# Patient Record
Sex: FEMALE | Race: WHITE | NOT HISPANIC OR LATINO | Employment: PART TIME | URBAN - METROPOLITAN AREA
[De-identification: names, ages, dates, MRNs, and addresses within clinical notes are randomized per-mention and may not be internally consistent; named-entity substitution may affect disease eponyms.]

---

## 2017-01-13 ENCOUNTER — OFFICE VISIT (OUTPATIENT)
Dept: FAMILY MEDICINE CLINIC | Facility: CLINIC | Age: 62
End: 2017-01-13

## 2017-01-13 VITALS
WEIGHT: 243.2 LBS | DIASTOLIC BLOOD PRESSURE: 86 MMHG | BODY MASS INDEX: 38.17 KG/M2 | HEIGHT: 67 IN | TEMPERATURE: 98.1 F | SYSTOLIC BLOOD PRESSURE: 124 MMHG

## 2017-01-13 DIAGNOSIS — T63.301D SPIDER BITE, ACCIDENTAL OR UNINTENTIONAL, SUBSEQUENT ENCOUNTER: Primary | ICD-10-CM

## 2017-01-13 PROCEDURE — 99213 OFFICE O/P EST LOW 20 MIN: CPT | Performed by: FAMILY MEDICINE

## 2017-01-13 RX ORDER — DAPSONE 100 MG/1
TABLET ORAL
Qty: 6 TABLET | Refills: 0 | Status: SHIPPED | OUTPATIENT
Start: 2017-01-13 | End: 2017-09-29

## 2017-01-13 NOTE — PROGRESS NOTES
Chief Complaint   Patient presents with   • Cellulitis     follow up       Subjective.../HPI  Patient present today with cellullitis from spider bite on lower abdomen.    I have reviewed the patient's medical history in detail and updated the computerized patient record.    No family history on file.    Social History     Social History   • Marital status:      Spouse name: N/A   • Number of children: N/A   • Years of education: N/A     Occupational History   • Not on file.     Social History Main Topics   • Smoking status: Former Smoker   • Smokeless tobacco: Never Used   • Alcohol use No   • Drug use: No   • Sexual activity: Defer     Other Topics Concern   • Not on file     Social History Narrative       Review of Systems:   Review of Systems   Constitutional: Negative for chills, fatigue, fever and unexpected weight change.   HENT: Negative for ear pain, hearing loss, sinus pressure, sore throat and tinnitus.    Eyes: Negative for pain, discharge and redness.   Respiratory: Negative for cough, shortness of breath and wheezing.    Cardiovascular: Negative for chest pain, palpitations and leg swelling.   Gastrointestinal: Negative for abdominal pain, constipation, diarrhea and nausea.   Endocrine: Negative for cold intolerance and heat intolerance.   Genitourinary: Negative for difficulty urinating, flank pain and urgency.   Musculoskeletal: Negative for back pain, joint swelling and myalgias.   Skin: Negative for rash and wound.   Allergic/Immunologic: Negative for environmental allergies and food allergies.   Neurological: Negative for dizziness, seizures, numbness and headaches.   Hematological: Negative for adenopathy. Does not bruise/bleed easily.   Psychiatric/Behavioral: Negative for decreased concentration, dysphoric mood and sleep disturbance. The patient is not nervous/anxious.    All other systems reviewed and are negative.      Objective:  Vital Signs     Vitals:    01/13/17 1626   BP: 124/86  "  BP Location: Left arm   Patient Position: Sitting   Temp: 98.1 °F (36.7 °C)   TempSrc: Oral   Weight: 243 lb 3.2 oz (110 kg)   Height: 67\" (170.2 cm)     Physical Exam   Constitutional: She is oriented to person, place, and time. She appears well-developed and well-nourished.   Eyes: Pupils are equal, round, and reactive to light.   Neck: Normal range of motion.   Cardiovascular: Normal rate.    Pulmonary/Chest: Effort normal.   Abdominal: Soft.   Musculoskeletal: Normal range of motion.   Neurological: She is alert and oriented to person, place, and time.   Skin: Skin is warm and dry. Rash (large lesion is 3x2 cm) noted.   Lower abd        Results Review:      REVIEWED AND DISCUSSED CLINICAL RESULTS WITH PATIENT                          Current Outpatient Prescriptions:   •  azelastine (OPTIVAR) 0.05 % ophthalmic solution, Apply  to eye 2 (Two) Times a Day. 1 (one) solution, Disp: , Rfl:   •  JASON 5-40 MG per tablet, Take 1 tablet by mouth daily., Disp: 30 tablet, Rfl: 11  •  ENABLEX 15 MG 24 hr tablet, Take 1 tablet by mouth daily., Disp: 30 tablet, Rfl: 11  •  Halobetasol Propionate (ULTRAVATE EX), Apply 45 g topically 2 (Two) Times a Day. Med Name: Ultravate, 0.05%, Disp: , Rfl:   •  mupirocin (BACTROBAN) 2 % ointment, APPLY TO AFFECTED AREA 3 TIMES A DAY, Disp: , Rfl: 2  •  SEROQUEL XR 50 MG tablet sustained-release 24 hour tablet, , Disp: , Rfl:   •  dapsone 100 MG tablet, 1/2 tablet x 2 days and 1 tablet x 5 days, Disp: 6 tablet, Rfl: 0    Procedures    Assessment/Plan     Diagnoses and all orders for this visit:    Spider bite, accidental or unintentional, subsequent encounter    Other orders  -     dapsone 100 MG tablet; 1/2 tablet x 2 days and 1 tablet x 5 days         Asif Robertson Jr., MD  01/13/17  6:05 PM          "

## 2017-01-16 ENCOUNTER — TELEPHONE (OUTPATIENT)
Dept: FAMILY MEDICINE CLINIC | Facility: CLINIC | Age: 62
End: 2017-01-16

## 2017-01-16 RX ORDER — LEVOFLOXACIN 500 MG/1
500 TABLET, FILM COATED ORAL DAILY
Qty: 19 TABLET | Refills: 0 | Status: SHIPPED | OUTPATIENT
Start: 2017-01-16 | End: 2019-05-01

## 2017-01-16 NOTE — TELEPHONE ENCOUNTER
----- Message from Venus Muhammad sent at 1/16/2017  9:34 AM EST -----   [phone]  [facility] [phone]  Allergic to sulfa and codeine drugs  She saw dr goodman on the 13th and was given dapsone and they said it contains sulfa drug and she is allergic to it. And wants to know if she can get something else called in for her.

## 2017-07-17 RX ORDER — AMLODIPINE BESYLATE AND OLMESARTAN MEDOXOMIL 5; 40 MG/1; MG/1
1 TABLET, FILM COATED ORAL DAILY
Qty: 30 TABLET | Refills: 3 | Status: SHIPPED | OUTPATIENT
Start: 2017-07-17 | End: 2017-07-17 | Stop reason: SDUPTHER

## 2017-07-17 RX ORDER — AMLODIPINE BESYLATE AND OLMESARTAN MEDOXOMIL 5; 40 MG/1; MG/1
1 TABLET, FILM COATED ORAL DAILY
Qty: 30 TABLET | Refills: 3 | Status: SHIPPED | OUTPATIENT
Start: 2017-07-17 | End: 2017-07-18 | Stop reason: SDUPTHER

## 2017-07-18 RX ORDER — AMLODIPINE BESYLATE AND OLMESARTAN MEDOXOMIL 5; 40 MG/1; MG/1
1 TABLET, FILM COATED ORAL DAILY
Qty: 30 TABLET | Refills: 3 | Status: SHIPPED | OUTPATIENT
Start: 2017-07-18 | End: 2017-09-29 | Stop reason: SDUPTHER

## 2017-09-29 ENCOUNTER — OFFICE VISIT (OUTPATIENT)
Dept: FAMILY MEDICINE CLINIC | Facility: CLINIC | Age: 62
End: 2017-09-29

## 2017-09-29 VITALS
DIASTOLIC BLOOD PRESSURE: 60 MMHG | TEMPERATURE: 97.5 F | BODY MASS INDEX: 38.48 KG/M2 | HEART RATE: 65 BPM | HEIGHT: 67 IN | WEIGHT: 245.2 LBS | SYSTOLIC BLOOD PRESSURE: 110 MMHG | OXYGEN SATURATION: 95 %

## 2017-09-29 DIAGNOSIS — N32.81 OVERACTIVE BLADDER: ICD-10-CM

## 2017-09-29 DIAGNOSIS — Z23 IMMUNIZATION DUE: Primary | ICD-10-CM

## 2017-09-29 DIAGNOSIS — I10 ESSENTIAL HYPERTENSION: ICD-10-CM

## 2017-09-29 PROCEDURE — 90686 IIV4 VACC NO PRSV 0.5 ML IM: CPT | Performed by: FAMILY MEDICINE

## 2017-09-29 PROCEDURE — 90471 IMMUNIZATION ADMIN: CPT | Performed by: FAMILY MEDICINE

## 2017-09-29 PROCEDURE — 99213 OFFICE O/P EST LOW 20 MIN: CPT | Performed by: FAMILY MEDICINE

## 2017-09-29 RX ORDER — AMLODIPINE BESYLATE AND OLMESARTAN MEDOXOMIL 5; 40 MG/1; MG/1
1 TABLET, FILM COATED ORAL DAILY
Qty: 30 TABLET | Refills: 11 | Status: SHIPPED | OUTPATIENT
Start: 2017-09-29 | End: 2017-09-29 | Stop reason: SDUPTHER

## 2017-09-29 RX ORDER — AMLODIPINE BESYLATE AND OLMESARTAN MEDOXOMIL 5; 40 MG/1; MG/1
1 TABLET, FILM COATED ORAL DAILY
Qty: 30 TABLET | Refills: 11 | Status: SHIPPED | OUTPATIENT
Start: 2017-09-29 | End: 2018-05-16 | Stop reason: SDUPTHER

## 2017-09-29 RX ORDER — AZELASTINE HYDROCHLORIDE 0.5 MG/ML
1 SOLUTION/ DROPS OPHTHALMIC 2 TIMES DAILY
Qty: 1 EACH | Refills: 11 | Status: SHIPPED | OUTPATIENT
Start: 2017-09-29 | End: 2020-05-01 | Stop reason: HOSPADM

## 2017-09-29 RX ORDER — FLUCONAZOLE 150 MG/1
150 TABLET ORAL ONCE
Qty: 12 TABLET | Refills: 5 | Status: SHIPPED | OUTPATIENT
Start: 2017-09-29 | End: 2017-09-29

## 2017-09-29 NOTE — PROGRESS NOTES
Subjective.../ HPI  I have reviewed the patient's medical history in detail and updated the computerized patient record.    Subjective: Francheska Sherman is a 62 y.o. female presents here today for-    Hypertension (follow up pt is doing well.no complains)  Francheska has a history of chronic hypertension and has been well controlled on current medications.  Patient reports has had hypertension for 4 years. She is tolerating medications without side effect. She reports no vision changes, headaches or lightheadedness. She is requesting refills of medications    History reviewed. No pertinent family history.    Social History     Social History   • Marital status:      Spouse name: N/A   • Number of children: N/A   • Years of education: N/A     Occupational History   • Not on file.     Social History Main Topics   • Smoking status: Former Smoker   • Smokeless tobacco: Never Used   • Alcohol use No   • Drug use: No   • Sexual activity: Defer     Other Topics Concern   • Not on file     Social History Narrative       Review of Systems   Constitutional: Negative.    HENT: Negative.    Eyes: Negative.    Respiratory: Negative.    Cardiovascular: Negative.    Gastrointestinal: Negative.    Endocrine: Negative.    Genitourinary: Negative.    Musculoskeletal: Negative.    Skin: Negative.    Allergic/Immunologic: Negative.    Neurological: Negative.    Hematological: Negative.    Psychiatric/Behavioral: Negative.        Physical Exam   Constitutional: She is oriented to person, place, and time. She appears well-developed and well-nourished.   Cardiovascular: Normal rate, regular rhythm, normal heart sounds and intact distal pulses.    Pulmonary/Chest: Effort normal and breath sounds normal.   Neurological: She is alert and oriented to person, place, and time.   Vitals reviewed.    Procedures    Francheska was seen today for hypertension.    Diagnoses and all orders for this visit:    Immunization due  -     Flu Vaccine Quad PF  3YR+    Overactive bladder  -     ENABLEX 15 MG 24 hr tablet; Take 1 tablet by mouth Daily.    Essential hypertension  -     Discontinue: JASON 5-40 MG per tablet; Take 1 tablet by mouth Daily.  -     JASON 5-40 MG per tablet; Take 1 tablet by mouth Daily.        Requested Prescriptions     Signed Prescriptions Disp Refills   • ENABLEX 15 MG 24 hr tablet 30 tablet 11     Sig: Take 1 tablet by mouth Daily.   • JASON 5-40 MG per tablet 30 tablet 11     Sig: Take 1 tablet by mouth Daily.       Results Review:    REVIEWED AND DISCUSSED CLINICAL RESULTS WITH PATIENT    Return in about 6 months (around 3/29/2018) for Recheck.

## 2017-10-12 ENCOUNTER — TELEPHONE (OUTPATIENT)
Dept: FAMILY MEDICINE CLINIC | Facility: CLINIC | Age: 62
End: 2017-10-12

## 2017-10-12 NOTE — TELEPHONE ENCOUNTER
Patients  called upset over cost of JASON 5-40mg over $200.00 can't afford please advise  Last office visit 9/29/17 she will be out of medication by the weekend, we have no samples of her dose.

## 2017-10-13 RX ORDER — IRBESARTAN 150 MG/1
150 TABLET ORAL NIGHTLY
Qty: 30 TABLET | Refills: 6 | Status: SHIPPED | OUTPATIENT
Start: 2017-10-13 | End: 2018-05-18

## 2017-10-13 RX ORDER — AMLODIPINE BESYLATE 5 MG/1
5 TABLET ORAL DAILY
Qty: 30 TABLET | Refills: 6 | Status: SHIPPED | OUTPATIENT
Start: 2017-10-13 | End: 2018-05-18

## 2018-05-16 DIAGNOSIS — N32.81 OVERACTIVE BLADDER: ICD-10-CM

## 2018-05-16 DIAGNOSIS — I10 ESSENTIAL HYPERTENSION: ICD-10-CM

## 2018-05-16 RX ORDER — AMLODIPINE BESYLATE AND OLMESARTAN MEDOXOMIL 5; 40 MG/1; MG/1
1 TABLET, FILM COATED ORAL DAILY
Qty: 30 TABLET | Refills: 11 | Status: SHIPPED | OUTPATIENT
Start: 2018-05-16 | End: 2019-05-01 | Stop reason: ALTCHOICE

## 2018-05-18 RX ORDER — SOLIFENACIN SUCCINATE 10 MG/1
10 TABLET, FILM COATED ORAL DAILY
Qty: 30 TABLET | Refills: 1 | Status: SHIPPED | OUTPATIENT
Start: 2018-05-18 | End: 2019-05-01

## 2018-11-13 DIAGNOSIS — N32.81 OVERACTIVE BLADDER: ICD-10-CM

## 2019-01-31 ENCOUNTER — HOSPITAL ENCOUNTER (EMERGENCY)
Facility: HOSPITAL | Age: 64
Discharge: PSYCHIATRIC OR SUBSTANCE USE FACILITY | End: 2019-02-01
Attending: EMERGENCY MEDICINE | Admitting: EMERGENCY MEDICINE

## 2019-01-31 LAB
ALBUMIN SERPL-MCNC: 4.5 G/DL (ref 3.5–5.2)
ALBUMIN/GLOB SERPL: 1.2 G/DL
ALP SERPL-CCNC: 39 U/L (ref 39–117)
ALT SERPL W P-5'-P-CCNC: 46 U/L (ref 1–33)
AMORPH URATE CRY URNS QL MICRO: ABNORMAL /HPF
AMPHET+METHAMPHET UR QL: NEGATIVE
ANION GAP SERPL CALCULATED.3IONS-SCNC: 14.7 MMOL/L
AST SERPL-CCNC: 35 U/L (ref 1–32)
BACTERIA UR QL AUTO: ABNORMAL /HPF
BARBITURATES UR QL SCN: NEGATIVE
BASOPHILS # BLD AUTO: 0.02 10*3/MM3 (ref 0–0.2)
BASOPHILS NFR BLD AUTO: 0.2 % (ref 0–1.5)
BENZODIAZ UR QL SCN: NEGATIVE
BILIRUB SERPL-MCNC: 0.3 MG/DL (ref 0.1–1.2)
BILIRUB UR QL STRIP: NEGATIVE
BUN BLD-MCNC: 31 MG/DL (ref 8–23)
BUN/CREAT SERPL: 27.9 (ref 7–25)
CALCIUM SPEC-SCNC: 9.9 MG/DL (ref 8.6–10.5)
CANNABINOIDS SERPL QL: NEGATIVE
CHLORIDE SERPL-SCNC: 103 MMOL/L (ref 98–107)
CLARITY UR: ABNORMAL
CO2 SERPL-SCNC: 23.3 MMOL/L (ref 22–29)
COCAINE UR QL: NEGATIVE
COLOR UR: YELLOW
CREAT BLD-MCNC: 1.11 MG/DL (ref 0.57–1)
DEPRECATED RDW RBC AUTO: 53.6 FL (ref 37–54)
EOSINOPHIL # BLD AUTO: 0.2 10*3/MM3 (ref 0–0.7)
EOSINOPHIL NFR BLD AUTO: 2 % (ref 0.3–6.2)
ERYTHROCYTE [DISTWIDTH] IN BLOOD BY AUTOMATED COUNT: 15.3 % (ref 11.7–13)
ETHANOL BLD-MCNC: <10 MG/DL (ref 0–10)
ETHANOL UR QL: <0.01 %
GFR SERPL CREATININE-BSD FRML MDRD: 50 ML/MIN/1.73
GLOBULIN UR ELPH-MCNC: 3.8 GM/DL
GLUCOSE BLD-MCNC: 106 MG/DL (ref 65–99)
GLUCOSE UR STRIP-MCNC: NEGATIVE MG/DL
HCT VFR BLD AUTO: 46.3 % (ref 35.6–45.5)
HGB BLD-MCNC: 14.3 G/DL (ref 11.9–15.5)
HGB UR QL STRIP.AUTO: ABNORMAL
HYALINE CASTS UR QL AUTO: ABNORMAL /LPF
IMM GRANULOCYTES # BLD AUTO: 0.03 10*3/MM3 (ref 0–0.03)
IMM GRANULOCYTES NFR BLD AUTO: 0.3 % (ref 0–0.5)
KETONES UR QL STRIP: ABNORMAL
LEUKOCYTE ESTERASE UR QL STRIP.AUTO: ABNORMAL
LYMPHOCYTES # BLD AUTO: 2.29 10*3/MM3 (ref 0.9–4.8)
LYMPHOCYTES NFR BLD AUTO: 22.8 % (ref 19.6–45.3)
MCH RBC QN AUTO: 29.7 PG (ref 26.9–32)
MCHC RBC AUTO-ENTMCNC: 30.9 G/DL (ref 32.4–36.3)
MCV RBC AUTO: 96.1 FL (ref 80.5–98.2)
METHADONE UR QL SCN: NEGATIVE
MONOCYTES # BLD AUTO: 0.85 10*3/MM3 (ref 0.2–1.2)
MONOCYTES NFR BLD AUTO: 8.5 % (ref 5–12)
NEUTROPHILS # BLD AUTO: 6.64 10*3/MM3 (ref 1.9–8.1)
NEUTROPHILS NFR BLD AUTO: 66.2 % (ref 42.7–76)
NITRITE UR QL STRIP: NEGATIVE
OPIATES UR QL: NEGATIVE
OXYCODONE UR QL SCN: NEGATIVE
PH UR STRIP.AUTO: <=5 [PH] (ref 5–8)
PLATELET # BLD AUTO: 279 10*3/MM3 (ref 140–500)
PMV BLD AUTO: 11.5 FL (ref 6–12)
POTASSIUM BLD-SCNC: 4.2 MMOL/L (ref 3.5–5.2)
PROT SERPL-MCNC: 8.3 G/DL (ref 6–8.5)
PROT UR QL STRIP: NEGATIVE
RBC # BLD AUTO: 4.82 10*6/MM3 (ref 3.9–5.2)
RBC # UR: ABNORMAL /HPF
REF LAB TEST METHOD: ABNORMAL
SODIUM BLD-SCNC: 141 MMOL/L (ref 136–145)
SP GR UR STRIP: 1.03 (ref 1–1.03)
SQUAMOUS #/AREA URNS HPF: ABNORMAL /HPF
UROBILINOGEN UR QL STRIP: ABNORMAL
WBC NRBC COR # BLD: 10.03 10*3/MM3 (ref 4.5–10.7)
WBC UR QL AUTO: ABNORMAL /HPF

## 2019-01-31 PROCEDURE — 80307 DRUG TEST PRSMV CHEM ANLYZR: CPT | Performed by: NURSE PRACTITIONER

## 2019-01-31 PROCEDURE — 80053 COMPREHEN METABOLIC PANEL: CPT | Performed by: NURSE PRACTITIONER

## 2019-01-31 PROCEDURE — 81001 URINALYSIS AUTO W/SCOPE: CPT | Performed by: NURSE PRACTITIONER

## 2019-01-31 PROCEDURE — 85025 COMPLETE CBC W/AUTO DIFF WBC: CPT | Performed by: NURSE PRACTITIONER

## 2019-01-31 PROCEDURE — 90791 PSYCH DIAGNOSTIC EVALUATION: CPT

## 2019-01-31 PROCEDURE — 36415 COLL VENOUS BLD VENIPUNCTURE: CPT

## 2019-01-31 PROCEDURE — 99285 EMERGENCY DEPT VISIT HI MDM: CPT

## 2019-01-31 RX ORDER — NITROFURANTOIN 25; 75 MG/1; MG/1
100 CAPSULE ORAL 2 TIMES DAILY
Qty: 14 CAPSULE | Refills: 0 | Status: SHIPPED | OUTPATIENT
Start: 2019-01-31 | End: 2019-05-01

## 2019-02-01 VITALS
HEART RATE: 82 BPM | RESPIRATION RATE: 18 BRPM | DIASTOLIC BLOOD PRESSURE: 64 MMHG | TEMPERATURE: 97 F | HEIGHT: 67 IN | SYSTOLIC BLOOD PRESSURE: 101 MMHG | BODY MASS INDEX: 38.4 KG/M2 | OXYGEN SATURATION: 93 %

## 2019-02-01 NOTE — ED NOTES
This RN spoke with HELENA Sutton at [facility] regarding prescription for Macrobid not included in pt's paperwork. Script faxed to [phone] per HELENA Sutton's instructions. Lesley MALIK also verbally informed of medication, dose, frequency and amount to be dispensed. Verbalized understanding.     Hilary Hunter RN  02/01/19 5679

## 2019-02-01 NOTE — CONSULTS
"62 yo white female evaluated in ED (Room#42) BIB her  Tate. Patient affect blunted, flat. Slow to respond verbally, positive for thought blocking. Onset 4-5 days ago per . States she has only slept approximately 8-9 hours in a 72 hour period. Last psychiatric inpatient admission was 4 years ago. Currently outpatient with Dr. Ernst. Patient states she is prescribed seroquel; recently increased from 1 50mg tab to 2 50mg tabs nightly.  states patient has never been diagnosed with bipolar disorder but believes she should be. States patient has been paranoid talking about monkeys on the roof messing with the electronics. Patient has been checking the doors frequently and under the bed. Patient went missing for 14 hours yesterday and was found walking down the street in the cold. Patient states she was driving around and ran out of gas. Asked patient what her destination was and she starts talking about the \"ice on the roads so I went back and forth\". Police advised  to pursue a MIW but  brought patient to the ED instead. Patient states she is very stressed about \"everyone fighting and talking about Trump\". Denies SI. Denies HI. Does appear overtly paranoid with delusional thinking.  states last time patient had an episode like this was 4 years ago.  concerned about patient's safety. States she can't be left alone like this. 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 Royal is currently on psychiatric diversion. Spoke with intake (rosaline) at [facility] and she states we can fax evaluation to them for review. Patient and  agreeable with plan of care.  "

## 2019-02-01 NOTE — ED NOTES
Pt and spouse updated on status. DENNY gives ETA of 0230. Food and beverage offered to pt, but declined. Recliner provided for pt due to not wanting to sit on stretcher. Beverage offered to spouse also, but declined.     Hilary Hunter, RN  02/01/19 0037

## 2019-02-01 NOTE — ED NOTES
Pt to room 28 awaiting disposition and possible admission to [facility]. Spouse at bedside.     Hilary Hunter RN  01/31/19 1575

## 2019-02-01 NOTE — ED PROVIDER NOTES
Pt care taken over from ROSEMARY Tam, pending a decision regarding transfer facility and placement.     TRANSFER    Patient will be transferred to [facility] for admission. Dr. Ernst has accepted the patient as a transfer and the patient will be transferred with a copy of their ED encounter notes and any EKGs, lab and radiology results. Patient stable enough for transport at the time of transfer.    Pt/family voiced understanding of the plan for transfer for further testing/treatment as needed.    Documentation assistance provided by carmella Lei for Cr Bergeron PA-C.  Information recorded by the carmella was done at my direction and has been verified and validated by me.     Radha Lei  01/31/19 8981       Cr Bergeron PA  01/31/19 6684

## 2019-02-01 NOTE — ED NOTES
Intake called at 's request, wants to know if he can give his wife her night meds (Griffin and Seroquel) per Meng, ok to give pt her night meds. Pt and spouse updated on status.      Hilary Hunter, RN  01/31/19 4055

## 2019-04-15 ENCOUNTER — TRANSCRIBE ORDERS (OUTPATIENT)
Dept: ADMINISTRATIVE | Facility: HOSPITAL | Age: 64
End: 2019-04-15

## 2019-04-15 DIAGNOSIS — K76.0 FATTY LIVER: Primary | ICD-10-CM

## 2019-04-15 DIAGNOSIS — B19.20 HEPATITIS C VIRUS INFECTION WITHOUT HEPATIC COMA, UNSPECIFIED CHRONICITY: ICD-10-CM

## 2019-04-18 ENCOUNTER — APPOINTMENT (OUTPATIENT)
Dept: CT IMAGING | Facility: HOSPITAL | Age: 64
End: 2019-04-18

## 2019-04-18 ENCOUNTER — TRANSCRIBE ORDERS (OUTPATIENT)
Dept: ADMINISTRATIVE | Facility: HOSPITAL | Age: 64
End: 2019-04-18

## 2019-04-18 DIAGNOSIS — R10.11 RUQ PAIN: Primary | ICD-10-CM

## 2019-05-01 ENCOUNTER — OFFICE VISIT (OUTPATIENT)
Dept: CARDIOLOGY | Age: 64
End: 2019-05-01

## 2019-05-01 VITALS
HEART RATE: 80 BPM | HEIGHT: 67 IN | BODY MASS INDEX: 38.14 KG/M2 | SYSTOLIC BLOOD PRESSURE: 135 MMHG | WEIGHT: 243 LBS | DIASTOLIC BLOOD PRESSURE: 88 MMHG

## 2019-05-01 DIAGNOSIS — R94.31 ABNORMAL EKG: Primary | ICD-10-CM

## 2019-05-01 DIAGNOSIS — I10 ESSENTIAL HYPERTENSION: ICD-10-CM

## 2019-05-01 DIAGNOSIS — R06.02 SOB (SHORTNESS OF BREATH): ICD-10-CM

## 2019-05-01 PROCEDURE — 99203 OFFICE O/P NEW LOW 30 MIN: CPT | Performed by: INTERNAL MEDICINE

## 2019-05-01 NOTE — PROGRESS NOTES
Subjective:        [facility]  Cardiology Consult Note    Patient Identification:  Name: Francheska Sherman  Age: 64 y.o.  Sex: female  :  1955  MRN: [id number]             CC  ABNORMAL EKG    History of Present Illness:   64-year-old female here for the cardiac evaluation as well as establishment of the care as the patient has been found to have abnormal EKG, by the primary care physician recently, also complaining the shortness of breath on exertion    Shortness of breath there is a mild on moderate exertion relieved with rest over the several months    Comorbid cardiac risk factors:     Past Medical History:  Past Medical History:   Diagnosis Date   • ADD (attention deficit disorder)    • Chest pain    • Encounter for annual health examination     Annual Health Assessment: 14, 10/25/13   • History of mammogram 2011   • Hyperactivity of bladder    • Hypertension    • Pain of right side of body     c/o pain in right side and back     Past Surgical History:  No past surgical history on file.   Allergies:  Allergies   Allergen Reactions   • Codeine    • Sulfa Antibiotics      Home Meds:    (Not in a hospital admission)  Current Meds:   [unfilled]  Social History:   Social History     Tobacco Use   • Smoking status: Former Smoker   • Smokeless tobacco: Never Used   Substance Use Topics   • Alcohol use: No      Family History:  No family history on file.     Review of Systems    Constitutional: No weakness,fatigue, fever, rigors, chills   Eyes: No vision changes, eye pain   ENT/oropharynx: No difficulty swallowing, sore throat, epistaxis, changes in hearing   Cardiovascular: No chest pain, chest tightness, palpitations, paroxysmal nocturnal dyspnea, orthopnea, diaphoresis, dizziness / syncopal episode   Respiratory:  Mild shortness of breath, dyspnea on exertion, no cough, wheezing hemoptysis   Gastrointestinal: No abdominal pain, nausea, vomiting, diarrhea, bloody stools   Genitourinary: No  hematuria, dysuria   Neurological: No headache, tremors, numbness,  one-sided weakness    Musculoskeletal: No cramps, myalgias,  joint pain, joint swelling   Integument: No rash, edema           Constitutional:  Heart Rate:  [80] 80  BP: (135)/(88) 135/88    Physical Exam   General:  Appears in no acute distress  Eyes: PERTL,  HEENT:  No JVD. Thyroid not visibly enlarged. No mucosal pallor or cyanosis  Respiratory: Respirations regular and unlabored at rest. BBS with good air entry in all fields. No crackles, rubs or wheezes auscultated  Cardiovascular: S1S2 Regular rate and rhythm. No murmur, rub or gallop auscultated. No carotid bruits. DP/PT pulses    . No pretibial pitting edema  Gastrointestinal: Abdomen soft, flat, non tender. Bowel sounds present. No hepatosplenomegaly. No ascites  Musculoskeletal: DALLAS x4. No abnormal movements  Extremities: No digital clubbing or cyanosis  Skin: Color pink. Skin warm and dry to touch. No rashes  No xanthoma  Neuro: AAO x3 CN II-XII grossly intact          Procedures        Cardiographics  ECG:     Telemetry:    Echocardiogram:     Imaging  Chest X-ray:     Lab Review               @[email or web address]@              Assessment:/ Recommendations / Plan:   Patient Active Problem List   Diagnosis   • Strain of left ankle   • Overactive bladder   • Essential hypertension                    ICD-10-CM ICD-9-CM   1. Abnormal EKG R94.31 794.31   2. SOB (shortness of breath) R06.02 786.05   3. Essential hypertension I10 401.9     1. Abnormal EKG  Considering the patient's symptoms as well as clinical situation and  EKG findings, along with cardiac risk factors, ischemic workup is necessary to rule out ischemic cardiomyopathy, stress induced arrhythmias, and functional capacity for diagnosis as well as prognostic consideration    - Stress Test With Myocardial Perfusion (1 Day)  - Adult Transthoracic Echo Complete W/ Cont if Necessary Per Protocol    2. SOB (shortness of breath)  Considering  patient's medical condition as well as the risk factors, patient will require echocardiogram for further evaluation for the LV function, four-chamber evaluation, including the pressures, valvular function and  pericardial disease and pericardial effusion    - Stress Test With Myocardial Perfusion (1 Day)  - Adult Transthoracic Echo Complete W/ Cont if Necessary Per Protocol    3. Essential hypertension  The blood pressure stable  - Stress Test With Myocardial Perfusion (1 Day)  - Adult Transthoracic Echo Complete W/ Cont if Necessary Per Protocol       WALKING LEXISCA, ECHO    SEE IN 2-4 WKS    Labs/tests ordered for am      Karely Zee MD  5/1/2019, 3:56 PM      EMR Dragon/Transcription:   Dictated utilizing Dragon dictation

## 2019-05-06 ENCOUNTER — OFFICE (OUTPATIENT)
Dept: URBAN - METROPOLITAN AREA CLINIC 66 | Facility: CLINIC | Age: 64
End: 2019-05-06

## 2019-05-06 VITALS
WEIGHT: 245 LBS | HEIGHT: 67 IN | HEART RATE: 72 BPM | DIASTOLIC BLOOD PRESSURE: 90 MMHG | SYSTOLIC BLOOD PRESSURE: 130 MMHG

## 2019-05-06 DIAGNOSIS — R10.11 RIGHT UPPER QUADRANT PAIN: ICD-10-CM

## 2019-05-06 DIAGNOSIS — K30 FUNCTIONAL DYSPEPSIA: ICD-10-CM

## 2019-05-06 DIAGNOSIS — R19.4 CHANGE IN BOWEL HABIT: ICD-10-CM

## 2019-05-06 DIAGNOSIS — E66.9 OBESITY, UNSPECIFIED: ICD-10-CM

## 2019-05-06 DIAGNOSIS — Z12.11 ENCOUNTER FOR SCREENING FOR MALIGNANT NEOPLASM OF COLON: ICD-10-CM

## 2019-05-06 DIAGNOSIS — K21.9 GASTRO-ESOPHAGEAL REFLUX DISEASE WITHOUT ESOPHAGITIS: ICD-10-CM

## 2019-05-06 DIAGNOSIS — K76.0 FATTY (CHANGE OF) LIVER, NOT ELSEWHERE CLASSIFIED: ICD-10-CM

## 2019-05-06 PROCEDURE — 99243 OFF/OP CNSLTJ NEW/EST LOW 30: CPT | Performed by: NURSE PRACTITIONER

## 2019-05-22 ENCOUNTER — HOSPITAL ENCOUNTER (OUTPATIENT)
Dept: CARDIOLOGY | Facility: HOSPITAL | Age: 64
Discharge: HOME | End: 2019-05-22
Admitting: INTERNAL MEDICINE

## 2019-05-22 ENCOUNTER — HOSPITAL ENCOUNTER (OUTPATIENT)
Dept: CARDIOLOGY | Facility: HOSPITAL | Age: 64
Discharge: HOME | End: 2019-05-22

## 2019-05-22 VITALS
WEIGHT: 243 LBS | DIASTOLIC BLOOD PRESSURE: 87 MMHG | SYSTOLIC BLOOD PRESSURE: 131 MMHG | HEART RATE: 74 BPM | HEIGHT: 67 IN | BODY MASS INDEX: 38.14 KG/M2

## 2019-05-22 VITALS
SYSTOLIC BLOOD PRESSURE: 140 MMHG | RESPIRATION RATE: 18 BRPM | WEIGHT: 243 LBS | HEIGHT: 67 IN | HEART RATE: 68 BPM | DIASTOLIC BLOOD PRESSURE: 70 MMHG | BODY MASS INDEX: 38.14 KG/M2 | OXYGEN SATURATION: 95 %

## 2019-05-22 LAB
BH CV ECHO MEAS - ACS: 2 CM
BH CV ECHO MEAS - AI DEC SLOPE: 231 CM/SEC^2
BH CV ECHO MEAS - AI MAX PG: 56 MMHG
BH CV ECHO MEAS - AI MAX VEL: 374 CM/SEC
BH CV ECHO MEAS - AI P1/2T: 474.2 MSEC
BH CV ECHO MEAS - AO MAX PG (FULL): 0.9 MMHG
BH CV ECHO MEAS - AO MAX PG: 8.4 MMHG
BH CV ECHO MEAS - AO MEAN PG (FULL): 0 MMHG
BH CV ECHO MEAS - AO MEAN PG: 4 MMHG
BH CV ECHO MEAS - AO ROOT AREA (BSA CORRECTED): 1.3
BH CV ECHO MEAS - AO ROOT AREA: 6.6 CM^2
BH CV ECHO MEAS - AO ROOT DIAM: 2.9 CM
BH CV ECHO MEAS - AO V2 MAX: 145 CM/SEC
BH CV ECHO MEAS - AO V2 MEAN: 96.8 CM/SEC
BH CV ECHO MEAS - AO V2 VTI: 28.6 CM
BH CV ECHO MEAS - ASC AORTA: 3.1 CM
BH CV ECHO MEAS - AVA(I,A): 2.9 CM^2
BH CV ECHO MEAS - AVA(I,D): 2.9 CM^2
BH CV ECHO MEAS - AVA(V,A): 3 CM^2
BH CV ECHO MEAS - AVA(V,D): 3 CM^2
BH CV ECHO MEAS - BSA(HAYCOCK): 2.3 M^2
BH CV ECHO MEAS - BSA: 2.2 M^2
BH CV ECHO MEAS - BZI_BMI: 38.1 KILOGRAMS/M^2
BH CV ECHO MEAS - BZI_METRIC_HEIGHT: 170.2 CM
BH CV ECHO MEAS - BZI_METRIC_WEIGHT: 110.2 KG
BH CV ECHO MEAS - EDV(CUBED): 91.1 ML
BH CV ECHO MEAS - EDV(MOD-SP2): 62 ML
BH CV ECHO MEAS - EDV(MOD-SP4): 78 ML
BH CV ECHO MEAS - EDV(TEICH): 92.4 ML
BH CV ECHO MEAS - EF(CUBED): 67.3 %
BH CV ECHO MEAS - EF(MOD-BP): 62 %
BH CV ECHO MEAS - EF(MOD-SP2): 59.7 %
BH CV ECHO MEAS - EF(MOD-SP4): 61.5 %
BH CV ECHO MEAS - EF(TEICH): 59 %
BH CV ECHO MEAS - ESV(CUBED): 29.8 ML
BH CV ECHO MEAS - ESV(MOD-SP2): 25 ML
BH CV ECHO MEAS - ESV(MOD-SP4): 30 ML
BH CV ECHO MEAS - ESV(TEICH): 37.9 ML
BH CV ECHO MEAS - FS: 31.1 %
BH CV ECHO MEAS - IVS/LVPW: 0.85
BH CV ECHO MEAS - IVSD: 1.1 CM
BH CV ECHO MEAS - LAT PEAK E' VEL: 9.4 CM/SEC
BH CV ECHO MEAS - LV DIASTOLIC VOL/BSA (35-75): 35.5 ML/M^2
BH CV ECHO MEAS - LV MASS(C)D: 198.1 GRAMS
BH CV ECHO MEAS - LV MASS(C)DI: 90.2 GRAMS/M^2
BH CV ECHO MEAS - LV MAX PG: 7.5 MMHG
BH CV ECHO MEAS - LV MEAN PG: 4 MMHG
BH CV ECHO MEAS - LV SYSTOLIC VOL/BSA (12-30): 13.7 ML/M^2
BH CV ECHO MEAS - LV V1 MAX: 137 CM/SEC
BH CV ECHO MEAS - LV V1 MEAN: 90.7 CM/SEC
BH CV ECHO MEAS - LV V1 VTI: 26.1 CM
BH CV ECHO MEAS - LVIDD: 4.5 CM
BH CV ECHO MEAS - LVIDS: 3.1 CM
BH CV ECHO MEAS - LVLD AP2: 6.3 CM
BH CV ECHO MEAS - LVLD AP4: 6.7 CM
BH CV ECHO MEAS - LVLS AP2: 5.8 CM
BH CV ECHO MEAS - LVLS AP4: 5.5 CM
BH CV ECHO MEAS - LVOT AREA (M): 3.1 CM^2
BH CV ECHO MEAS - LVOT AREA: 3.1 CM^2
BH CV ECHO MEAS - LVOT DIAM: 2 CM
BH CV ECHO MEAS - LVPWD: 1.3 CM
BH CV ECHO MEAS - MED PEAK E' VEL: 8.3 CM/SEC
BH CV ECHO MEAS - MV A DUR: 0.17 SEC
BH CV ECHO MEAS - MV A MAX VEL: 109 CM/SEC
BH CV ECHO MEAS - MV DEC SLOPE: 484 CM/SEC^2
BH CV ECHO MEAS - MV DEC TIME: 0.15 SEC
BH CV ECHO MEAS - MV E MAX VEL: 101 CM/SEC
BH CV ECHO MEAS - MV E/A: 0.93
BH CV ECHO MEAS - MV MAX PG: 5.3 MMHG
BH CV ECHO MEAS - MV MEAN PG: 2 MMHG
BH CV ECHO MEAS - MV P1/2T MAX VEL: 104 CM/SEC
BH CV ECHO MEAS - MV P1/2T: 62.9 MSEC
BH CV ECHO MEAS - MV V2 MAX: 115 CM/SEC
BH CV ECHO MEAS - MV V2 MEAN: 71.7 CM/SEC
BH CV ECHO MEAS - MV V2 VTI: 30.9 CM
BH CV ECHO MEAS - MVA P1/2T LCG: 2.1 CM^2
BH CV ECHO MEAS - MVA(P1/2T): 3.5 CM^2
BH CV ECHO MEAS - MVA(VTI): 2.7 CM^2
BH CV ECHO MEAS - PA ACC TIME: 0.12 SEC
BH CV ECHO MEAS - PA MAX PG (FULL): 2.3 MMHG
BH CV ECHO MEAS - PA MAX PG: 4.8 MMHG
BH CV ECHO MEAS - PA PR(ACCEL): 26.8 MMHG
BH CV ECHO MEAS - PA V2 MAX: 110 CM/SEC
BH CV ECHO MEAS - PI END-D VEL: 145 CM/SEC
BH CV ECHO MEAS - PULM A REVS DUR: 0.14 SEC
BH CV ECHO MEAS - PULM A REVS VEL: 33 CM/SEC
BH CV ECHO MEAS - PULM DIAS VEL: 45.6 CM/SEC
BH CV ECHO MEAS - PULM S/D: 1.5
BH CV ECHO MEAS - PULM SYS VEL: 66.5 CM/SEC
BH CV ECHO MEAS - PVA(V,A): 2.8 CM^2
BH CV ECHO MEAS - PVA(V,D): 2.8 CM^2
BH CV ECHO MEAS - QP/QS: 0.7
BH CV ECHO MEAS - RAP SYSTOLE: 3 MMHG
BH CV ECHO MEAS - RV MAX PG: 2.5 MMHG
BH CV ECHO MEAS - RV MEAN PG: 1 MMHG
BH CV ECHO MEAS - RV V1 MAX: 79.8 CM/SEC
BH CV ECHO MEAS - RV V1 MEAN: 53.3 CM/SEC
BH CV ECHO MEAS - RV V1 VTI: 15.1 CM
BH CV ECHO MEAS - RVOT AREA: 3.8 CM^2
BH CV ECHO MEAS - RVOT DIAM: 2.2 CM
BH CV ECHO MEAS - RVSP: 33.9 MMHG
BH CV ECHO MEAS - SI(AO): 86 ML/M^2
BH CV ECHO MEAS - SI(CUBED): 27.9 ML/M^2
BH CV ECHO MEAS - SI(LVOT): 37.3 ML/M^2
BH CV ECHO MEAS - SI(MOD-SP2): 16.8 ML/M^2
BH CV ECHO MEAS - SI(MOD-SP4): 21.9 ML/M^2
BH CV ECHO MEAS - SI(TEICH): 24.8 ML/M^2
BH CV ECHO MEAS - SV(AO): 188.9 ML
BH CV ECHO MEAS - SV(CUBED): 61.3 ML
BH CV ECHO MEAS - SV(LVOT): 82 ML
BH CV ECHO MEAS - SV(MOD-SP2): 37 ML
BH CV ECHO MEAS - SV(MOD-SP4): 48 ML
BH CV ECHO MEAS - SV(RVOT): 57.4 ML
BH CV ECHO MEAS - SV(TEICH): 54.5 ML
BH CV ECHO MEAS - TAPSE (>1.6): 1.9 CM2
BH CV ECHO MEAS - TR MAX VEL: 278 CM/SEC
BH CV ECHO MEASUREMENTS AVERAGE E/E' RATIO: 11.41
BH CV XLRA - RV BASE: 3 CM
BH CV XLRA - RV LENGTH: 6.9 CM
BH CV XLRA - RV MID: 2.7 CM
BH CV XLRA - TDI S': 11.6 CM/SEC
LEFT ATRIUM VOLUME INDEX: 18 ML/M2
MAXIMAL PREDICTED HEART RATE: 158 BPM
STRESS TARGET HR: 134 BPM

## 2019-05-22 PROCEDURE — 0 TECHNETIUM SESTAMIBI: Performed by: INTERNAL MEDICINE

## 2019-05-22 PROCEDURE — 93306 TTE W/DOPPLER COMPLETE: CPT

## 2019-05-22 PROCEDURE — 93306 TTE W/DOPPLER COMPLETE: CPT | Performed by: INTERNAL MEDICINE

## 2019-05-22 PROCEDURE — A9500 TC99M SESTAMIBI: HCPCS | Performed by: INTERNAL MEDICINE

## 2019-05-22 PROCEDURE — 78452 HT MUSCLE IMAGE SPECT MULT: CPT

## 2019-05-22 PROCEDURE — 93018 CV STRESS TEST I&R ONLY: CPT | Performed by: INTERNAL MEDICINE

## 2019-05-22 PROCEDURE — 78452 HT MUSCLE IMAGE SPECT MULT: CPT | Performed by: INTERNAL MEDICINE

## 2019-05-22 PROCEDURE — 93016 CV STRESS TEST SUPVJ ONLY: CPT | Performed by: NURSE PRACTITIONER

## 2019-05-22 PROCEDURE — 93017 CV STRESS TEST TRACING ONLY: CPT

## 2019-05-22 RX ORDER — AMLODIPINE BESYLATE 5 MG/1
5 TABLET ORAL DAILY PRN
Refills: 0 | COMMUNITY
Start: 2019-04-15

## 2019-05-22 RX ORDER — LOSARTAN POTASSIUM 50 MG/1
50 TABLET ORAL DAILY
COMMUNITY
End: 2020-05-01 | Stop reason: HOSPADM

## 2019-05-22 RX ORDER — QUETIAPINE 150 MG/1
TABLET, EXTENDED RELEASE ORAL
Refills: 2 | COMMUNITY
Start: 2019-05-16 | End: 2020-05-01 | Stop reason: HOSPADM

## 2019-05-22 RX ORDER — FLUCONAZOLE 150 MG/1
TABLET ORAL
Refills: 4 | Status: ON HOLD | COMMUNITY
Start: 2019-05-13 | End: 2020-04-30

## 2019-05-22 RX ADMIN — TECHNETIUM TC 99M SESTAMIBI 1 DOSE: 1 INJECTION INTRAVENOUS at 10:49

## 2019-05-22 RX ADMIN — TECHNETIUM TC 99M SESTAMIBI 1 DOSE: 1 INJECTION INTRAVENOUS at 07:47

## 2019-05-23 LAB
BH CV STRESS BP STAGE 1: NORMAL
BH CV STRESS BP STAGE 2: NORMAL
BH CV STRESS DURATION MIN STAGE 1: 3
BH CV STRESS DURATION MIN STAGE 2: 2
BH CV STRESS DURATION SEC STAGE 1: 30
BH CV STRESS DURATION SEC STAGE 2: 2
BH CV STRESS GRADE STAGE 1: 10
BH CV STRESS GRADE STAGE 2: 12
BH CV STRESS HR STAGE 1: 117
BH CV STRESS HR STAGE 2: 137
BH CV STRESS METS STAGE 1: 4.1
BH CV STRESS METS STAGE 2: 7
BH CV STRESS PROTOCOL 1: NORMAL
BH CV STRESS RECOVERY BP: NORMAL MMHG
BH CV STRESS RECOVERY HR: 89 BPM
BH CV STRESS RECOVERY O2: 95 %
BH CV STRESS SPEED STAGE 1: 1.7
BH CV STRESS SPEED STAGE 2: 2.5
BH CV STRESS STAGE 1: 1
BH CV STRESS STAGE 2: 2
LV EF NUC BP: 60 %
MAXIMAL PREDICTED HEART RATE: 158 BPM
PERCENT MAX PREDICTED HR: 86.71 %
STRESS BASELINE BP: NORMAL MMHG
STRESS BASELINE HR: 85 BPM
STRESS O2 SAT REST: 95 %
STRESS PERCENT HR: 102 %
STRESS POST ESTIMATED WORKLOAD: 7 METS
STRESS POST EXERCISE DUR MIN: 6 MIN
STRESS POST EXERCISE DUR SEC: 2 SEC
STRESS POST PEAK BP: NORMAL MMHG
STRESS POST PEAK HR: 137 BPM
STRESS TARGET HR: 134 BPM

## 2019-06-11 ENCOUNTER — ON CAMPUS - OUTPATIENT (OUTPATIENT)
Dept: URBAN - METROPOLITAN AREA HOSPITAL 114 | Facility: HOSPITAL | Age: 64
End: 2019-06-11
Payer: COMMERCIAL

## 2019-06-11 ENCOUNTER — ANESTHESIA (OUTPATIENT)
Dept: GASTROENTEROLOGY | Facility: HOSPITAL | Age: 64
End: 2019-06-11

## 2019-06-11 ENCOUNTER — HOSPITAL ENCOUNTER (OUTPATIENT)
Facility: HOSPITAL | Age: 64
Setting detail: HOSPITAL OUTPATIENT SURGERY
Discharge: HOME | End: 2019-06-11
Attending: INTERNAL MEDICINE | Admitting: INTERNAL MEDICINE

## 2019-06-11 ENCOUNTER — ANESTHESIA EVENT (OUTPATIENT)
Dept: GASTROENTEROLOGY | Facility: HOSPITAL | Age: 64
End: 2019-06-11

## 2019-06-11 VITALS
TEMPERATURE: 97.8 F | BODY MASS INDEX: 38.77 KG/M2 | HEART RATE: 72 BPM | SYSTOLIC BLOOD PRESSURE: 138 MMHG | DIASTOLIC BLOOD PRESSURE: 77 MMHG | RESPIRATION RATE: 16 BRPM | WEIGHT: 247 LBS | HEIGHT: 67 IN | OXYGEN SATURATION: 93 %

## 2019-06-11 DIAGNOSIS — K31.89 OTHER DISEASES OF STOMACH AND DUODENUM: ICD-10-CM

## 2019-06-11 DIAGNOSIS — K62.1 RECTAL POLYP: ICD-10-CM

## 2019-06-11 DIAGNOSIS — K21.9 ACID REFLUX: ICD-10-CM

## 2019-06-11 DIAGNOSIS — R12 HEARTBURN: ICD-10-CM

## 2019-06-11 DIAGNOSIS — K63.5 POLYP OF COLON: ICD-10-CM

## 2019-06-11 DIAGNOSIS — K21.9 GASTRO-ESOPHAGEAL REFLUX DISEASE WITHOUT ESOPHAGITIS: ICD-10-CM

## 2019-06-11 DIAGNOSIS — Z12.11 SCREENING FOR COLON CANCER: ICD-10-CM

## 2019-06-11 DIAGNOSIS — Z12.11 ENCOUNTER FOR SCREENING FOR MALIGNANT NEOPLASM OF COLON: ICD-10-CM

## 2019-06-11 DIAGNOSIS — D12.2 BENIGN NEOPLASM OF ASCENDING COLON: ICD-10-CM

## 2019-06-11 PROCEDURE — 88305 TISSUE EXAM BY PATHOLOGIST: CPT | Performed by: INTERNAL MEDICINE

## 2019-06-11 PROCEDURE — 43239 EGD BIOPSY SINGLE/MULTIPLE: CPT | Performed by: INTERNAL MEDICINE

## 2019-06-11 PROCEDURE — 25010000002 PROPOFOL 1000 MG/ML EMULSION: Performed by: ANESTHESIOLOGY

## 2019-06-11 PROCEDURE — 45380 COLONOSCOPY AND BIOPSY: CPT | Mod: 33 | Performed by: INTERNAL MEDICINE

## 2019-06-11 PROCEDURE — 25010000002 PROPOFOL 10 MG/ML EMULSION: Performed by: ANESTHESIOLOGY

## 2019-06-11 RX ORDER — DIPHENHYDRAMINE HYDROCHLORIDE 50 MG/ML
6.25 INJECTION INTRAMUSCULAR; INTRAVENOUS
Status: DISCONTINUED | OUTPATIENT
Start: 2019-06-11 | End: 2019-06-11 | Stop reason: HOSPADM

## 2019-06-11 RX ORDER — EPHEDRINE SULFATE 50 MG/ML
5 INJECTION, SOLUTION INTRAVENOUS ONCE AS NEEDED
Status: DISCONTINUED | OUTPATIENT
Start: 2019-06-11 | End: 2019-06-11 | Stop reason: HOSPADM

## 2019-06-11 RX ORDER — FENTANYL CITRATE 50 UG/ML
25 INJECTION, SOLUTION INTRAMUSCULAR; INTRAVENOUS
Status: DISCONTINUED | OUTPATIENT
Start: 2019-06-11 | End: 2019-06-11 | Stop reason: HOSPADM

## 2019-06-11 RX ORDER — SODIUM CHLORIDE, SODIUM LACTATE, POTASSIUM CHLORIDE, CALCIUM CHLORIDE 600; 310; 30; 20 MG/100ML; MG/100ML; MG/100ML; MG/100ML
30 INJECTION, SOLUTION INTRAVENOUS CONTINUOUS PRN
Status: DISCONTINUED | OUTPATIENT
Start: 2019-06-11 | End: 2019-06-11 | Stop reason: HOSPADM

## 2019-06-11 RX ORDER — ONDANSETRON 2 MG/ML
4 INJECTION INTRAMUSCULAR; INTRAVENOUS ONCE AS NEEDED
Status: DISCONTINUED | OUTPATIENT
Start: 2019-06-11 | End: 2019-06-11 | Stop reason: HOSPADM

## 2019-06-11 RX ORDER — HYDRALAZINE HYDROCHLORIDE 20 MG/ML
5 INJECTION INTRAMUSCULAR; INTRAVENOUS
Status: DISCONTINUED | OUTPATIENT
Start: 2019-06-11 | End: 2019-06-11 | Stop reason: HOSPADM

## 2019-06-11 RX ORDER — SODIUM CHLORIDE 0.9 % (FLUSH) 0.9 %
3-10 SYRINGE (ML) INJECTION AS NEEDED
Status: DISCONTINUED | OUTPATIENT
Start: 2019-06-11 | End: 2019-06-11 | Stop reason: HOSPADM

## 2019-06-11 RX ORDER — LIDOCAINE HYDROCHLORIDE 20 MG/ML
INJECTION, SOLUTION INFILTRATION; PERINEURAL AS NEEDED
Status: DISCONTINUED | OUTPATIENT
Start: 2019-06-11 | End: 2019-06-11 | Stop reason: SURG

## 2019-06-11 RX ORDER — PROPOFOL 10 MG/ML
VIAL (ML) INTRAVENOUS AS NEEDED
Status: DISCONTINUED | OUTPATIENT
Start: 2019-06-11 | End: 2019-06-11 | Stop reason: SURG

## 2019-06-11 RX ORDER — NALOXONE HCL 0.4 MG/ML
0.4 VIAL (ML) INJECTION AS NEEDED
Status: DISCONTINUED | OUTPATIENT
Start: 2019-06-11 | End: 2019-06-11 | Stop reason: HOSPADM

## 2019-06-11 RX ADMIN — SODIUM CHLORIDE, POTASSIUM CHLORIDE, SODIUM LACTATE AND CALCIUM CHLORIDE 30 ML/HR: 600; 310; 30; 20 INJECTION, SOLUTION INTRAVENOUS at 09:24

## 2019-06-11 RX ADMIN — PROPOFOL 200 MCG/KG/MIN: 10 INJECTION, EMULSION INTRAVENOUS at 09:35

## 2019-06-11 RX ADMIN — PROPOFOL 100 MG: 10 INJECTION, EMULSION INTRAVENOUS at 09:35

## 2019-06-11 RX ADMIN — LIDOCAINE HYDROCHLORIDE 60 MG: 20 INJECTION, SOLUTION INFILTRATION; PERINEURAL at 09:35

## 2019-06-11 NOTE — H&P
Patient Care Team:  Toñito Loja MD as PCP - General (Internal Medicine)    Chief complaint Gastroesophageal reflux diseaseAge related colon cancer screening    Subjective     History of Present Illness  The patient presents with no gastrointestinal  Symptoms or issues at this time   Review of Systems   Constitutional: Positive for fatigue.        Past Medical History:   Diagnosis Date   • ADD (attention deficit disorder)    • ARDS survivor    • Chest pain    • Encounter for annual health examination     Annual Health Assessment: 14, 10/25/13   • GERD (gastroesophageal reflux disease)    • History of mammogram 2011   • Hyperactivity of bladder    • Hypertension    • Pain of right side of body     c/o pain in right side and back   • Sepsis (CMS/HCC)      Past Surgical History:   Procedure Laterality Date   • APPENDECTOMY     •  SECTION     • CHEST TUBE INSERTION     • CHOLECYSTECTOMY     • HYSTERECTOMY       History reviewed. No pertinent family history.  Social History     Tobacco Use   • Smoking status: Former Smoker   • Smokeless tobacco: Never Used   Substance Use Topics   • Alcohol use: No   • Drug use: No     No medications prior to admission.     Allergies:  Codeine and Sulfa antibiotics    Objective      Vital Signs  Temp:  [97.8 °F (36.6 °C)] 97.8 °F (36.6 °C)  Heart Rate:  [87] 87  Resp:  [14] 14  BP: (136)/() 136/82    Physical Exam   Constitutional: She is oriented to person, place, and time. She appears well-developed and well-nourished.   HENT:   Mouth/Throat: Oropharynx is clear and moist.   Eyes: Conjunctivae are normal.   Neck: Neck supple.   Cardiovascular: Normal rate and regular rhythm.   Pulmonary/Chest: Effort normal and breath sounds normal.   Abdominal: Soft. Bowel sounds are normal.   Neurological: She is alert and oriented to person, place, and time.   Skin: Skin is warm and dry.   Psychiatric: She has a normal mood and affect.       Results  Review:   None      Assessment/Plan       * No active hospital problems. *      Assessment:  (Gastroesophageal reflux disease  Age related colon cancer screening).     Plan:   (Upper and lower tract endoscopy, risks, alternatives and benefits dicussed  with patient and patient is agreeable to proceed.).       I discussed the patients findings and my recommendations with patient and nursing staff    Fernandez Hooks MD  06/11/19  10:12 AM

## 2019-06-11 NOTE — ANESTHESIA PREPROCEDURE EVALUATION
Anesthesia Evaluation     no history of anesthetic complications:               Airway   Mallampati: II  Neck ROM: full  no difficulty expected  Dental - normal exam     Pulmonary - normal exam   (+) a smoker Former, shortness of breath,   (-) COPD, asthma, sleep apnea    ROS comment: H/o ARDS  PE comment: nonlabored  Cardiovascular - normal exam    Rhythm: regular  Rate: normal    (+) hypertension 2 medications or greater, dysrhythmias (T-wave abnormlity on EKG-->ischemic workup negative),   (-) valvular problems/murmurs, past MI, CAD, angina      Neuro/Psych  (+) psychiatric history ADD,     (-) seizures, TIA, CVA  GI/Hepatic/Renal/Endo    (+)   hepatitis (treated) C, liver disease (h/o Hep C-->resolved w/ tx),   (-) GERD, no renal disease, diabetes, hypothyroidism, hyperthyroidism    Musculoskeletal (-) negative ROS    Abdominal    Substance History      OB/GYN          Other                      Anesthesia Plan    ASA 3     MAC     Anesthetic plan, all risks, benefits, and alternatives have been provided, discussed and informed consent has been obtained with: patient.

## 2019-06-11 NOTE — ANESTHESIA POSTPROCEDURE EVALUATION
"Patient: Francheska Sherman    Procedure Summary     Date:  06/11/19 Room / Location:  [facility] ENDOSCOPY 9 /  [facility]    Anesthesia Start:  0931 Anesthesia Stop:  1014    Procedures:       ESOPHAGOGASTRODUODENOSCOPY with biopsies (N/A Esophagus)      COLONOSCOPY into cecum and TI with cold biopsy polypectomies (N/A ) Diagnosis:      Surgeon:  Fernandez Hooks MD Provider:  Rafita Araiza MD    Anesthesia Type:  MAC ASA Status:  3          Anesthesia Type: MAC  Last vitals  BP   127/82 (06/11/19 1013)   Temp   36.6 °C (97.8 °F) (06/11/19 0910)   Pulse   72 (06/11/19 1013)   Resp   16 (06/11/19 1013)     SpO2   93 % (06/11/19 1013)     Post Anesthesia Care and Evaluation    Patient location during evaluation: bedside  Patient participation: complete - patient participated  Level of consciousness: awake  Pain management: adequate  Airway patency: patent  Anesthetic complications: No anesthetic complications    Cardiovascular status: acceptable  Respiratory status: acceptable  Hydration status: acceptable    Comments: */82 (BP Location: Left arm, Patient Position: Lying)   Pulse 72   Temp 36.6 °C (97.8 °F) (Oral)   Resp 16   Ht 170.2 cm (67\")   Wt 112 kg (247 lb)   SpO2 93%   BMI 38.69 kg/m²         "

## 2019-06-11 NOTE — DISCHARGE INSTRUCTIONS
Colon Polyps  Polyps are tissue growths inside the body. Polyps can grow in many places, including the large intestine (colon). A polyp may be a round bump or a mushroom-shaped growth. You could have one polyp or several.  Most colon polyps are noncancerous (benign). However, some colon polyps can become cancerous over time.  What are the causes?  The exact cause of colon polyps is not known.  What increases the risk?  This condition is more likely to develop in people who:  · Have a family history of colon cancer or colon polyps.  · Are older than 50 or older than 45 if they are .  · Have inflammatory bowel disease, such as ulcerative colitis or Crohn disease.  · Are overweight.  · Smoke cigarettes.  · Do not get enough exercise.  · Drink too much alcohol.  · Eat a diet that is:  ? High in fat and red meat.  ? Low in fiber.  · Had childhood cancer that was treated with abdominal radiation.    What are the signs or symptoms?  Most polyps do not cause symptoms. If you have symptoms, they may include:  · Blood coming from your rectum when having a bowel movement.  · Blood in your stool. The stool may look dark red or black.  · A change in bowel habits, such as constipation or diarrhea.    How is this diagnosed?  This condition is diagnosed with a colonoscopy. This is a procedure that uses a lighted, flexible scope to look at the inside of your colon.  How is this treated?  Treatment for this condition involves removing any polyps that are found. Those polyps will then be tested for cancer. If cancer is found, your health care provider will talk to you about options for colon cancer treatment.  Follow these instructions at home:  Diet  · Eat plenty of fiber, such as fruits, vegetables, and whole grains.  · Eat foods that are high in calcium and vitamin D, such as milk, cheese, yogurt, eggs, liver, fish, and broccoli.  · Limit foods high in fat, red meats, and processed meats, such as hot dogs, sausage,  page, and lunch meats.  · Maintain a healthy weight, or lose weight if recommended by your health care provider.  General instructions  · Do not smoke cigarettes.  · Do not drink alcohol excessively.  · Keep all follow-up visits as told by your health care provider. This is important. This includes keeping regularly scheduled colonoscopies. Talk to your health care provider about when you need a colonoscopy.  · Exercise every day or as told by your health care provider.  Contact a health care provider if:  · You have new or worsening bleeding during a bowel movement.  · You have new or increased blood in your stool.  · You have a change in bowel habits.  · You unexpectedly lose weight.  This information is not intended to replace advice given to you by your health care provider. Make sure you discuss any questions you have with your health care provider.  Document Released: 09/13/2005 Document Revised: 05/25/2017 Document Reviewed: 11/07/2016  [employer] Interactive Patient Education © 2019 [employer]  Colonoscopy, Adult, Care After  This sheet gives you information about how to care for yourself after your procedure. Your health care provider may also give you more specific instructions. If you have problems or questions, contact your health care provider.  What can I expect after the procedure?  After the procedure, it is common to have:  · A small amount of blood in your stool for 24 hours after the procedure.  · Some gas.  · Mild abdominal cramping or bloating.    Follow these instructions at home:  General instructions    · For the first 24 hours after the procedure:  ? Do not drive or use machinery.  ? Do not sign important documents.  ? Do not drink alcohol.  ? Do your regular daily activities at a slower pace than normal.  ? Eat soft, easy-to-digest foods.  ? Rest often.  · Take over-the-counter or prescription medicines only as told by your health care provider.  · It is up to you to get the results of  your procedure. Ask your health care provider, or the department performing the procedure, when your results will be ready.  Relieving cramping and bloating  · Try walking around when you have cramps or feel bloated.  · Apply heat to your abdomen as told by your health care provider. Use a heat source that your health care provider recommends, such as a moist heat pack or a heating pad.  ? Place a towel between your skin and the heat source.  ? Leave the heat on for 20-30 minutes.  ? Remove the heat if your skin turns bright red. This is especially important if you are unable to feel pain, heat, or cold. You may have a greater risk of getting burned.  Eating and drinking  · Drink enough fluid to keep your urine clear or pale yellow.  · Resume your normal diet as instructed by your health care provider. Avoid heavy or fried foods that are hard to digest.  · Avoid drinking alcohol for as long as instructed by your health care provider.  Contact a health care provider if:  · You have blood in your stool 2-3 days after the procedure.  Get help right away if:  · You have more than a small spotting of blood in your stool.  · You pass large blood clots in your stool.  · Your abdomen is swollen.  · You have nausea or vomiting.  · You have a fever.  · You have increasing abdominal pain that is not relieved with medicine.  This information is not intended to replace advice given to you by your health care provider. Make sure you discuss any questions you have with your health care provider.  Document Released: 08/01/2005 Document Revised: 09/11/2017 Document Reviewed: 02/28/2017  [employer] Interactive Patient Education © 2018 [employer]  Gastritis, Adult  Gastritis is inflammation of the stomach. There are two kinds of gastritis:  · Acute gastritis. This kind develops suddenly.  · Chronic gastritis. This kind lasts for a long time.    Gastritis happens when the lining of the stomach becomes weak or gets damaged. Without  treatment, gastritis can lead to stomach bleeding and ulcers.  What are the causes?  This condition may be caused by:  · An infection.  · Drinking too much alcohol.  · Certain medicines.  · Having too much acid in the stomach.  · A disease of the intestines or stomach.  · Stress.    What are the signs or symptoms?  Symptoms of this condition include:  · Pain or a burning in the upper abdomen.  · Nausea.  · Vomiting.  · An uncomfortable feeling of fullness after eating.    In some cases, there are no symptoms.  How is this diagnosed?  This condition may be diagnosed with:  · A description of your symptoms.  · A physical exam.  · Tests. These can include:  ? Blood tests.  ? Stool tests.  ? A test in which a thin, flexible instrument with a light and camera on the end is passed down the esophagus and into the stomach (upper endoscopy).  ? A test in which a sample of tissue is taken for testing (biopsy).    How is this treated?  This condition may be treated with medicines. If the condition is caused by a bacterial infection, you may be given antibiotic medicines. If it is caused by too much acid in the stomach, you may get medicines called H2 blockers, proton pump inhibitors, or antacids. Treatment may also involve stopping the use of certain medicines, such as aspirin, ibuprofen, or other nonsteroidal anti-inflammatory drugs (NSAIDs).  Follow these instructions at home:  · Take over-the-counter and prescription medicines only as told by your health care provider.  · If you were prescribed an antibiotic, take it as told by your health care provider. Do not stop taking the antibiotic even if you start to feel better.  · Drink enough fluid to keep your urine pale yellow.  · Eat small, frequent meals instead of large meals.  · Avoid foods and drinks that make your symptoms worse.  Contact a health care provider if:  · Your symptoms get worse.  · Your symptoms return after treatment.  Get help right away if:  · You vomit  blood or material that looks like coffee grounds.  · You have black or dark red stools.  · You are unable to keep fluids down.  · Your abdominal pain gets worse.  · You have a fever.  · You do not feel better after 1 week.  Summary  · Gastritis is inflammation of the stomach that happens when the lining of the stomach becomes weak or gets damaged.  · This condition is diagnosed with a medical history, a physical exam, or tests.  · This condition may be treated with medicines to treat infection or medicines to reduce the amount of acid in your stomach.  · If your condition is caused by alcohol, or by irritation from medicines that you are taking, you may be told to stop using alcohol or to stop taking those medicines.  This information is not intended to replace advice given to you by your health care provider. Make sure you discuss any questions you have with your health care provider.  Document Released: 12/12/2002 Document Revised: 07/31/2018 Document Reviewed: 09/10/2016  [employer] Interactive Patient Education © 2019 [employer]    Esophagogastroduodenoscopy, Care After  Refer to this sheet in the next few weeks. These instructions provide you with information about caring for yourself after your procedure. Your health care provider may also give you more specific instructions. Your treatment has been planned according to current medical practices, but problems sometimes occur. Call your health care provider if you have any problems or questions after your procedure.  What can I expect after the procedure?  After the procedure, it is common to have:  · A sore throat.  · Nausea.  · Bloating.  · Dizziness.  · Fatigue.    Follow these instructions at home:    · Do not drive for 24 hours if you received a medicine to help you relax (sedative).  · If your health care provider took a tissue sample for testing during the procedure, make sure to get your test results. This is your responsibility. Ask your health care  provider or the department performing the test when your results will be ready.  · Keep all follow-up visits as told by your health care provider. This is important.  Contact a health care provider if:  · You cannot stop coughing.  · You are not urinating.  · You are urinating less than usual.  Get help right away if:  · You have trouble swallowing.  · You cannot eat or drink.  · You have throat or chest pain that gets worse.  · You are dizzy or light-headed.  · You faint.  · You have nausea or vomiting.  · You have chills.  · You have a fever.  · You have severe abdominal pain.  · You have black, tarry, or bloody stools.  This information is not intended to replace advice given to you by your health care provider. Make sure you discuss any questions you have with your health care provider.  Document Released: 12/04/2013 Document Revised: 05/25/2017 Document Reviewed: 11/10/2016  [employer] Interactive Patient Education © 2019 [employer]

## 2019-06-12 LAB
CYTO UR: NORMAL
LAB AP CASE REPORT: NORMAL
PATH REPORT.FINAL DX SPEC: NORMAL
PATH REPORT.GROSS SPEC: NORMAL

## 2019-06-13 ENCOUNTER — TELEPHONE (OUTPATIENT)
Dept: CARDIOLOGY | Facility: CLINIC | Age: 64
End: 2019-06-13

## 2020-04-25 ENCOUNTER — APPOINTMENT (OUTPATIENT)
Dept: GENERAL RADIOLOGY | Facility: HOSPITAL | Age: 65
End: 2020-04-25

## 2020-04-25 ENCOUNTER — APPOINTMENT (OUTPATIENT)
Dept: CT IMAGING | Facility: HOSPITAL | Age: 65
End: 2020-04-25

## 2020-04-25 ENCOUNTER — HOSPITAL ENCOUNTER (INPATIENT)
Facility: HOSPITAL | Age: 65
LOS: 6 days | End: 2020-05-01
Attending: EMERGENCY MEDICINE | Admitting: INTERNAL MEDICINE

## 2020-04-25 DIAGNOSIS — R41.82 ALTERED MENTAL STATUS, UNSPECIFIED ALTERED MENTAL STATUS TYPE: Primary | ICD-10-CM

## 2020-04-25 DIAGNOSIS — E87.0 HYPERNATREMIA: ICD-10-CM

## 2020-04-25 DIAGNOSIS — N28.9 RENAL INSUFFICIENCY: ICD-10-CM

## 2020-04-25 PROBLEM — K58.2 IRRITABLE BOWEL SYNDROME WITH BOTH CONSTIPATION AND DIARRHEA: Status: ACTIVE | Noted: 2020-04-25

## 2020-04-25 PROBLEM — K21.9 GERD (GASTROESOPHAGEAL REFLUX DISEASE): Status: ACTIVE | Noted: 2020-04-25

## 2020-04-25 PROBLEM — Z86.19 HISTORY OF HEPATITIS C: Status: ACTIVE | Noted: 2020-04-25

## 2020-04-25 PROBLEM — E86.9 VOLUME DEPLETION: Status: ACTIVE | Noted: 2020-04-25

## 2020-04-25 PROBLEM — R79.89 ELEVATED LIVER FUNCTION TESTS: Status: ACTIVE | Noted: 2020-04-25

## 2020-04-25 PROBLEM — N17.9 ACUTE KIDNEY INJURY: Status: ACTIVE | Noted: 2020-04-25

## 2020-04-25 PROBLEM — F98.8 ADD (ATTENTION DEFICIT DISORDER): Status: ACTIVE | Noted: 2020-04-25

## 2020-04-25 PROBLEM — E78.5 HYPERLIPIDEMIA: Status: ACTIVE | Noted: 2020-04-25

## 2020-04-25 PROBLEM — E66.9 OBESITY (BMI 30-39.9): Status: ACTIVE | Noted: 2020-04-25

## 2020-04-25 PROBLEM — E55.9 VITAMIN D DEFICIENCY: Status: ACTIVE | Noted: 2020-04-25

## 2020-04-25 PROBLEM — K76.0 HEPATIC STEATOSIS: Status: ACTIVE | Noted: 2020-04-25

## 2020-04-25 PROBLEM — R31.9 HEMATURIA: Status: ACTIVE | Noted: 2020-04-25

## 2020-04-25 PROBLEM — R53.1 GENERALIZED WEAKNESS: Status: ACTIVE | Noted: 2020-04-25

## 2020-04-25 LAB
ALBUMIN SERPL-MCNC: 4.4 G/DL (ref 3.5–5.2)
ALBUMIN/GLOB SERPL: 1 G/DL
ALP SERPL-CCNC: 59 U/L (ref 39–117)
ALT SERPL W P-5'-P-CCNC: 236 U/L (ref 1–33)
AMPHET+METHAMPHET UR QL: NEGATIVE
ANION GAP SERPL CALCULATED.3IONS-SCNC: 25.3 MMOL/L (ref 5–15)
AST SERPL-CCNC: 415 U/L (ref 1–32)
BACTERIA UR QL AUTO: ABNORMAL /HPF
BARBITURATES UR QL SCN: NEGATIVE
BASOPHILS # BLD AUTO: 0.05 10*3/MM3 (ref 0–0.2)
BASOPHILS NFR BLD AUTO: 0.3 % (ref 0–1.5)
BENZODIAZ UR QL SCN: NEGATIVE
BILIRUB SERPL-MCNC: 0.7 MG/DL (ref 0.2–1.2)
BILIRUB UR QL STRIP: NEGATIVE
BUN BLD-MCNC: 78 MG/DL (ref 8–23)
BUN/CREAT SERPL: 48.8 (ref 7–25)
CALCIUM SPEC-SCNC: 10.1 MG/DL (ref 8.6–10.5)
CANNABINOIDS SERPL QL: NEGATIVE
CHLORIDE SERPL-SCNC: 101 MMOL/L (ref 98–107)
CLARITY UR: ABNORMAL
CO2 SERPL-SCNC: 24.7 MMOL/L (ref 22–29)
COCAINE UR QL: NEGATIVE
COLOR UR: YELLOW
CREAT BLD-MCNC: 1.6 MG/DL (ref 0.57–1)
DEPRECATED RDW RBC AUTO: 45.2 FL (ref 37–54)
EOSINOPHIL # BLD AUTO: 0.02 10*3/MM3 (ref 0–0.4)
EOSINOPHIL NFR BLD AUTO: 0.1 % (ref 0.3–6.2)
ERYTHROCYTE [DISTWIDTH] IN BLOOD BY AUTOMATED COUNT: 13.5 % (ref 12.3–15.4)
ETHANOL BLD-MCNC: <10 MG/DL (ref 0–10)
ETHANOL UR QL: <0.01 %
GFR SERPL CREATININE-BSD FRML MDRD: 33 ML/MIN/1.73
GLOBULIN UR ELPH-MCNC: 4.5 GM/DL
GLUCOSE BLD-MCNC: 123 MG/DL (ref 65–99)
GLUCOSE UR STRIP-MCNC: NEGATIVE MG/DL
HCT VFR BLD AUTO: 50.8 % (ref 34–46.6)
HGB BLD-MCNC: 16.9 G/DL (ref 12–15.9)
HGB UR QL STRIP.AUTO: ABNORMAL
HYALINE CASTS UR QL AUTO: ABNORMAL /LPF
IMM GRANULOCYTES # BLD AUTO: 0.09 10*3/MM3 (ref 0–0.05)
IMM GRANULOCYTES NFR BLD AUTO: 0.6 % (ref 0–0.5)
KETONES UR QL STRIP: ABNORMAL
LEUKOCYTE ESTERASE UR QL STRIP.AUTO: ABNORMAL
LYMPHOCYTES # BLD AUTO: 2.28 10*3/MM3 (ref 0.7–3.1)
LYMPHOCYTES NFR BLD AUTO: 15.6 % (ref 19.6–45.3)
MAGNESIUM SERPL-MCNC: 2.9 MG/DL (ref 1.6–2.4)
MCH RBC QN AUTO: 30.1 PG (ref 26.6–33)
MCHC RBC AUTO-ENTMCNC: 33.3 G/DL (ref 31.5–35.7)
MCV RBC AUTO: 90.4 FL (ref 79–97)
METHADONE UR QL SCN: NEGATIVE
MONOCYTES # BLD AUTO: 1.14 10*3/MM3 (ref 0.1–0.9)
MONOCYTES NFR BLD AUTO: 7.8 % (ref 5–12)
NEUTROPHILS # BLD AUTO: 11.04 10*3/MM3 (ref 1.7–7)
NEUTROPHILS NFR BLD AUTO: 75.6 % (ref 42.7–76)
NITRITE UR QL STRIP: NEGATIVE
NRBC BLD AUTO-RTO: 0 /100 WBC (ref 0–0.2)
OPIATES UR QL: NEGATIVE
OXYCODONE UR QL SCN: NEGATIVE
PH UR STRIP.AUTO: <=5 [PH] (ref 5–8)
PLATELET # BLD AUTO: 310 10*3/MM3 (ref 140–450)
PMV BLD AUTO: 11.9 FL (ref 6–12)
POTASSIUM BLD-SCNC: 3.7 MMOL/L (ref 3.5–5.2)
PROT SERPL-MCNC: 8.9 G/DL (ref 6–8.5)
PROT UR QL STRIP: ABNORMAL
RBC # BLD AUTO: 5.62 10*6/MM3 (ref 3.77–5.28)
RBC # UR: ABNORMAL /HPF
REF LAB TEST METHOD: ABNORMAL
SODIUM BLD-SCNC: 151 MMOL/L (ref 136–145)
SP GR UR STRIP: 1.02 (ref 1–1.03)
SQUAMOUS #/AREA URNS HPF: ABNORMAL /HPF
T4 FREE SERPL-MCNC: 1.53 NG/DL (ref 0.93–1.7)
TSH SERPL DL<=0.05 MIU/L-ACNC: 1.18 UIU/ML (ref 0.27–4.2)
UROBILINOGEN UR QL STRIP: ABNORMAL
WBC NRBC COR # BLD: 14.62 10*3/MM3 (ref 3.4–10.8)
WBC UR QL AUTO: ABNORMAL /HPF

## 2020-04-25 PROCEDURE — P9612 CATHETERIZE FOR URINE SPEC: HCPCS

## 2020-04-25 PROCEDURE — 87522 HEPATITIS C REVRS TRNSCRPJ: CPT | Performed by: INTERNAL MEDICINE

## 2020-04-25 PROCEDURE — 85025 COMPLETE CBC W/AUTO DIFF WBC: CPT | Performed by: EMERGENCY MEDICINE

## 2020-04-25 PROCEDURE — 87086 URINE CULTURE/COLONY COUNT: CPT | Performed by: EMERGENCY MEDICINE

## 2020-04-25 PROCEDURE — 80307 DRUG TEST PRSMV CHEM ANLYZR: CPT | Performed by: EMERGENCY MEDICINE

## 2020-04-25 PROCEDURE — 84443 ASSAY THYROID STIM HORMONE: CPT | Performed by: EMERGENCY MEDICINE

## 2020-04-25 PROCEDURE — 81001 URINALYSIS AUTO W/SCOPE: CPT | Performed by: EMERGENCY MEDICINE

## 2020-04-25 PROCEDURE — 99284 EMERGENCY DEPT VISIT MOD MDM: CPT

## 2020-04-25 PROCEDURE — 71045 X-RAY EXAM CHEST 1 VIEW: CPT

## 2020-04-25 PROCEDURE — 84439 ASSAY OF FREE THYROXINE: CPT | Performed by: EMERGENCY MEDICINE

## 2020-04-25 PROCEDURE — 85025 COMPLETE CBC W/AUTO DIFF WBC: CPT | Performed by: INTERNAL MEDICINE

## 2020-04-25 PROCEDURE — 36415 COLL VENOUS BLD VENIPUNCTURE: CPT

## 2020-04-25 PROCEDURE — 70450 CT HEAD/BRAIN W/O DYE: CPT

## 2020-04-25 PROCEDURE — 83735 ASSAY OF MAGNESIUM: CPT | Performed by: EMERGENCY MEDICINE

## 2020-04-25 PROCEDURE — 80053 COMPREHEN METABOLIC PANEL: CPT | Performed by: EMERGENCY MEDICINE

## 2020-04-25 RX ORDER — DOCUSATE SODIUM 100 MG/1
100 CAPSULE, LIQUID FILLED ORAL 2 TIMES DAILY PRN
Status: DISCONTINUED | OUTPATIENT
Start: 2020-04-25 | End: 2020-05-01 | Stop reason: HOSPADM

## 2020-04-25 RX ORDER — NITROGLYCERIN 0.4 MG/1
0.4 TABLET SUBLINGUAL
Status: DISCONTINUED | OUTPATIENT
Start: 2020-04-25 | End: 2020-05-01 | Stop reason: HOSPADM

## 2020-04-25 RX ORDER — LORAZEPAM 0.5 MG/1
0.5 TABLET ORAL EVERY 8 HOURS PRN
Status: DISCONTINUED | OUTPATIENT
Start: 2020-04-25 | End: 2020-05-01 | Stop reason: HOSPADM

## 2020-04-25 RX ORDER — ACETAMINOPHEN 650 MG/1
650 SUPPOSITORY RECTAL EVERY 4 HOURS PRN
Status: DISCONTINUED | OUTPATIENT
Start: 2020-04-25 | End: 2020-05-01 | Stop reason: HOSPADM

## 2020-04-25 RX ORDER — FAMOTIDINE 20 MG/1
40 TABLET, FILM COATED ORAL DAILY
Status: DISCONTINUED | OUTPATIENT
Start: 2020-04-26 | End: 2020-05-01 | Stop reason: HOSPADM

## 2020-04-25 RX ORDER — CLOBETASOL PROPIONATE 0.5 MG/G
CREAM TOPICAL EVERY 12 HOURS SCHEDULED
Status: DISCONTINUED | OUTPATIENT
Start: 2020-04-25 | End: 2020-05-01 | Stop reason: HOSPADM

## 2020-04-25 RX ORDER — LOSARTAN POTASSIUM 50 MG/1
50 TABLET ORAL DAILY
Status: DISCONTINUED | OUTPATIENT
Start: 2020-04-26 | End: 2020-04-30

## 2020-04-25 RX ORDER — SODIUM CHLORIDE 0.9 % (FLUSH) 0.9 %
10 SYRINGE (ML) INJECTION EVERY 12 HOURS SCHEDULED
Status: DISCONTINUED | OUTPATIENT
Start: 2020-04-25 | End: 2020-05-01 | Stop reason: HOSPADM

## 2020-04-25 RX ORDER — KETOTIFEN FUMARATE 0.35 MG/ML
1 SOLUTION/ DROPS OPHTHALMIC 2 TIMES DAILY PRN
Status: DISCONTINUED | OUTPATIENT
Start: 2020-04-25 | End: 2020-05-01 | Stop reason: HOSPADM

## 2020-04-25 RX ORDER — AMLODIPINE BESYLATE 5 MG/1
5 TABLET ORAL DAILY PRN
Status: DISCONTINUED | OUTPATIENT
Start: 2020-04-25 | End: 2020-05-01 | Stop reason: HOSPADM

## 2020-04-25 RX ORDER — QUETIAPINE FUMARATE 50 MG/1
100 TABLET, EXTENDED RELEASE ORAL NIGHTLY
Status: DISCONTINUED | OUTPATIENT
Start: 2020-04-25 | End: 2020-05-01 | Stop reason: HOSPADM

## 2020-04-25 RX ORDER — ONDANSETRON 2 MG/ML
4 INJECTION INTRAMUSCULAR; INTRAVENOUS EVERY 6 HOURS PRN
Status: DISCONTINUED | OUTPATIENT
Start: 2020-04-25 | End: 2020-05-01 | Stop reason: HOSPADM

## 2020-04-25 RX ORDER — CEFTRIAXONE SODIUM 1 G/50ML
1 INJECTION, SOLUTION INTRAVENOUS EVERY 24 HOURS
Status: DISCONTINUED | OUTPATIENT
Start: 2020-04-25 | End: 2020-04-28

## 2020-04-25 RX ORDER — OXYBUTYNIN CHLORIDE 5 MG/1
5 TABLET, EXTENDED RELEASE ORAL DAILY
Status: DISCONTINUED | OUTPATIENT
Start: 2020-04-26 | End: 2020-05-01 | Stop reason: HOSPADM

## 2020-04-25 RX ORDER — ACETAMINOPHEN 325 MG/1
650 TABLET ORAL EVERY 4 HOURS PRN
Status: DISCONTINUED | OUTPATIENT
Start: 2020-04-25 | End: 2020-05-01 | Stop reason: HOSPADM

## 2020-04-25 RX ORDER — CHOLECALCIFEROL (VITAMIN D3) 125 MCG
5 CAPSULE ORAL NIGHTLY PRN
Status: DISCONTINUED | OUTPATIENT
Start: 2020-04-25 | End: 2020-05-01 | Stop reason: HOSPADM

## 2020-04-25 RX ORDER — DEXTROSE AND SODIUM CHLORIDE 5; .45 G/100ML; G/100ML
125 INJECTION, SOLUTION INTRAVENOUS CONTINUOUS
Status: DISCONTINUED | OUTPATIENT
Start: 2020-04-25 | End: 2020-04-26

## 2020-04-25 RX ORDER — SODIUM CHLORIDE 0.9 % (FLUSH) 0.9 %
10 SYRINGE (ML) INJECTION AS NEEDED
Status: DISCONTINUED | OUTPATIENT
Start: 2020-04-25 | End: 2020-05-01 | Stop reason: HOSPADM

## 2020-04-25 RX ORDER — ACETAMINOPHEN 160 MG/5ML
650 SOLUTION ORAL EVERY 4 HOURS PRN
Status: DISCONTINUED | OUTPATIENT
Start: 2020-04-25 | End: 2020-05-01 | Stop reason: HOSPADM

## 2020-04-25 RX ADMIN — SODIUM CHLORIDE 1000 ML: 9 INJECTION, SOLUTION INTRAVENOUS at 18:17

## 2020-04-25 RX ADMIN — DEXTROSE AND SODIUM CHLORIDE 125 ML/HR: 5; 450 INJECTION, SOLUTION INTRAVENOUS at 23:32

## 2020-04-26 ENCOUNTER — APPOINTMENT (OUTPATIENT)
Dept: CT IMAGING | Facility: HOSPITAL | Age: 65
End: 2020-04-26

## 2020-04-26 ENCOUNTER — APPOINTMENT (OUTPATIENT)
Dept: ULTRASOUND IMAGING | Facility: HOSPITAL | Age: 65
End: 2020-04-26

## 2020-04-26 PROBLEM — F31.62 BIPOLAR DISORDER, CURRENT EPISODE MIXED, MODERATE: Status: ACTIVE | Noted: 2020-04-26

## 2020-04-26 LAB
25(OH)D3 SERPL-MCNC: 23.8 NG/ML (ref 30–100)
ALBUMIN SERPL-MCNC: 3.8 G/DL (ref 3.5–5.2)
ALP SERPL-CCNC: 51 U/L (ref 39–117)
ALT SERPL W P-5'-P-CCNC: 182 U/L (ref 1–33)
AMMONIA BLD-SCNC: 33 UMOL/L (ref 11–51)
AMYLASE SERPL-CCNC: 80 U/L (ref 28–100)
ANION GAP SERPL CALCULATED.3IONS-SCNC: 18.6 MMOL/L (ref 5–15)
APAP SERPL-MCNC: <5 MCG/ML (ref 10–30)
ARTERIAL PATENCY WRIST A: POSITIVE
AST SERPL-CCNC: 268 U/L (ref 1–32)
ATMOSPHERIC PRESS: 747.4 MMHG
BACTERIA SPEC AEROBE CULT: NO GROWTH
BACTERIA UR QL AUTO: ABNORMAL /HPF
BASE EXCESS BLDA CALC-SCNC: 4.3 MMOL/L (ref 0–2)
BASOPHILS # BLD AUTO: 0.04 10*3/MM3 (ref 0–0.2)
BASOPHILS # BLD AUTO: 0.06 10*3/MM3 (ref 0–0.2)
BASOPHILS NFR BLD AUTO: 0.3 % (ref 0–1.5)
BASOPHILS NFR BLD AUTO: 0.5 % (ref 0–1.5)
BDY SITE: ABNORMAL
BILIRUB CONJ SERPL-MCNC: 0.2 MG/DL (ref 0.2–0.3)
BILIRUB INDIRECT SERPL-MCNC: 0.3 MG/DL
BILIRUB SERPL-MCNC: 0.5 MG/DL (ref 0.2–1.2)
BILIRUB UR QL STRIP: NEGATIVE
BUN BLD-MCNC: 57 MG/DL (ref 8–23)
BUN/CREAT SERPL: 54.3 (ref 7–25)
CALCIUM SPEC-SCNC: 9.3 MG/DL (ref 8.6–10.5)
CHLORIDE SERPL-SCNC: 107 MMOL/L (ref 98–107)
CHLORIDE UR-SCNC: 40 MMOL/L
CHOLEST SERPL-MCNC: 189 MG/DL (ref 0–200)
CK SERPL-CCNC: 2473 U/L (ref 20–180)
CLARITY UR: CLEAR
CO2 SERPL-SCNC: 25.4 MMOL/L (ref 22–29)
COLOR UR: YELLOW
CREAT BLD-MCNC: 1.05 MG/DL (ref 0.57–1)
CREAT UR-MCNC: 100.9 MG/DL
DEPRECATED RDW RBC AUTO: 44.4 FL (ref 37–54)
DEPRECATED RDW RBC AUTO: 45.2 FL (ref 37–54)
EOSINOPHIL # BLD AUTO: 0.05 10*3/MM3 (ref 0–0.4)
EOSINOPHIL # BLD AUTO: 0.14 10*3/MM3 (ref 0–0.4)
EOSINOPHIL NFR BLD AUTO: 0.4 % (ref 0.3–6.2)
EOSINOPHIL NFR BLD AUTO: 1.2 % (ref 0.3–6.2)
ERYTHROCYTE [DISTWIDTH] IN BLOOD BY AUTOMATED COUNT: 13.4 % (ref 12.3–15.4)
ERYTHROCYTE [DISTWIDTH] IN BLOOD BY AUTOMATED COUNT: 13.5 % (ref 12.3–15.4)
GFR SERPL CREATININE-BSD FRML MDRD: 53 ML/MIN/1.73
GLUCOSE BLD-MCNC: 131 MG/DL (ref 65–99)
GLUCOSE UR STRIP-MCNC: NEGATIVE MG/DL
HAV IGM SERPL QL IA: ABNORMAL
HBV CORE IGM SERPL QL IA: ABNORMAL
HBV SURFACE AG SERPL QL IA: ABNORMAL
HCO3 BLDA-SCNC: 30.4 MMOL/L (ref 22–28)
HCT VFR BLD AUTO: 47.5 % (ref 34–46.6)
HCT VFR BLD AUTO: 47.5 % (ref 34–46.6)
HCV AB SER DONR QL: REACTIVE
HDLC SERPL-MCNC: 34 MG/DL (ref 40–60)
HGB BLD-MCNC: 15.4 G/DL (ref 12–15.9)
HGB BLD-MCNC: 15.8 G/DL (ref 12–15.9)
HGB UR QL STRIP.AUTO: ABNORMAL
HOLD SPECIMEN: NORMAL
HOROWITZ INDEX BLD+IHG-RTO: 30 %
HYALINE CASTS UR QL AUTO: ABNORMAL /LPF
IMM GRANULOCYTES # BLD AUTO: 0.04 10*3/MM3 (ref 0–0.05)
IMM GRANULOCYTES # BLD AUTO: 0.09 10*3/MM3 (ref 0–0.05)
IMM GRANULOCYTES NFR BLD AUTO: 0.3 % (ref 0–0.5)
IMM GRANULOCYTES NFR BLD AUTO: 0.7 % (ref 0–0.5)
INR PPP: 1.13 (ref 0.9–1.1)
KETONES UR QL STRIP: ABNORMAL
LDLC SERPL CALC-MCNC: 127 MG/DL (ref 0–100)
LDLC/HDLC SERPL: 3.73 {RATIO}
LEUKOCYTE ESTERASE UR QL STRIP.AUTO: NEGATIVE
LIPASE SERPL-CCNC: 68 U/L (ref 13–60)
LYMPHOCYTES # BLD AUTO: 2.08 10*3/MM3 (ref 0.7–3.1)
LYMPHOCYTES # BLD AUTO: 2.31 10*3/MM3 (ref 0.7–3.1)
LYMPHOCYTES NFR BLD AUTO: 17.4 % (ref 19.6–45.3)
LYMPHOCYTES NFR BLD AUTO: 17.5 % (ref 19.6–45.3)
MAGNESIUM SERPL-MCNC: 2.5 MG/DL (ref 1.6–2.4)
MAGNESIUM SERPL-MCNC: 2.7 MG/DL (ref 1.6–2.4)
MCH RBC QN AUTO: 29.3 PG (ref 26.6–33)
MCH RBC QN AUTO: 30.1 PG (ref 26.6–33)
MCHC RBC AUTO-ENTMCNC: 32.4 G/DL (ref 31.5–35.7)
MCHC RBC AUTO-ENTMCNC: 33.3 G/DL (ref 31.5–35.7)
MCV RBC AUTO: 90.5 FL (ref 79–97)
MCV RBC AUTO: 90.5 FL (ref 79–97)
MODALITY: ABNORMAL
MONOCYTES # BLD AUTO: 1.21 10*3/MM3 (ref 0.1–0.9)
MONOCYTES # BLD AUTO: 1.26 10*3/MM3 (ref 0.1–0.9)
MONOCYTES NFR BLD AUTO: 10.5 % (ref 5–12)
MONOCYTES NFR BLD AUTO: 9.2 % (ref 5–12)
NEUTROPHILS # BLD AUTO: 8.39 10*3/MM3 (ref 1.7–7)
NEUTROPHILS # BLD AUTO: 9.46 10*3/MM3 (ref 1.7–7)
NEUTROPHILS NFR BLD AUTO: 70.3 % (ref 42.7–76)
NEUTROPHILS NFR BLD AUTO: 71.7 % (ref 42.7–76)
NITRITE UR QL STRIP: NEGATIVE
NRBC BLD AUTO-RTO: 0 /100 WBC (ref 0–0.2)
NRBC BLD AUTO-RTO: 0.1 /100 WBC (ref 0–0.2)
O2 A-A PPRESDIFF RESPIRATORY: 0.5 MMHG
PCO2 BLDA: 48.8 MM HG (ref 35–45)
PH BLDA: 7.4 PH UNITS (ref 7.35–7.45)
PH UR STRIP.AUTO: <=5 [PH] (ref 5–8)
PHOSPHATE SERPL-MCNC: 3.9 MG/DL (ref 2.5–4.5)
PLATELET # BLD AUTO: 260 10*3/MM3 (ref 140–450)
PLATELET # BLD AUTO: 267 10*3/MM3 (ref 140–450)
PMV BLD AUTO: 12.1 FL (ref 6–12)
PMV BLD AUTO: 12.7 FL (ref 6–12)
PO2 BLDA: 81.6 MM HG (ref 80–100)
POTASSIUM BLD-SCNC: 3.3 MMOL/L (ref 3.5–5.2)
POTASSIUM BLD-SCNC: 3.7 MMOL/L (ref 3.5–5.2)
PROT SERPL-MCNC: 7.7 G/DL (ref 6–8.5)
PROT UR QL STRIP: ABNORMAL
PROT UR-MCNC: 19 MG/DL
PROT/CREAT UR: 188.3 MG/G CREA (ref 0–200)
PROTHROMBIN TIME: 14.2 SECONDS (ref 11.7–14.2)
RBC # BLD AUTO: 5.25 10*6/MM3 (ref 3.77–5.28)
RBC # BLD AUTO: 5.25 10*6/MM3 (ref 3.77–5.28)
RBC # UR: ABNORMAL /HPF
REF LAB TEST METHOD: ABNORMAL
SAO2 % BLDCOA: 95.8 % (ref 92–99)
SODIUM BLD-SCNC: 151 MMOL/L (ref 136–145)
SODIUM UR-SCNC: 37 MMOL/L
SP GR UR STRIP: 1.02 (ref 1–1.03)
SQUAMOUS #/AREA URNS HPF: ABNORMAL /HPF
TRIGL SERPL-MCNC: 141 MG/DL (ref 0–150)
TROPONIN T SERPL-MCNC: <0.01 NG/ML (ref 0–0.03)
TSH SERPL DL<=0.05 MIU/L-ACNC: 1.17 UIU/ML (ref 0.27–4.2)
URATE SERPL-MCNC: 14.8 MG/DL (ref 2.4–5.7)
URINE MYOGLOBIN, QUALITATIVE: POSITIVE
UROBILINOGEN UR QL STRIP: ABNORMAL
VLDLC SERPL-MCNC: 28.2 MG/DL (ref 5–40)
WBC NRBC COR # BLD: 11.95 10*3/MM3 (ref 3.4–10.8)
WBC NRBC COR # BLD: 13.18 10*3/MM3 (ref 3.4–10.8)
WBC UR QL AUTO: ABNORMAL /HPF

## 2020-04-26 PROCEDURE — 80061 LIPID PANEL: CPT | Performed by: INTERNAL MEDICINE

## 2020-04-26 PROCEDURE — 84132 ASSAY OF SERUM POTASSIUM: CPT | Performed by: INTERNAL MEDICINE

## 2020-04-26 PROCEDURE — 82570 ASSAY OF URINE CREATININE: CPT | Performed by: INTERNAL MEDICINE

## 2020-04-26 PROCEDURE — 82140 ASSAY OF AMMONIA: CPT | Performed by: INTERNAL MEDICINE

## 2020-04-26 PROCEDURE — 82150 ASSAY OF AMYLASE: CPT | Performed by: INTERNAL MEDICINE

## 2020-04-26 PROCEDURE — 80307 DRUG TEST PRSMV CHEM ANLYZR: CPT | Performed by: INTERNAL MEDICINE

## 2020-04-26 PROCEDURE — 25010000002 CEFTRIAXONE PER 250 MG: Performed by: INTERNAL MEDICINE

## 2020-04-26 PROCEDURE — 84156 ASSAY OF PROTEIN URINE: CPT | Performed by: INTERNAL MEDICINE

## 2020-04-26 PROCEDURE — 36600 WITHDRAWAL OF ARTERIAL BLOOD: CPT

## 2020-04-26 PROCEDURE — 25010000003 POTASSIUM CHLORIDE 10 MEQ/100ML SOLUTION: Performed by: INTERNAL MEDICINE

## 2020-04-26 PROCEDURE — 84484 ASSAY OF TROPONIN QUANT: CPT | Performed by: INTERNAL MEDICINE

## 2020-04-26 PROCEDURE — 85025 COMPLETE CBC W/AUTO DIFF WBC: CPT | Performed by: INTERNAL MEDICINE

## 2020-04-26 PROCEDURE — 82436 ASSAY OF URINE CHLORIDE: CPT | Performed by: INTERNAL MEDICINE

## 2020-04-26 PROCEDURE — 84300 ASSAY OF URINE SODIUM: CPT | Performed by: INTERNAL MEDICINE

## 2020-04-26 PROCEDURE — 83735 ASSAY OF MAGNESIUM: CPT | Performed by: INTERNAL MEDICINE

## 2020-04-26 PROCEDURE — 82306 VITAMIN D 25 HYDROXY: CPT | Performed by: INTERNAL MEDICINE

## 2020-04-26 PROCEDURE — 76705 ECHO EXAM OF ABDOMEN: CPT

## 2020-04-26 PROCEDURE — 83874 ASSAY OF MYOGLOBIN: CPT | Performed by: INTERNAL MEDICINE

## 2020-04-26 PROCEDURE — 84443 ASSAY THYROID STIM HORMONE: CPT | Performed by: INTERNAL MEDICINE

## 2020-04-26 PROCEDURE — 82550 ASSAY OF CK (CPK): CPT | Performed by: INTERNAL MEDICINE

## 2020-04-26 PROCEDURE — 93010 ELECTROCARDIOGRAM REPORT: CPT | Performed by: INTERNAL MEDICINE

## 2020-04-26 PROCEDURE — 81001 URINALYSIS AUTO W/SCOPE: CPT | Performed by: INTERNAL MEDICINE

## 2020-04-26 PROCEDURE — 80076 HEPATIC FUNCTION PANEL: CPT | Performed by: INTERNAL MEDICINE

## 2020-04-26 PROCEDURE — 80048 BASIC METABOLIC PNL TOTAL CA: CPT | Performed by: INTERNAL MEDICINE

## 2020-04-26 PROCEDURE — 80074 ACUTE HEPATITIS PANEL: CPT | Performed by: INTERNAL MEDICINE

## 2020-04-26 PROCEDURE — 93005 ELECTROCARDIOGRAM TRACING: CPT | Performed by: INTERNAL MEDICINE

## 2020-04-26 PROCEDURE — 74176 CT ABD & PELVIS W/O CONTRAST: CPT

## 2020-04-26 PROCEDURE — 84550 ASSAY OF BLOOD/URIC ACID: CPT | Performed by: INTERNAL MEDICINE

## 2020-04-26 PROCEDURE — 83690 ASSAY OF LIPASE: CPT | Performed by: INTERNAL MEDICINE

## 2020-04-26 PROCEDURE — 80069 RENAL FUNCTION PANEL: CPT | Performed by: INTERNAL MEDICINE

## 2020-04-26 PROCEDURE — 99222 1ST HOSP IP/OBS MODERATE 55: CPT | Performed by: INTERNAL MEDICINE

## 2020-04-26 PROCEDURE — 82803 BLOOD GASES ANY COMBINATION: CPT

## 2020-04-26 PROCEDURE — 25010000002 ENOXAPARIN PER 10 MG: Performed by: INTERNAL MEDICINE

## 2020-04-26 PROCEDURE — 84100 ASSAY OF PHOSPHORUS: CPT | Performed by: INTERNAL MEDICINE

## 2020-04-26 PROCEDURE — 85610 PROTHROMBIN TIME: CPT | Performed by: INTERNAL MEDICINE

## 2020-04-26 RX ORDER — POTASSIUM CHLORIDE 750 MG/1
40 CAPSULE, EXTENDED RELEASE ORAL AS NEEDED
Status: DISCONTINUED | OUTPATIENT
Start: 2020-04-26 | End: 2020-05-01 | Stop reason: HOSPADM

## 2020-04-26 RX ORDER — POTASSIUM CHLORIDE 29.8 MG/ML
20 INJECTION INTRAVENOUS
Status: DISCONTINUED | OUTPATIENT
Start: 2020-04-26 | End: 2020-05-01 | Stop reason: HOSPADM

## 2020-04-26 RX ORDER — POTASSIUM CHLORIDE 1.5 G/1.77G
40 POWDER, FOR SOLUTION ORAL AS NEEDED
Status: DISCONTINUED | OUTPATIENT
Start: 2020-04-26 | End: 2020-05-01 | Stop reason: HOSPADM

## 2020-04-26 RX ORDER — ERGOCALCIFEROL 1.25 MG/1
50000 CAPSULE ORAL
Status: DISCONTINUED | OUTPATIENT
Start: 2020-04-26 | End: 2020-05-01 | Stop reason: HOSPADM

## 2020-04-26 RX ORDER — DEXTROSE MONOHYDRATE 50 MG/ML
100 INJECTION, SOLUTION INTRAVENOUS CONTINUOUS
Status: DISCONTINUED | OUTPATIENT
Start: 2020-04-26 | End: 2020-04-28

## 2020-04-26 RX ORDER — ENALAPRILAT 2.5 MG/2ML
1.25 INJECTION INTRAVENOUS EVERY 6 HOURS PRN
Status: DISCONTINUED | OUTPATIENT
Start: 2020-04-26 | End: 2020-04-28

## 2020-04-26 RX ORDER — POTASSIUM CHLORIDE 7.45 MG/ML
10 INJECTION INTRAVENOUS
Status: DISCONTINUED | OUTPATIENT
Start: 2020-04-26 | End: 2020-05-01 | Stop reason: HOSPADM

## 2020-04-26 RX ADMIN — SODIUM CHLORIDE, PRESERVATIVE FREE 10 ML: 5 INJECTION INTRAVENOUS at 12:10

## 2020-04-26 RX ADMIN — CLOBETASOL PROPIONATE: 0.5 CREAM TOPICAL at 23:56

## 2020-04-26 RX ADMIN — SODIUM CHLORIDE, PRESERVATIVE FREE 10 ML: 5 INJECTION INTRAVENOUS at 23:56

## 2020-04-26 RX ADMIN — SODIUM CHLORIDE, PRESERVATIVE FREE 10 ML: 5 INJECTION INTRAVENOUS at 01:53

## 2020-04-26 RX ADMIN — POTASSIUM CHLORIDE 10 MEQ: 7.46 INJECTION, SOLUTION INTRAVENOUS at 12:10

## 2020-04-26 RX ADMIN — POTASSIUM CHLORIDE 10 MEQ: 7.46 INJECTION, SOLUTION INTRAVENOUS at 10:29

## 2020-04-26 RX ADMIN — CLOBETASOL PROPIONATE: 0.5 CREAM TOPICAL at 10:29

## 2020-04-26 RX ADMIN — POTASSIUM CHLORIDE 10 MEQ: 7.46 INJECTION, SOLUTION INTRAVENOUS at 15:31

## 2020-04-26 RX ADMIN — ENOXAPARIN SODIUM 40 MG: 40 INJECTION SUBCUTANEOUS at 01:52

## 2020-04-26 RX ADMIN — CEFTRIAXONE SODIUM 1 G: 1 INJECTION, SOLUTION INTRAVENOUS at 01:52

## 2020-04-26 RX ADMIN — DEXTROSE MONOHYDRATE 125 ML/HR: 50 INJECTION, SOLUTION INTRAVENOUS at 18:27

## 2020-04-26 RX ADMIN — DEXTROSE MONOHYDRATE 125 ML/HR: 50 INJECTION, SOLUTION INTRAVENOUS at 10:29

## 2020-04-26 RX ADMIN — ENOXAPARIN SODIUM 40 MG: 40 INJECTION SUBCUTANEOUS at 23:55

## 2020-04-26 RX ADMIN — CEFTRIAXONE SODIUM 1 G: 1 INJECTION, SOLUTION INTRAVENOUS at 23:55

## 2020-04-26 RX ADMIN — ENALAPRILAT 1.25 MG: 1.25 INJECTION INTRAVENOUS at 15:31

## 2020-04-26 RX ADMIN — POTASSIUM CHLORIDE 10 MEQ: 7.46 INJECTION, SOLUTION INTRAVENOUS at 17:25

## 2020-04-27 ENCOUNTER — APPOINTMENT (OUTPATIENT)
Dept: CT IMAGING | Facility: HOSPITAL | Age: 65
End: 2020-04-27

## 2020-04-27 ENCOUNTER — INPATIENT HOSPITAL (OUTPATIENT)
Dept: URBAN - METROPOLITAN AREA HOSPITAL 113 | Facility: HOSPITAL | Age: 65
End: 2020-04-27

## 2020-04-27 DIAGNOSIS — R74.8 ABNORMAL LEVELS OF OTHER SERUM ENZYMES: ICD-10-CM

## 2020-04-27 LAB
ALBUMIN SERPL-MCNC: 3.3 G/DL (ref 3.5–5.2)
ANION GAP SERPL CALCULATED.3IONS-SCNC: 13.8 MMOL/L (ref 5–15)
ANION GAP SERPL CALCULATED.3IONS-SCNC: 14.6 MMOL/L (ref 5–15)
BASOPHILS # BLD AUTO: 0.04 10*3/MM3 (ref 0–0.2)
BASOPHILS NFR BLD AUTO: 0.3 % (ref 0–1.5)
BUN BLD-MCNC: 49 MG/DL (ref 8–23)
BUN BLD-MCNC: 51 MG/DL (ref 8–23)
BUN/CREAT SERPL: 26.9 (ref 7–25)
BUN/CREAT SERPL: 33.1 (ref 7–25)
CALCIUM SPEC-SCNC: 9 MG/DL (ref 8.6–10.5)
CALCIUM SPEC-SCNC: 9 MG/DL (ref 8.6–10.5)
CHLORIDE SERPL-SCNC: 104 MMOL/L (ref 98–107)
CHLORIDE SERPL-SCNC: 105 MMOL/L (ref 98–107)
CK SERPL-CCNC: 603 U/L (ref 20–180)
CO2 SERPL-SCNC: 25.4 MMOL/L (ref 22–29)
CO2 SERPL-SCNC: 26.2 MMOL/L (ref 22–29)
CREAT BLD-MCNC: 1.54 MG/DL (ref 0.57–1)
CREAT BLD-MCNC: 1.82 MG/DL (ref 0.57–1)
DEPRECATED RDW RBC AUTO: 45.2 FL (ref 37–54)
EOSINOPHIL # BLD AUTO: 0.19 10*3/MM3 (ref 0–0.4)
EOSINOPHIL NFR BLD AUTO: 1.5 % (ref 0.3–6.2)
ERYTHROCYTE [DISTWIDTH] IN BLOOD BY AUTOMATED COUNT: 13.2 % (ref 12.3–15.4)
GFR SERPL CREATININE-BSD FRML MDRD: 28 ML/MIN/1.73
GFR SERPL CREATININE-BSD FRML MDRD: 34 ML/MIN/1.73
GLUCOSE BLD-MCNC: 122 MG/DL (ref 65–99)
GLUCOSE BLD-MCNC: 164 MG/DL (ref 65–99)
HCT VFR BLD AUTO: 43.9 % (ref 34–46.6)
HGB BLD-MCNC: 14.5 G/DL (ref 12–15.9)
IMM GRANULOCYTES # BLD AUTO: 0.06 10*3/MM3 (ref 0–0.05)
IMM GRANULOCYTES NFR BLD AUTO: 0.5 % (ref 0–0.5)
LIPASE SERPL-CCNC: 86 U/L (ref 13–60)
LYMPHOCYTES # BLD AUTO: 2.18 10*3/MM3 (ref 0.7–3.1)
LYMPHOCYTES NFR BLD AUTO: 17.7 % (ref 19.6–45.3)
MCH RBC QN AUTO: 30.3 PG (ref 26.6–33)
MCHC RBC AUTO-ENTMCNC: 33 G/DL (ref 31.5–35.7)
MCV RBC AUTO: 91.6 FL (ref 79–97)
MONOCYTES # BLD AUTO: 1.21 10*3/MM3 (ref 0.1–0.9)
MONOCYTES NFR BLD AUTO: 9.8 % (ref 5–12)
NEUTROPHILS # BLD AUTO: 8.63 10*3/MM3 (ref 1.7–7)
NEUTROPHILS NFR BLD AUTO: 70.2 % (ref 42.7–76)
NRBC BLD AUTO-RTO: 0.1 /100 WBC (ref 0–0.2)
PHOSPHATE SERPL-MCNC: 2.8 MG/DL (ref 2.5–4.5)
PLATELET # BLD AUTO: 223 10*3/MM3 (ref 140–450)
PMV BLD AUTO: 12.3 FL (ref 6–12)
POTASSIUM BLD-SCNC: 3.5 MMOL/L (ref 3.5–5.2)
POTASSIUM BLD-SCNC: 3.7 MMOL/L (ref 3.5–5.2)
RBC # BLD AUTO: 4.79 10*6/MM3 (ref 3.77–5.28)
SODIUM BLD-SCNC: 144 MMOL/L (ref 136–145)
SODIUM BLD-SCNC: 145 MMOL/L (ref 136–145)
WBC NRBC COR # BLD: 12.31 10*3/MM3 (ref 3.4–10.8)

## 2020-04-27 PROCEDURE — 99232 SBSQ HOSP IP/OBS MODERATE 35: CPT | Performed by: INTERNAL MEDICINE

## 2020-04-27 PROCEDURE — 82550 ASSAY OF CK (CPK): CPT | Performed by: INTERNAL MEDICINE

## 2020-04-27 PROCEDURE — 83690 ASSAY OF LIPASE: CPT | Performed by: INTERNAL MEDICINE

## 2020-04-27 PROCEDURE — 85025 COMPLETE CBC W/AUTO DIFF WBC: CPT | Performed by: INTERNAL MEDICINE

## 2020-04-27 PROCEDURE — 80048 BASIC METABOLIC PNL TOTAL CA: CPT | Performed by: INTERNAL MEDICINE

## 2020-04-27 PROCEDURE — 71250 CT THORAX DX C-: CPT

## 2020-04-27 RX ADMIN — CLOBETASOL PROPIONATE: 0.5 CREAM TOPICAL at 08:13

## 2020-04-27 RX ADMIN — QUETIAPINE FUMARATE 100 MG: 50 TABLET, EXTENDED RELEASE ORAL at 20:53

## 2020-04-27 RX ADMIN — CLOBETASOL PROPIONATE: 0.5 CREAM TOPICAL at 20:53

## 2020-04-27 RX ADMIN — SODIUM CHLORIDE, PRESERVATIVE FREE 10 ML: 5 INJECTION INTRAVENOUS at 08:13

## 2020-04-27 RX ADMIN — SODIUM CHLORIDE, PRESERVATIVE FREE 10 ML: 5 INJECTION INTRAVENOUS at 20:53

## 2020-04-27 RX ADMIN — DEXTROSE MONOHYDRATE 100 ML/HR: 50 INJECTION, SOLUTION INTRAVENOUS at 18:35

## 2020-04-27 RX ADMIN — DEXTROSE MONOHYDRATE 125 ML/HR: 50 INJECTION, SOLUTION INTRAVENOUS at 08:13

## 2020-04-28 LAB
ALBUMIN SERPL-MCNC: 3.3 G/DL (ref 3.5–5.2)
ALBUMIN/GLOB SERPL: 1 G/DL
ALP SERPL-CCNC: 44 U/L (ref 39–117)
ALT SERPL W P-5'-P-CCNC: 115 U/L (ref 1–33)
ANION GAP SERPL CALCULATED.3IONS-SCNC: 12.1 MMOL/L (ref 5–15)
AST SERPL-CCNC: 91 U/L (ref 1–32)
BASOPHILS # BLD AUTO: 0.04 10*3/MM3 (ref 0–0.2)
BASOPHILS NFR BLD AUTO: 0.4 % (ref 0–1.5)
BILIRUB SERPL-MCNC: 0.9 MG/DL (ref 0.2–1.2)
BUN BLD-MCNC: 32 MG/DL (ref 8–23)
BUN/CREAT SERPL: 36.8 (ref 7–25)
CALCIUM SPEC-SCNC: 9.1 MG/DL (ref 8.6–10.5)
CHLORIDE SERPL-SCNC: 101 MMOL/L (ref 98–107)
CO2 SERPL-SCNC: 26.9 MMOL/L (ref 22–29)
CREAT BLD-MCNC: 0.87 MG/DL (ref 0.57–1)
DEPRECATED RDW RBC AUTO: 41.8 FL (ref 37–54)
EOSINOPHIL # BLD AUTO: 0.26 10*3/MM3 (ref 0–0.4)
EOSINOPHIL NFR BLD AUTO: 2.8 % (ref 0.3–6.2)
ERYTHROCYTE [DISTWIDTH] IN BLOOD BY AUTOMATED COUNT: 12.9 % (ref 12.3–15.4)
GFR SERPL CREATININE-BSD FRML MDRD: 66 ML/MIN/1.73
GLOBULIN UR ELPH-MCNC: 3.4 GM/DL
GLUCOSE BLD-MCNC: 145 MG/DL (ref 65–99)
HCT VFR BLD AUTO: 43.7 % (ref 34–46.6)
HGB BLD-MCNC: 14.2 G/DL (ref 12–15.9)
IMM GRANULOCYTES # BLD AUTO: 0.04 10*3/MM3 (ref 0–0.05)
IMM GRANULOCYTES NFR BLD AUTO: 0.4 % (ref 0–0.5)
LYMPHOCYTES # BLD AUTO: 2.47 10*3/MM3 (ref 0.7–3.1)
LYMPHOCYTES NFR BLD AUTO: 26.1 % (ref 19.6–45.3)
MAGNESIUM SERPL-MCNC: 1.8 MG/DL (ref 1.6–2.4)
MCH RBC QN AUTO: 28.9 PG (ref 26.6–33)
MCHC RBC AUTO-ENTMCNC: 32.5 G/DL (ref 31.5–35.7)
MCV RBC AUTO: 89 FL (ref 79–97)
MONOCYTES # BLD AUTO: 0.93 10*3/MM3 (ref 0.1–0.9)
MONOCYTES NFR BLD AUTO: 9.8 % (ref 5–12)
NEUTROPHILS # BLD AUTO: 5.71 10*3/MM3 (ref 1.7–7)
NEUTROPHILS NFR BLD AUTO: 60.5 % (ref 42.7–76)
NRBC BLD AUTO-RTO: 0 /100 WBC (ref 0–0.2)
PHOSPHATE SERPL-MCNC: 2 MG/DL (ref 2.5–4.5)
PLATELET # BLD AUTO: 190 10*3/MM3 (ref 140–450)
PMV BLD AUTO: 12.2 FL (ref 6–12)
POTASSIUM BLD-SCNC: 3.1 MMOL/L (ref 3.5–5.2)
PROT SERPL-MCNC: 6.7 G/DL (ref 6–8.5)
RBC # BLD AUTO: 4.91 10*6/MM3 (ref 3.77–5.28)
SODIUM BLD-SCNC: 140 MMOL/L (ref 136–145)
WBC NRBC COR # BLD: 9.45 10*3/MM3 (ref 3.4–10.8)

## 2020-04-28 PROCEDURE — 25010000002 CEFTRIAXONE PER 250 MG: Performed by: INTERNAL MEDICINE

## 2020-04-28 PROCEDURE — 99232 SBSQ HOSP IP/OBS MODERATE 35: CPT | Performed by: INTERNAL MEDICINE

## 2020-04-28 PROCEDURE — 97166 OT EVAL MOD COMPLEX 45 MIN: CPT | Performed by: OCCUPATIONAL THERAPIST

## 2020-04-28 PROCEDURE — 25010000002 ENOXAPARIN PER 10 MG: Performed by: INTERNAL MEDICINE

## 2020-04-28 PROCEDURE — 84100 ASSAY OF PHOSPHORUS: CPT | Performed by: INTERNAL MEDICINE

## 2020-04-28 PROCEDURE — 25010000003 POTASSIUM CHLORIDE 10 MEQ/100ML SOLUTION: Performed by: INTERNAL MEDICINE

## 2020-04-28 PROCEDURE — 80053 COMPREHEN METABOLIC PANEL: CPT | Performed by: INTERNAL MEDICINE

## 2020-04-28 PROCEDURE — 83735 ASSAY OF MAGNESIUM: CPT | Performed by: INTERNAL MEDICINE

## 2020-04-28 PROCEDURE — 85025 COMPLETE CBC W/AUTO DIFF WBC: CPT | Performed by: INTERNAL MEDICINE

## 2020-04-28 RX ORDER — DEXTROSE MONOHYDRATE 50 MG/ML
50 INJECTION, SOLUTION INTRAVENOUS CONTINUOUS
Status: DISCONTINUED | OUTPATIENT
Start: 2020-04-28 | End: 2020-04-30

## 2020-04-28 RX ADMIN — CLOBETASOL PROPIONATE: 0.5 CREAM TOPICAL at 20:52

## 2020-04-28 RX ADMIN — OXYBUTYNIN CHLORIDE 5 MG: 5 TABLET, EXTENDED RELEASE ORAL at 08:55

## 2020-04-28 RX ADMIN — POTASSIUM CHLORIDE 10 MEQ: 7.46 INJECTION, SOLUTION INTRAVENOUS at 17:12

## 2020-04-28 RX ADMIN — DEXTROSE MONOHYDRATE 50 ML/HR: 50 INJECTION, SOLUTION INTRAVENOUS at 19:24

## 2020-04-28 RX ADMIN — ENOXAPARIN SODIUM 40 MG: 40 INJECTION SUBCUTANEOUS at 23:18

## 2020-04-28 RX ADMIN — SODIUM CHLORIDE, PRESERVATIVE FREE 10 ML: 5 INJECTION INTRAVENOUS at 08:55

## 2020-04-28 RX ADMIN — POTASSIUM CHLORIDE 10 MEQ: 7.46 INJECTION, SOLUTION INTRAVENOUS at 14:54

## 2020-04-28 RX ADMIN — CEFTRIAXONE SODIUM 1 G: 1 INJECTION, SOLUTION INTRAVENOUS at 00:30

## 2020-04-28 RX ADMIN — QUETIAPINE FUMARATE 100 MG: 50 TABLET, EXTENDED RELEASE ORAL at 21:28

## 2020-04-28 RX ADMIN — POTASSIUM CHLORIDE 10 MEQ: 7.46 INJECTION, SOLUTION INTRAVENOUS at 15:54

## 2020-04-28 RX ADMIN — POTASSIUM PHOSPHATE, MONOBASIC AND POTASSIUM PHOSPHATE, DIBASIC 15 MMOL: 224; 236 INJECTION, SOLUTION INTRAVENOUS at 20:47

## 2020-04-28 RX ADMIN — ENOXAPARIN SODIUM 40 MG: 40 INJECTION SUBCUTANEOUS at 00:30

## 2020-04-28 RX ADMIN — SODIUM CHLORIDE, PRESERVATIVE FREE 10 ML: 5 INJECTION INTRAVENOUS at 20:52

## 2020-04-28 RX ADMIN — POTASSIUM CHLORIDE 10 MEQ: 7.46 INJECTION, SOLUTION INTRAVENOUS at 14:07

## 2020-04-29 ENCOUNTER — APPOINTMENT (OUTPATIENT)
Dept: MRI IMAGING | Facility: HOSPITAL | Age: 65
End: 2020-04-29

## 2020-04-29 LAB
ALBUMIN SERPL-MCNC: 3.3 G/DL (ref 3.5–5.2)
ANION GAP SERPL CALCULATED.3IONS-SCNC: 13.4 MMOL/L (ref 5–15)
BASOPHILS # BLD AUTO: 0.05 10*3/MM3 (ref 0–0.2)
BASOPHILS NFR BLD AUTO: 0.6 % (ref 0–1.5)
BUN BLD-MCNC: 19 MG/DL (ref 8–23)
BUN/CREAT SERPL: 26 (ref 7–25)
CALCIUM SPEC-SCNC: 8.9 MG/DL (ref 8.6–10.5)
CHLORIDE SERPL-SCNC: 103 MMOL/L (ref 98–107)
CO2 SERPL-SCNC: 24.6 MMOL/L (ref 22–29)
CREAT BLD-MCNC: 0.73 MG/DL (ref 0.57–1)
DEPRECATED RDW RBC AUTO: 42 FL (ref 37–54)
EOSINOPHIL # BLD AUTO: 0.38 10*3/MM3 (ref 0–0.4)
EOSINOPHIL NFR BLD AUTO: 4.3 % (ref 0.3–6.2)
ERYTHROCYTE [DISTWIDTH] IN BLOOD BY AUTOMATED COUNT: 13.1 % (ref 12.3–15.4)
GFR SERPL CREATININE-BSD FRML MDRD: 81 ML/MIN/1.73
GLUCOSE BLD-MCNC: 116 MG/DL (ref 65–99)
HCT VFR BLD AUTO: 44 % (ref 34–46.6)
HCV RNA SERPL NAA+PROBE-ACNC: NORMAL IU/ML
HGB BLD-MCNC: 14.9 G/DL (ref 12–15.9)
IMM GRANULOCYTES # BLD AUTO: 0.06 10*3/MM3 (ref 0–0.05)
IMM GRANULOCYTES NFR BLD AUTO: 0.7 % (ref 0–0.5)
LYMPHOCYTES # BLD AUTO: 2.43 10*3/MM3 (ref 0.7–3.1)
LYMPHOCYTES NFR BLD AUTO: 27.3 % (ref 19.6–45.3)
MCH RBC QN AUTO: 30 PG (ref 26.6–33)
MCHC RBC AUTO-ENTMCNC: 33.9 G/DL (ref 31.5–35.7)
MCV RBC AUTO: 88.7 FL (ref 79–97)
MONOCYTES # BLD AUTO: 0.94 10*3/MM3 (ref 0.1–0.9)
MONOCYTES NFR BLD AUTO: 10.6 % (ref 5–12)
NEUTROPHILS # BLD AUTO: 5.04 10*3/MM3 (ref 1.7–7)
NEUTROPHILS NFR BLD AUTO: 56.5 % (ref 42.7–76)
NRBC BLD AUTO-RTO: 0 /100 WBC (ref 0–0.2)
PHOSPHATE SERPL-MCNC: 2.7 MG/DL (ref 2.5–4.5)
PLATELET # BLD AUTO: 173 10*3/MM3 (ref 140–450)
PMV BLD AUTO: 12.5 FL (ref 6–12)
POTASSIUM BLD-SCNC: 3.8 MMOL/L (ref 3.5–5.2)
RBC # BLD AUTO: 4.96 10*6/MM3 (ref 3.77–5.28)
SODIUM BLD-SCNC: 141 MMOL/L (ref 136–145)
TEST INFORMATION: NORMAL
WBC NRBC COR # BLD: 8.9 10*3/MM3 (ref 3.4–10.8)

## 2020-04-29 PROCEDURE — 70551 MRI BRAIN STEM W/O DYE: CPT

## 2020-04-29 PROCEDURE — 99253 IP/OBS CNSLTJ NEW/EST LOW 45: CPT | Performed by: PSYCHIATRY & NEUROLOGY

## 2020-04-29 PROCEDURE — 97110 THERAPEUTIC EXERCISES: CPT

## 2020-04-29 PROCEDURE — 25010000002 ENOXAPARIN PER 10 MG: Performed by: INTERNAL MEDICINE

## 2020-04-29 PROCEDURE — 80069 RENAL FUNCTION PANEL: CPT | Performed by: INTERNAL MEDICINE

## 2020-04-29 PROCEDURE — 25010000002 LORAZEPAM PER 2 MG: Performed by: INTERNAL MEDICINE

## 2020-04-29 PROCEDURE — 85025 COMPLETE CBC W/AUTO DIFF WBC: CPT | Performed by: INTERNAL MEDICINE

## 2020-04-29 PROCEDURE — 87522 HEPATITIS C REVRS TRNSCRPJ: CPT | Performed by: INTERNAL MEDICINE

## 2020-04-29 PROCEDURE — 97162 PT EVAL MOD COMPLEX 30 MIN: CPT

## 2020-04-29 PROCEDURE — 99254 IP/OBS CNSLTJ NEW/EST MOD 60: CPT | Performed by: INTERNAL MEDICINE

## 2020-04-29 RX ORDER — LORAZEPAM 2 MG/ML
1 INJECTION INTRAMUSCULAR ONCE
Status: COMPLETED | OUTPATIENT
Start: 2020-04-29 | End: 2020-04-29

## 2020-04-29 RX ADMIN — CLOBETASOL PROPIONATE: 0.5 CREAM TOPICAL at 11:29

## 2020-04-29 RX ADMIN — CLOBETASOL PROPIONATE: 0.5 CREAM TOPICAL at 20:47

## 2020-04-29 RX ADMIN — SODIUM CHLORIDE, PRESERVATIVE FREE 10 ML: 5 INJECTION INTRAVENOUS at 11:29

## 2020-04-29 RX ADMIN — ENOXAPARIN SODIUM 40 MG: 40 INJECTION SUBCUTANEOUS at 20:50

## 2020-04-29 RX ADMIN — DEXTROSE MONOHYDRATE 50 ML/HR: 50 INJECTION, SOLUTION INTRAVENOUS at 22:25

## 2020-04-29 RX ADMIN — SODIUM CHLORIDE, PRESERVATIVE FREE 10 ML: 5 INJECTION INTRAVENOUS at 20:47

## 2020-04-29 RX ADMIN — LORAZEPAM 1 MG: 2 INJECTION INTRAMUSCULAR; INTRAVENOUS at 12:30

## 2020-04-29 RX ADMIN — QUETIAPINE FUMARATE 100 MG: 50 TABLET, EXTENDED RELEASE ORAL at 20:47

## 2020-04-30 LAB
ALBUMIN SERPL-MCNC: 2.7 G/DL (ref 3.5–5.2)
ALBUMIN/GLOB SERPL: 0.7 G/DL
ALP SERPL-CCNC: 43 U/L (ref 39–117)
ALT SERPL W P-5'-P-CCNC: 78 U/L (ref 1–33)
ANION GAP SERPL CALCULATED.3IONS-SCNC: 13.8 MMOL/L (ref 5–15)
AST SERPL-CCNC: 50 U/L (ref 1–32)
BASOPHILS # BLD AUTO: 0.04 10*3/MM3 (ref 0–0.2)
BASOPHILS NFR BLD AUTO: 0.4 % (ref 0–1.5)
BILIRUB SERPL-MCNC: 0.7 MG/DL (ref 0.2–1.2)
BUN BLD-MCNC: 15 MG/DL (ref 8–23)
BUN/CREAT SERPL: 23.1 (ref 7–25)
CALCIUM SPEC-SCNC: 8.6 MG/DL (ref 8.6–10.5)
CHLORIDE SERPL-SCNC: 100 MMOL/L (ref 98–107)
CK SERPL-CCNC: 109 U/L (ref 20–180)
CO2 SERPL-SCNC: 23.2 MMOL/L (ref 22–29)
CREAT BLD-MCNC: 0.65 MG/DL (ref 0.57–1)
DEPRECATED RDW RBC AUTO: 42.6 FL (ref 37–54)
EOSINOPHIL # BLD AUTO: 0.51 10*3/MM3 (ref 0–0.4)
EOSINOPHIL NFR BLD AUTO: 5.7 % (ref 0.3–6.2)
ERYTHROCYTE [DISTWIDTH] IN BLOOD BY AUTOMATED COUNT: 13 % (ref 12.3–15.4)
GFR SERPL CREATININE-BSD FRML MDRD: 92 ML/MIN/1.73
GLOBULIN UR ELPH-MCNC: 3.8 GM/DL
GLUCOSE BLD-MCNC: 108 MG/DL (ref 65–99)
HCT VFR BLD AUTO: 42.3 % (ref 34–46.6)
HGB BLD-MCNC: 14.2 G/DL (ref 12–15.9)
IMM GRANULOCYTES # BLD AUTO: 0.05 10*3/MM3 (ref 0–0.05)
IMM GRANULOCYTES NFR BLD AUTO: 0.6 % (ref 0–0.5)
LYMPHOCYTES # BLD AUTO: 2.59 10*3/MM3 (ref 0.7–3.1)
LYMPHOCYTES NFR BLD AUTO: 29.1 % (ref 19.6–45.3)
MAGNESIUM SERPL-MCNC: 1.9 MG/DL (ref 1.6–2.4)
MCH RBC QN AUTO: 29.9 PG (ref 26.6–33)
MCHC RBC AUTO-ENTMCNC: 33.6 G/DL (ref 31.5–35.7)
MCV RBC AUTO: 89.1 FL (ref 79–97)
MONOCYTES # BLD AUTO: 0.94 10*3/MM3 (ref 0.1–0.9)
MONOCYTES NFR BLD AUTO: 10.6 % (ref 5–12)
NEUTROPHILS # BLD AUTO: 4.76 10*3/MM3 (ref 1.7–7)
NEUTROPHILS NFR BLD AUTO: 53.6 % (ref 42.7–76)
NRBC BLD AUTO-RTO: 0 /100 WBC (ref 0–0.2)
PLATELET # BLD AUTO: 152 10*3/MM3 (ref 140–450)
PMV BLD AUTO: 12.5 FL (ref 6–12)
POTASSIUM BLD-SCNC: 4 MMOL/L (ref 3.5–5.2)
PROT SERPL-MCNC: 6.5 G/DL (ref 6–8.5)
RBC # BLD AUTO: 4.75 10*6/MM3 (ref 3.77–5.28)
SODIUM BLD-SCNC: 137 MMOL/L (ref 136–145)
WBC NRBC COR # BLD: 8.89 10*3/MM3 (ref 3.4–10.8)

## 2020-04-30 PROCEDURE — 97112 NEUROMUSCULAR REEDUCATION: CPT | Performed by: OCCUPATIONAL THERAPIST

## 2020-04-30 PROCEDURE — 85025 COMPLETE CBC W/AUTO DIFF WBC: CPT | Performed by: INTERNAL MEDICINE

## 2020-04-30 PROCEDURE — 99231 SBSQ HOSP IP/OBS SF/LOW 25: CPT | Performed by: NURSE PRACTITIONER

## 2020-04-30 PROCEDURE — 25010000002 ENOXAPARIN PER 10 MG: Performed by: INTERNAL MEDICINE

## 2020-04-30 PROCEDURE — 92610 EVALUATE SWALLOWING FUNCTION: CPT | Performed by: SPEECH-LANGUAGE PATHOLOGIST

## 2020-04-30 PROCEDURE — 82550 ASSAY OF CK (CPK): CPT | Performed by: INTERNAL MEDICINE

## 2020-04-30 PROCEDURE — 83735 ASSAY OF MAGNESIUM: CPT | Performed by: INTERNAL MEDICINE

## 2020-04-30 PROCEDURE — 80053 COMPREHEN METABOLIC PANEL: CPT | Performed by: INTERNAL MEDICINE

## 2020-04-30 PROCEDURE — 25010000002 FUROSEMIDE PER 20 MG: Performed by: INTERNAL MEDICINE

## 2020-04-30 RX ORDER — HYDROCHLOROTHIAZIDE 25 MG/1
6.25 TABLET ORAL DAILY
Status: DISCONTINUED | OUTPATIENT
Start: 2020-05-01 | End: 2020-05-01 | Stop reason: HOSPADM

## 2020-04-30 RX ORDER — CLOBETASOL PROPIONATE 0.5 MG/G
CREAM TOPICAL EVERY 12 HOURS SCHEDULED
Status: CANCELLED | OUTPATIENT
Start: 2020-04-30

## 2020-04-30 RX ORDER — HYDROCHLOROTHIAZIDE 25 MG/1
6.25 TABLET ORAL DAILY
Status: CANCELLED | OUTPATIENT
Start: 2020-04-30

## 2020-04-30 RX ORDER — KETOTIFEN FUMARATE 0.35 MG/ML
1 SOLUTION/ DROPS OPHTHALMIC 2 TIMES DAILY PRN
Status: CANCELLED | OUTPATIENT
Start: 2020-04-30

## 2020-04-30 RX ORDER — CHOLECALCIFEROL (VITAMIN D3) 125 MCG
5 CAPSULE ORAL NIGHTLY PRN
Status: CANCELLED | OUTPATIENT
Start: 2020-04-30

## 2020-04-30 RX ORDER — ACETAMINOPHEN 160 MG/5ML
650 SOLUTION ORAL EVERY 4 HOURS PRN
Status: CANCELLED | OUTPATIENT
Start: 2020-04-30

## 2020-04-30 RX ORDER — LOSARTAN POTASSIUM 100 MG/1
100 TABLET ORAL DAILY
Status: CANCELLED | OUTPATIENT
Start: 2020-04-30

## 2020-04-30 RX ORDER — DOCUSATE SODIUM 100 MG/1
100 CAPSULE, LIQUID FILLED ORAL 2 TIMES DAILY PRN
Status: CANCELLED | OUTPATIENT
Start: 2020-04-30

## 2020-04-30 RX ORDER — ONDANSETRON 2 MG/ML
4 INJECTION INTRAMUSCULAR; INTRAVENOUS EVERY 6 HOURS PRN
Status: CANCELLED | OUTPATIENT
Start: 2020-04-30

## 2020-04-30 RX ORDER — KETOTIFEN FUMARATE 0.35 MG/ML
1 SOLUTION/ DROPS OPHTHALMIC 2 TIMES DAILY
Status: CANCELLED | OUTPATIENT
Start: 2020-04-30

## 2020-04-30 RX ORDER — FUROSEMIDE 10 MG/ML
20 INJECTION INTRAMUSCULAR; INTRAVENOUS ONCE
Status: COMPLETED | OUTPATIENT
Start: 2020-04-30 | End: 2020-04-30

## 2020-04-30 RX ORDER — QUETIAPINE FUMARATE 50 MG/1
150 TABLET, EXTENDED RELEASE ORAL NIGHTLY
Status: CANCELLED | OUTPATIENT
Start: 2020-04-30

## 2020-04-30 RX ORDER — ACETAMINOPHEN 325 MG/1
650 TABLET ORAL EVERY 4 HOURS PRN
Status: CANCELLED | OUTPATIENT
Start: 2020-04-30

## 2020-04-30 RX ORDER — LORAZEPAM 0.5 MG/1
0.5 TABLET ORAL EVERY 8 HOURS PRN
Status: CANCELLED | OUTPATIENT
Start: 2020-04-30 | End: 2020-05-05

## 2020-04-30 RX ORDER — BISOPROLOL FUMARATE 5 MG/1
2.5 TABLET, FILM COATED ORAL
Status: CANCELLED | OUTPATIENT
Start: 2020-04-30

## 2020-04-30 RX ORDER — OXYBUTYNIN CHLORIDE 10 MG/1
10 TABLET, EXTENDED RELEASE ORAL DAILY
Status: CANCELLED | OUTPATIENT
Start: 2020-04-30

## 2020-04-30 RX ORDER — ACETAMINOPHEN 650 MG/1
650 SUPPOSITORY RECTAL EVERY 4 HOURS PRN
Status: CANCELLED | OUTPATIENT
Start: 2020-04-30

## 2020-04-30 RX ORDER — BISOPROLOL FUMARATE 5 MG/1
2.5 TABLET, FILM COATED ORAL
Status: DISCONTINUED | OUTPATIENT
Start: 2020-05-01 | End: 2020-05-01 | Stop reason: HOSPADM

## 2020-04-30 RX ORDER — ERGOCALCIFEROL 1.25 MG/1
50000 CAPSULE ORAL
Status: CANCELLED | OUTPATIENT
Start: 2020-05-03

## 2020-04-30 RX ORDER — LOSARTAN POTASSIUM 100 MG/1
100 TABLET ORAL DAILY
Status: DISCONTINUED | OUTPATIENT
Start: 2020-05-01 | End: 2020-05-01 | Stop reason: HOSPADM

## 2020-04-30 RX ORDER — SODIUM CHLORIDE 0.9 % (FLUSH) 0.9 %
10 SYRINGE (ML) INJECTION AS NEEDED
Status: CANCELLED | OUTPATIENT
Start: 2020-04-30

## 2020-04-30 RX ORDER — FAMOTIDINE 20 MG/1
40 TABLET, FILM COATED ORAL DAILY
Status: CANCELLED | OUTPATIENT
Start: 2020-05-01

## 2020-04-30 RX ORDER — SODIUM CHLORIDE 0.9 % (FLUSH) 0.9 %
10 SYRINGE (ML) INJECTION EVERY 12 HOURS SCHEDULED
Status: CANCELLED | OUTPATIENT
Start: 2020-04-30

## 2020-04-30 RX ORDER — AMLODIPINE BESYLATE 5 MG/1
5 TABLET ORAL DAILY PRN
Status: CANCELLED | OUTPATIENT
Start: 2020-04-30

## 2020-04-30 RX ADMIN — SODIUM CHLORIDE, PRESERVATIVE FREE 10 ML: 5 INJECTION INTRAVENOUS at 20:09

## 2020-04-30 RX ADMIN — CLOBETASOL PROPIONATE: 0.5 CREAM TOPICAL at 20:09

## 2020-04-30 RX ADMIN — FUROSEMIDE 20 MG: 20 INJECTION, SOLUTION INTRAMUSCULAR; INTRAVENOUS at 10:36

## 2020-04-30 RX ADMIN — QUETIAPINE FUMARATE 100 MG: 50 TABLET, EXTENDED RELEASE ORAL at 20:09

## 2020-04-30 RX ADMIN — CLOBETASOL PROPIONATE: 0.5 CREAM TOPICAL at 08:18

## 2020-04-30 RX ADMIN — ENOXAPARIN SODIUM 40 MG: 40 INJECTION SUBCUTANEOUS at 20:17

## 2020-04-30 RX ADMIN — FAMOTIDINE 40 MG: 20 TABLET, FILM COATED ORAL at 08:18

## 2020-04-30 RX ADMIN — OXYBUTYNIN CHLORIDE 5 MG: 5 TABLET, EXTENDED RELEASE ORAL at 08:18

## 2020-04-30 RX ADMIN — LOSARTAN POTASSIUM 50 MG: 50 TABLET, FILM COATED ORAL at 09:00

## 2020-04-30 RX ADMIN — SODIUM CHLORIDE, PRESERVATIVE FREE 10 ML: 5 INJECTION INTRAVENOUS at 08:18

## 2020-05-01 ENCOUNTER — HOSPITAL ENCOUNTER (INPATIENT)
Facility: HOSPITAL | Age: 65
LOS: 19 days | Discharge: HOME | End: 2020-05-20
Attending: SPECIALIST | Admitting: SPECIALIST

## 2020-05-01 VITALS
DIASTOLIC BLOOD PRESSURE: 60 MMHG | SYSTOLIC BLOOD PRESSURE: 141 MMHG | TEMPERATURE: 98.1 F | OXYGEN SATURATION: 92 % | HEART RATE: 96 BPM | BODY MASS INDEX: 33.71 KG/M2 | WEIGHT: 214.8 LBS | HEIGHT: 67 IN | RESPIRATION RATE: 18 BRPM

## 2020-05-01 DIAGNOSIS — F31.75 BIPOLAR DISORDER, IN PARTIAL REMISSION, MOST RECENT EPISODE DEPRESSED (HCC): Primary | ICD-10-CM

## 2020-05-01 PROBLEM — F31.9 AFFECTIVE PSYCHOSIS, BIPOLAR (HCC): Status: ACTIVE | Noted: 2020-05-01

## 2020-05-01 LAB
ALBUMIN SERPL-MCNC: 3.5 G/DL (ref 3.5–5.2)
ALBUMIN/GLOB SERPL: 0.9 G/DL
ALP SERPL-CCNC: 51 U/L (ref 39–117)
ALT SERPL W P-5'-P-CCNC: 77 U/L (ref 1–33)
ANION GAP SERPL CALCULATED.3IONS-SCNC: 16.3 MMOL/L (ref 5–15)
AST SERPL-CCNC: 50 U/L (ref 1–32)
BASOPHILS # BLD AUTO: 0.05 10*3/MM3 (ref 0–0.2)
BASOPHILS # BLD AUTO: 0.09 10*3/MM3 (ref 0–0.2)
BASOPHILS NFR BLD AUTO: 0.5 % (ref 0–1.5)
BASOPHILS NFR BLD AUTO: 0.7 % (ref 0–1.5)
BILIRUB SERPL-MCNC: 0.7 MG/DL (ref 0.2–1.2)
BUN BLD-MCNC: 23 MG/DL (ref 8–23)
BUN/CREAT SERPL: 26.4 (ref 7–25)
CALCIUM SPEC-SCNC: 9.8 MG/DL (ref 8.6–10.5)
CHLORIDE SERPL-SCNC: 101 MMOL/L (ref 98–107)
CO2 SERPL-SCNC: 22.7 MMOL/L (ref 22–29)
CREAT BLD-MCNC: 0.87 MG/DL (ref 0.57–1)
DEPRECATED RDW RBC AUTO: 41.6 FL (ref 37–54)
DEPRECATED RDW RBC AUTO: 42.9 FL (ref 37–54)
EOSINOPHIL # BLD AUTO: 0.34 10*3/MM3 (ref 0–0.4)
EOSINOPHIL # BLD AUTO: 0.39 10*3/MM3 (ref 0–0.4)
EOSINOPHIL NFR BLD AUTO: 2.7 % (ref 0.3–6.2)
EOSINOPHIL NFR BLD AUTO: 3.7 % (ref 0.3–6.2)
ERYTHROCYTE [DISTWIDTH] IN BLOOD BY AUTOMATED COUNT: 13.1 % (ref 12.3–15.4)
ERYTHROCYTE [DISTWIDTH] IN BLOOD BY AUTOMATED COUNT: 13.2 % (ref 12.3–15.4)
GFR SERPL CREATININE-BSD FRML MDRD: 66 ML/MIN/1.73
GLOBULIN UR ELPH-MCNC: 3.7 GM/DL
GLUCOSE BLD-MCNC: 118 MG/DL (ref 65–99)
GLUCOSE BLDC GLUCOMTR-MCNC: 107 MG/DL (ref 70–130)
HCT VFR BLD AUTO: 47.5 % (ref 34–46.6)
HCT VFR BLD AUTO: 47.7 % (ref 34–46.6)
HGB BLD-MCNC: 15.9 G/DL (ref 12–15.9)
HGB BLD-MCNC: 16 G/DL (ref 12–15.9)
IMM GRANULOCYTES # BLD AUTO: 0.08 10*3/MM3 (ref 0–0.05)
IMM GRANULOCYTES # BLD AUTO: 0.09 10*3/MM3 (ref 0–0.05)
IMM GRANULOCYTES NFR BLD AUTO: 0.7 % (ref 0–0.5)
IMM GRANULOCYTES NFR BLD AUTO: 0.8 % (ref 0–0.5)
LYMPHOCYTES # BLD AUTO: 2.47 10*3/MM3 (ref 0.7–3.1)
LYMPHOCYTES # BLD AUTO: 2.74 10*3/MM3 (ref 0.7–3.1)
LYMPHOCYTES NFR BLD AUTO: 22 % (ref 19.6–45.3)
LYMPHOCYTES NFR BLD AUTO: 23.7 % (ref 19.6–45.3)
MCH RBC QN AUTO: 29.5 PG (ref 26.6–33)
MCH RBC QN AUTO: 29.6 PG (ref 26.6–33)
MCHC RBC AUTO-ENTMCNC: 33.3 G/DL (ref 31.5–35.7)
MCHC RBC AUTO-ENTMCNC: 33.7 G/DL (ref 31.5–35.7)
MCV RBC AUTO: 87.8 FL (ref 79–97)
MCV RBC AUTO: 88.5 FL (ref 79–97)
MONOCYTES # BLD AUTO: 1.11 10*3/MM3 (ref 0.1–0.9)
MONOCYTES # BLD AUTO: 1.43 10*3/MM3 (ref 0.1–0.9)
MONOCYTES NFR BLD AUTO: 10.6 % (ref 5–12)
MONOCYTES NFR BLD AUTO: 11.5 % (ref 5–12)
NEUTROPHILS # BLD AUTO: 6.33 10*3/MM3 (ref 1.7–7)
NEUTROPHILS # BLD AUTO: 7.78 10*3/MM3 (ref 1.7–7)
NEUTROPHILS NFR BLD AUTO: 60.7 % (ref 42.7–76)
NEUTROPHILS NFR BLD AUTO: 62.4 % (ref 42.7–76)
NRBC BLD AUTO-RTO: 0 /100 WBC (ref 0–0.2)
NRBC BLD AUTO-RTO: 0 /100 WBC (ref 0–0.2)
PLATELET # BLD AUTO: 203 10*3/MM3 (ref 140–450)
PLATELET # BLD AUTO: 248 10*3/MM3 (ref 140–450)
PMV BLD AUTO: 12.3 FL (ref 6–12)
PMV BLD AUTO: 13 FL (ref 6–12)
POTASSIUM BLD-SCNC: 3.9 MMOL/L (ref 3.5–5.2)
PROT SERPL-MCNC: 7.2 G/DL (ref 6–8.5)
RBC # BLD AUTO: 5.39 10*6/MM3 (ref 3.77–5.28)
RBC # BLD AUTO: 5.41 10*6/MM3 (ref 3.77–5.28)
SODIUM BLD-SCNC: 140 MMOL/L (ref 136–145)
T4 FREE SERPL-MCNC: 1.8 NG/DL (ref 0.93–1.7)
TSH SERPL DL<=0.05 MIU/L-ACNC: 3.26 UIU/ML (ref 0.27–4.2)
WBC NRBC COR # BLD: 10.43 10*3/MM3 (ref 3.4–10.8)
WBC NRBC COR # BLD: 12.47 10*3/MM3 (ref 3.4–10.8)

## 2020-05-01 PROCEDURE — 85025 COMPLETE CBC W/AUTO DIFF WBC: CPT | Performed by: SPECIALIST

## 2020-05-01 PROCEDURE — 97110 THERAPEUTIC EXERCISES: CPT

## 2020-05-01 PROCEDURE — 80053 COMPREHEN METABOLIC PANEL: CPT | Performed by: SPECIALIST

## 2020-05-01 PROCEDURE — 84439 ASSAY OF FREE THYROXINE: CPT | Performed by: SPECIALIST

## 2020-05-01 PROCEDURE — 84443 ASSAY THYROID STIM HORMONE: CPT | Performed by: SPECIALIST

## 2020-05-01 PROCEDURE — 85025 COMPLETE CBC W/AUTO DIFF WBC: CPT | Performed by: INTERNAL MEDICINE

## 2020-05-01 PROCEDURE — 82962 GLUCOSE BLOOD TEST: CPT

## 2020-05-01 RX ORDER — ALUMINA, MAGNESIA, AND SIMETHICONE 2400; 2400; 240 MG/30ML; MG/30ML; MG/30ML
15 SUSPENSION ORAL EVERY 6 HOURS PRN
Status: DISCONTINUED | OUTPATIENT
Start: 2020-05-01 | End: 2020-05-20 | Stop reason: HOSPADM

## 2020-05-01 RX ORDER — BISOPROLOL FUMARATE 5 MG/1
2.5 TABLET, FILM COATED ORAL
Status: DISCONTINUED | OUTPATIENT
Start: 2020-05-02 | End: 2020-05-12

## 2020-05-01 RX ORDER — QUETIAPINE FUMARATE 50 MG/1
100 TABLET, EXTENDED RELEASE ORAL NIGHTLY
Qty: 90 EACH
Start: 2020-05-01 | End: 2020-05-20 | Stop reason: HOSPADM

## 2020-05-01 RX ORDER — LOPERAMIDE HYDROCHLORIDE 2 MG/1
2 CAPSULE ORAL
Status: DISCONTINUED | OUTPATIENT
Start: 2020-05-01 | End: 2020-05-20 | Stop reason: HOSPADM

## 2020-05-01 RX ORDER — FAMOTIDINE 40 MG/1
40 TABLET, FILM COATED ORAL DAILY
Start: 2020-05-02 | End: 2021-07-01

## 2020-05-01 RX ORDER — FAMOTIDINE 40 MG/1
40 TABLET, FILM COATED ORAL DAILY
Status: DISCONTINUED | OUTPATIENT
Start: 2020-05-02 | End: 2020-05-20 | Stop reason: HOSPADM

## 2020-05-01 RX ORDER — CHOLECALCIFEROL (VITAMIN D3) 125 MCG
5 CAPSULE ORAL NIGHTLY PRN
Status: ON HOLD
Start: 2020-05-01 | End: 2020-09-17

## 2020-05-01 RX ORDER — ACETAMINOPHEN 325 MG/1
650 TABLET ORAL EVERY 4 HOURS PRN
Start: 2020-05-01

## 2020-05-01 RX ORDER — CHOLECALCIFEROL (VITAMIN D3) 125 MCG
5 CAPSULE ORAL NIGHTLY PRN
Status: DISCONTINUED | OUTPATIENT
Start: 2020-05-01 | End: 2020-05-20 | Stop reason: HOSPADM

## 2020-05-01 RX ORDER — LOSARTAN POTASSIUM 100 MG/1
100 TABLET ORAL DAILY
Start: 2020-05-02 | End: 2020-05-20 | Stop reason: HOSPADM

## 2020-05-01 RX ORDER — LORAZEPAM 0.5 MG/1
0.5 TABLET ORAL EVERY 8 HOURS PRN
Status: ACTIVE | OUTPATIENT
Start: 2020-05-01 | End: 2020-05-11

## 2020-05-01 RX ORDER — LOSARTAN POTASSIUM 100 MG/1
100 TABLET ORAL
Status: DISCONTINUED | OUTPATIENT
Start: 2020-05-02 | End: 2020-05-02

## 2020-05-01 RX ORDER — OXYBUTYNIN CHLORIDE 5 MG/1
5 TABLET, EXTENDED RELEASE ORAL DAILY
Status: ON HOLD
Start: 2020-05-02 | End: 2020-09-17

## 2020-05-01 RX ORDER — BISOPROLOL FUMARATE 5 MG/1
2.5 TABLET, FILM COATED ORAL
Status: ON HOLD
Start: 2020-05-02 | End: 2020-09-17

## 2020-05-01 RX ORDER — KETOTIFEN FUMARATE 0.35 MG/ML
1 SOLUTION/ DROPS OPHTHALMIC 2 TIMES DAILY
Status: DISCONTINUED | OUTPATIENT
Start: 2020-05-01 | End: 2020-05-17

## 2020-05-01 RX ORDER — ERGOCALCIFEROL 1.25 MG/1
50000 CAPSULE ORAL
Qty: 5 CAPSULE | Status: ON HOLD
Start: 2020-05-03 | End: 2020-09-17

## 2020-05-01 RX ORDER — CLOBETASOL PROPIONATE 0.5 MG/G
CREAM TOPICAL EVERY 12 HOURS SCHEDULED
Status: DISCONTINUED | OUTPATIENT
Start: 2020-05-01 | End: 2020-05-17

## 2020-05-01 RX ORDER — DOCUSATE SODIUM 100 MG/1
100 CAPSULE, LIQUID FILLED ORAL 2 TIMES DAILY
Status: DISCONTINUED | OUTPATIENT
Start: 2020-05-01 | End: 2020-05-20 | Stop reason: HOSPADM

## 2020-05-01 RX ORDER — HYDROCHLOROTHIAZIDE 12.5 MG/1
6.25 TABLET ORAL DAILY
Status: ON HOLD
Start: 2020-05-02 | End: 2020-09-17

## 2020-05-01 RX ORDER — HYDROCHLOROTHIAZIDE 25 MG/1
6.25 TABLET ORAL DAILY
Status: DISCONTINUED | OUTPATIENT
Start: 2020-05-02 | End: 2020-05-08

## 2020-05-01 RX ORDER — LORAZEPAM 0.5 MG/1
0.5 TABLET ORAL EVERY 8 HOURS PRN
Start: 2020-05-01 | End: 2020-05-20 | Stop reason: HOSPADM

## 2020-05-01 RX ORDER — POTASSIUM CHLORIDE 750 MG/1
40 CAPSULE, EXTENDED RELEASE ORAL AS NEEDED
Start: 2020-05-01 | End: 2020-05-20 | Stop reason: HOSPADM

## 2020-05-01 RX ORDER — PSEUDOEPHEDRINE HCL 30 MG
100 TABLET ORAL 2 TIMES DAILY PRN
Start: 2020-05-01 | End: 2021-07-01

## 2020-05-01 RX ORDER — OXYBUTYNIN CHLORIDE 5 MG/1
5 TABLET, EXTENDED RELEASE ORAL DAILY
Status: DISCONTINUED | OUTPATIENT
Start: 2020-05-02 | End: 2020-05-02

## 2020-05-01 RX ORDER — ERGOCALCIFEROL 1.25 MG/1
50000 CAPSULE ORAL
Status: DISCONTINUED | OUTPATIENT
Start: 2020-05-03 | End: 2020-05-20 | Stop reason: HOSPADM

## 2020-05-01 RX ORDER — CLOBETASOL PROPIONATE 0.5 MG/G
CREAM TOPICAL EVERY 12 HOURS SCHEDULED
Status: ON HOLD
Start: 2020-05-01 | End: 2020-09-19

## 2020-05-01 RX ORDER — KETOTIFEN FUMARATE 0.35 MG/ML
1 SOLUTION/ DROPS OPHTHALMIC 2 TIMES DAILY PRN
Start: 2020-05-01 | End: 2020-05-20 | Stop reason: HOSPADM

## 2020-05-01 RX ORDER — QUETIAPINE FUMARATE 50 MG/1
100 TABLET, EXTENDED RELEASE ORAL NIGHTLY
Status: DISCONTINUED | OUTPATIENT
Start: 2020-05-01 | End: 2020-05-02

## 2020-05-01 RX ADMIN — BISOPROLOL FUMARATE 2.5 MG: 5 TABLET ORAL at 09:31

## 2020-05-01 RX ADMIN — DOCUSATE SODIUM 100 MG: 100 CAPSULE, LIQUID FILLED ORAL at 21:02

## 2020-05-01 RX ADMIN — QUETIAPINE FUMARATE 100 MG: 50 TABLET, EXTENDED RELEASE ORAL at 21:01

## 2020-05-01 RX ADMIN — CLOBETASOL PROPIONATE: 0.5 CREAM TOPICAL at 09:32

## 2020-05-01 RX ADMIN — LOSARTAN POTASSIUM 100 MG: 100 TABLET, FILM COATED ORAL at 09:31

## 2020-05-01 RX ADMIN — FAMOTIDINE 40 MG: 20 TABLET, FILM COATED ORAL at 09:35

## 2020-05-01 RX ADMIN — HYDROCHLOROTHIAZIDE 6.25 MG: 25 TABLET ORAL at 09:29

## 2020-05-01 RX ADMIN — SODIUM CHLORIDE, PRESERVATIVE FREE 10 ML: 5 INJECTION INTRAVENOUS at 09:32

## 2020-05-01 RX ADMIN — KETOTIFEN FUMARATE 1 DROP: 0.35 SOLUTION/ DROPS OPHTHALMIC at 21:03

## 2020-05-01 RX ADMIN — OXYBUTYNIN CHLORIDE 5 MG: 5 TABLET, EXTENDED RELEASE ORAL at 09:32

## 2020-05-01 NOTE — PLAN OF CARE
Problem: Mental State/Mood Impairment (Psychotic Signs/Symptoms) (Adult)  Goal: Improved Mental State/Mood (Psychotic Signs/Symptoms)  Outcome: Ongoing (interventions implemented as appropriate)  Flowsheets (Taken 5/1/2020 1614)  Improved Mental State/Mood Action Step/Short Term Goal (STG) Established: 5/1/2020  Improved Mental State/Mood Time Frame for Action Step (STG): 4 days  Improved Mental State/Mood Action Step (STG) Outcome: making progress toward outcome     Problem: Psychomotor Movement Impairment (Psychotic Signs/Symptoms) (Adult)  Goal: Improved Psychomotor Symptoms (Psychotic Signs/Symptoms)  Outcome: Ongoing (interventions implemented as appropriate)  Flowsheets (Taken 5/1/2020 1614)  Improved Psychomotor Symptoms Action Step/Short Term Goal (STG) Established: 5/1/2020  Improved Psychomotor Symptoms Time Frame for Action Step (STG): 4 days  Improved Psychomotor Symptoms Action Step (STG) Outcome: making progress toward outcome     Problem: Cognitive Impairment (Psychotic Signs/Symptoms) (Adult)  Goal: Improved Thought Clarity/Organization (Psychotic Signs/Symptoms)  Outcome: Ongoing (interventions implemented as appropriate)  Flowsheets (Taken 5/1/2020 1614)  Improved Thought Clarity/Organization Action Step/Short Term Goal (STG) Established: 5/1/2020  Improved Thought Clarity/Organization Time Frame for Action Step (STG): 4 days  Improved Thought Clarity/Organization Action Step (STG) Outcome: making progress toward outcome     Problem: Overarching Goals (Adult)  Goal: Develops/Participates in Therapeutic [place] to Support Successful Transition  Outcome: Ongoing (interventions implemented as appropriate)  Flowsheets (Taken 5/1/2020 1614)  Develops/Participates in Therapeutic [place] to Support Successful Transition: making progress toward outcome     Problem: Overarching Goals (Adult)  Goal: Optimized Coping Skills in Response to Life Stressors  Outcome: Ongoing (interventions implemented as  appropriate)  Flowsheets (Taken 5/1/2020 1614)  Optimized Coping Skills in Response to Life Stressors: making progress toward outcome     Problem: Overarching Goals (Adult)  Goal: Adheres to Safety Considerations for Self and Others  Outcome: Ongoing (interventions implemented as appropriate)  Flowsheets (Taken 5/1/2020 8626)  Adheres to Safety Considerations for Self and Others: making progress toward outcome

## 2020-05-01 NOTE — PLAN OF CARE
Problem: Fall Risk (Adult)  Goal: Identify Related Risk Factors and Signs and Symptoms  Outcome: Outcome(s) achieved  Flowsheets (Taken 5/1/2020 1645)  Related Risk Factors (Fall Risk): confusion/agitation; depression/anxiety; gait/mobility problems; environment unfamiliar     Problem: Skin Injury Risk (Adult)  Goal: Identify Related Risk Factors and Signs and Symptoms  Outcome: Outcome(s) achieved  Flowsheets (Taken 5/1/2020 1645)  Related Risk Factors (Skin Injury Risk): cognitive impairment; medication; nutritional deficiencies     Problem: Fall Risk (Adult)  Goal: Absence of Fall  Outcome: Ongoing (interventions implemented as appropriate)  Flowsheets (Taken 5/1/2020 1645)  Absence of Fall: making progress toward outcome     Problem: Skin Injury Risk (Adult)  Goal: Skin Health and Integrity  Outcome: Ongoing (interventions implemented as appropriate)  Flowsheets (Taken 5/1/2020 1645)  Skin Health and Integrity: making progress toward outcome
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
## 2020-05-01 NOTE — PLAN OF CARE
Problem: Patient Care Overview  Goal: Plan of Care Review  Flowsheets (Taken 5/1/2020 1321)  Progress: improving  Plan of Care Reviewed With: patient  Outcome Summary: patient awake and alert, still essentially non-verbal throughout PT session. patient able to get up to EOB with modAx2, able to stand one time with assist of 2 but unable to take any steps or achieve standing for a second time. Patient continues to be limited due to cognitive status, anticipate d/c to [facility] soon   Patient was wearing a face mask during this therapy encounter. Therapist used appropriate personal protective equipment including mask and gloves.  Mask used was standard procedure mask. Appropriate PPE was worn during the entire therapy session. Hand hygiene was completed before and after therapy session. Patient is not in enhanced droplet precautions.

## 2020-05-01 NOTE — PLAN OF CARE
Problem: Patient Care Overview  Goal: Plan of Care Review  Outcome: Ongoing (interventions implemented as appropriate)  Flowsheets (Taken 5/1/2020 0010)  Outcome Summary: Pt is much more awake and alert tonight but still not answering questions or making logical statments. Pt trying to pull off monitor, O2 and brief. HR elevated when pt is anxious and pullling at things.  [facility]. Continue to monitor. Safety maintained.

## 2020-05-01 NOTE — PROGRESS NOTES
ELIE HICKEY [facility]  INTERNAL MEDICINE  TOÑITO LOJA MD  [address] [place] [address]  Phone [phone] Fax [phone]  E-mail:  [email or web address]      INTERNAL MEDICINE DAILY PROGRESS NOTE  Toñito Loja M.D.  2020            Patient Identification:  Name: Francheska Sherman  Age: 65 y.o.  Sex: female  :  1955  MRN: [id number]         Primary Care Physician: Toñito Loja MD  LENGTH OF STAY 5 DAYS    Consults     Date and Time Order Name Status Description    2020 1143 Inpatient Infectious Diseases Consult Completed     2020 0834 Inpatient Neurology Consult General Completed     2020 1205 Inpatient Psychiatrist Consult      2020 Inpatient Gastroenterology Consult Completed     2020 Inpatient Urology Consult Completed     2020 Inpatient Nephrology Consult Completed     2020 1913 Family Medicine Consult Completed         Chief Complaint:  Altered mental status with generalized weakness and dehydration     History of Present Illness:     Subjective         Interval History: Patient is a 65 y.o.female who presented with altered mental status and generalized weakness with dehydration to the [facility] emergency room by private car yesterday evening.  Most of the history is provided by her  who brought her to the hospital since patient was quite confused and unable to really provide much concrete detail and during her initial evaluation.  Patient is regularly followed by Dr. Hernesto Ernst in psychiatry at [facility] and has in the past been admitted to [facility] for episodes very similar to this one.  Patient does suffer from bipolar disorder and takes Seroquel on a regular basis for control and stabilization of this issue.  A few weeks ago, patient was feeling good and decided to discontinue her Seroquel.  She also discontinued several of her other  medications including her blood pressure medicine and her vitamin D.  Patient's  noticed that over the last 2 weeks she has begun to have a decline in her mental l condition and specifically he noticed that she was having increased confusion, insomnia, and extreme paranoia at times.  The  contacted Dr. Ernst and was instructed to start the patient back on Seroquel at 50 mg daily with titration up to 100 mg after 1 week on the 50 mg dose.  She has now had the 100 mg dose for the last 4 days but has not really responded to the medication at this point.  Patient has been talking nonstop and sleeping less than 4 hours per night.  This is very similar to episode she has had in the past that have been described as hypomanic in nature.  Patient did threaten to leave the home but has been has been able to manage this by using the alarm system at home to no when she goes out the door and has had her under careful observation for the last 10 days.  In the last 48 hours, patient has been drinking little to no liquid and on 2 occasions the  has found her collapsed and on the floor.  He had no evidence of broken bones or excessive trauma and each time he has been able to eventually get her back up on a chair or the sofa.  When he discussed bringing her to the hospital, patient has had an extreme fear of contacting another individual with COVID-19 and has refused to come in.  Patient has had no exposure to other individuals in over a month and has no symptoms of COVID-19 such as fever, cough, or shortness of breath.   was finally able to convince the patient to come to the emergency room yesterday evening when her weakness became so extreme that she could not get up to move about the house.  She denied nausea, vomiting, or diarrhea.  Family helped the  get her into his truck to bring her to the emergency room and ER staff helped get her out of the truck and bring her into the facility for  "evaluation.     Mrs. Sherman is a delightful 65-year-old female who I had the pleasure of following in my office since she transferred there just prior to longterm of her regular physician, my former partner, Lemuel Turcios MD.   the medical problems for which I follow her include: Hypertension, hyperlipidemia, overactive bladder, and vitamin D deficiency.  Her compliance with medications and treating these issues has been somewhat marginal.  Her last set of labs in January 2020 did show normal liver function, normal electrolytes, mild hyperlipidemia with a cholesterol of 226, normal thyroid function, and a normal STD work-up.  The STD work-up was done at request of the patient because she was concerned that \" her  might be cheating on her\".  In general, patient seen today quite stable mentally at the time of her last visit with me on March 6 when she came in with complaints of urinary frequency and otherwise had a normal medical checkup.  Review of records shows that she did see Dr. Fernandez Hooks MD and recently underwent endoscopy and colonoscopy on June 11, 2019.  Polyps were removed by cold biopsy and plans for 3-year follow-up were instituted. Dr. Hooks did feel that the patient met criteria for the diagnosis of irritable bowel syndrome with alternating diarrhea and constipation.  Patient also sees Dr. Greg Stevens MD in urology for overactive bladder and has been controlled fairly well on Enablex.  Patient did see Dr. Zee in cardiology because of an abnormal EKG in 2019 and did have an exercise stress test with myocardial perfusion SPECT on 5/22/19.  She had requested medical treatment for the findings of that test which showed ejection fraction 60%, normal myocardial perfusion with no evidence of ischemia, and test consistent with a low risk study.     Patient was seen in the [facility] emergency room by Dr. Naldo Hi on 4/2520 at 1914.  At the time, patient presented with increasing " confusion.   reported that this confusion was related to unstable bipolar disorder and discontinuation of her medication, Seroquel, that she takes for treatment of the disorder.  The ER physician was able to confirm that she had started back on this medication at the request of her psychiatric physician and has been titrated from 50 mg/day up to 100 mg/day recently.  Despite this attempted outpatient titration of the medication, patient had been refusing to eat or drink anything and had developed significant weakness.  On the day of admission she was unable to get out of bed on her own that she denied any chest pain or abdominal pain at the time.  In addition, patient had no evidence of nausea vomiting or diarrhea. Review of systems could not be performed due to the patient's mental status change.  Allergies were reported to codeine, morphine, and sulfa.  Physical exam in the emergency room showed that temperature was 96.5, pulse 85, respiratory rate 17, and blood pressure 145/92 with a sat of 95%.  Exam by the ER physician showed mucous membranes were quite dry.  Patient was awake and answering some questions appropriately such as name and age but was unable to really relate any history of the events which led to her visit in the ER.  Her exam was otherwise totally normal.  Lab results did show several abnormalities: Glucose elevated 123, BUN elevated 78, creatinine elevated at 1.60, and sodium elevated at 151.  Her liver function tests were also elevated with ALT of 236, and AST of 415.  She apparently had normal liver function test at a visit with her hepatitis C physician Dr. Woodruff just 1 month ago.  Patient had no evidence of alcohol in her blood.  Her white blood cell count was 14.62, her hemoglobin was 16.9, hematocrit was 50.8, and her platelet count was normal at 310,000.  Her magnesium was slightly high at 2.9.  Thyroid function tests were normal.  Urine drug screen was totally negative but UA did  show 3+ blood 1+ protein 1+ leukocyte esterase.  CT of head was unremarkable and chest x-ray was unremarkable.  EKG done soon after admission did show a normal sinus rhythm PAC, nonspecific ST changes and slightly prolonged QT interval.     4/26/20.  I personally saw the patient at the time of my rounds on 4/26/2020.  Nurse was present in the room at the time of my visit.  The patient had not spoken with others during the day, she did respond to my voice and actually open her eyes.  She did have a brief conversation with me before falling back off to sleep.  Renal has been adjusting her fluids to replace her volume deficit.  They will continue to follow this with us.  Urology did order a CT scan of abdomen and pelvis with and without contrast.  After consultation with renal, we elected to do the CT with out contrast only.  Results are pending at the time of my visit.  Patient was also seen in consultation by gastroenterology.  They feel that her elevated liver function tests are probably a result of the volume depletion status.  Hopefully these will improve as her volume is corrected.  Otherwise there are no major changes in the patient's condition from their perspective.  They also plan to continue following patient.  Psychiatry did see patient briefly but she was not cooperative with their exam.  Dr. Cruz will see patient tomorrow for further evaluation medication recommendations.  We suspect that the patient will need to be treated in the inpatient psychiatric unit at least short-term in order to stabilize the medications if her patterns follow previous pattern with respect to this issue.  Patient otherwise is hemodynamically stable at the present time.  We did do a blood gas since she is quite lethargic but that was within normal limits.    4/27/2020.  Patient resting quietly in bed at the time of my rounds this evening.  I did discuss the case at length with the patient's nurse who went to the room with  me.  Patient is a little more alert this evening and actually good agreed to take a few bites of ice cream.  In fact, she was feeling well enough to request strawberry ice cream instead of the vanilla ice cream that was available on the floor.  Patient remains quite weak.  She has not been out of bed at this time.  The nurse and I agreed that it would probably be appropriate to have PT or OT attempt to get patient up to bedside chair tomorrow if possible.  Patient has been declining her medications including the Seroquel XR that she is supposed to take each evening for her bipolar state.  Patient does seem oriented to person and place but somewhat lost in time.  GI feels she is stable from their point of view.  Elevated liver function tests probably are secondary to myoglobinuria which occurred after patient has been laying around and in bed most of the time.  Renal continues to adjust fluid for the patient and was agreeable to starting her blood pressure medications again.  Blood pressure did run high throughout the day yesterday and required treatment with Vasotec IV.  Urology had little further to add with the patient today but will plan outpatient cystoscopy after discharge.  Psychiatry did see patient in consultation and they feel she would be appropriate for their Geropsych unit to continue medication adjustment and stabilization of patient's psychiatric situation.  They are happy to take her in transfer once she is medically stable.  Urine culture so far shows no growth.    4/28/2020.  Patient still resting quietly in her bed at the time of my rounds in her room on 4 N.  Patient does wake up as I entered the room and is semi-responsive but her answers to questions are relatively unintelligible and she tends to just mumble words.  Patient has eaten and drunk very little today despite efforts from nursing staff to encourage her to do so.  Apparently she would not even take orange sherbet which is 1 of her  favorite foods when offered to her by the staff.  I did speak briefly to the patient's nurse.  She reported no real new changes in the patient's condition.  Review of notes from occupational therapy showed that the patient did sit up on the side of the bed but did not ambulate effectively at this point.  Patient has not been taking most of her medication at this point.  Psychiatry is still following the patient with plans to admit the patient to the psychiatric unit when she is medically stable.  I discussed the case at length with the patient's .  He feels that this is very similar to a breakdown that she had several years ago which required hospitalization in the psychiatric unit at our [facility].  Nonetheless, after our discussion, I am recommending a neurology consultation and we will obtain an MRI to be sure that there is been no cerebrovascular accident causing these changes.  I also would like to get speech therapy to see the patient in consultation to be sure that her swallow is effective.  We may have to consider short-term feeding tube to stabilize the patient and provide a route for giving her medicine.    4/29/2020.  Patient is essentially unchanged on my rounds today.  She does say a few words but makes no coherent statements.  She still has been refusing her meds and only taking small bites occasionally of food with great urging from the nursing staff.  We did have neurology see the patient in consultation today and I spoke at length with  regarding his findings.  She has no real evidence of stroke.  She does have a small tremor.  MRI was done and did not show anything acute though it does show some hardening of the arteries and a tiny old infarct that radiology feels is insignificant with respect to the current findings.  I also have infectious disease see the patient to be sure they feel the question of sepsis is completely ruled out.  I did agree with discontinuing all  antibiotics at this point and do not feel that there is any signs of acute infection.  Renal has essentially signed off the case also.  Currently she is receiving IV fluids at 50 cc/h.  Speech is to see the patient first thing in the morning and determine swallowing safety.  I did discuss the situation with [facility] and they do feel that if patient needs a short-term stabilization with a Dobbhoff tube for medications and for fluids this could be continued on the psych unit.  This would be done as a short-term tool to continue medically stabilizing her while giving psychiatry an opportunity to adjust her bipolar medications.  Patient is urinating well.  Her labs now show normal BUN, creatinine, and potassium levels.  White blood cell count is now normal at 8.9 and there is no sign of anemia.  Her magnesium has all so returned to normal levels.  At this point, the patient does appear to be medically stable and it  ahould be possible to transfer her to psychiatry tomorrow if we can resolve the issues surrounding her nutritional and medication intake.    4/30/2020.  Patient more awake and alert today than previously at the time of my rounds.  She did take a small amount of her breakfast including some orange sherbet and a few bites of eggs.  Nurse was able to get the patient to take her pills both last night and this morning.  Blood pressure is running slightly up and I did restart her beta-blocker and diuretic which have helped with this issue in the past.  We will monitor her hydration status with labs.  I am going to discontinue the IV fluids and hope to cannulate more thirst to promote increased oral intake.  Nursing continues to encourage patient to eat small bites.  Patient did talk in brief sentences to her  on the phone today.  I did review the case with him and we both felt patient was appropriate for transfer to psychiatry.  Neurology work-up is complete and relatively negative.  ID work-up is also  complete.  Renal has signed off on the case.  At this point it is felt that behavioral modification is needed and adjustment of her psychiatric meds in order to return her to her baseline.  Hopefully psychiatry will be able to manage this in their unit.  I did speak with the access center and they do have a bed available for the patient tomorrow after her discharge is completed in the morning.  Transfer orders are complete and will be signed off in a.m.  If bed is available.  I also discussed the case with the patient's nurse and with PCP.        Review of Systems:               Review of systems could not be obtained due to  patient confusion.       Past Medical History:   Diagnosis Date   • ADD (attention deficit disorder)    • ARDS survivor    • Chest pain    • Encounter for annual health examination     Annual Health Assessment: 14, 10/25/13   • GERD (gastroesophageal reflux disease)    • History of mammogram 2011   • Hyperactivity of bladder    • Hypertension    • Pain of right side of body     c/o pain in right side and back   • Sepsis (CMS/HCC)      Past Surgical History:   Procedure Laterality Date   • APPENDECTOMY     • BLADDER REPAIR     •  SECTION     • CHEST TUBE INSERTION     • CHOLECYSTECTOMY     • COLONOSCOPY N/A 2019    Procedure: COLONOSCOPY into cecum and TI with cold biopsy polypectomies;  Surgeon: Fernandez Hooks MD;  Location: [facility] ENDOSCOPY;  Service: Gastroenterology   • ENDOSCOPY N/A 2019    Procedure: ESOPHAGOGASTRODUODENOSCOPY with biopsies;  Surgeon: Fernandez Hooks MD;  Location: [facility] ENDOSCOPY;  Service: Gastroenterology   • HYSTERECTOMY       Allergies   Allergen Reactions   • Codeine    • Morphine Delirium   • Sulfa Antibiotics    as  Family History   Problem Relation Age of Onset   • Liver disease Mother    • Crohn's disease Brother    e Brother         Socioeconomic History   • Marital status:      Spouse name: Tate Sherman   •  "Number of children: Not on file   • Years of education: Not on file   • Highest education level: Not on file   Occupational History   • Occupation: store owner     Employer: SELF-EMPLOYED   • Occupation: real-estate    Tobacco Use   • Smoking status: Former Smoker     Types: Cigarettes     Last attempt to quit: 1994     Years since quittin.3   • Smokeless tobacco: Never Used   Substance and Sexual Activity   • Alcohol use: No   • Drug use: No   • Sexual activity: Defer     Birth control/protection: Surgical   Social History Narrative    Lives with     Co-owner of [employer]       PMH, FH, SH and ROS completed with Admission History and Physical and updated in EPIC system.        Objective     Scheduled Meds:    clobetasol  Topical Q12H   enoxaparin 40 mg Subcutaneous Q24H   famotidine 40 mg Oral Daily   losartan 50 mg Oral Daily   oxybutynin XL 5 mg Oral Daily   QUEtiapine  mg Oral Nightly   sodium chloride 10 mL Intravenous Q12H   vitamin D 50,000 Units Oral Q7 Days     Continuous Infusions:    dextrose 50 mL/hr Last Rate: 50 mL/hr (20)       Vital signs in last 24 hours:  Temp:  [97.4 °F (36.3 °C)-98.9 °F (37.2 °C)] 98.9 °F (37.2 °C)  Heart Rate:  [] 92  Resp:  [20] 20  BP: (138-152)/(75-94) 152/94    Intake/Output:    Intake/Output Summary (Last 24 hours) at 2020 2330  Last data filed at 2020  Gross per 24 hour   Intake 60 ml   Output 500 ml   Net -440 ml       Exam:  /94 (BP Location: Left arm, Patient Position: Lying)   Pulse 92   Temp 98.9 °F (37.2 °C) (Oral)   Resp 20   Ht 170.2 cm (67\")   Wt 100 kg (221 lb 6.4 oz)   SpO2 98%   BMI 34.68 kg/m²     Constitutional:  More alert, cooperative, no distress, speaks with short sentences, AAOx2, resting comfortably, now taking meds and orange sherbet and minimal amounts of food and water.   Head:      Normocephalic, without obvious abnormality, atraumatic   Eyes:   "   PERRLA, conjunctiva/corneas clear, no icterus, no conjunctival                                     pallor, EOM's intact, both eyes      ENT and Mouth: Lips, tongue, gums normal; oral mucosa pink and moist   Neck:     Supple, symmetrical, trachea midline, no JVD  Respiratory:     Clear to auscultation bilaterally, respirations unlabored  Cardiovascular:  Regular rate and rhythm, S1 and S2 normal, no murmur,      no  Rub or gallop.  Pulses normal.    Gastrointestinal:   BS present x 4 Soft, non-tender, bowel sounds active,      no masses, no hepatosplenomegaly                                                     :       No hernia.  Normal exam for sex.         Musculoskeletal: Extremities normal, atraumatic, no cyanosis or edema, patient remains quite weak but is able to sit on side of bed     No arthropathy.  No deformity.  Gait normal                                                 Skin:   Skin is warm and dry,  no rashes, swelling or palpable lesions   Neurologic:  CN -XII intact, motor strength grossly intact, sensation grossly intact to light touch, no focal reflex deficits noted, persistent generalized weakness   Psychiatric:     Alert,oriented X3, no delusions, psychoses, depression or anxiety    Heme/Lymph/Imun:   No bruises, petechiae.  Lymph nodes normal in size/configuration       Data Review:  Lab Results   Component Value Date    CALCIUM 8.9 04/29/2020    PHOS 2.7 04/29/2020     Results from last 7 days   Lab Units 04/29/20  0331 04/28/20  0340 04/27/20  0311 04/26/20  2300 04/26/20  0505  04/25/20  1815   AST (SGOT) U/L  --  91*  --   --  268*  --  415*   MAGNESIUM mg/dL  --  1.8  --  2.5* 2.7*  --  2.9*   SODIUM mmol/L 141 140 144 145 151*  --  151*   POTASSIUM mmol/L 3.8 3.1* 3.5 3.7  3.7 3.3*  --  3.7   CHLORIDE mmol/L 103 101 104 105 107  --  101   CO2 mmol/L 24.6 26.9 26.2 25.4 25.4  --  24.7   BUN mg/dL 19 32* 51* 49* 57*  --  78*   CREATININE mg/dL 0.73 0.87 1.54* 1.82* 1.05*  --  1.60*    GLUCOSE mg/dL 116* 145* 122* 164* 131*  --  123*   CALCIUM mg/dL 8.9 9.1 9.0 9.0 9.3  --  10.1   WBC 10*3/mm3 8.90 9.45 12.31*  --  11.95*   < > 14.62*   HEMOGLOBIN g/dL 14.9 14.2 14.5  --  15.4   < > 16.9*   PLATELETS 10*3/mm3 173 190 223  --  267   < > 310   ALT (SGPT) U/L  --  115*  --   --  182*  --  236*    < > = values in this interval not displayed.     Lab Results   Component Value Date    CKTOTAL 603 (H) 04/27/2020    TROPONINT <0.010 04/26/2020     Estimated Creatinine Clearance: 95.9 mL/min (by C-G formula based on SCr of 0.73 mg/dL).  WEIGHTS:     Wt Readings from Last 1 Encounters:   04/29/20 0503 100 kg (221 lb 6.4 oz)   04/28/20 0549 101 kg (223 lb 3.2 oz)   04/27/20 0639 98.9 kg (218 lb)   04/25/20 2113 98.1 kg (216 lb 4.8 oz)   04/25/20 1818 106 kg (234 lb)         Assessment:    Altered mental status    Acute kidney injury (CMS/HCC)    Volume depletion    Generalized weakness    Elevated liver function tests    Bipolar disorder, current episode mixed, moderate (CMS/HCC)    Overactive bladder    Essential hypertension    Abnormal EKG    SOB (shortness of breath)    GERD (gastroesophageal reflux disease)    Vitamin D deficiency    Hyperlipidemia    History of hepatitis C followed by Dr. Ranjan Qiu [id number]    Irritable bowel syndrome with both constipation and diarrhea    Hepatic steatosis    Hematuria    ARDS survivor 1994 Ventilator    ADD (attention deficit disorder)    Obesity (BMI 30-39.9)      Attending Physician Assessment and Plan:    1.  Altered mental status in patient with history of bipolar disorder who has recently been noncompliant with her medication, Seroquel.  Suspect etiology for this problem is medication imbalance despite recent reinitiation of her Seroquel dosing as directed by Dr. Ernst, her regular psychiatrist.  Based on previous history, I suspect patient will need transfer to psychiatry for medication adjustment and stabilization once her medical disorders as  discussed below are better controlled.  Dr. Cruz is agreeable to this plan of treatment.  Patient still quite confused.  Neurology consult planned. No neurologic abnormalities found and MRI unremarkable.  ID also saw patient in consultation and found no ongoing evidence of infection.  Patient medically stable and ready for discharge to psychiatric unit when bed available  2.  Severe volume depletion with elevated renal function test.  Etiology is probable poor p.o. intake while psychiatric medications were out of balance.  Patient has critical elevations in both BUN, and creatinine, fluid resuscitation was initiated in the emergency room.  We are consulting renal for their input on the situation and for their help in adjusting her fluids at this point.  Suspect this correction will take 1 to 2 days.  Will monitor electrolytes carefully during this time.  Continued improvement in electrolytes and in particular with renal function tests.  Continue to monitor labs regularly.  Now fully rehydrated and renal has signed off  3.  Elevated CPK possibly secondary to acute kidney injury versus rhabdomyolysis.  I will send urine for myoglobin studies.  We will continue to monitor CPK.  Renal is following this issue with us at the present time.  Hopefully will resolve with increased hydration and volume stabilization.  Patient does appear to have some mild rhabdomyolysis.  CPK is clearing.  Elevated CPK resolving. rhabdomyolysis has resolved 4.  Generalized weakness felt to be secondary to electrolyte instability.  Will correct electrolytes.  Have added potassium protocol to her medications.  Will monitor elevated magnesium which is probably result of hemoconcentration.  Suspect elevated hemoglobin is also caused by the same etiology.  Will ask PT and OT to begin evaluation of patient.  Able to sit up on side of bed.  PT continuing to work with patient  5.  Positive sepsis screen with elevated white blood cell count. I  suspect the most likely etiology is urine which shows significant hematuria and elevated leukocytosis.  Urology has been consulted for their input since they follow the patient regularly for bladder dysfunction.  I have started patient on IV Rocephin pending results of urine culture.  We will continue to monitor carefully but white count has improved in the first few hours.  I seriously doubt that patient has true sepsis at the present time.   does indicate that her mental status changes have been provoked by urinary tract infections in the past.  Culture of urine negative so far.  Will consider discontinuation of antibiotics if this persists.  Urine culture negative.  CT scan of chest negative.  We will proceed to discontinue antibiotics at this point and monitor patient.  ID finds no evidence of ongoing sepsis  6.  Elevated liver function tests in patient with history of hepatitis.C treated in the past with Harvoni.  Etiology of this finding unclear at present time.  Abdominal ultrasound unremarkable.  GI consult requested.  Suspect may relate to the acute kidney injury more than an intrinsic problem with liver.  Did send hepatitis C viral load to be sure this has not resurfaced.  Will follow liver function test over the next couple days to be sure they do resolve.  Felt to probably be secondary to rhabdomyolysis.  Will monitor.    7.  Overactive bladder.  Patient's regular medication is nonformulary.  Have added substitution for now but may need to consider having  bring in medication from home if she does not respond favorably to the generic substitution.  8.  Essential hypertension with recent discontinuation of medications.  Blood pressure is elevated slightly and I have resumed her medications.  We will follow blood pressure and make a decision on this prior to her discharge.  9.  Abnormal EKG with no reported shortness of breath at present time.  Suspect these are chronic changes.  We have  completed a stress test in 2019 that was low risk for ischemic issues.  We will continue to monitor but suspect CPK is more elevated due to the renal/muscle issues and to any cardiac issue.  Will check troponin just to be sure it is not significantly elevated.  10.  Vitamin D deficiency.  Starting patient back on 50,000 units of vitamin D weekly for now.  We will continue to monitor and treat as needed.  11.  Hyperlipidemia.  Will check lipid profile while here in hospital and adjust medication and diet as needed to treat this issue.  12.  Irritable bowel syndrome with alternating diarrhea and constipation.  This problem sounds like it stable at present time.  Will add no new treatment currently.     Plan for disposition:Where: Psychiatry and When:  Medically stable in 1-2 days         Toñito Loja MD  4/30/2020  [id number]

## 2020-05-02 ENCOUNTER — APPOINTMENT (OUTPATIENT)
Dept: GENERAL RADIOLOGY | Facility: HOSPITAL | Age: 65
End: 2020-05-02

## 2020-05-02 PROCEDURE — 97165 OT EVAL LOW COMPLEX 30 MIN: CPT

## 2020-05-02 PROCEDURE — 25010000002 ENOXAPARIN PER 10 MG: Performed by: SPECIALIST

## 2020-05-02 PROCEDURE — 97162 PT EVAL MOD COMPLEX 30 MIN: CPT

## 2020-05-02 PROCEDURE — 97535 SELF CARE MNGMENT TRAINING: CPT

## 2020-05-02 PROCEDURE — 74018 RADEX ABDOMEN 1 VIEW: CPT

## 2020-05-02 RX ORDER — QUETIAPINE FUMARATE 50 MG/1
150 TABLET, EXTENDED RELEASE ORAL NIGHTLY
Status: DISCONTINUED | OUTPATIENT
Start: 2020-05-02 | End: 2020-05-03

## 2020-05-02 RX ORDER — LOSARTAN POTASSIUM 50 MG/1
50 TABLET ORAL
Status: DISCONTINUED | OUTPATIENT
Start: 2020-05-03 | End: 2020-05-08

## 2020-05-02 RX ADMIN — KETOTIFEN FUMARATE 1 DROP: 0.35 SOLUTION/ DROPS OPHTHALMIC at 21:37

## 2020-05-02 RX ADMIN — DOCUSATE SODIUM 100 MG: 100 CAPSULE, LIQUID FILLED ORAL at 21:36

## 2020-05-02 RX ADMIN — HYDROCHLOROTHIAZIDE 6.25 MG: 25 TABLET ORAL at 10:14

## 2020-05-02 RX ADMIN — QUETIAPINE FUMARATE 150 MG: 50 TABLET, EXTENDED RELEASE ORAL at 21:36

## 2020-05-02 RX ADMIN — ENOXAPARIN SODIUM 40 MG: 40 INJECTION SUBCUTANEOUS at 10:14

## 2020-05-02 RX ADMIN — FAMOTIDINE 40 MG: 40 TABLET, FILM COATED ORAL at 10:14

## 2020-05-02 RX ADMIN — KETOTIFEN FUMARATE 1 DROP: 0.35 SOLUTION/ DROPS OPHTHALMIC at 10:15

## 2020-05-02 RX ADMIN — OXYBUTYNIN CHLORIDE 5 MG: 5 TABLET, EXTENDED RELEASE ORAL at 10:38

## 2020-05-02 RX ADMIN — DOCUSATE SODIUM 100 MG: 100 CAPSULE, LIQUID FILLED ORAL at 10:15

## 2020-05-02 RX ADMIN — Medication 5 MG: at 21:36

## 2020-05-02 NOTE — THERAPY EVALUATION
Acute Care - Occupational Therapy Initial Evaluation  [facility]     Patient Name: Francheska Sherman  : 1955  MRN: [id number]  Today's Date: 2020     Date of Referral to OT: 20       Admit Date: 2020     No diagnosis found.  Patient Active Problem List   Diagnosis   • Overactive bladder   • Essential hypertension   • Abnormal EKG   • SOB (shortness of breath)   • Altered mental status   • GERD (gastroesophageal reflux disease)   • ARDS survivor  Ventilator   • ADD (attention deficit disorder)   • Vitamin D deficiency   • Acute kidney injury (CMS/HCC)   • Volume depletion    • Generalized weakness   • Hyperlipidemia   • History of hepatitis C followed by Dr. Ranjan Qiu [id number]   • Irritable bowel syndrome with both constipation and diarrhea   • Hepatic steatosis   • Obesity (BMI 30-39.9)   • Hematuria   • Elevated liver function tests   • Bipolar disorder, current episode mixed, moderate (CMS/HCC)   • Affective psychosis, bipolar (CMS/HCC)     Past Medical History:   Diagnosis Date   • ADD (attention deficit disorder)    • Anxiety    • ARDS survivor    • Chest pain    • Encounter for annual health examination     Annual Health Assessment: 14, 10/25/13   • GERD (gastroesophageal reflux disease)    • History of mammogram 2011   • Hyperactivity of bladder    • Hypertension    • Pain of right side of body     c/o pain in right side and back   • Psychiatric illness    • Sepsis (CMS/HCC)      Past Surgical History:   Procedure Laterality Date   • APPENDECTOMY     • BLADDER REPAIR     •  SECTION     • CHEST TUBE INSERTION     • CHOLECYSTECTOMY     • COLONOSCOPY N/A 2019    Procedure: COLONOSCOPY into cecum and TI with cold biopsy polypectomies;  Surgeon: Fernandez Hooks MD;  Location: [facility] ENDOSCOPY;  Service: Gastroenterology   • ENDOSCOPY N/A 2019    Procedure: ESOPHAGOGASTRODUODENOSCOPY with biopsies;  Surgeon: Fernandez Hooks MD;  Location:   [facility];  Service: Gastroenterology   • HYSTERECTOMY            OT ASSESSMENT FLOWSHEET (last 12 hours)      Occupational Therapy Evaluation     Row Name 05/02/20 6790                   OT Evaluation Time/Intention    Subjective Information  no complaints  -AF        Document Type  evaluation  -AF        Mode of Treatment  occupational therapy  -AF        Patient Effort  fair  -AF        Symptoms Noted During/After Treatment  none  -AF        Comment  difficulty wtih following commands, flat affect, increased verbalization but not task oriented  -AF           General Information    Patient Profile Reviewed?  yes  -AF        General Observations of Patient  sitting up in recliner chair sitter present   -AF        Prior Level of Function  independent:  -AF        Equipment Currently Used at Home  none  -AF        Existing Precautions/Restrictions  fall sitter  -AF        Limitations/Impairments  safety/cognitive  -AF           Cognitive Assessment/Intervention- PT/OT    Orientation Status (Cognition)  unable/difficult to assess  -AF        Follows Commands (Cognition)  follows one step commands;0-24% accuracy;initiation impaired;repetition of directions required;verbal cues/prompting required;physical/tactile prompts required;increased processing time needed;delayed response/completion  -AF           Bed Mobility Assessment/Treatment    Comment (Bed Mobility)  up in chair   -AF           Functional Mobility    Functional Mobility- Comment  deferred  -AF           Transfer Assessment/Treatment    Comment (Transfers)  deffered commode transfer  -AF           ADL Assessment/Intervention    BADL Assessment/Intervention  feeding;grooming  -AF           Grooming Assessment/Training    [place] Level (Grooming)  grooming skills;wash face, hands;maximum assist (25% patient effort)  -AF        Assistive Devices (Grooming)  hand over hand  -AF        Grooming Position  supported sitting  -AF        Comment (Grooming)   lack completion of task, decreased ability to start task, distracted and flat affect  -AF           Self-Feeding Assessment/Training    [place] Level (Feeding)  liquids to mouth  -AF        Assistive Devices (Feeding)  hand over hand  -AF        Position (Self-Feeding)  supported sitting  -AF        Comment (Feeding)  kept hand in cup wouldn't bring to mouth but when therapist did she took a drink   -AF           General ROM    GENERAL ROM COMMENTS  BUE AROM appears 3/4, unable to formally test  -AF           MMT (Manual Muscle Testing)    General MMT Comments  unable to formally test didn't follow directions   -AF           Static Sitting Balance    Level of [place] (Unsupported Sitting, Static Balance)  supervision  -AF        Sitting Position (Unsupported Sitting, Static Balance)  other (see comments)  -AF        Time Able to Maintain Position (Unsupported Sitting, Static Balance)  more than 5 minutes  -AF        Comment (Unsupported Sitting, Static Balance)  sitting up on edge of chair attempting pt to complete grooming or drinking tasks. would chat but alot did not make sense or relevant to tasks   -AF           Positioning and Restraints    Pre-Treatment Position  sitting in chair/recliner  -AF        Post Treatment Position  chair  -AF        In Chair  sitting with sitter present   -AF           Pain Scale: FACES Pre/Post-Treatment    Pain: FACES Scale, Pretreatment  0-->no hurt  -AF        Pain: FACES Scale, Post-Treatment  0-->no hurt  -AF           Clinical Impression (OT)    Date of Referral to OT  05/02/20  -AF        OT Diagnosis  Decreased ADL tasks  -AF        Criteria for Skilled Therapeutic Interventions Met (OT Eval)  yes;treatment indicated  -AF        Rehab Potential (OT Eval)  fair, will monitor progress closely  -AF        Therapy Frequency (OT Eval)  3 times/wk  -AF        Anticipated Discharge Disposition (OT)  skilled nursing facility  -AF           Planned OT Interventions     Planned Therapy Interventions (OT Eval)  BADL retraining;transfer/mobility retraining;ROM/therapeutic exercise  -AF           OT Goals    Transfer Goal Selection (OT)  transfer, OT goal 1  -AF        Grooming Goal Selection (OT)  grooming, OT goal 1  -AF        Self-Feeding Goal Selection (OT)  self feeding, OT goal 1  -AF        Additional Documentation  Self-Feeding Goal Selection (OT) (Row)  -AF           Transfer Goal 1 (OT)    Activity/Assistive Device (Transfer Goal 1, OT)  toilet;walker, rolling  -AF        [place] Level/Cues Needed (Transfer Goal 1, OT)  moderate assist (50-74% patient effort);minimum assist (75% or more patient effort);verbal cues required  -AF        Time Frame (Transfer Goal 1, OT)  long term goal (LTG)  -AF        Progress/Outcome (Transfer Goal 1, OT)  goal ongoing  -AF           Grooming Goal 1 (OT)    Activity/Device (Grooming Goal 1, OT)  grooming skills, all  -AF        [place] (Grooming Goal 1, OT)  minimum assist (75% or more patient effort);verbal cues required  -AF        Time Frame (Grooming Goal 1, OT)  long term goal (LTG)  -AF        Progress/Outcome (Grooming Goal 1, OT)  goal ongoing  -AF           Self-Feeding Goal 1 (OT)    Activity/Assistive Device (Self-Feeding Goal 1, OT)  liquids to mouth  -AF        [place] Level/Cues Needed (Self-Feeding Goal 1, OT)  supervision required;verbal cues required  -AF        Time Frame (Self-Feeding Goal 1, OT)  long term goal (LTG)  -AF        Progress/Outcomes (Self-Feeding Goal 1, OT)  goal ongoing  -AF          User Key  (r) = Recorded By, (t) = Taken By, (c) = Cosigned By    Initials Name Effective Dates    AF Melinda Grider, OTR 04/14/20 -                OT Recommendation and Plan  Outcome Summary/Treatment Plan (OT)  Anticipated Discharge Disposition (OT): skilled nursing facility  Planned Therapy Interventions (OT Eval): BADL retraining, transfer/mobility retraining, ROM/therapeutic exercise  Therapy Frequency (OT  Eval): 3 times/wk       Outcome Measures     Row Name 05/02/20 1556             How much help from another is currently needed...    Putting on and taking off regular lower body clothing?  1  -AF      Bathing (including washing, rinsing, and drying)  1  -AF      Toileting (which includes using toilet bed pan or urinal)  1  -AF      Putting on and taking off regular upper body clothing  1  -AF      Taking care of personal grooming (such as brushing teeth)  2  -AF      Eating meals  2  -AF      AM-PAC 6 Clicks Score (OT)  8  -AF        User Key  (r) = Recorded By, (t) = Taken By, (c) = Cosigned By    Initials Name Provider Type    AF Melinda Grider, OTR Occupational Therapist          Time Calculation:   Time Calculation- OT     Row Name 05/02/20 1557             Time Calculation- OT    OT Start Time  1115  -AF      OT Stop Time  1129  -AF      OT Time Calculation (min)  14 min  -AF      Total Timed Code Minutes- OT  8 minute(s)  -AF      OT Received On  05/02/20  -AF      OT - Next Appointment  05/04/20  -AF      OT Goal Re-Cert Due Date  05/18/20  -AF        User Key  (r) = Recorded By, (t) = Taken By, (c) = Cosigned By    Initials Name Provider Type    AF Melinda Grider, OTR Occupational Therapist        Therapy Charges for Today     Code Description Service Date Service Provider Modifiers Qty    16800141856  OT EVAL LOW COMPLEXITY 2 5/2/2020 Melinda Grider OTR GO 1    [id number]  OT SELF CARE/MGMT/TRAIN EA 15 MIN 5/2/2020 Melinda Grider OTR GO 1               GONZALO Rich  5/2/2020

## 2020-05-02 NOTE — PLAN OF CARE
Problem: Patient Care Overview  Goal: Plan of Care Review  Outcome: Ongoing (interventions implemented as appropriate)  Flowsheets  Taken 5/2/2020 1746  Progress: improving  Plan of Care Reviewed With: patient  Outcome Summary: Pt uncooperative w/ assessment this shift, remaining mute for most of the day. Pt took meds crushed in ice cream, yet demonstrated difficulty swallowing, much reinforcement required. Pt worked well w/ PT this shift, getting pt up to vijay chair. Pt assist x2 for transfers. Pt has remained in room all shift. Pt ate 25% dinner and about 20% lunch, assist feed at this time. X-ray completed, negative findings. VSS. No indicators of pain present. Pt remains incontinent. PRN O2 at 2L continued. 1:1 sitter remains for safety. No falls of further issues reported currently. Will continue to monitor and provide safe environment.  Taken 5/2/2020 1417  Patient Agreement with Plan of Care: unable to participate (Not speaking at this time )     Problem: Patient Care Overview  Goal: Individualization and Mutuality  Outcome: Ongoing (interventions implemented as appropriate)     Problem: Patient Care Overview  Goal: Discharge Needs Assessment  Outcome: Ongoing (interventions implemented as appropriate)     Problem: Patient Care Overview  Goal: Interprofessional Rounds/Family Conf  Outcome: Ongoing (interventions implemented as appropriate)     Problem: Overarching Goals (Adult)  Goal: Adheres to Safety Considerations for Self and Others  Outcome: Ongoing (interventions implemented as appropriate)  Flowsheets (Taken 5/2/2020 9956)  Adheres to Safety Considerations for Self and Others: making progress toward outcome     Problem: Overarching Goals (Adult)  Goal: Adheres to Safety Considerations for Self and Others  Intervention: Develop and Maintain Individualized Safety Plan  Flowsheets  Taken 5/2/2020 1601  Safety Measures: safety rounds completed  Taken 5/2/2020 1417  Previous Attempt to Harm Others: --  (ARIELLA )  Feels Like Hurting Others: other (see comments)     Problem: Overarching Goals (Adult)  Goal: Optimized Coping Skills in Response to Life Stressors  Outcome: Ongoing (interventions implemented as appropriate)  Flowsheets (Taken 5/2/2020 1746)  Optimized Coping Skills in Response to Life Stressors: making progress toward outcome     Problem: Overarching Goals (Adult)  Goal: Optimized Coping Skills in Response to Life Stressors  Intervention: Promote Effective Coping Strategies  Flowsheets (Taken 5/2/2020 1417)  Supportive Measures: self-care encouraged;relaxation techniques promoted;positive reinforcement provided;verbalization of feelings encouraged;decision-making supported;active listening utilized     Problem: Overarching Goals (Adult)  Goal: Develops/Participates in Therapeutic [place] to Support Successful Transition  Outcome: Ongoing (interventions implemented as appropriate)  Flowsheets (Taken 5/2/2020 1746)  Develops/Participates in Therapeutic [place] to Support Successful Transition: making progress toward outcome     Problem: Overarching Goals (Adult)  Goal: Develops/Participates in Therapeutic [place] to Support Successful Transition  Intervention: Foster Therapeutic [place]  Flowsheets (Taken 5/2/2020 1417)  Trust Relationship/Rapport: care explained;choices provided;thoughts/feelings acknowledged;reassurance provided;questions encouraged     Problem: Overarching Goals (Adult)  Goal: Develops/Participates in Therapeutic [place] to Support Successful Transition  Intervention: Mutually Develop Transition Plan  Flowsheets (Taken 5/2/2020 1417)  Transition Support: crisis management plan verbalized     Problem: Cognitive Impairment (Psychotic Signs/Symptoms) (Adult)  Goal: Improved Thought Clarity/Organization (Psychotic Signs/Symptoms)  Outcome: Ongoing (interventions implemented as appropriate)  Flowsheets  Taken 5/1/2020 1614 by Melissa Boyd RN  Improved Thought Clarity/Organization  Action Step/Short Term Goal (STG) Established: 05/01/20  Taken 5/2/2020 1746 by Aubrey Stone RN  Improved Thought Clarity/Organization Time Frame for Action Step (STG): 3 days  Improved Thought Clarity/Organization Action Step (STG) Outcome: making progress toward outcome     Problem: Psychomotor Movement Impairment (Psychotic Signs/Symptoms) (Adult)  Goal: Improved Psychomotor Symptoms (Psychotic Signs/Symptoms)  Outcome: Ongoing (interventions implemented as appropriate)  Flowsheets  Taken 5/1/2020 1614 by Melissa Boyd RN  Improved Psychomotor Symptoms Action Step/Short Term Goal (STG) Established: 05/01/20  Taken 5/2/2020 1746 by Aubrey Stone RN  Improved Psychomotor Symptoms Time Frame for Action Step (STG): 3 days  Improved Psychomotor Symptoms Action Step (STG) Outcome: making progress toward outcome     Problem: Mental State/Mood Impairment (Psychotic Signs/Symptoms) (Adult)  Goal: Improved Mental State/Mood (Psychotic Signs/Symptoms)  Outcome: Ongoing (interventions implemented as appropriate)  Flowsheets  Taken 5/1/2020 1614 by Melissa Boyd RN  Improved Mental State/Mood Action Step/Short Term Goal (STG) Established: 05/01/20  Taken 5/2/2020 1746 by Aubrey Stone RN  Improved Mental State/Mood Time Frame for Action Step (STG): 3 days  Improved Mental State/Mood Action Step (STG) Outcome: making progress toward outcome     Problem: Skin Injury Risk (Adult)  Goal: Skin Health and Integrity  Outcome: Ongoing (interventions implemented as appropriate)  Flowsheets (Taken 5/2/2020 1746)  Skin Health and Integrity: making progress toward outcome     Problem: Fall Risk (Adult)  Goal: Absence of Fall  Outcome: Ongoing (interventions implemented as appropriate)  Flowsheets (Taken 5/2/2020 1746)  Absence of Fall: making progress toward outcome
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
## 2020-05-02 NOTE — PLAN OF CARE
Problem: Patient Care Overview  Goal: Plan of Care Review  Flowsheets (Taken 5/2/2020 1153)  Progress: no change  Plan of Care Reviewed With: patient  Patient Agreement with Plan of Care: unable to participate (pt did not respond at all during session.)  Outcome Summary: Pt is a 64 y/o female admitted with altered mental status transferred to [facility] yesterday. Pt mumbled some but really did not respond to commands < 25%. Pt has deficits with bed mobility, transfers and ambulation. Pt was alert enough to transfer bed to chair today. Pt will benefit from further inpt PT to work on improving her mobility.

## 2020-05-02 NOTE — CONSULTS
[facility]  INTERNAL MEDICINE  TOÑITO LOJA MD  [address] [place] [address]  Phone [phone] Fax [phone]  E-mail:  [email or web address]      INTERNAL MEDICINE CONSULT NOTE on [facility]  Toñito Loja M.D.  2020        Patient Identification:  Name: Francheska Sherman  Age: 65 y.o.  Sex: female  :  1955  MRN: [id number]         Primary Care Physician: Toñito Loja MD  LENGTH OF STAY 1 DAYS    Consults     Date and Time Order Name Status Description    2020 1143 Inpatient Infectious Diseases Consult Completed     2020 0834 Inpatient Neurology Consult General Completed     2020 1205 Inpatient Psychiatrist Consult      2020 Inpatient Gastroenterology Consult Completed     2020 Inpatient Urology Consult Completed     2020 Inpatient Nephrology Consult Completed     2020 1913 Family Medicine Consult Completed         Chief Complaint:  Affective Psychosis, Bipolar Disorder with acute behavioral change    History of Present Illness:     Subjective         Interval History: Patient is a 65 y.o.female who presented with altered mental status and generalized weakness with dehydration to the [facility] emergency room by private car on 2020.  Most of the history is provided by her  who brought her to the hospital since patient was quite confused and unable to really provide much concrete detail and during her initial evaluation.  Patient is regularly followed by Dr. Hernesto Ernst in psychiatry at [facility] and has in the past been admitted to [facility] for episodes very similar to this one.  Patient does suffer from bipolar disorder and takes Seroquel on a regular basis for control and stabilization of this issue.  A few weeks ago, patient was feeling good and decided to discontinue her Seroquel.  She also discontinued several of her other medications  including her blood pressure medicine and her vitamin D.  Patient's  noticed that over the last 2 weeks she has begun to have a decline in her mental l condition and specifically he noticed that she was having increased confusion, insomnia, and extreme paranoia at times.  The  contacted Dr. Ernst and was instructed to start the patient back on Seroquel at 50 mg daily with titration up to 100 mg after 1 week on the 50 mg dose.  She has now had the 100 mg dose for the last 4 days but has not really responded to the medication at this point.  Patient has been talking nonstop and sleeping less than 4 hours per night.  This is very similar to episode she has had in the past that have been described as hypomanic in nature.  Patient did threaten to leave the home but has been has been able to manage this by using the alarm system at home to no when she goes out the door and has had her under careful observation for the last 10 days.  In the last 48 hours, patient has been drinking little to no liquid and on 2 occasions the  has found her collapsed and on the floor.  He had no evidence of broken bones or excessive trauma and each time he has been able to eventually get her back up on a chair or the sofa.  When he discussed bringing her to the hospital, patient has had an extreme fear of contacting another individual with COVID-19 and has refused to come in.  Patient has had no exposure to other individuals in over a month and has no symptoms of COVID-19 such as fever, cough, or shortness of breath.   was finally able to convince the patient to come to the emergency room yesterday evening when her weakness became so extreme that she could not get up to move about the house.  She denied nausea, vomiting, or diarrhea.  Family helped the  get her into his truck to bring her to the emergency room and ER staff helped get her out of the truck and bring her into the facility for  "evaluation.     Mrs. Sherman is a delightful 65-year-old female who I had the pleasure of following in my office since she transferred there just prior to senior living of her regular physician, my former partner, Lemuel Turcios MD.   the medical problems for which I follow her include: Hypertension, hyperlipidemia, overactive bladder, and vitamin D deficiency.  Her compliance with medications and treating these issues has been somewhat marginal.  Her last set of labs in January 2020 did show normal liver function, normal electrolytes, mild hyperlipidemia with a cholesterol of 226, normal thyroid function, and a normal STD work-up.  The STD work-up was done at request of the patient because she was concerned that \" her  might be cheating on her\".  In general, patient seen today quite stable mentally at the time of her last visit with me on March 6 when she came in with complaints of urinary frequency and otherwise had a normal medical checkup.  Review of records shows that she did see Dr. Fernandez Hooks MD and recently underwent endoscopy and colonoscopy on June 11, 2019.  Polyps were removed by cold biopsy and plans for 3-year follow-up were instituted. Dr. Hooks did feel that the patient met criteria for the diagnosis of irritable bowel syndrome with alternating diarrhea and constipation.  Patient also sees Dr. Greg Stevens MD in urology for overactive bladder and has been controlled fairly well on Enablex.  Patient did see Dr. Zee in cardiology because of an abnormal EKG in 2019 and did have an exercise stress test with myocardial perfusion SPECT on 5/22/19.  She had requested medical treatment for the findings of that test which showed ejection fraction 60%, normal myocardial perfusion with no evidence of ischemia, and test consistent with a low risk study.     Patient was seen in the [facility] emergency room by Dr. Naldo Hi on 4/2520 at 1914.  At the time, patient presented with increasing " confusion.   reported that this confusion was related to unstable bipolar disorder and discontinuation of her medication, Seroquel, that she takes for treatment of the disorder.  The ER physician was able to confirm that she had started back on this medication at the request of her psychiatric physician and has been titrated from 50 mg/day up to 100 mg/day recently.  Despite this attempted outpatient titration of the medication, patient had been refusing to eat or drink anything and had developed significant weakness.  On the day of admission she was unable to get out of bed on her own that she denied any chest pain or abdominal pain at the time.  In addition, patient had no evidence of nausea vomiting or diarrhea. Review of systems could not be performed due to the patient's mental status change.  Allergies were reported to codeine, morphine, and sulfa.  Physical exam in the emergency room showed that temperature was 96.5, pulse 85, respiratory rate 17, and blood pressure 145/92 with a sat of 95%.  Exam by the ER physician showed mucous membranes were quite dry.  Patient was awake and answering some questions appropriately such as name and age but was unable to really relate any history of the events which led to her visit in the ER.  Her exam was otherwise totally normal.  Lab results did show several abnormalities: Glucose elevated 123, BUN elevated 78, creatinine elevated at 1.60, and sodium elevated at 151.  Her liver function tests were also elevated with ALT of 236, and AST of 415.  She apparently had normal liver function test at a visit with her hepatitis C physician Dr. Woodruff just 1 month ago.  Patient had no evidence of alcohol in her blood.  Her white blood cell count was 14.62, her hemoglobin was 16.9, hematocrit was 50.8, and her platelet count was normal at 310,000.  Her magnesium was slightly high at 2.9.  Thyroid function tests were normal.  Urine drug screen was totally negative but UA did  show 3+ blood 1+ protein 1+ leukocyte esterase.  CT of head was unremarkable and chest x-ray was unremarkable.  EKG done soon after admission did show a normal sinus rhythm PAC, nonspecific ST changes and slightly prolonged QT interval.     4/26/20.  I personally saw the patient at the time of my rounds on 4/26/2020.  Nurse was present in the room at the time of my visit.  The patient had not spoken with others during the day, she did respond to my voice and actually open her eyes.  She did have a brief conversation with me before falling back off to sleep.  Renal has been adjusting her fluids to replace her volume deficit.  They will continue to follow this with us.  Urology did order a CT scan of abdomen and pelvis with and without contrast.  After consultation with renal, we elected to do the CT with out contrast only.  Results are pending at the time of my visit.  Patient was also seen in consultation by gastroenterology.  They feel that her elevated liver function tests are probably a result of the volume depletion status.  Hopefully these will improve as her volume is corrected.  Otherwise there are no major changes in the patient's condition from their perspective.  They also plan to continue following patient.  Psychiatry did see patient briefly but she was not cooperative with their exam.  Dr. Cruz will see patient tomorrow for further evaluation medication recommendations.  We suspect that the patient will need to be treated in the inpatient psychiatric unit at least short-term in order to stabilize the medications if her patterns follow previous pattern with respect to this issue.  Patient otherwise is hemodynamically stable at the present time.  We did do a blood gas since she is quite lethargic but that was within normal limits.    4/27/2020.  Patient resting quietly in bed at the time of my rounds this evening.  I did discuss the case at length with the patient's nurse who went to the room with  me.  Patient is a little more alert this evening and actually good agreed to take a few bites of ice cream.  In fact, she was feeling well enough to request strawberry ice cream instead of the vanilla ice cream that was available on the floor.  Patient remains quite weak.  She has not been out of bed at this time.  The nurse and I agreed that it would probably be appropriate to have PT or OT attempt to get patient up to bedside chair tomorrow if possible.  Patient has been declining her medications including the Seroquel XR that she is supposed to take each evening for her bipolar state.  Patient does seem oriented to person and place but somewhat lost in time.  GI feels she is stable from their point of view.  Elevated liver function tests probably are secondary to myoglobinuria which occurred after patient has been laying around and in bed most of the time.  Renal continues to adjust fluid for the patient and was agreeable to starting her blood pressure medications again.  Blood pressure did run high throughout the day yesterday and required treatment with Vasotec IV.  Urology had little further to add with the patient today but will plan outpatient cystoscopy after discharge.  Psychiatry did see patient in consultation and they feel she would be appropriate for their Geropsych unit to continue medication adjustment and stabilization of patient's psychiatric situation.  They are happy to take her in transfer once she is medically stable.  Urine culture so far shows no growth.    4/28/2020.  Patient still resting quietly in her bed at the time of my rounds in her room on 4 N.  Patient does wake up as I entered the room and is semi-responsive but her answers to questions are relatively unintelligible and she tends to just mumble words.  Patient has eaten and drunk very little today despite efforts from nursing staff to encourage her to do so.  Apparently she would not even take orange sherbet which is 1 of her  favorite foods when offered to her by the staff.  I did speak briefly to the patient's nurse.  She reported no real new changes in the patient's condition.  Review of notes from occupational therapy showed that the patient did sit up on the side of the bed but did not ambulate effectively at this point.  Patient has not been taking most of her medication at this point.  Psychiatry is still following the patient with plans to admit the patient to the psychiatric unit when she is medically stable.  I discussed the case at length with the patient's .  He feels that this is very similar to a breakdown that she had several years ago which required hospitalization in [place] [facility]  Nonetheless, after our discussion, I am recommending a neurology consultation and we will obtain an MRI to be sure that there is been no cerebrovascular accident causing these changes.  I also would like to get speech therapy to see the patient in consultation to be sure that her swallow is effective.  We may have to consider short-term feeding tube to stabilize the patient and provide a route for giving her medicine.    4/29/2020.  Patient is essentially unchanged on my rounds today.  She does say a few words but makes no coherent statements.  She still has been refusing her meds and only taking small bites occasionally of food with great urging from the nursing staff.  We did have neurology see the patient in consultation today and I spoke at length with  regarding his findings.  She has no real evidence of stroke.  She does have a small tremor.  MRI was done and did not show anything acute though it does show some hardening of the arteries and a tiny old infarct that radiology feels is insignificant with respect to the current findings.  I also have infectious disease see the patient to be sure they feel the question of sepsis is completely ruled out.  I did agree with discontinuing all  antibiotics at this point and do not feel that there is any signs of acute infection.  Renal has essentially signed off the case also.  Currently she is receiving IV fluids at 50 cc/h.  Speech is to see the patient first thing in the morning and determine swallowing safety.  I did discuss the situation with [facility] and they do feel that if patient needs a short-term stabilization with a Dobbhoff tube for medications and for fluids this could be continued on the psych unit.  This would be done as a short-term tool to continue medically stabilizing her while giving psychiatry an opportunity to adjust her bipolar medications.  Patient is urinating well.  Her labs now show normal BUN, creatinine, and potassium levels.  White blood cell count is now normal at 8.9 and there is no sign of anemia.  Her magnesium has all so returned to normal levels.  At this point, the patient does appear to be medically stable and it  ahould be possible to transfer her to psychiatry tomorrow if we can resolve the issues surrounding her nutritional and medication intake.    4/30/2020.  Patient more awake and alert today than previously at the time of my rounds.  She did take a small amount of her breakfast including some orange sherbet and a few bites of eggs.  Nurse was able to get the patient to take her pills both last night and this morning.  Blood pressure is running slightly up and I did restart her beta-blocker and diuretic which have helped with this issue in the past.  We will monitor her hydration status with labs.  I am going to discontinue the IV fluids and hope to cannulate more thirst to promote increased oral intake.  Nursing continues to encourage patient to eat small bites.  Patient did talk in brief sentences to her  on the phone today.  I did review the case with him and we both felt patient was appropriate for transfer to psychiatry.  Neurology work-up is complete and relatively negative.  ID work-up is also  complete.  Renal has signed off on the case.  At this point it is felt that behavioral modification is needed and adjustment of her psychiatric meds in order to return her to her baseline.  Hopefully psychiatry will be able to manage this in their unit.  I did speak with the access center and they do have a bed available for the patient tomorrow after her discharge is completed in the morning.  Transfer orders are complete and will be signed off in a.m.  If bed is available.  I also discussed the case with the patient's nurse and with PCP.     5/1/2020.  Patient had a quiet evening last night and seems stable still this a.m.  Psychiatry planning discharge from their unit today and at that point a bed will be available for patient in their unit which seems appropriate.  Patient does not need feeding tube at this time since she has started taking small bites of food and small sips of fluids.  We do have a saline lock still in place so patient could be given IV bolus if needed for dehydration.  We will continue to follow patient in the psychiatric unit and monitor her electrolytes while there.  Patient does have a short conversation with me today.  She is watching CNN and states that she does not like the fake needs.  Patient did work with OT and sat on the edge of bed with better control of body yesterday.  Speech therapy evaluated patient today and does feel that she has an adequate swallow and her trouble eating is behavioral in nature.  At this point patient has maximized benefit from hospitalization and is medically stable.  She will be ready for discharge to psychiatry when bed is available.    ________________________________________________________________________________________________    TRANSFER TO PSYCHIATRY Attending Dr. Cruz  Internal Medicine Consult Dr. Loja  ________________________________________________________________________________________________    5/2/2020.  I was consulted by   [facility] for an internal medicine consult to evaluate this patient.  Patient was seen with nursing assistant present in her room on psychiatry which was room 3330.  Patient was resting quietly in bed but did seem to recognize me as I entered the room and smiled.  She mumbled a few words but made no real coherent conversation while I was in the room.  I spoke with nursing aide who was at bedside.  Patient is currently on with a sitter.  Apparently she did manage to get up well with physical therapy today and sat in the chair at bedside for almost an hour.  She had very little breakfast but lunch did eat all the fruit on her tray and tried to eat some of the meat but unfortunately could not chew the roast beef that was brought to her on the tray today.  I am going to try changing the consistency of the meat and see if she is able to better handle that chopped up.  There is a speech therapy consult in place for the patient on Monday.  Patient seems to be feeling well.  She has no specific complaints of pain or discomfort.  Blood pressure is now under better control.  I did speak with the  and reviewed the case with him.  He is pleased with her progress.  We did obtain an abdominal x-ray series at the request of the  to be sure constipation is not a problem.  She has a bit of excessive gas in her abdomen but there is no significant stool burden or other signs of bowel blockage.  Seroquel has been increased to her regular dialysis which is 150 mg daily.  I also decrease the dose slightly of her losartan to 50 mg which is her usual dose.  We have added Ziac and I will continue to monitor blood pressures while she is on this medication.  She has taken it in the past with excellent control of her blood pressure on that medication.  Overall patient seems to be medically stable.  I will continue to follow her with you and see her again on Monday.  We will obtain labs on Monday morning.    Review of Systems:                Review of systems could not be obtained due to  patient confusion.       Past Medical History:   Diagnosis Date   • ADD (attention deficit disorder)    • ARDS survivor    • Chest pain    • Encounter for annual health examination     Annual Health Assessment: 14, 10/25/13   • GERD (gastroesophageal reflux disease)    • History of mammogram 2011   • Hyperactivity of bladder    • Hypertension    • Pain of right side of body     c/o pain in right side and back   • Sepsis (CMS/HCC)      Past Surgical History:   Procedure Laterality Date   • APPENDECTOMY     • BLADDER REPAIR     •  SECTION     • CHEST TUBE INSERTION     • CHOLECYSTECTOMY     • COLONOSCOPY N/A 2019    Procedure: COLONOSCOPY into cecum and TI with cold biopsy polypectomies;  Surgeon: Fernandez Hooks MD;  Location: [facility] ENDOSCOPY;  Service: Gastroenterology   • ENDOSCOPY N/A 2019    Procedure: ESOPHAGOGASTRODUODENOSCOPY with biopsies;  Surgeon: Fernandez Hooks MD;  Location: [facility] ENDOSCOPY;  Service: Gastroenterology   • HYSTERECTOMY       Allergies   Allergen Reactions   • Codeine    • Morphine Delirium   • Sulfa Antibiotics    as  Family History   Problem Relation Age of Onset   • Liver disease Mother    • Crohn's disease Brother    e Brother         Socioeconomic History   • Marital status:      Spouse name: Tate Sherman   • Number of children: Not on file   • Years of education: Not on file   • Highest education level: Not on file   Occupational History   • Occupation: store owner     Employer: SELF-EMPLOYED   • Occupation: real-estate    Tobacco Use   • Smoking status: Former Smoker     Types: Cigarettes     Last attempt to quit: 1994     Years since quittin.3   • Smokeless tobacco: Never Used   Substance and Sexual Activity   • Alcohol use: No   • Drug use: No   • Sexual activity: Defer     Birth control/protection: Surgical   Social History Narrative    Lives with  "    Co-owner of garden store    Real-estate        PMH, FH, SH and ROS completed with Admission History and Physical and updated in EPIC system.        Objective     Scheduled Meds:    clobetasol  Topical Q12H   enoxaparin 40 mg Subcutaneous Q24H   famotidine 40 mg Oral Daily   losartan 50 mg Oral Daily   oxybutynin XL 5 mg Oral Daily   QUEtiapine  mg Oral Nightly   sodium chloride 10 mL Intravenous Q12H   vitamin D 50,000 Units Oral Q7 Days     Continuous Infusions:    dextrose 50 mL/hr Last Rate: 50 mL/hr (04/29/20 2225)       Vital signs in last 24 hours:  Temp:  [97.4 °F (36.3 °C)-98.9 °F (37.2 °C)] 98.9 °F (37.2 °C)  Heart Rate:  [] 92  Resp:  [20] 20  BP: (138-152)/(75-94) 152/94    Intake/Output:    Intake/Output Summary (Last 24 hours) at 4/29/2020 2330  Last data filed at 4/29/2020 2025  Gross per 24 hour   Intake 60 ml   Output 500 ml   Net -440 ml       Exam:  /94 (BP Location: Left arm, Patient Position: Lying)   Pulse 92   Temp 98.9 °F (37.2 °C) (Oral)   Resp 20   Ht 170.2 cm (67\")   Wt 100 kg (221 lb 6.4 oz)   SpO2 98%   BMI 34.68 kg/m²     Constitutional:  More alert, cooperative, no distress, speaks with short sentences, AAOx2, resting comfortably, now taking meds and orange sherbet and minimal amounts of food and water.  Did eat approximately 25% of lunch.   Head:      Normocephalic, without obvious abnormality, atraumatic   Eyes:     PERRLA, conjunctiva/corneas clear, no icterus, no conjunctival                                     pallor, EOM's intact, both eyes      ENT and Mouth: Lips, tongue, gums normal; oral mucosa pink and moist   Neck:     Supple, symmetrical, trachea midline, no JVD  Respiratory:     Clear to auscultation bilaterally, respirations unlabored  Cardiovascular:  Regular rate and rhythm, S1 and S2 normal, no murmur,      no  Rub or gallop.  Pulses normal.    Gastrointestinal:   BS present x 4 Soft, non-tender, bowel sounds active,      no " masses, no hepatosplenomegaly                                                     :       No hernia.  Normal exam for sex.         Musculoskeletal: Extremities normal, atraumatic, no cyanosis or edema, patient remains quite weak but is able to sit on side of bed     No arthropathy.  No deformity.  Gait normal                                                 Skin:   Skin is warm and dry,  no rashes, swelling or palpable lesions   Neurologic:  CN -XII intact, motor strength grossly intact, sensation grossly intact to light touch, no focal reflex deficits noted, persistent generalized weakness.  More alert making good eye contact   Psychiatric:     Alert,oriented X3, no delusions, psychoses, depression or anxiety    Heme/Lymph/Imun:   No bruises, petechiae.  Lymph nodes normal in size/configuration       Data Review:  Lab Results   Component Value Date    CALCIUM 8.9 04/29/2020    PHOS 2.7 04/29/2020     Results from last 7 days   Lab Units 04/29/20  0331 04/28/20  0340 04/27/20  0311 04/26/20  2300 04/26/20  0505  04/25/20  1815   AST (SGOT) U/L  --  91*  --   --  268*  --  415*   MAGNESIUM mg/dL  --  1.8  --  2.5* 2.7*  --  2.9*   SODIUM mmol/L 141 140 144 145 151*  --  151*   POTASSIUM mmol/L 3.8 3.1* 3.5 3.7  3.7 3.3*  --  3.7   CHLORIDE mmol/L 103 101 104 105 107  --  101   CO2 mmol/L 24.6 26.9 26.2 25.4 25.4  --  24.7   BUN mg/dL 19 32* 51* 49* 57*  --  78*   CREATININE mg/dL 0.73 0.87 1.54* 1.82* 1.05*  --  1.60*   GLUCOSE mg/dL 116* 145* 122* 164* 131*  --  123*   CALCIUM mg/dL 8.9 9.1 9.0 9.0 9.3  --  10.1   WBC 10*3/mm3 8.90 9.45 12.31*  --  11.95*   < > 14.62*   HEMOGLOBIN g/dL 14.9 14.2 14.5  --  15.4   < > 16.9*   PLATELETS 10*3/mm3 173 190 223  --  267   < > 310   ALT (SGPT) U/L  --  115*  --   --  182*  --  236*    < > = values in this interval not displayed.     Lab Results   Component Value Date    CKTOTAL 603 (H) 04/27/2020    TROPONINT <0.010 04/26/2020     Estimated Creatinine Clearance: 95.9  mL/min (by C-G formula based on SCr of 0.73 mg/dL).  WEIGHTS:     Wt Readings from Last 1 Encounters:   04/29/20 0503 100 kg (221 lb 6.4 oz)   04/28/20 0549 101 kg (223 lb 3.2 oz)   04/27/20 0639 98.9 kg (218 lb)   04/25/20 2113 98.1 kg (216 lb 4.8 oz)   04/25/20 1818 106 kg (234 lb)         Assessment:    Altered mental status felt to be due to toxic metabolic encephalopathy from volume depletion now resolving    Acute kidney injury (CMS/HCC) significantly resolved on IV fluids    Volume depletion also resolved    Rhabdomyolysis with elevated CPK now resolved    Generalized weakness due to poor oral intake    Elevated liver function tests    Bipolar disorder, current episode mixed, moderate (CMS/HCC)    Overactive bladder trying to discontinue medication to prevent sleepiness    Essential hypertension    Abnormal EKG no acute changes    SOB (shortness of breath)    GERD (gastroesophageal reflux disease) controlled with PPI    Vitamin D deficiency    Hyperlipidemia    History of hepatitis C followed by Dr. 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  Altered mental status in patient with history of bipolar disorder who has recently been noncompliant with her medication, Seroquel.  Suspect etiology for this problem is medication imbalance despite recent reinitiation of her Seroquel dosing as directed by Dr. Ernst, her regular psychiatrist.  Based on previous history, I suspect patient will need transfer to psychiatry for medication adjustment and stabilization once her medical disorders as discussed below are better controlled.  Dr. Cruz is agreeable to this plan of treatment.  Patient still quite confused.  Neurology consult planned. No neurologic  abnormalities found and MRI unremarkable.  ID also saw patient in consultation and found no ongoing evidence of infection.  Patient medically stable and ready for discharge to psychiatric unit when bed available.  In psychiatry unit patient slowly improving.  We have gone back up to her regular dose of Seroquel with psychiatry to plan further medication changes at this point.  2.  Severe volume depletion with elevated renal function test.  Etiology is probable poor p.o. intake while psychiatric medications were out of balance.  Patient has critical elevations in both BUN, and creatinine, fluid resuscitation was initiated in the emergency room.  We are consulting renal for their input on the situation and for their help in adjusting her fluids at this point.  Suspect this correction will take 1 to 2 days.  Will monitor electrolytes carefully during this time.  Continued improvement in electrolytes and in particular with renal function tests.  Continue to monitor labs regularly.  Now fully rehydrated and renal has signed off.  Encouraging p.o. intake as much as possible.  I did discuss this with her nurse in the psychiatric unit.    3.  Elevated CPK possibly secondary to acute kidney injury versus rhabdomyolysis.  I will send urine for myoglobin studies.  We will continue to monitor CPK.  Renal is following this issue with us at the present time.  Hopefully will resolve with increased hydration and volume stabilization.  Patient does appear to have some mild rhabdomyolysis.  CPK is clearing.  Elevated CPK resolving. rhabdomyolysis has resolved   4.  Generalized weakness felt to be secondary to electrolyte instability.  Will correct electrolytes.  Have added potassium protocol to her medications.  Will monitor elevated magnesium which is probably result of hemoconcentration.  Suspect elevated hemoglobin is also caused by the same etiology.  Will ask PT and OT to begin evaluation of patient.  Able to sit up on side of  bed.  PT continuing to work with patient.  On first day and psychiatry much more successful getting patient up to chair  5.  Positive sepsis screen with elevated white blood cell count. I suspect the most likely etiology is urine which shows significant hematuria and elevated leukocytosis.  Urology has been consulted for their input since they follow the patient regularly for bladder dysfunction.  I have started patient on IV Rocephin pending results of urine culture.  We will continue to monitor carefully but white count has improved in the first few hours.  I seriously doubt that patient has true sepsis at the present time.   does indicate that her mental status changes have been provoked by urinary tract infections in the past.  Culture of urine negative so far.  Will consider discontinuation of antibiotics if this persists.  Urine culture negative.  CT scan of chest negative.  We will proceed to discontinue antibiotics at this point and monitor patient.  ID finds no evidence of ongoing sepsis.   6.  Elevated liver function tests in patient with history of hepatitis.C treated in the past with Harvoni.  Etiology of this finding unclear at present time.  Abdominal ultrasound unremarkable.  GI consult requested.  Suspect may relate to the acute kidney injury more than an intrinsic problem with liver.  Did send hepatitis C viral load to be sure this has not resurfaced.  Will follow liver function test over the next couple days to be sure they do resolve.  Felt to probably be secondary to rhabdomyolysis.  Will monitor.  Will recheck labs on Monday  7.  Overactive bladder.  Patient's regular medication is nonformulary.  Have added substitution for now but may need to consider having  bring in medication from home if she does not respond favorably to the generic substitution.  Has discontinued medication at 's request.  Generally she does not need meds for the overactive bladder.  8.  Essential  hypertension with recent discontinuation of medications.  Blood pressure is elevated slightly and I have resumed her medications.  We will follow blood pressure and make a decision on this prior to her discharge.  Losartan is adjusted to 50 mg a day.  I will continue to monitor blood pressure with the  9.  Abnormal EKG with no reported shortness of breath at present time.  Suspect these are chronic changes.  We have completed a stress test in 2019 that was low risk for ischemic issues.  We will continue to monitor but suspect CPK is more elevated due to the renal/muscle issues and to any cardiac issue.  Will check troponin just to be sure it is not significantly elevated.  10.  Vitamin D deficiency.  Starting patient back on 50,000 units of vitamin D weekly for now.  We will continue to monitor and treat as needed.  11.  Hyperlipidemia.  Will check lipid profile while here in hospital and adjust medication and diet as needed to treat this issue.  12.  Irritable bowel syndrome with alternating diarrhea and constipation.  This problem sounds like it stable at present time.  Will add no new treatment currently.     Plan for disposition:Where: to Home and When:  Psychiatry feels she is stable    Dr. Loja will continue to follow patient with you and can be reached at [phone].         Toñito Loja MD  5/2/2020  0498

## 2020-05-02 NOTE — PLAN OF CARE
Problem: Patient Care Overview  Goal: Plan of Care Review  Flowsheets (Taken 5/2/2020 1516)  Consent Given to Review Plan with: New orders received. Patient now admitted to [facility]. Clinical swallow evaluation completed on 4/30/2020. Patient with poor participation/acceptance of trials. Recommended continuation of a regular diet with thin liquids, allowing patient to take only what she accepts. If any overt s/s of aspiration noted, make patient NPO and re-consult SLP. Thank you.   Plan of Care Reviewed With: other (see comments) (Chart review)

## 2020-05-02 NOTE — PLAN OF CARE
Pt not cooperating with assessment and is not verbally answering questions. Pt will nod at times but is not speaking. Pt compliant with meds crushed in orange sherbet. Pt with 2+ bilateral lower extremity edema. Pt remains on O2 2L prn -sitter remains for safety due to O2. Pt slept most of the evening.     Problem: Patient Care Overview  Goal: Plan of Care Review  5/2/2020 0444 by Paige Grimm, RN  Outcome: Ongoing (interventions implemented as appropriate)  5/2/2020 0442 by Paige Grimm, RN  Outcome: Ongoing (interventions implemented as appropriate)  Goal: Individualization and Mutuality  5/2/2020 0444 by Paige Grimm, RN  Outcome: Ongoing (interventions implemented as appropriate)  5/2/2020 0442 by Paige Grimm, RN  Outcome: Ongoing (interventions implemented as appropriate)  Goal: Discharge Needs Assessment  5/2/2020 0444 by Paige Grimm, RN  Outcome: Ongoing (interventions implemented as appropriate)  5/2/2020 0442 by Paige Grimm, RN  Outcome: Ongoing (interventions implemented as appropriate)  Goal: Interprofessional Rounds/Family Conf  5/2/2020 0444 by Paige Grimm, RN  Outcome: Ongoing (interventions implemented as appropriate)  5/2/2020 0442 by Paige Grimm, RN  Outcome: Ongoing (interventions implemented as appropriate)     Problem: Overarching Goals (Adult)  Goal: Adheres to Safety Considerations for Self and Others  5/2/2020 0444 by Paige Grimm, RN  Outcome: Ongoing (interventions implemented as appropriate)  5/2/2020 0442 by Paige Grimm, RN  Outcome: Ongoing (interventions implemented as appropriate)  Goal: Optimized Coping Skills in Response to Life Stressors  5/2/2020 0444 by Paige Grimm, RN  Outcome: Ongoing (interventions implemented as appropriate)  5/2/2020 0442 by Paige Grimm, RN  Outcome: Ongoing (interventions implemented as appropriate)  Goal:  Develops/Participates in Therapeutic [place] to Support Successful Transition  5/2/2020 0444 by Paige Grimm, RN  Outcome: Ongoing (interventions implemented as appropriate)  5/2/2020 0442 by Paige Grimm RN  Outcome: Ongoing (interventions implemented as appropriate)     Problem: Cognitive Impairment (Psychotic Signs/Symptoms) (Adult)  Goal: Improved Thought Clarity/Organization (Psychotic Signs/Symptoms)  5/2/2020 0444 by Paige Grimm, RN  Outcome: Ongoing (interventions implemented as appropriate)  5/2/2020 0442 by Paige Grimm, RN  Outcome: Ongoing (interventions implemented as appropriate)     Problem: Psychomotor Movement Impairment (Psychotic Signs/Symptoms) (Adult)  Goal: Improved Psychomotor Symptoms (Psychotic Signs/Symptoms)  5/2/2020 0444 by Paige Grimm RN  Outcome: Ongoing (interventions implemented as appropriate)  5/2/2020 0442 by Paige Grimm, RN  Outcome: Ongoing (interventions implemented as appropriate)     Problem: Mental State/Mood Impairment (Psychotic Signs/Symptoms) (Adult)  Goal: Improved Mental State/Mood (Psychotic Signs/Symptoms)  5/2/2020 0444 by Paige Grimm, RN  Outcome: Ongoing (interventions implemented as appropriate)  5/2/2020 0442 by Paige Grimm, RN  Outcome: Ongoing (interventions implemented as appropriate)     Problem: Skin Injury Risk (Adult)  Goal: Skin Health and Integrity  5/2/2020 0444 by Paige Grimm, RN  Outcome: Ongoing (interventions implemented as appropriate)  5/2/2020 0442 by Paige Grimm, RN  Outcome: Ongoing (interventions implemented as appropriate)     Problem: Fall Risk (Adult)  Goal: Absence of Fall  5/2/2020 0444 by Paige Grimm, RN  Outcome: Ongoing (interventions implemented as appropriate)  5/2/2020 0442 by Paige Grimm, RN  Outcome: Ongoing (interventions implemented as appropriate)

## 2020-05-02 NOTE — H&P
Informants:  Patient, unreliable.  Chart, reliable    Date of admission: May 1, 2020  Date of assessment: May 2, 2020    Chief Complaint: None given    History of presenting illness: Patient is a  65 y.o. female with a reported past psychiatric history of bipolar disorder.  The patient presented to the ED due to generalized weakness and altered mental status.  She was admitted to the medical floor on April 26 due to dehydration and electrolyte instability.  Patient was seen by Dr. Cruz on consultation on April 27, 2020.  Please see this note.  She was medically cleared on May 1 and transferred to [facility] for psychiatric care.      Patient is seen on an outpatient basis by Dr. Hernesto Ernst.  Patient has a long history of multiple past psychiatric admissions and medication noncompliance.  This is her first admission to the [facility].  She had been noncompliant with her bipolar medication for several weeks.  She is supposed be taking Seroquel  mg nightly.  She was placed on Seroquel  mg at bedtime while on the medical floor.      Past psychiatric history:  See history of present illness    Past medical history:   Diagnoses: Hypertension, gastroesophageal reflux disease, vitamin D deficiency, history of hepatitis C, IBS, hepatic steatosis, obesity  Medications: Seroquel  mg at bedtime, Zebeta 2.5 mg daily, Lovenox 40 mg every 24 hours, Pepcid 40 mg daily, hydro-Diuril 6.25 mg daily, Zaditor ophthalmic solution 1 drop to both eyes 2 times a day, Cozaar 50 mg daily, vitamin D 50,000 units every 7 days.  Allergies: Codeine, morphine, sulfa drugs.    Social history: Reviewed without changes    Family history: Noncontributory    Substance abuse history:   Patient denies past or current use of alcohol, tobacco or street drugs.    Vital Signs    Temp: 98.1     Heart Rate: 76  Resp: 18  BP: 97/61    Mental Status Exam: The patient is found lying in bed.  She is obese of body habitus.  She is wearing a hospital  gown.  She is receiving oxygen via nasal cannula.  She is somewhat disheveled and unkempt.  She is alert and oriented ×1.  She has a paucity of speech, with a decrease in tone and volume.  Mood is described as being depressed.  Affect is flat.  She denies current suicidal ideations or homicidal ideations.  She endorses auditory hallucinations, but does not elaborate.  Thought processes are disorganized.  Judgment and insight is poor.  Memory is poor.    Assets: To be determined   Liabilities: Chronic medication noncompliance, multiple medical comorbidities    Assessment:   Axis I: Bipolar disorder, most recent episode depressed, severe, with psychotic features;  Axis II: Deferred  Axis III: Hypertension, gastroesophageal reflux disease, vitamin D deficiency, history of hepatitis C, IBS, hepatic steatosis, obesity    Treatment Plan: The patient is admitted to the [facility] for safety and stabilization under the care of Dr. Cruz.  She will receive a medical evaluation.  She'll be provided with standard laboratory examination and standard prn medications.  She was admitted on a one-to-one, and this will be continued as she is on Yi 2.  Medications, as per medical floor discharge, with be continued.  I will increase Seroquel  mg at bedtime.  The patient will receive individual and group therapy.      Estimated length of stay is 7 days.  Prognosis is somewhat guarded.

## 2020-05-02 NOTE — PLAN OF CARE
Pt sitting up in recliner chair with sitter present, was alert and attempted conversation, conversation was not on topic, flat affect, hand over hand to complete grooming tasks and bring cup to mouth but did take drink once cup was at mouth. Pt will require assistance with ADL tasks at d/c, pt may benefit from skilled OT services while in the hospital.     Patient was not wearing a face mask during this therapy encounter. Therapist used appropriate personal protective equipment including mask and gloves.  Mask used was standard procedure mask. Appropriate PPE was worn during the entire therapy session. Hand hygiene was completed before and after therapy session. Patient is not in enhanced droplet precautions.

## 2020-05-03 LAB
HCV RNA SERPL NAA+PROBE-ACNC: NORMAL IU/ML
TEST INFORMATION: NORMAL

## 2020-05-03 PROCEDURE — 25010000002 ENOXAPARIN PER 10 MG: Performed by: SPECIALIST

## 2020-05-03 RX ORDER — QUETIAPINE FUMARATE 50 MG/1
200 TABLET, EXTENDED RELEASE ORAL NIGHTLY
Status: DISCONTINUED | OUTPATIENT
Start: 2020-05-03 | End: 2020-05-20 | Stop reason: HOSPADM

## 2020-05-03 RX ADMIN — DOCUSATE SODIUM 100 MG: 100 CAPSULE, LIQUID FILLED ORAL at 09:39

## 2020-05-03 RX ADMIN — FAMOTIDINE 40 MG: 40 TABLET, FILM COATED ORAL at 09:35

## 2020-05-03 RX ADMIN — KETOTIFEN FUMARATE 1 DROP: 0.35 SOLUTION/ DROPS OPHTHALMIC at 21:50

## 2020-05-03 RX ADMIN — ENOXAPARIN SODIUM 40 MG: 40 INJECTION SUBCUTANEOUS at 09:52

## 2020-05-03 RX ADMIN — ERGOCALCIFEROL 50000 UNITS: 1.25 CAPSULE ORAL at 09:40

## 2020-05-03 RX ADMIN — DOCUSATE SODIUM 100 MG: 100 CAPSULE, LIQUID FILLED ORAL at 21:46

## 2020-05-03 RX ADMIN — KETOTIFEN FUMARATE 1 DROP: 0.35 SOLUTION/ DROPS OPHTHALMIC at 09:44

## 2020-05-03 RX ADMIN — Medication 5 MG: at 21:47

## 2020-05-03 RX ADMIN — LOSARTAN POTASSIUM 50 MG: 50 TABLET, FILM COATED ORAL at 09:31

## 2020-05-03 RX ADMIN — QUETIAPINE FUMARATE 200 MG: 50 TABLET, EXTENDED RELEASE ORAL at 21:46

## 2020-05-03 RX ADMIN — HYDROCHLOROTHIAZIDE 6.25 MG: 25 TABLET ORAL at 09:28

## 2020-05-03 RX ADMIN — BISOPROLOL FUMARATE 2.5 MG: 5 TABLET ORAL at 09:27

## 2020-05-03 NOTE — PLAN OF CARE
Problem: Patient Care Overview  Goal: Plan of Care Review  Outcome: Ongoing (interventions implemented as appropriate)  Flowsheets  Taken 5/3/2020 1812  Progress: no change  Outcome Summary: Pt uncooperative w/ assessment this shift, speaking very little, making non-sensical comments at times. Pt cooperative w/ care and compliant w/ meds in applesauce. Pt  difficult w/ med pass, much encouragement required from RN. Very little food given, pocketing in cheeks throughout the day. Pt out in milieu for part of day w/o O2, sats at 94%. 2+ BLE. 1:1 sitter remains for safety. No falls or further issues reported at this time. Will continue to monitor and provide safe environment.  Taken 5/3/2020 1347  Plan of Care Reviewed With: patient  Patient Agreement with Plan of Care: unable to participate     Problem: Patient Care Overview  Goal: Individualization and Mutuality  Outcome: Ongoing (interventions implemented as appropriate)     Problem: Patient Care Overview  Goal: Discharge Needs Assessment  Outcome: Ongoing (interventions implemented as appropriate)     Problem: Patient Care Overview  Goal: Interprofessional Rounds/Family Conf  Outcome: Ongoing (interventions implemented as appropriate)     Problem: Overarching Goals (Adult)  Goal: Adheres to Safety Considerations for Self and Others  Outcome: Ongoing (interventions implemented as appropriate)  Flowsheets (Taken 5/3/2020 1812)  Adheres to Safety Considerations for Self and Others: making progress toward outcome     Problem: Overarching Goals (Adult)  Goal: Adheres to Safety Considerations for Self and Others  Intervention: Develop and Maintain Individualized Safety Plan  Flowsheets  Taken 5/3/2020 1800  Safety Measures: safety rounds completed  Taken 5/3/2020 1347  Previous Attempt to Harm Others: no  Feels Like Hurting Others: other (see comments) (ARIELLA )     Problem: Overarching Goals (Adult)  Goal: Optimized Coping Skills in Response to Life Stressors  Outcome:  Ongoing (interventions implemented as appropriate)  Flowsheets (Taken 5/3/2020 1812)  Optimized Coping Skills in Response to Life Stressors: making progress toward outcome     Problem: Overarching Goals (Adult)  Goal: Optimized Coping Skills in Response to Life Stressors  Intervention: Promote Effective Coping Strategies  Flowsheets (Taken 5/3/2020 1347)  Supportive Measures: verbalization of feelings encouraged;goal setting facilitated;self-care encouraged;relaxation techniques promoted;active listening utilized     Problem: Overarching Goals (Adult)  Goal: Develops/Participates in Therapeutic [place] to Support Successful Transition  Outcome: Ongoing (interventions implemented as appropriate)  Flowsheets (Taken 5/3/2020 1812)  Develops/Participates in Therapeutic [place] to Support Successful Transition: making progress toward outcome     Problem: Overarching Goals (Adult)  Goal: Develops/Participates in Therapeutic [place] to Support Successful Transition  Intervention: Foster Therapeutic [place]  Flowsheets (Taken 5/3/2020 1347)  Trust Relationship/Rapport: care explained;choices provided;thoughts/feelings acknowledged;reassurance provided;questions encouraged     Problem: Overarching Goals (Adult)  Goal: Develops/Participates in Therapeutic [place] to Support Successful Transition  Intervention: Mutually Develop Transition Plan  Flowsheets (Taken 5/3/2020 1347)  Transition Support: crisis management plan promoted     Problem: Cognitive Impairment (Psychotic Signs/Symptoms) (Adult)  Goal: Improved Thought Clarity/Organization (Psychotic Signs/Symptoms)  Outcome: Ongoing (interventions implemented as appropriate)  Flowsheets  Taken 5/1/2020 1614 by Melissa Boyd, RN  Improved Thought Clarity/Organization Action Step/Short Term Goal (STG) Established: 05/01/20  Taken 5/3/2020 1812 by Aubrey Stone RN  Improved Thought Clarity/Organization Time Frame for Action Step (STG): 2 days  Improved Thought  Clarity/Organization Action Step (STG) Outcome: making progress toward outcome     Problem: Psychomotor Movement Impairment (Psychotic Signs/Symptoms) (Adult)  Goal: Improved Psychomotor Symptoms (Psychotic Signs/Symptoms)  Outcome: Ongoing (interventions implemented as appropriate)  Flowsheets  Taken 5/1/2020 1614 by Melissa Boyd RN  Improved Psychomotor Symptoms Action Step/Short Term Goal (STG) Established: 05/01/20  Taken 5/3/2020 1812 by Aubrey Stone RN  Improved Psychomotor Symptoms Time Frame for Action Step (STG): 2 days  Improved Psychomotor Symptoms Action Step (STG) Outcome: making progress toward outcome     Problem: Mental State/Mood Impairment (Psychotic Signs/Symptoms) (Adult)  Goal: Improved Mental State/Mood (Psychotic Signs/Symptoms)  Outcome: Ongoing (interventions implemented as appropriate)  Flowsheets  Taken 5/1/2020 1614 by Melissa Boyd RN  Improved Mental State/Mood Action Step/Short Term Goal (STG) Established: 05/01/20  Taken 5/3/2020 1812 by Aubrey Stone RN  Improved Mental State/Mood Time Frame for Action Step (STG): 2 days  Improved Mental State/Mood Action Step (STG) Outcome: making progress toward outcome     Problem: Skin Injury Risk (Adult)  Goal: Skin Health and Integrity  Outcome: Ongoing (interventions implemented as appropriate)  Flowsheets (Taken 5/3/2020 1812)  Skin Health and Integrity: making progress toward outcome     Problem: Fall Risk (Adult)  Goal: Absence of Fall  Outcome: Ongoing (interventions implemented as appropriate)  Flowsheets (Taken 5/3/2020 1812)  Absence of Fall: making progress toward outcome

## 2020-05-03 NOTE — PROGRESS NOTES
"Patient is seen, evaluated, and chart reviewed. Discussed with staff.  Patient is seen for Dr. Cruz.    *Correction:  In my H&P, I stated \"I will increase Seroquel  mg at bedtime.\" This is an error for which Epic will not allow me to correct.  It should state \"I will increase Seroquel XR to 150 mg at bedtime.\"     Staff reports that patient has been cooperative and compliant with medications.  She continues to have a paucity of speech, at times nonsensical.  No behavioral issues.    On examination, patient is found laying in bed.  She is awake and alert.  She is receiving oxygen via nasal cannula.  She looks at me, but does not respond to any questioning.      No medication side effects.  Patient is provided with supportive therapy. Consider increasing Seroquel XR to 200 mg at bedtime.  Continue current medications, therapy, and inpatient treatment plan for safety and stabilization.  Dr. Cruz will resume care tomorrow.  "

## 2020-05-03 NOTE — PLAN OF CARE
"Pt presents with flat affect but appears brighter with better eye contact this shift. Pt did have interaction with staff and was able to speak and nod head to questions at times. When asked if she had pain, pt responded \"how can I have pain when...\" followed by nonsensical speech. Pt also volunteered that \"I don't like wearing hose\" & \"Never block a door\".  Pt complaint with meds in ice cream. Pt remains on 2L O2 prn.  Pt had broken sleep throughout the night.       Problem: Patient Care Overview  Goal: Plan of Care Review  Outcome: Ongoing (interventions implemented as appropriate)  Goal: Individualization and Mutuality  Outcome: Ongoing (interventions implemented as appropriate)  Goal: Discharge Needs Assessment  Outcome: Ongoing (interventions implemented as appropriate)  Goal: Interprofessional Rounds/Family Conf  Outcome: Ongoing (interventions implemented as appropriate)     Problem: Overarching Goals (Adult)  Goal: Adheres to Safety Considerations for Self and Others  Outcome: Ongoing (interventions implemented as appropriate)  Goal: Optimized Coping Skills in Response to Life Stressors  Outcome: Ongoing (interventions implemented as appropriate)  Goal: Develops/Participates in Therapeutic [place] to Support Successful Transition  Outcome: Ongoing (interventions implemented as appropriate)     Problem: Cognitive Impairment (Psychotic Signs/Symptoms) (Adult)  Goal: Improved Thought Clarity/Organization (Psychotic Signs/Symptoms)  Outcome: Ongoing (interventions implemented as appropriate)     Problem: Psychomotor Movement Impairment (Psychotic Signs/Symptoms) (Adult)  Goal: Improved Psychomotor Symptoms (Psychotic Signs/Symptoms)  Outcome: Ongoing (interventions implemented as appropriate)     Problem: Mental State/Mood Impairment (Psychotic Signs/Symptoms) (Adult)  Goal: Improved Mental State/Mood (Psychotic Signs/Symptoms)  Outcome: Ongoing (interventions implemented as appropriate)     Problem: Skin Injury " Risk (Adult)  Goal: Skin Health and Integrity  Outcome: Ongoing (interventions implemented as appropriate)     Problem: Fall Risk (Adult)  Goal: Absence of Fall  Outcome: Ongoing (interventions implemented as appropriate)

## 2020-05-04 LAB
ALBUMIN SERPL-MCNC: 3.3 G/DL (ref 3.5–5.2)
ALBUMIN/GLOB SERPL: 0.8 G/DL
ALP SERPL-CCNC: 46 U/L (ref 39–117)
ALT SERPL W P-5'-P-CCNC: 52 U/L (ref 1–33)
ANION GAP SERPL CALCULATED.3IONS-SCNC: 18.1 MMOL/L (ref 5–15)
AST SERPL-CCNC: 37 U/L (ref 1–32)
BASOPHILS # BLD AUTO: 0.09 10*3/MM3 (ref 0–0.2)
BASOPHILS NFR BLD AUTO: 0.7 % (ref 0–1.5)
BILIRUB SERPL-MCNC: 0.5 MG/DL (ref 0.2–1.2)
BUN BLD-MCNC: 66 MG/DL (ref 8–23)
BUN/CREAT SERPL: 41.3 (ref 7–25)
CALCIUM SPEC-SCNC: 9.5 MG/DL (ref 8.6–10.5)
CHLORIDE SERPL-SCNC: 102 MMOL/L (ref 98–107)
CO2 SERPL-SCNC: 18.9 MMOL/L (ref 22–29)
CREAT BLD-MCNC: 1.6 MG/DL (ref 0.57–1)
DEPRECATED RDW RBC AUTO: 43.3 FL (ref 37–54)
EOSINOPHIL # BLD AUTO: 0.25 10*3/MM3 (ref 0–0.4)
EOSINOPHIL NFR BLD AUTO: 2 % (ref 0.3–6.2)
ERYTHROCYTE [DISTWIDTH] IN BLOOD BY AUTOMATED COUNT: 13.2 % (ref 12.3–15.4)
GFR SERPL CREATININE-BSD FRML MDRD: 33 ML/MIN/1.73
GLOBULIN UR ELPH-MCNC: 3.9 GM/DL
GLUCOSE BLD-MCNC: 115 MG/DL (ref 65–99)
HCT VFR BLD AUTO: 46.6 % (ref 34–46.6)
HGB BLD-MCNC: 15.5 G/DL (ref 12–15.9)
IMM GRANULOCYTES # BLD AUTO: 0.08 10*3/MM3 (ref 0–0.05)
IMM GRANULOCYTES NFR BLD AUTO: 0.6 % (ref 0–0.5)
LYMPHOCYTES # BLD AUTO: 2.02 10*3/MM3 (ref 0.7–3.1)
LYMPHOCYTES NFR BLD AUTO: 15.8 % (ref 19.6–45.3)
MCH RBC QN AUTO: 30.2 PG (ref 26.6–33)
MCHC RBC AUTO-ENTMCNC: 33.3 G/DL (ref 31.5–35.7)
MCV RBC AUTO: 90.7 FL (ref 79–97)
MONOCYTES # BLD AUTO: 1.28 10*3/MM3 (ref 0.1–0.9)
MONOCYTES NFR BLD AUTO: 10 % (ref 5–12)
NEUTROPHILS # BLD AUTO: 9.04 10*3/MM3 (ref 1.7–7)
NEUTROPHILS NFR BLD AUTO: 70.9 % (ref 42.7–76)
NRBC BLD AUTO-RTO: 0 /100 WBC (ref 0–0.2)
PLATELET # BLD AUTO: 222 10*3/MM3 (ref 140–450)
PMV BLD AUTO: 12.8 FL (ref 6–12)
POTASSIUM BLD-SCNC: 4.5 MMOL/L (ref 3.5–5.2)
PROT SERPL-MCNC: 7.2 G/DL (ref 6–8.5)
RBC # BLD AUTO: 5.14 10*6/MM3 (ref 3.77–5.28)
SODIUM BLD-SCNC: 139 MMOL/L (ref 136–145)
WBC NRBC COR # BLD: 12.76 10*3/MM3 (ref 3.4–10.8)

## 2020-05-04 PROCEDURE — 80053 COMPREHEN METABOLIC PANEL: CPT | Performed by: INTERNAL MEDICINE

## 2020-05-04 PROCEDURE — 85025 COMPLETE CBC W/AUTO DIFF WBC: CPT | Performed by: INTERNAL MEDICINE

## 2020-05-04 PROCEDURE — 25010000002 ENOXAPARIN PER 10 MG: Performed by: SPECIALIST

## 2020-05-04 PROCEDURE — 97110 THERAPEUTIC EXERCISES: CPT

## 2020-05-04 RX ADMIN — KETOTIFEN FUMARATE 1 DROP: 0.35 SOLUTION/ DROPS OPHTHALMIC at 09:18

## 2020-05-04 RX ADMIN — CLOBETASOL PROPIONATE: 0.5 CREAM TOPICAL at 20:04

## 2020-05-04 RX ADMIN — ENOXAPARIN SODIUM 40 MG: 40 INJECTION SUBCUTANEOUS at 10:42

## 2020-05-04 RX ADMIN — DOCUSATE SODIUM 100 MG: 100 CAPSULE, LIQUID FILLED ORAL at 20:00

## 2020-05-04 RX ADMIN — DOCUSATE SODIUM 100 MG: 100 CAPSULE, LIQUID FILLED ORAL at 10:44

## 2020-05-04 RX ADMIN — FAMOTIDINE 40 MG: 40 TABLET, FILM COATED ORAL at 10:44

## 2020-05-04 RX ADMIN — QUETIAPINE FUMARATE 200 MG: 50 TABLET, EXTENDED RELEASE ORAL at 19:59

## 2020-05-04 RX ADMIN — Medication 5 MG: at 20:00

## 2020-05-04 RX ADMIN — KETOTIFEN FUMARATE 1 DROP: 0.35 SOLUTION/ DROPS OPHTHALMIC at 20:04

## 2020-05-04 NOTE — PLAN OF CARE
Problem: Patient Care Overview  Goal: Discharge Needs Assessment  Outcome: Ongoing (interventions implemented as appropriate)  Flowsheets  Taken 5/4/2020 1230  Concerns Comments: Pt will return home.  Svcs will be assessed ongoing.  SW will explore outpt providers.  Taken 5/4/2020 1229  Transportation Concerns: car, none  Concerns to be Addressed: mental health;discharge planning  Patient/Family Anticipated Services at Transition: mental health services  Patient/Family Anticipates Transition to: home with family

## 2020-05-04 NOTE — PLAN OF CARE
"Patient talking a bit today, but with significant response lag. Some illogical talk of being \"able to fly\" and \"being on fire\". Seemed very preoccupied with something going on outside of window, so blinds pulled down for the day. Patient was able to take medications whole with water and has been encouraged to push fluids as her labs indicate dehydration. Order for 2L O2 if sats drop below 88%.  Problem: Patient Care Overview  Goal: Plan of Care Review  Outcome: Ongoing (interventions implemented as appropriate)  Goal: Individualization and Mutuality  Outcome: Ongoing (interventions implemented as appropriate)  Goal: Discharge Needs Assessment  Outcome: Ongoing (interventions implemented as appropriate)  Goal: Interprofessional Rounds/Family Conf  Outcome: Ongoing (interventions implemented as appropriate)     Problem: Overarching Goals (Adult)  Goal: Adheres to Safety Considerations for Self and Others  Outcome: Ongoing (interventions implemented as appropriate)  Goal: Optimized Coping Skills in Response to Life Stressors  Outcome: Ongoing (interventions implemented as appropriate)  Goal: Develops/Participates in Therapeutic [place] to Support Successful Transition  Outcome: Ongoing (interventions implemented as appropriate)     Problem: Cognitive Impairment (Psychotic Signs/Symptoms) (Adult)  Goal: Improved Thought Clarity/Organization (Psychotic Signs/Symptoms)  Outcome: Ongoing (interventions implemented as appropriate)     Problem: Psychomotor Movement Impairment (Psychotic Signs/Symptoms) (Adult)  Goal: Improved Psychomotor Symptoms (Psychotic Signs/Symptoms)  Outcome: Ongoing (interventions implemented as appropriate)     Problem: Mental State/Mood Impairment (Psychotic Signs/Symptoms) (Adult)  Goal: Improved Mental State/Mood (Psychotic Signs/Symptoms)  Outcome: Ongoing (interventions implemented as appropriate)     Problem: Skin Injury Risk (Adult)  Goal: Skin Health and Integrity  Outcome: Ongoing " (interventions implemented as appropriate)     Problem: Fall Risk (Adult)  Goal: Absence of Fall  Outcome: Ongoing (interventions implemented as appropriate)

## 2020-05-04 NOTE — PLAN OF CARE
Problem: Patient Care Overview  Goal: Individualization and Mutuality  Outcome: Ongoing (interventions implemented as appropriate)  Flowsheets (Taken 5/4/2020 1027)  Patient Personal Strengths: stable living environment; family/social support     Problem: Patient Care Overview  Goal: Interprofessional Rounds/Family Conf  Outcome: Ongoing (interventions implemented as appropriate)  Flowsheets (Taken 5/4/2020 1027)  Participants: art therapy; pharmacy; social work; nursing;   Summary: Treatment team met to discuss plan of care.  Patient to remain current with [facility] and Dr. Ernst after discharge and plan to return home when stable.   to assess for additional discharge needs and to reach out to  to discuss discharge plans.     Problem: Cognitive Impairment (Psychotic Signs/Symptoms) (Adult)  Goal: Improved Thought Clarity/Organization (Psychotic Signs/Symptoms)  Outcome: Ongoing (interventions implemented as appropriate)  Flowsheets  Taken 5/1/2020 1614 by Melissa Boyd RN  Improved Thought Clarity/Organization Action Step/Short Term Goal (STG) Established: 05/01/20  Taken 5/4/2020 1027 by Mallika Byrd RN  Improved Thought Clarity/Organization Time Frame for Action Step (STG): 1 day  Taken 5/3/2020 1812 by Aubrey Stone RN  Improved Thought Clarity/Organization Action Step (STG) Outcome: making progress toward outcome     Problem: Psychomotor Movement Impairment (Psychotic Signs/Symptoms) (Adult)  Goal: Improved Psychomotor Symptoms (Psychotic Signs/Symptoms)  Outcome: Ongoing (interventions implemented as appropriate)  Flowsheets  Taken 5/1/2020 1614 by Melissa Boyd RN  Improved Psychomotor Symptoms Action Step/Short Term Goal (STG) Established: 05/01/20  Taken 5/4/2020 1027 by Mallika Byrd RN  Improved Psychomotor Symptoms Time Frame for Action Step (STG): 1 day  Taken 5/3/2020 1812 by Aubrey Stone RN  Improved Psychomotor Symptoms Action Step (STG)  Outcome: making progress toward outcome     Problem: Mental State/Mood Impairment (Psychotic Signs/Symptoms) (Adult)  Goal: Improved Mental State/Mood (Psychotic Signs/Symptoms)  Outcome: Ongoing (interventions implemented as appropriate)  Flowsheets  Taken 5/1/2020 1614 by Melissa Boyd RN  Improved Mental State/Mood Action Step/Short Term Goal (STG) Established: 05/01/20  Taken 5/4/2020 1027 by Mallika Byrd RN  Improved Mental State/Mood Time Frame for Action Step (STG): 1 day  Taken 5/3/2020 1812 by Aubrey Stone RN  Improved Mental State/Mood Action Step (STG) Outcome: making progress toward outcome     Patient/Guardian Signature: __________________________________            Psychiatrist Signature: ______________________________________             Therapist Signature: ________________________________________         Nurse Signature: ___________________________________________          Staff Signature: ____________________________________________            Staff Signature: ____________________________________________          Staff Signature: ____________________________________________          Staff Signature:

## 2020-05-04 NOTE — PROGRESS NOTES
"Adult Nutrition  Assessment/PES    Patient Name:  Francheska Sherman  YOB: 1955  MRN: [id number]  Admit Date:  5/1/2020    Assessment Date:  5/4/2020    Comments:  Assessed d/t MST 3: reduced oral intake.    Pt seen by RD associates while on acute. She's been refusing to eat/drink for several days now - had gotten a tad better before transfer over to [facility] from [facility]. Her BUN/Cr levels are rising. Will try adding Boost TID to trays. Food prefs on file from interview with  on acute. She may need IV hydration if unable to ^ PO intake. Will cont to monitor.     Reason for Assessment     Row Name 05/04/20 1353          Reason for Assessment    Reason For Assessment  identified at risk by screening criteria     Diagnosis  neurologic conditions;psychosocial Bipolar disorder, manic depression     Identified At Risk by Screening Criteria  reduced oral intake over the last month         Nutrition/Diet History     Row Name 05/04/20 1353          Nutrition/Diet History    Typical Food/Fluid Intake  Transfer from [facility] where she had been eating poorly, and continues to do so on [facility]. BUN/Cr up significantly. She's mute.      Food Preferences  per previous RD note: likes sandwiches, scrambled eggs, tuna fish, crackers     Factors Affecting Nutritional Intake  impaired cognitive status/motor control         Anthropometrics     Row Name 05/04/20 1357          Anthropometrics    Height  170.2 cm (67\")        Admit Weight    Admit Weight  98 kg (216 lb) 4/25/20 adm to acute        Ideal Body Weight (IBW)    Ideal Body Weight (IBW) (kg)  61.86        Usual Body Weight (UBW)    Usual Body Weight  110 kg (243 lb)     Weight Loss  unintentional     Weight Loss Time Frame  ~ 30 lb wt loss (?) 5/2019        Body Mass Index (BMI)    BMI Assessment  BMI 35-39.9: obesity grade II Bmi 36         Labs/Tests/Procedures/Meds     Row Name 05/04/20 2056          Labs/Procedures/Meds    Lab Results Reviewed  reviewed     Lab Results " "Comments  Glu, BUN/Cr, ALT, Alb        Diagnostic Tests/Procedures    Diagnostic Test/Procedure Reviewed  reviewed     Diagnostic Test/Procedures Comments  Clinical swallow evaluation completed on 4/30/2020        Medications    Pertinent Medications Reviewed  reviewed     Pertinent Medications Comments  colace, pepcid, HCTZ, VIt D         Physical Findings     Row Name 05/04/20 1357          Physical Findings    Overall Physical Appearance  obese;on oxygen therapy     Skin  other (see comments) B 14         Estimated/Assessed Needs     Row Name 05/04/20 1401 05/04/20 1355       Calculation Measurements    Weight Used For Calculations  98 kg (216 lb)  --    Height  --  170.2 cm (67\")       Estimated/Assessed Needs    Additional Documentation  Protein Requirements (Group);Fluid Requirements (Group);KCAL/KG (Group)  --       KCAL/KG    KCAL/KG  20 Kcal/Kg (kcal)  --    20 Kcal/Kg (kcal)  1959.54  --       Protein Requirements    Weight Used For Protein Calculations  98 kg (216 lb)  --    Est Protein Requirement Amount (gms/kg)  0.8 gm protein  --    Estimated Protein Requirements (gms/day)  78.38  --       Fluid Requirements    Estimated Fluid Requirements (mL/day)  1959  --    RDA Method (mL)  1959  --        Nutrition Prescription Ordered     Row Name 05/04/20 1357          Nutrition Prescription PO    Current PO Diet  Soft Texture     Texture  Ground     Fluid Consistency  Thin         Evaluation of Received Nutrient/Fluid Intake     Row Name 05/04/20 1358          PO Evaluation    % PO Intake  0-a couple of bites               Problem/Interventions:  Problem 1     Row Name 05/04/20 1402          Nutrition Diagnoses Problem 1    Problem 1  Inadequate Intake/Infusion     Inadequate Intake Type  Oral     Macronutrient  Kcal;Protein     Etiology (related to)  Medical Diagnosis     Neurological  AMS     Signs/Symptoms (evidenced by)  Report of Mnimal PO Intake               Intervention Goal     Row Name 05/04/20 1402 "          Intervention Goal    General  Maintain nutrition;Disease management/therapy     PO  Tolerate PO;Increase intake;PO intake (%)     PO Intake %  75 %     Weight  No significant weight loss         Nutrition Intervention     Row Name 05/04/20 1402          Nutrition Intervention    RD/Tech Action  Follow Tx progress;Care plan reviewd;Encourage intake;Recommend/ordered     Recommended/Ordered  Supplement         Nutrition Prescription     Row Name 05/04/20 1402          Nutrition Prescription PO    PO Prescription  Begin/change supplement     Supplement  Boost Plus     New PO Prescription Ordered?  Yes         Education/Evaluation     Row Name 05/04/20 1404          Education    Education  Will Instruct as appropriate        Monitor/Evaluation    Monitor  Per protocol           Electronically signed by:  Francisca Walls RD  05/04/20 14:03

## 2020-05-04 NOTE — PROGRESS NOTES
The patient is sitting in her room today.  She is virtually mute during attempted interview, but staff reports that her p.o. intake is slightly improved.  I spoke with the patient regarding the importance of compliance with prescribed medications.  We are concerned that if she does not improve her p.o. intake that transfer back to the main hospital may become necessary as her renal function has been deteriorating.

## 2020-05-04 NOTE — THERAPY TREATMENT NOTE
Inpatient Rehabilitation - Physical Therapy Treatment Note  [facility]     Patient Name: Francheska Sherman  : 1955  MRN: [id number]    Today's Date: 2020                 Admit Date: 2020      Visit Dx:    No diagnosis found.    Patient Active Problem List   Diagnosis   • Overactive bladder   • Essential hypertension   • Abnormal EKG   • SOB (shortness of breath)   • Altered mental status   • GERD (gastroesophageal reflux disease)   • ARDS survivor  Ventilator   • ADD (attention deficit disorder)   • Vitamin D deficiency   • Acute kidney injury (CMS/HCC)   • Volume depletion    • Generalized weakness   • Hyperlipidemia   • History of hepatitis C followed by Dr. Ranjan Qiu [id number]   • Irritable bowel syndrome with both constipation and diarrhea   • Hepatic steatosis   • Obesity (BMI 30-39.9)   • Hematuria   • Elevated liver function tests   • Bipolar disorder, current episode mixed, moderate (CMS/HCC)   • Affective psychosis, bipolar (CMS/HCC)       Therapy Treatment               PT Recommendation and Plan                 Outcome Measures     Row Name 20 5656             How much help from another is currently needed...    Putting on and taking off regular lower body clothing?  1  -AF      Bathing (including washing, rinsing, and drying)  1  -AF      Toileting (which includes using toilet bed pan or urinal)  1  -AF      Putting on and taking off regular upper body clothing  1  -AF      Taking care of personal grooming (such as brushing teeth)  2  -AF      Eating meals  2  -AF      AM-PAC 6 Clicks Score (OT)  8  -AF        User Key  (r) = Recorded By, (t) = Taken By, (c) = Cosigned By    Initials Name Provider Type    Melinda Light, OTR Occupational Therapist             Time Calculation:     PT Charges     Row Name 20 1503             Time Calculation    Start Time  1100  -LB      Stop Time  1118  -LB      Time Calculation (min)  18 min  -LB      PT Received On  20  -LB       PT - Next Appointment  05/05/20  -LB      PT Goal Re-Cert Due Date  05/16/20  -LB         Time Calculation- PT    Total Timed Code Minutes- PT  18 minute(s)  -LB        User Key  (r) = Recorded By, (t) = Taken By, (c) = Cosigned By    Initials Name Provider Type    Liliana Velasco, PT Physical Therapist          Therapy Charges for Today     Code Description Service Date Service Provider Modifiers Qty    24771124609 HC PT THER PROC EA 15 MIN 5/4/2020 Liliana Patrick, PT GP 1            PT G-Codes  Outcome Measure Options: AM-PAC 6 Clicks Basic Mobility (PT)  AM-PAC 6 Clicks Score (PT): 10  AM-PAC 6 Clicks Score (OT): 8      Liliana Patrick, PT  5/4/2020

## 2020-05-04 NOTE — PROGRESS NOTES
Case Management Discharge Note      Final Note: Transfer to [facility]. Ya KNOWLES     Provided Post Acute Provider List?: Refused  Refused Provider List Comment: Spouse wants patient to go to [facility] unit    Destination      No service has been selected for the patient.      Durable Medical Equipment      No service has been selected for the patient.      Dialysis/Infusion      No service has been selected for the patient.      Home Medical Care      No service has been selected for the patient.      Therapy      No service has been selected for the patient.      Community Resources      No service has been selected for the patient.             Final Discharge Disposition Code: 65 - psychiatric hospital or unit

## 2020-05-04 NOTE — PLAN OF CARE
Pt was less engaged with staff this shift and would neither speak nor otherwise acknowledge staff questions. Pt affect remains flat with poor eye contact. Pt required considerable encouragement to take her meds and attempted to spit them out. Pt taking in very little fluid and no food this shift. Pt slept for most of the evening. Pt O2 sats were 89% on room air so 2L O2 placed on pt. Pt with BLE pitting edema. Sitter remains for safety.   Problem: Patient Care Overview  Goal: Plan of Care Review  Outcome: Ongoing (interventions implemented as appropriate)  Goal: Individualization and Mutuality  Outcome: Ongoing (interventions implemented as appropriate)  Goal: Discharge Needs Assessment  Outcome: Ongoing (interventions implemented as appropriate)  Goal: Interprofessional Rounds/Family Conf  Outcome: Ongoing (interventions implemented as appropriate)     Problem: Overarching Goals (Adult)  Goal: Adheres to Safety Considerations for Self and Others  Outcome: Ongoing (interventions implemented as appropriate)  Goal: Optimized Coping Skills in Response to Life Stressors  Outcome: Ongoing (interventions implemented as appropriate)  Goal: Develops/Participates in Therapeutic [place] to Support Successful Transition  Outcome: Ongoing (interventions implemented as appropriate)     Problem: Cognitive Impairment (Psychotic Signs/Symptoms) (Adult)  Goal: Improved Thought Clarity/Organization (Psychotic Signs/Symptoms)  Outcome: Ongoing (interventions implemented as appropriate)     Problem: Psychomotor Movement Impairment (Psychotic Signs/Symptoms) (Adult)  Goal: Improved Psychomotor Symptoms (Psychotic Signs/Symptoms)  Outcome: Ongoing (interventions implemented as appropriate)     Problem: Mental State/Mood Impairment (Psychotic Signs/Symptoms) (Adult)  Goal: Improved Mental State/Mood (Psychotic Signs/Symptoms)  Outcome: Ongoing (interventions implemented as appropriate)     Problem: Skin Injury Risk (Adult)  Goal: Skin  Health and Integrity  Outcome: Ongoing (interventions implemented as appropriate)     Problem: Fall Risk (Adult)  Goal: Absence of Fall  Outcome: Ongoing (interventions implemented as appropriate)

## 2020-05-05 LAB
ALBUMIN SERPL-MCNC: 3.7 G/DL (ref 3.5–5.2)
ALBUMIN/GLOB SERPL: 0.9 G/DL
ALP SERPL-CCNC: 55 U/L (ref 39–117)
ALT SERPL W P-5'-P-CCNC: 50 U/L (ref 1–33)
ANION GAP SERPL CALCULATED.3IONS-SCNC: 18.5 MMOL/L (ref 5–15)
AST SERPL-CCNC: 32 U/L (ref 1–32)
BASOPHILS # BLD AUTO: 0.06 10*3/MM3 (ref 0–0.2)
BASOPHILS NFR BLD AUTO: 0.6 % (ref 0–1.5)
BILIRUB SERPL-MCNC: 0.7 MG/DL (ref 0.2–1.2)
BUN BLD-MCNC: 52 MG/DL (ref 8–23)
BUN/CREAT SERPL: 56.5 (ref 7–25)
CALCIUM SPEC-SCNC: 10 MG/DL (ref 8.6–10.5)
CHLORIDE SERPL-SCNC: 104 MMOL/L (ref 98–107)
CO2 SERPL-SCNC: 21.5 MMOL/L (ref 22–29)
CREAT BLD-MCNC: 0.92 MG/DL (ref 0.57–1)
DEPRECATED RDW RBC AUTO: 43.5 FL (ref 37–54)
EOSINOPHIL # BLD AUTO: 0.27 10*3/MM3 (ref 0–0.4)
EOSINOPHIL NFR BLD AUTO: 2.7 % (ref 0.3–6.2)
ERYTHROCYTE [DISTWIDTH] IN BLOOD BY AUTOMATED COUNT: 13.2 % (ref 12.3–15.4)
GFR SERPL CREATININE-BSD FRML MDRD: 62 ML/MIN/1.73
GLOBULIN UR ELPH-MCNC: 4.2 GM/DL
GLUCOSE BLD-MCNC: 118 MG/DL (ref 65–99)
HCT VFR BLD AUTO: 48.5 % (ref 34–46.6)
HGB BLD-MCNC: 16.3 G/DL (ref 12–15.9)
IMM GRANULOCYTES # BLD AUTO: 0.04 10*3/MM3 (ref 0–0.05)
IMM GRANULOCYTES NFR BLD AUTO: 0.4 % (ref 0–0.5)
LYMPHOCYTES # BLD AUTO: 1.77 10*3/MM3 (ref 0.7–3.1)
LYMPHOCYTES NFR BLD AUTO: 17.7 % (ref 19.6–45.3)
MCH RBC QN AUTO: 30.2 PG (ref 26.6–33)
MCHC RBC AUTO-ENTMCNC: 33.6 G/DL (ref 31.5–35.7)
MCV RBC AUTO: 90 FL (ref 79–97)
MONOCYTES # BLD AUTO: 1.1 10*3/MM3 (ref 0.1–0.9)
MONOCYTES NFR BLD AUTO: 11 % (ref 5–12)
NEUTROPHILS # BLD AUTO: 6.78 10*3/MM3 (ref 1.7–7)
NEUTROPHILS NFR BLD AUTO: 67.6 % (ref 42.7–76)
NRBC BLD AUTO-RTO: 0 /100 WBC (ref 0–0.2)
PLATELET # BLD AUTO: 224 10*3/MM3 (ref 140–450)
PMV BLD AUTO: 13.2 FL (ref 6–12)
POTASSIUM BLD-SCNC: 5.3 MMOL/L (ref 3.5–5.2)
PROT SERPL-MCNC: 7.9 G/DL (ref 6–8.5)
RBC # BLD AUTO: 5.39 10*6/MM3 (ref 3.77–5.28)
SODIUM BLD-SCNC: 144 MMOL/L (ref 136–145)
WBC NRBC COR # BLD: 10.02 10*3/MM3 (ref 3.4–10.8)

## 2020-05-05 PROCEDURE — 25010000002 ENOXAPARIN PER 10 MG: Performed by: SPECIALIST

## 2020-05-05 PROCEDURE — 85025 COMPLETE CBC W/AUTO DIFF WBC: CPT | Performed by: INTERNAL MEDICINE

## 2020-05-05 PROCEDURE — 80053 COMPREHEN METABOLIC PANEL: CPT | Performed by: INTERNAL MEDICINE

## 2020-05-05 RX ORDER — LORAZEPAM 0.5 MG/1
0.5 TABLET ORAL 3 TIMES DAILY
Status: DISCONTINUED | OUTPATIENT
Start: 2020-05-05 | End: 2020-05-20 | Stop reason: HOSPADM

## 2020-05-05 RX ORDER — MIRTAZAPINE 15 MG/1
7.5 TABLET, FILM COATED ORAL NIGHTLY
Status: DISCONTINUED | OUTPATIENT
Start: 2020-05-05 | End: 2020-05-05

## 2020-05-05 RX ADMIN — ENOXAPARIN SODIUM 40 MG: 40 INJECTION SUBCUTANEOUS at 10:04

## 2020-05-05 RX ADMIN — DOCUSATE SODIUM 100 MG: 100 CAPSULE, LIQUID FILLED ORAL at 10:07

## 2020-05-05 RX ADMIN — QUETIAPINE FUMARATE 200 MG: 50 TABLET, EXTENDED RELEASE ORAL at 20:02

## 2020-05-05 RX ADMIN — CLOBETASOL PROPIONATE: 0.5 CREAM TOPICAL at 10:04

## 2020-05-05 RX ADMIN — FAMOTIDINE 40 MG: 40 TABLET, FILM COATED ORAL at 10:07

## 2020-05-05 RX ADMIN — KETOTIFEN FUMARATE 1 DROP: 0.35 SOLUTION/ DROPS OPHTHALMIC at 10:05

## 2020-05-05 RX ADMIN — LORAZEPAM 0.5 MG: 0.5 TABLET ORAL at 20:02

## 2020-05-05 RX ADMIN — LORAZEPAM 0.5 MG: 0.5 TABLET ORAL at 17:50

## 2020-05-05 RX ADMIN — HYDROCHLOROTHIAZIDE 6.25 MG: 25 TABLET ORAL at 10:01

## 2020-05-05 RX ADMIN — LOSARTAN POTASSIUM 50 MG: 50 TABLET, FILM COATED ORAL at 10:08

## 2020-05-05 RX ADMIN — BISOPROLOL FUMARATE 2.5 MG: 5 TABLET ORAL at 10:01

## 2020-05-05 RX ADMIN — Medication 5 MG: at 20:02

## 2020-05-05 NOTE — PLAN OF CARE
Patient refuses medications and to eat and drink.  Staff and nurse have encouraged fluids.  Pt appears fearful, and is disorganized in speech.  Pt monitored for safety.  Problem: Patient Care Overview  Goal: Plan of Care Review  Outcome: Ongoing (interventions implemented as appropriate)  Goal: Individualization and Mutuality  Outcome: Ongoing (interventions implemented as appropriate)  Goal: Discharge Needs Assessment  Outcome: Ongoing (interventions implemented as appropriate)  Goal: Interprofessional Rounds/Family Conf  Outcome: Ongoing (interventions implemented as appropriate)     Problem: Overarching Goals (Adult)  Goal: Adheres to Safety Considerations for Self and Others  Outcome: Ongoing (interventions implemented as appropriate)  Goal: Optimized Coping Skills in Response to Life Stressors  Outcome: Ongoing (interventions implemented as appropriate)  Goal: Develops/Participates in Therapeutic [place] to Support Successful Transition  Outcome: Ongoing (interventions implemented as appropriate)     Problem: Cognitive Impairment (Psychotic Signs/Symptoms) (Adult)  Goal: Improved Thought Clarity/Organization (Psychotic Signs/Symptoms)  Outcome: Ongoing (interventions implemented as appropriate)     Problem: Psychomotor Movement Impairment (Psychotic Signs/Symptoms) (Adult)  Goal: Improved Psychomotor Symptoms (Psychotic Signs/Symptoms)  Outcome: Ongoing (interventions implemented as appropriate)     Problem: Mental State/Mood Impairment (Psychotic Signs/Symptoms) (Adult)  Goal: Improved Mental State/Mood (Psychotic Signs/Symptoms)  Outcome: Ongoing (interventions implemented as appropriate)     Problem: Skin Injury Risk (Adult)  Goal: Skin Health and Integrity  Outcome: Ongoing (interventions implemented as appropriate)     Problem: Fall Risk (Adult)  Goal: Absence of Fall  Outcome: Ongoing (interventions implemented as appropriate)

## 2020-05-05 NOTE — PROGRESS NOTES
[facility]  INTERNAL MEDICINE  TOÑITO LOJA MD  [address] [place] [address]  Phone [phone] Fax [phone]  E-mail:  [email or web address]      INTERNAL MEDICINE DAILY PROGRESS NOTE  Toñito Loja M.D.  2020              Patient Identification:  Name: Francheska Sherman  Age: 65 y.o.  Sex: female  :  1955  MRN: [id number]         Primary Care Physician: Toñito Loja MD  LENGTH OF STAY 3 DAYS    Consults     Date and Time Order Name Status Description    2020 0830 Inpatient Nephrology Consult      2020 1854 Inpatient Hospitalist Consult Completed     2020 1143 Inpatient Infectious Diseases Consult Completed     2020 0834 Inpatient Neurology Consult General Completed     2020 1205 Inpatient Psychiatrist Consult Completed     2020 2244 Inpatient Gastroenterology Consult Completed     2020 224 Inpatient Urology Consult Completed     2020 224 Inpatient Nephrology Consult Completed     2020 1913 Family Medicine Consult Completed               Chief Complaint:  Affective Psychosis, Bipolar Disorder with acute behavioral change     History of Present Illness:     Subjective         Interval History: Patient is a 65 y.o.female who presented with altered mental status and generalized weakness with dehydration to the [facility] emergency room by private car on 2020.  Most of the history is provided by her  who brought her to the hospital since patient was quite confused and unable to really provide much concrete detail and during her initial evaluation.  Patient is regularly followed by Dr. Hernesto Ernst in psychiatry at [facility] and has in the past been admitted to [facility] for episodes very similar to this one.  Patient does suffer from bipolar disorder and takes Seroquel on a regular basis for control and stabilization of this issue.  A few weeks ago,  patient was feeling good and decided to discontinue her Seroquel.  She also discontinued several of her other medications including her blood pressure medicine and her vitamin D.  Patient's  noticed that over the last 2 weeks she has begun to have a decline in her mental l condition and specifically he noticed that she was having increased confusion, insomnia, and extreme paranoia at times.  The  contacted Dr. Ernst and was instructed to start the patient back on Seroquel at 50 mg daily with titration up to 100 mg after 1 week on the 50 mg dose.  She has now had the 100 mg dose for the last 4 days but has not really responded to the medication at this point.  Patient has been talking nonstop and sleeping less than 4 hours per night.  This is very similar to episode she has had in the past that have been described as hypomanic in nature.  Patient did threaten to leave the home but has been has been able to manage this by using the alarm system at home to no when she goes out the door and has had her under careful observation for the last 10 days.  In the last 48 hours, patient has been drinking little to no liquid and on 2 occasions the  has found her collapsed and on the floor.  He had no evidence of broken bones or excessive trauma and each time he has been able to eventually get her back up on a chair or the sofa.  When he discussed bringing her to the hospital, patient has had an extreme fear of contacting another individual with COVID-19 and has refused to come in.  Patient has had no exposure to other individuals in over a month and has no symptoms of COVID-19 such as fever, cough, or shortness of breath.   was finally able to convince the patient to come to the emergency room yesterday evening when her weakness became so extreme that she could not get up to move about the house.  She denied nausea, vomiting, or diarrhea.  Family helped the  get her into his truck to bring  "her to the emergency room and ER staff helped get her out of the truck and bring her into the facility for evaluation.     Mrs. Sherman is a delightful 65-year-old female who I had the pleasure of following in my office since she transferred there just prior to prison of her regular physician, my former partner, Lemuel Turcios MD.   the medical problems for which I follow her include: Hypertension, hyperlipidemia, overactive bladder, and vitamin D deficiency.  Her compliance with medications and treating these issues has been somewhat marginal.  Her last set of labs in January 2020 did show normal liver function, normal electrolytes, mild hyperlipidemia with a cholesterol of 226, normal thyroid function, and a normal STD work-up.  The STD work-up was done at request of the patient because she was concerned that \" her  might be cheating on her\".  In general, patient seen today quite stable mentally at the time of her last visit with me on March 6 when she came in with complaints of urinary frequency and otherwise had a normal medical checkup.  Review of records shows that she did see Dr. Fernandez Hooks MD and recently underwent endoscopy and colonoscopy on June 11, 2019.  Polyps were removed by cold biopsy and plans for 3-year follow-up were instituted. Dr. Hooks did feel that the patient met criteria for the diagnosis of irritable bowel syndrome with alternating diarrhea and constipation.  Patient also sees Dr. Greg Stevens MD in urology for overactive bladder and has been controlled fairly well on Enablex.  Patient did see Dr. Zee in cardiology because of an abnormal EKG in 2019 and did have an exercise stress test with myocardial perfusion SPECT on 5/22/19.  She had requested medical treatment for the findings of that test which showed ejection fraction 60%, normal myocardial perfusion with no evidence of ischemia, and test consistent with a low risk study.     Patient was seen in the [facility] emergency room by Dr. Naldo Hi on 4/2520 at 1914.  At the time, patient presented with increasing confusion.   reported that this confusion was related to unstable bipolar disorder and discontinuation of her medication, Seroquel, that she takes for treatment of the disorder.  The ER physician was able to confirm that she had started back on this medication at the request of her psychiatric physician and has been titrated from 50 mg/day up to 100 mg/day recently.  Despite this attempted outpatient titration of the medication, patient had been refusing to eat or drink anything and had developed significant weakness.  On the day of admission she was unable to get out of bed on her own that she denied any chest pain or abdominal pain at the time.  In addition, patient had no evidence of nausea vomiting or diarrhea. Review of systems could not be performed due to the patient's mental status change.  Allergies were reported to codeine, morphine, and sulfa.  Physical exam in the emergency room showed that temperature was 96.5, pulse 85, respiratory rate 17, and blood pressure 145/92 with a sat of 95%.  Exam by the ER physician showed mucous membranes were quite dry.  Patient was awake and answering some questions appropriately such as name and age but was unable to really relate any history of the events which led to her visit in the ER.  Her exam was otherwise totally normal.  Lab results did show several abnormalities: Glucose elevated 123, BUN elevated 78, creatinine elevated at 1.60, and sodium elevated at 151.  Her liver function tests were also elevated with ALT of 236, and AST of 415.  She apparently had normal liver function test at a visit with her hepatitis C physician Dr. Woodruff just 1 month ago.  Patient had no evidence of alcohol in her blood.  Her white blood cell count was 14.62, her hemoglobin was 16.9, hematocrit was 50.8, and her platelet count was normal at 310,000.  Her magnesium was  slightly high at 2.9.  Thyroid function tests were normal.  Urine drug screen was totally negative but UA did show 3+ blood 1+ protein 1+ leukocyte esterase.  CT of head was unremarkable and chest x-ray was unremarkable.  EKG done soon after admission did show a normal sinus rhythm PAC, nonspecific ST changes and slightly prolonged QT interval.     4/26/20.  I personally saw the patient at the time of my rounds on 4/26/2020.  Nurse was present in the room at the time of my visit.  The patient had not spoken with others during the day, she did respond to my voice and actually open her eyes.  She did have a brief conversation with me before falling back off to sleep.  Renal has been adjusting her fluids to replace her volume deficit.  They will continue to follow this with us.  Urology did order a CT scan of abdomen and pelvis with and without contrast.  After consultation with renal, we elected to do the CT with out contrast only.  Results are pending at the time of my visit.  Patient was also seen in consultation by gastroenterology.  They feel that her elevated liver function tests are probably a result of the volume depletion status.  Hopefully these will improve as her volume is corrected.  Otherwise there are no major changes in the patient's condition from their perspective.  They also plan to continue following patient.  Psychiatry did see patient briefly but she was not cooperative with their exam.  Dr. Cruz will see patient tomorrow for further evaluation medication recommendations.  We suspect that the patient will need to be treated in the inpatient psychiatric unit at least short-term in order to stabilize the medications if her patterns follow previous pattern with respect to this issue.  Patient otherwise is hemodynamically stable at the present time.  We did do a blood gas since she is quite lethargic but that was within normal limits.     4/27/2020.  Patient resting quietly in bed at the time  of my rounds this evening.  I did discuss the case at length with the patient's nurse who went to the room with me.  Patient is a little more alert this evening and actually good agreed to take a few bites of ice cream.  In fact, she was feeling well enough to request strawberry ice cream instead of the vanilla ice cream that was available on the floor.  Patient remains quite weak.  She has not been out of bed at this time.  The nurse and I agreed that it would probably be appropriate to have PT or OT attempt to get patient up to bedside chair tomorrow if possible.  Patient has been declining her medications including the Seroquel XR that she is supposed to take each evening for her bipolar state.  Patient does seem oriented to person and place but somewhat lost in time.  GI feels she is stable from their point of view.  Elevated liver function tests probably are secondary to myoglobinuria which occurred after patient has been laying around and in bed most of the time.  Renal continues to adjust fluid for the patient and was agreeable to starting her blood pressure medications again.  Blood pressure did run high throughout the day yesterday and required treatment with Vasotec IV.  Urology had little further to add with the patient today but will plan outpatient cystoscopy after discharge.  Psychiatry did see patient in consultation and they feel she would be appropriate for their Geropsych unit to continue medication adjustment and stabilization of patient's psychiatric situation.  They are happy to take her in transfer once she is medically stable.  Urine culture so far shows no growth.     4/28/2020.  Patient still resting quietly in her bed at the time of my rounds in her room on 4 [address].  Patient does wake up as I entered the room and is semi-responsive but her answers to questions are relatively unintelligible and she tends to just mumble words.  Patient has eaten and drunk very little today despite efforts from  nursing staff to encourage her to do so.  Apparently she would not even take orange sherbet which is 1 of her favorite foods when offered to her by the staff.  I did speak briefly to the patient's nurse.  She reported no real new changes in the patient's condition.  Review of notes from occupational therapy showed that the patient did sit up on the side of the bed but did not ambulate effectively at this point.  Patient has not been taking most of her medication at this point.  Psychiatry is still following the patient with plans to admit the patient to the psychiatric unit when she is medically stable.  I discussed the case at length with the patient's .  He feels that this is very similar to a breakdown that she had several years ago which required hospitalization in the psychiatric unit at our [facility].  Nonetheless, after our discussion, I am recommending a neurology consultation and we will obtain an MRI to be sure that there is been no cerebrovascular accident causing these changes.  I also would like to get speech therapy to see the patient in consultation to be sure that her swallow is effective.  We may have to consider short-term feeding tube to stabilize the patient and provide a route for giving her medicine.     4/29/2020.  Patient is essentially unchanged on my rounds today.  She does say a few words but makes no coherent statements.  She still has been refusing her meds and only taking small bites occasionally of food with great urging from the nursing staff.  We did have neurology see the patient in consultation today and I spoke at length with  regarding his findings.  She has no real evidence of stroke.  She does have a small tremor.  MRI was done and did not show anything acute though it does show some hardening of the arteries and a tiny old infarct that radiology feels is insignificant with respect to the current findings.  I also have infectious disease see the patient to  be sure they feel the question of sepsis is completely ruled out.  I did agree with discontinuing all antibiotics at this point and do not feel that there is any signs of acute infection.  Renal has essentially signed off the case also.  Currently she is receiving IV fluids at 50 cc/h.  Speech is to see the patient first thing in the morning and determine swallowing safety.  I did discuss the situation with [facility] and they do feel that if patient needs a short-term stabilization with a Dobbhoff tube for medications and for fluids this could be continued on the psych unit.  This would be done as a short-term tool to continue medically stabilizing her while giving psychiatry an opportunity to adjust her bipolar medications.  Patient is urinating well.  Her labs now show normal BUN, creatinine, and potassium levels.  White blood cell count is now normal at 8.9 and there is no sign of anemia.  Her magnesium has all so returned to normal levels.  At this point, the patient does appear to be medically stable and it  ahould be possible to transfer her to psychiatry tomorrow if we can resolve the issues surrounding her nutritional and medication intake.     4/30/2020.  Patient more awake and alert today than previously at the time of my rounds.  She did take a small amount of her breakfast including some orange sherbet and a few bites of eggs.  Nurse was able to get the patient to take her pills both last night and this morning.  Blood pressure is running slightly up and I did restart her beta-blocker and diuretic which have helped with this issue in the past.  We will monitor her hydration status with labs.  I am going to discontinue the IV fluids and hope to cannulate more thirst to promote increased oral intake.  Nursing continues to encourage patient to eat small bites.  Patient did talk in brief sentences to her  on the phone today.  I did review the case with him and we both felt patient was appropriate  for transfer to psychiatry.  Neurology work-up is complete and relatively negative.  ID work-up is also complete.  Renal has signed off on the case.  At this point it is felt that behavioral modification is needed and adjustment of her psychiatric meds in order to return her to her baseline.  Hopefully psychiatry will be able to manage this in their unit.  I did speak with the access center and they do have a bed available for the patient tomorrow after her discharge is completed in the morning.  Transfer orders are complete and will be signed off in a.m.  If bed is available.  I also discussed the case with the patient's nurse and with PCP.     5/1/2020.  Patient had a quiet evening last night and seems stable still this a.m.  Psychiatry planning discharge from their unit today and at that point a bed will be available for patient in their unit which seems appropriate.  Patient does not need feeding tube at this time since she has started taking small bites of food and small sips of fluids.  We do have a saline lock still in place so patient could be given IV bolus if needed for dehydration.  We will continue to follow patient in the psychiatric unit and monitor her electrolytes while there.  Patient does have a short conversation with me today.  She is watching CNN and states that she does not like the fake needs.  Patient did work with OT and sat on the edge of bed with better control of body yesterday.  Speech therapy evaluated patient today and does feel that she has an adequate swallow and her trouble eating is behavioral in nature.  At this point patient has maximized benefit from hospitalization and is medically stable.  She will be ready for discharge to psychiatry when bed is available.     ________________________________________________________________________________________________     TRANSFER TO PSYCHIATRY Attending Dr. Cruz  Internal Medicine Consult   Purvi  ________________________________________________________________________________________________     5/2/2020.  I was consulted by Dr. Cruz for an internal medicine consult to evaluate this patient.  Patient was seen with nursing assistant present in her room on psychiatry which was room 3330.  Patient was resting quietly in bed but did seem to recognize me as I entered the room and smiled.  She mumbled a few words but made no real coherent conversation while I was in the room.  I spoke with nursing aide who was at bedside.  Patient is currently on with a sitter.  Apparently she did manage to get up well with physical therapy today and sat in the chair at bedside for almost an hour.  She had very little breakfast but lunch did eat all the fruit on her tray and tried to eat some of the meat but unfortunately could not chew the roast beef that was brought to her on the tray today.  I am going to try changing the consistency of the meat and see if she is able to better handle that chopped up.  There is a speech therapy consult in place for the patient on Monday.  Patient seems to be feeling well.  She has no specific complaints of pain or discomfort.  Blood pressure is now under better control.  I did speak with the  and reviewed the case with him.  He is pleased with her progress.  We did obtain an abdominal x-ray series at the request of the  to be sure constipation is not a problem.  She has a bit of excessive gas in her abdomen but there is no significant stool burden or other signs of bowel blockage.  Seroquel has been increased to her regular dialysis which is 150 mg daily.  I also decrease the dose slightly of her losartan to 50 mg which is her usual dose.  We have added Ziac and I will continue to monitor blood pressures while she is on this medication.  She has taken it in the past with excellent control of her blood pressure on that medication.  Overall patient seems to be  medically stable.  I will continue to follow her with you and see her again on Monday.  We will obtain labs on Monday morning.    5/4/2020. Patient much more interactive than when previously seen 2 days ago.  She completes simple sentences but at times makes no sense.  She has been drinking and eating a bit better today though her labs from earlier this a.m. did show some mild dehydration again.  I have reconsulted the renal team that was following her on an inpatient basis for their input and suggestions on this issue.  Nursing staff has been encouraged to push oral fluids overnight tonight and will try their best to get the patient to take more oral intake.  Patient did drink 2 large glasses of water just prior to my arrival on the unit.  She has been taking her medicines more regularly.  Patient set up in a chair by bedside for approximately 3 hours today.  She does seem to be getting stronger and hopefully is beginning to react favorably to reinstitution of her medication therapy.  We did modify her oxygen orders to say that she only needs the oxygen if sats are below 88% and she did come out to the TV room today and interacted briefly with other residents there.  We continue to hope that the patient will gradually improve.  Her  hopes that she will be able to return home once things totally stabilize.          Review of Systems:               Review of systems could not be obtained due to  patient confusion.         Past Medical History:   Diagnosis Date   • ADD (attention deficit disorder)    • Anxiety    • ARDS survivor 1994   • Chest pain    • Encounter for annual health examination     Annual Health Assessment: 07/30/14, 10/25/13   • GERD (gastroesophageal reflux disease)    • History of mammogram 04/14/2011   • Hyperactivity of bladder    • Hypertension    • Pain of right side of body     c/o pain in right side and back   • Psychiatric illness    • Sepsis (CMS/HCC)      Past Surgical History:    Procedure Laterality Date   • APPENDECTOMY     • BLADDER REPAIR     •  SECTION     • CHEST TUBE INSERTION     • CHOLECYSTECTOMY     • COLONOSCOPY N/A 2019    Procedure: COLONOSCOPY into cecum and TI with cold biopsy polypectomies;  Surgeon: Fernandez Hooks MD;  Location: [facility] ENDOSCOPY;  Service: Gastroenterology   • ENDOSCOPY N/A 2019    Procedure: ESOPHAGOGASTRODUODENOSCOPY with biopsies;  Surgeon: Fernandez Hooks MD;  Location: [facility] ENDOSCOPY;  Service: Gastroenterology   • HYSTERECTOMY       Allergies   Allergen Reactions   • Codeine    • Morphine Delirium   • Sulfa Antibiotics      Family History   Problem Relation Age of Onset   • Liver disease Mother    • Crohn's disease Brother      Social History     Socioeconomic History   • Marital status:      Spouse name: Tate Sherman   • Number of children: Not on file   • Years of education: Not on file   • Highest education level: Not on file   Occupational History   • Occupation: store owner     Employer: SELF-EMPLOYED   • Occupation: real-estate    Tobacco Use   • Smoking status: Former Smoker     Types: Cigarettes     Last attempt to quit: 1994     Years since quittin.3   • Smokeless tobacco: Never Used   Substance and Sexual Activity   • Alcohol use: No   • Drug use: No   • Sexual activity: Defer     Birth control/protection: Surgical   Social History Narrative    Lives with     Co-owner of garden store    Real-estate        PMH, FH, SH and ROS completed with Admission History and Physical and updated in EPIC system.        Objective     Scheduled Meds:  bisoprolol 2.5 mg Oral Q24H   clobetasol  Topical Q12H   docusate sodium 100 mg Oral BID   enoxaparin 40 mg Subcutaneous Q24H   famotidine 40 mg Oral Daily   hydroCHLOROthiazide Oral 6.25 mg Oral Daily   ketotifen 1 drop Both Eyes BID   losartan 50 mg Oral Q24H   QUEtiapine fumarate  mg Oral Nightly   vitamin D 50,000 Units Oral Q7 Days  "    Continuous Infusions:     Vital signs in last 24 hours:  Temp:  [97.9 °F (36.6 °C)-98.3 °F (36.8 °C)] 97.9 °F (36.6 °C)  Heart Rate:  [79-82] 82  Resp:  [16-18] 18  BP: ()/(51-54) 127/54    Intake/Output:    Intake/Output Summary (Last 24 hours) at 5/5/2020 0204  Last data filed at 5/4/2020 1715  Gross per 24 hour   Intake 120 ml   Output --   Net 120 ml       Exam:  /54 (BP Location: Left arm, Patient Position: Sitting)   Pulse 82   Temp 97.9 °F (36.6 °C) (Oral)   Resp 18   Ht 170.2 cm (67\")   Wt 107 kg (235 lb 1.6 oz)   SpO2 95%   BMI 36.82 kg/m²     Constitutional:  Alert, semi-cooperative, no distress, AAOx1, resting comfortably in chair at bedside, drinking fluids this evening   Head:      Normocephalic, without obvious abnormality, atraumatic   Eyes:     PERRLA, conjunctiva/corneas clear, no icterus, no conjunctival                                     pallor, EOM's intact, both eyes      ENT and Mouth: Lips, tongue, gums normal; oral mucosa pink and moist   Neck:     Supple, symmetrical, trachea midline, no JVD  Respiratory:     Clear to auscultation bilaterally, respirations unlabored  Cardiovascular:  Regular rate and rhythm, S1 and S2 normal, no murmur,      no  Rub or gallop.  Pulses normal.    Gastrointestinal:   BS present x 4 Soft, non-tender, bowel sounds active,      no masses, no hepatosplenomegaly                                                     :       No hernia.  Normal exam for sex.         Musculoskeletal: Extremities normal, atraumatic, no cyanosis or edema     No arthropathy.  No deformity.  Gait normal                                                 Skin:   Skin is warm and dry,  no rashes, swelling or palpable lesions   Neurologic:  CN -XII intact, motor strength grossly intact, sensation grossly intact to light touch, no focal reflex deficits noted    Psychiatric:     Alert,oriented X3, no delusions, psychoses, depression or anxiety    Heme/Lymph/Imun:   No " bruises, petechiae.  Lymph nodes normal in size/configuration       Data Review:  Lab Results   Component Value Date    CALCIUM 9.5 05/04/2020    PHOS 2.7 04/29/2020     Results from last 7 days   Lab Units 05/04/20  0623 05/01/20  1905 05/01/20  0323 04/30/20  0244  04/28/20  0340   AST (SGOT) U/L 37* 50*  --  50*  --  91*   MAGNESIUM mg/dL  --   --   --  1.9  --  1.8   SODIUM mmol/L 139 140  --  137   < > 140   POTASSIUM mmol/L 4.5 3.9  --  4.0   < > 3.1*   CHLORIDE mmol/L 102 101  --  100   < > 101   CO2 mmol/L 18.9* 22.7  --  23.2   < > 26.9   BUN mg/dL 66* 23  --  15   < > 32*   CREATININE mg/dL 1.60* 0.87  --  0.65   < > 0.87   GLUCOSE mg/dL 115* 118*  --  108*   < > 145*   CALCIUM mg/dL 9.5 9.8  --  8.6   < > 9.1   WBC 10*3/mm3 12.76* 12.47* 10.43 8.89   < > 9.45   HEMOGLOBIN g/dL 15.5 16.0* 15.9 14.2   < > 14.2   PLATELETS 10*3/mm3 222 248 203 152   < > 190   ALT (SGPT) U/L 52* 77*  --  78*  --  115*    < > = values in this interval not displayed.     Lab Results   Component Value Date    CKTOTAL 109 04/30/2020    TROPONINT <0.010 04/26/2020     Estimated Creatinine Clearance: 45.3 mL/min (A) (by C-G formula based on SCr of 1.6 mg/dL (H)).  WEIGHTS:     Wt Readings from Last 1 Encounters:   05/01/20 1909 107 kg (235 lb 1.6 oz)         Assessment:    Affective psychosis, bipolar (CMS/HCC)    Altered mental status    Acute kidney injury (CMS/HCC)    Volume depletion     Generalized weakness    Elevated liver function tests    Bipolar disorder, current episode mixed, moderate (CMS/HCC)    Overactive bladder    Essential hypertension    Abnormal EKG    SOB (shortness of breath)    GERD (gastroesophageal reflux disease)    Vitamin D deficiency    Hyperlipidemia    History of hepatitis C followed by Dr. Ranjan Qiu [id number]    Irritable bowel syndrome with both constipation and diarrhea    Hepatic steatosis    Hematuria    ARDS survivor 1994 Ventilator    ADD (attention deficit disorder)    Obesity (BMI  30-39.9)      Attending Physician Assessment and Plan:    1.  Altered mental status in patient with history of bipolar disorder who has recently been noncompliant with her medication, Seroquel.  Suspect etiology for this problem is medication imbalance despite recent reinitiation of her Seroquel dosing as directed by Dr. Ernst, her regular psychiatrist.  Based on previous history, I suspect patient will need transfer to psychiatry for medication adjustment and stabilization once her medical disorders as discussed below are better controlled.  Dr. Cruz is agreeable to this plan of treatment.  Patient still quite confused.  Neurology consult planned. No neurologic abnormalities found and MRI unremarkable.  ID also saw patient in consultation and found no ongoing evidence of infection.  Patient medically stable and ready for discharge to psychiatric unit when bed available.  In psychiatry unit patient slowly improving.  We have gone back up to her regular dose of Seroquel with psychiatry to plan further medication changes at this point.  Continues to take her medications regularly  2.  Severe volume depletion with elevated renal function test.  Etiology is probable poor p.o. intake while psychiatric medications were out of balance.  Patient has critical elevations in both BUN, and creatinine, fluid resuscitation was initiated in the emergency room.  We are consulting renal for their input on the situation and for their help in adjusting her fluids at this point.  Suspect this correction will take 1 to 2 days.  Will monitor electrolytes carefully during this time.  Continued improvement in electrolytes and in particular with renal function tests.  Continue to monitor labs regularly.  Now fully rehydrated and renal has signed off.  Encouraging p.o. intake as much as possible.  I did discuss this with her nurse in the psychiatric unit.  Nursing staff is pushing oral fluids.  Renal has been reconsulted for their  input on volume status.  3.  Elevated CPK possibly secondary to acute kidney injury versus rhabdomyolysis.  I will send urine for myoglobin studies.  We will continue to monitor CPK.  Renal is following this issue with us at the present time.  Hopefully will resolve with increased hydration and volume stabilization.  Patient does appear to have some mild rhabdomyolysis.  CPK is clearing.  Elevated CPK resolving. rhabdomyolysis has resolved   4.  Generalized weakness felt to be secondary to electrolyte instability.  Will correct electrolytes.  Have added potassium protocol to her medications.  Will monitor elevated magnesium which is probably result of hemoconcentration.  Suspect elevated hemoglobin is also caused by the same etiology.  Will ask PT and OT to begin evaluation of patient.  Able to sit up on side of bed.  PT continuing to work with patient.  On first day and psychiatry much more successful getting patient up to chair.  Patient continues to get stronger.  Walked herself to the restroom earlier this evening.  Set up in chair for 3 hours today.  5.  Positive sepsis screen with elevated white blood cell count. I suspect the most likely etiology is urine which shows significant hematuria and elevated leukocytosis.  Urology has been consulted for their input since they follow the patient regularly for bladder dysfunction.  I have started patient on IV Rocephin pending results of urine culture.  We will continue to monitor carefully but white count has improved in the first few hours.  I seriously doubt that patient has true sepsis at the present time.   does indicate that her mental status changes have been provoked by urinary tract infections in the past.  Culture of urine negative so far.  Will consider discontinuation of antibiotics if this persists.  Urine culture negative.  CT scan of chest negative.  We will proceed to discontinue antibiotics at this point and monitor patient.  ID finds no  evidence of ongoing sepsis.   6.  Elevated liver function tests in patient with history of hepatitis.C treated in the past with Harvoni.  Etiology of this finding unclear at present time.  Abdominal ultrasound unremarkable.  GI consult requested.  Suspect may relate to the acute kidney injury more than an intrinsic problem with liver.  Did send hepatitis C viral load to be sure this has not resurfaced.  Will follow liver function test over the next couple days to be sure they do resolve.  Felt to probably be secondary to rhabdomyolysis.  Will monitor.  Will recheck labs on Monday  7.  Overactive bladder.  Patient's regular medication is nonformulary.  Have added substitution for now but may need to consider having  bring in medication from home if she does not respond favorably to the generic substitution.  Has discontinued medication at 's request.  Generally she does not need meds for the overactive bladder.  8.  Essential hypertension with recent discontinuation of medications.  Blood pressure is elevated slightly and I have resumed her medications.  We will follow blood pressure and make a decision on this prior to her discharge.  Losartan is adjusted to 50 mg a day.  I will continue to monitor blood pressure with the  9.  Abnormal EKG with no reported shortness of breath at present time.  Suspect these are chronic changes.  We have completed a stress test in 2019 that was low risk for ischemic issues.  We will continue to monitor but suspect CPK is more elevated due to the renal/muscle issues and to any cardiac issue.  Will check troponin just to be sure it is not significantly elevated.  10.  Vitamin D deficiency.  Starting patient back on 50,000 units of vitamin D weekly for now.  We will continue to monitor and treat as needed.  11.  Hyperlipidemia.  Will check lipid profile while here in hospital and adjust medication and diet as needed to treat this issue.  12.  Irritable bowel syndrome with  alternating diarrhea and constipation.  This problem sounds like it stable at present time.  Will add no new treatment currently.     Plan for disposition:Where: to Home and When:  Psychiatry feels she is stable     Dr. Loja will continue to follow patient with you and can be reached at [phone].        Toñito Loja MD  5/4/2020 1945

## 2020-05-05 NOTE — PROGRESS NOTES
The patient remains extremely flat with significant lack of response.  She her p.o. intake remains very poor.  I will add Ativan 0.5 mg 3 times daily in hopes of addressing her ongoing near catatonia.

## 2020-05-05 NOTE — PLAN OF CARE
Problem: Patient Care Overview  Goal: Plan of Care Review  Outcome: Ongoing (interventions implemented as appropriate)  Flowsheets (Taken 5/5/2020 0405)  Progress: no change  Plan of Care Reviewed With: patient  Patient Agreement with Plan of Care: unable to participate  Note:   Pt continues to minimally participate in assessment. Pt did speak, and response lag appeared to be less than previously noted. However, her speech was nonsensical and rambling. Pt took medication crushed in sherbet with much encouragement, after refusing to swallow the pills whole. Pt drank a small amount of ginger ale, also with much encouragement. Pt sat in the chair for a few hours, and then was helped to bed with x2 assist. Incontinent, gait unsteady. Appears to be sleeping well with no disturbance. Will continue to monitor.   Goal: Individualization and Mutuality  Outcome: Ongoing (interventions implemented as appropriate)  Goal: Discharge Needs Assessment  Outcome: Ongoing (interventions implemented as appropriate)  Goal: Interprofessional Rounds/Family Conf  Outcome: Ongoing (interventions implemented as appropriate)     Problem: Overarching Goals (Adult)  Goal: Adheres to Safety Considerations for Self and Others  Outcome: Ongoing (interventions implemented as appropriate)  Flowsheets (Taken 5/4/2020 0441 by Paige Grimm, RN)  Adheres to Safety Considerations for Self and Others: making progress toward outcome  Goal: Optimized Coping Skills in Response to Life Stressors  Outcome: Ongoing (interventions implemented as appropriate)  Flowsheets (Taken 5/4/2020 0441 by Paige Grimm, RN)  Optimized Coping Skills in Response to Life Stressors: making progress toward outcome  Goal: Develops/Participates in Therapeutic [place] to Support Successful Transition  Outcome: Ongoing (interventions implemented as appropriate)  Flowsheets (Taken 5/4/2020 0441 by Paige Grimm, RN)  Develops/Participates in  Therapeutic [place] to Support Successful Transition: making progress toward outcome     Problem: Cognitive Impairment (Psychotic Signs/Symptoms) (Adult)  Goal: Improved Thought Clarity/Organization (Psychotic Signs/Symptoms)  Outcome: Ongoing (interventions implemented as appropriate)  Flowsheets  Taken 5/5/2020 0405 by Terese Wong RN  Improved Thought Clarity/Organization Time Frame for Action Step (STG): 1 day  Taken 5/3/2020 1812 by Aubrey Stone RN  Improved Thought Clarity/Organization Action Step (STG) Outcome: making progress toward outcome     Problem: Psychomotor Movement Impairment (Psychotic Signs/Symptoms) (Adult)  Goal: Improved Psychomotor Symptoms (Psychotic Signs/Symptoms)  Outcome: Ongoing (interventions implemented as appropriate)  Flowsheets  Taken 5/5/2020 0405 by Terese Wong RN  Improved Psychomotor Symptoms Time Frame for Action Step (STG): 1 day  Taken 5/3/2020 1812 by Aubrey Stone RN  Improved Psychomotor Symptoms Action Step (STG) Outcome: making progress toward outcome     Problem: Mental State/Mood Impairment (Psychotic Signs/Symptoms) (Adult)  Goal: Improved Mental State/Mood (Psychotic Signs/Symptoms)  Outcome: Ongoing (interventions implemented as appropriate)  Flowsheets  Taken 5/5/2020 0405 by Terese Wong RN  Improved Mental State/Mood Time Frame for Action Step (STG): 1 day  Taken 5/3/2020 1812 by Aubrey Stone RN  Improved Mental State/Mood Action Step (STG) Outcome: making progress toward outcome     Problem: Skin Injury Risk (Adult)  Goal: Skin Health and Integrity  Outcome: Ongoing (interventions implemented as appropriate)  Flowsheets (Taken 5/5/2020 0405)  Skin Health and Integrity: making progress toward outcome     Problem: Fall Risk (Adult)  Goal: Absence of Fall  Outcome: Ongoing (interventions implemented as appropriate)  Flowsheets (Taken 5/5/2020 0405)  Absence of Fall: making progress toward outcome

## 2020-05-06 PROCEDURE — 97110 THERAPEUTIC EXERCISES: CPT

## 2020-05-06 PROCEDURE — 25010000002 ENOXAPARIN PER 10 MG: Performed by: SPECIALIST

## 2020-05-06 PROCEDURE — 97112 NEUROMUSCULAR REEDUCATION: CPT

## 2020-05-06 RX ADMIN — CLOBETASOL PROPIONATE: 0.5 CREAM TOPICAL at 20:53

## 2020-05-06 RX ADMIN — LOSARTAN POTASSIUM 50 MG: 50 TABLET, FILM COATED ORAL at 08:52

## 2020-05-06 RX ADMIN — DOCUSATE SODIUM 100 MG: 100 CAPSULE, LIQUID FILLED ORAL at 08:50

## 2020-05-06 RX ADMIN — DOCUSATE SODIUM 100 MG: 100 CAPSULE, LIQUID FILLED ORAL at 20:53

## 2020-05-06 RX ADMIN — ENOXAPARIN SODIUM 40 MG: 40 INJECTION SUBCUTANEOUS at 09:04

## 2020-05-06 RX ADMIN — BISOPROLOL FUMARATE 2.5 MG: 5 TABLET ORAL at 08:49

## 2020-05-06 RX ADMIN — LORAZEPAM 0.5 MG: 0.5 TABLET ORAL at 20:53

## 2020-05-06 RX ADMIN — LORAZEPAM 0.5 MG: 0.5 TABLET ORAL at 08:49

## 2020-05-06 RX ADMIN — KETOTIFEN FUMARATE 1 DROP: 0.35 SOLUTION/ DROPS OPHTHALMIC at 20:53

## 2020-05-06 RX ADMIN — LORAZEPAM 0.5 MG: 0.5 TABLET ORAL at 17:21

## 2020-05-06 RX ADMIN — QUETIAPINE FUMARATE 200 MG: 50 TABLET, EXTENDED RELEASE ORAL at 20:53

## 2020-05-06 RX ADMIN — HYDROCHLOROTHIAZIDE 6.25 MG: 25 TABLET ORAL at 08:51

## 2020-05-06 RX ADMIN — FAMOTIDINE 40 MG: 40 TABLET, FILM COATED ORAL at 08:50

## 2020-05-06 RX ADMIN — KETOTIFEN FUMARATE 1 DROP: 0.35 SOLUTION/ DROPS OPHTHALMIC at 09:02

## 2020-05-06 NOTE — PROGRESS NOTES
The patient has not had much response, to this point, the addition of lorazepam.  She remains extremely flat, psychomotorically retarded and continues to exhibit muteness and near catatonia though her p.o. intake has improved slightly.

## 2020-05-06 NOTE — PLAN OF CARE
Problem: Patient Care Overview  Goal: Plan of Care Review  Flowsheets (Taken 5/6/2020 1529)  Progress: no change  Plan of Care Reviewed With: patient  Patient Agreement with Plan of Care: other (see comments) (pt not participating much today)  Outcome Summary: Pt required encouragement from PT and RN to get her to get up out of bed into chair. She needed much coaxing. Pt would not walk during PT today with transfer to chair only activity.

## 2020-05-06 NOTE — PLAN OF CARE
Problem: Patient Care Overview  Goal: Plan of Care Review  Outcome: Ongoing (interventions implemented as appropriate)  Flowsheets (Taken 5/6/2020 1540)  Progress: no change  Plan of Care Reviewed With: patient  Patient Agreement with Plan of Care: unable to participate  Outcome Summary: Pt uncooperative w/ assessment, not speaking with this RN. Pt periodicaly makes non-sensical comments. Pt compliant w/ meds crushed in ice cream. Poor PO intake reported this shift. Pt hypoactive throughout day. Pt out in milieu most of shiftand continues to work w/ PT and OT. 1:1 sitter removed. No falls or further issues reported at this time. Will continue to monitor and provide safe environment.     Problem: Patient Care Overview  Goal: Individualization and Mutuality  Outcome: Ongoing (interventions implemented as appropriate)     Problem: Patient Care Overview  Goal: Discharge Needs Assessment  Outcome: Ongoing (interventions implemented as appropriate)     Problem: Patient Care Overview  Goal: Interprofessional Rounds/Family Conf  Outcome: Ongoing (interventions implemented as appropriate)     Problem: Overarching Goals (Adult)  Goal: Adheres to Safety Considerations for Self and Others  Outcome: Ongoing (interventions implemented as appropriate)  Flowsheets (Taken 5/6/2020 1540)  Adheres to Safety Considerations for Self and Others: making progress toward outcome     Problem: Overarching Goals (Adult)  Goal: Adheres to Safety Considerations for Self and Others  Intervention: Develop and Maintain Individualized Safety Plan  Flowsheets  Taken 5/6/2020 1401  Safety Measures: safety rounds completed  Taken 5/6/2020 0849  Previous Attempt to Harm Others: no  Feels Like Hurting Others: other (see comments) (ARIELLA )     Problem: Overarching Goals (Adult)  Goal: Optimized Coping Skills in Response to Life Stressors  Outcome: Ongoing (interventions implemented as appropriate)  Flowsheets (Taken 5/6/2020 1540)  Optimized Coping Skills  in Response to Life Stressors: making progress toward outcome     Problem: Overarching Goals (Adult)  Goal: Optimized Coping Skills in Response to Life Stressors  Intervention: Promote Effective Coping Strategies  Flowsheets (Taken 5/6/2020 0849)  Supportive Measures: active listening utilized;verbalization of feelings encouraged;relaxation techniques promoted;positive reinforcement provided;goal setting facilitated     Problem: Overarching Goals (Adult)  Goal: Develops/Participates in Therapeutic [place] to Support Successful Transition  Outcome: Ongoing (interventions implemented as appropriate)  Flowsheets (Taken 5/6/2020 1540)  Develops/Participates in Therapeutic [place] to Support Successful Transition: making progress toward outcome     Problem: Overarching Goals (Adult)  Goal: Develops/Participates in Therapeutic [place] to Support Successful Transition  Intervention: Foster Therapeutic [place]  Flowsheets (Taken 5/6/2020 0849)  Trust Relationship/Rapport: care explained;choices provided;thoughts/feelings acknowledged;reassurance provided;questions encouraged     Problem: Overarching Goals (Adult)  Goal: Develops/Participates in Therapeutic [place] to Support Successful Transition  Intervention: Mutually Develop Transition Plan  Flowsheets (Taken 5/6/2020 0849)  Transition Support: crisis management plan promoted     Problem: Cognitive Impairment (Psychotic Signs/Symptoms) (Adult)  Goal: Improved Thought Clarity/Organization (Psychotic Signs/Symptoms)  Outcome: Ongoing (interventions implemented as appropriate)  Flowsheets  Taken 5/6/2020 1026 by Mallika Byrd RN  Improved Thought Clarity/Organization Action Step/Short Term Goal (STG) Established: 05/06/20  Taken 5/6/2020 1540 by Aubrey Stone, RN  Improved Thought Clarity/Organization Time Frame for Action Step (STG): 4 days  Improved Thought Clarity/Organization Action Step (STG) Outcome: making progress toward outcome     Problem:  Psychomotor Movement Impairment (Psychotic Signs/Symptoms) (Adult)  Goal: Improved Psychomotor Symptoms (Psychotic Signs/Symptoms)  Outcome: Ongoing (interventions implemented as appropriate)  Flowsheets  Taken 5/6/2020 1026 by Mallika Byrd RN  Improved Psychomotor Symptoms Action Step/Short Term Goal (STG) Established: 05/06/20  Taken 5/6/2020 1540 by Aubrey Stone RN  Improved Psychomotor Symptoms Time Frame for Action Step (STG): 4 days  Improved Psychomotor Symptoms Action Step (STG) Outcome: making progress toward outcome     Problem: Mental State/Mood Impairment (Psychotic Signs/Symptoms) (Adult)  Goal: Improved Mental State/Mood (Psychotic Signs/Symptoms)  Outcome: Ongoing (interventions implemented as appropriate)  Flowsheets  Taken 5/6/2020 1026 by Mallika Byrd RN  Improved Mental State/Mood Action Step/Short Term Goal (STG) Established: 05/06/20  Taken 5/6/2020 1540 by Aubrey Stone RN  Improved Mental State/Mood Time Frame for Action Step (STG): 4 days  Improved Mental State/Mood Action Step (STG) Outcome: making progress toward outcome     Problem: Skin Injury Risk (Adult)  Goal: Skin Health and Integrity  Outcome: Ongoing (interventions implemented as appropriate)  Flowsheets (Taken 5/6/2020 1540)  Skin Health and Integrity: making progress toward outcome     Problem: Fall Risk (Adult)  Goal: Absence of Fall  Outcome: Ongoing (interventions implemented as appropriate)  Flowsheets (Taken 5/6/2020 1540)  Absence of Fall: making progress toward outcome

## 2020-05-06 NOTE — PROGRESS NOTES
Continued Stay Note  [facility]     Patient Name: Francheska Sherman  MRN: [id number]  Today's Date: 5/6/2020    Admit Date: 5/1/2020    Discharge Plan     Row Name 05/06/20 1134       Plan    Plan Comments  SW called and spoke w/pt's spouse, Tate Sherman, ph. [phone] to discuss pt's care & d/c plans.  SW explained role of CCP as it relates to d/c planning.  Mr. Sherman stated that the plan is for pt to return home once she is stable. Pt's spouse discussed what led to pt's hospitalization and feels that she would need to be here for an extended time to get stable.  SW adv that she would keep him updated on pt's care and that he can contact the unit at any time regarding pt's care.        Discharge Codes    No documentation.             ELENI Rivera

## 2020-05-06 NOTE — PLAN OF CARE
Problem: Patient Care Overview  Goal: Plan of Care Review  Outcome: Ongoing (interventions implemented as appropriate)  Flowsheets (Taken 5/6/2020 0307)  Progress: no change  Plan of Care Reviewed With: patient  Patient Agreement with Plan of Care: refuses to participate  Note:   Pt hesitant with medications this evening. Eventually was compliant with crushed medications. Pt did drink a small amount of orange juice and took a few bites of sherbet. Pt walked to the bathroom with x2 assist and walker, and before getting to the toilet stated she did not need to go, and refused to walk back to the bed. Pt had to be placed into the vijay chair and then pivoted back to her bed. Speech continues to be nonsensical with significant response lag. Will continue to monitor.   Goal: Individualization and Mutuality  Outcome: Ongoing (interventions implemented as appropriate)  Goal: Discharge Needs Assessment  Outcome: Ongoing (interventions implemented as appropriate)  Goal: Interprofessional Rounds/Family Conf  Outcome: Ongoing (interventions implemented as appropriate)     Problem: Overarching Goals (Adult)  Goal: Adheres to Safety Considerations for Self and Others  Outcome: Ongoing (interventions implemented as appropriate)  Flowsheets (Taken 5/4/2020 0441 by Paige Grimm, RN)  Adheres to Safety Considerations for Self and Others: making progress toward outcome  Goal: Optimized Coping Skills in Response to Life Stressors  Outcome: Ongoing (interventions implemented as appropriate)  Flowsheets (Taken 5/4/2020 0441 by Paige Grimm, RN)  Optimized Coping Skills in Response to Life Stressors: making progress toward outcome  Goal: Develops/Participates in Therapeutic [place] to Support Successful Transition  Outcome: Ongoing (interventions implemented as appropriate)  Flowsheets (Taken 5/4/2020 0441 by Paige Grimm, RN)  Develops/Participates in Therapeutic [place] to Support Successful  Transition: making progress toward outcome     Problem: Cognitive Impairment (Psychotic Signs/Symptoms) (Adult)  Goal: Improved Thought Clarity/Organization (Psychotic Signs/Symptoms)  Outcome: Ongoing (interventions implemented as appropriate)  Flowsheets  Taken 5/6/2020 0307 by Terese Wong RN  Improved Thought Clarity/Organization Time Frame for Action Step (STG): 3 days  Taken 5/3/2020 1812 by Aubrey Stone RN  Improved Thought Clarity/Organization Action Step (STG) Outcome: making progress toward outcome     Problem: Psychomotor Movement Impairment (Psychotic Signs/Symptoms) (Adult)  Goal: Improved Psychomotor Symptoms (Psychotic Signs/Symptoms)  Outcome: Ongoing (interventions implemented as appropriate)  Flowsheets  Taken 5/6/2020 0307 by Terese Wong RN  Improved Psychomotor Symptoms Time Frame for Action Step (STG): 3 days  Taken 5/3/2020 1812 by Aubrey Stone RN  Improved Psychomotor Symptoms Action Step (STG) Outcome: making progress toward outcome     Problem: Mental State/Mood Impairment (Psychotic Signs/Symptoms) (Adult)  Goal: Improved Mental State/Mood (Psychotic Signs/Symptoms)  Outcome: Ongoing (interventions implemented as appropriate)  Flowsheets  Taken 5/6/2020 0307 by Terese Wong RN  Improved Mental State/Mood Time Frame for Action Step (STG): 1 day  Taken 5/3/2020 1812 by Aubrey Stone RN  Improved Mental State/Mood Action Step (STG) Outcome: making progress toward outcome     Problem: Skin Injury Risk (Adult)  Goal: Skin Health and Integrity  Outcome: Ongoing (interventions implemented as appropriate)  Flowsheets (Taken 5/6/2020 0307)  Skin Health and Integrity: making progress toward outcome     Problem: Fall Risk (Adult)  Goal: Absence of Fall  Outcome: Ongoing (interventions implemented as appropriate)  Flowsheets (Taken 5/6/2020 0307)  Absence of Fall: making progress toward outcome
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
## 2020-05-06 NOTE — PROGRESS NOTES
ELIE [facility]  INTERNAL MEDICINE  TOÑITO LOJA MD  [address] [place] [address]  Phone [phone] Fax [phone]  E-mail:  [email or web address]      INTERNAL MEDICINE DAILY PROGRESS NOTE  Toñito Loja M.D.  2020              Patient Identification:  Name: Francheska Sherman  Age: 65 y.o.  Sex: female  :  1955  MRN: [id number]         Primary Care Physician: Toñito Loja MD  LENGTH OF STAY 4 DAYS    Consults     Date and Time Order Name Status Description    2020 0830 Inpatient Nephrology Consult      2020 1854 Inpatient Hospitalist Consult Completed     2020 1143 Inpatient Infectious Diseases Consult Completed     2020 0834 Inpatient Neurology Consult General Completed     2020 1205 Inpatient Psychiatrist Consult Completed     2020 2244 Inpatient Gastroenterology Consult Completed     2020 224 Inpatient Urology Consult Completed     2020 224 Inpatient Nephrology Consult Completed     2020 1913 Family Medicine Consult Completed               Chief Complaint:  Affective Psychosis, Bipolar Disorder with acute behavioral change     History of Present Illness:     Subjective         Interval History: Patient is a 65 y.o.female who presented with altered mental status and generalized weakness with dehydration to the [facility] emergency room by private car on 2020.  Most of the history is provided by her  who brought her to the hospital since patient was quite confused and unable to really provide much concrete detail and during her initial evaluation.  Patient is regularly followed by Dr. Hernesto Ernst in psychiatry at [facility] and has in the past been admitted to [facility] for episodes very similar to this one.  Patient does suffer from bipolar disorder and takes Seroquel on a regular basis for control and stabilization of this issue.  A few weeks ago,  patient was feeling good and decided to discontinue her Seroquel.  She also discontinued several of her other medications including her blood pressure medicine and her vitamin D.  Patient's  noticed that over the last 2 weeks she has begun to have a decline in her mental l condition and specifically he noticed that she was having increased confusion, insomnia, and extreme paranoia at times.  The  contacted Dr. Ernst and was instructed to start the patient back on Seroquel at 50 mg daily with titration up to 100 mg after 1 week on the 50 mg dose.  She has now had the 100 mg dose for the last 4 days but has not really responded to the medication at this point.  Patient has been talking nonstop and sleeping less than 4 hours per night.  This is very similar to episode she has had in the past that have been described as hypomanic in nature.  Patient did threaten to leave the home but has been has been able to manage this by using the alarm system at home to no when she goes out the door and has had her under careful observation for the last 10 days.  In the last 48 hours, patient has been drinking little to no liquid and on 2 occasions the  has found her collapsed and on the floor.  He had no evidence of broken bones or excessive trauma and each time he has been able to eventually get her back up on a chair or the sofa.  When he discussed bringing her to the hospital, patient has had an extreme fear of contacting another individual with COVID-19 and has refused to come in.  Patient has had no exposure to other individuals in over a month and has no symptoms of COVID-19 such as fever, cough, or shortness of breath.   was finally able to convince the patient to come to the emergency room yesterday evening when her weakness became so extreme that she could not get up to move about the house.  She denied nausea, vomiting, or diarrhea.  Family helped the  get her into his truck to bring  "her to the emergency room and ER staff helped get her out of the truck and bring her into the facility for evaluation.     Mrs. Sherman is a delightful 65-year-old female who I had the pleasure of following in my office since she transferred there just prior to care home of her regular physician, my former partner, Lemuel Turcios MD.   the medical problems for which I follow her include: Hypertension, hyperlipidemia, overactive bladder, and vitamin D deficiency.  Her compliance with medications and treating these issues has been somewhat marginal.  Her last set of labs in January 2020 did show normal liver function, normal electrolytes, mild hyperlipidemia with a cholesterol of 226, normal thyroid function, and a normal STD work-up.  The STD work-up was done at request of the patient because she was concerned that \" her  might be cheating on her\".  In general, patient seen today quite stable mentally at the time of her last visit with me on March 6 when she came in with complaints of urinary frequency and otherwise had a normal medical checkup.  Review of records shows that she did see Dr. Fernandez Hooks MD and recently underwent endoscopy and colonoscopy on June 11, 2019.  Polyps were removed by cold biopsy and plans for 3-year follow-up were instituted. Dr. Hooks did feel that the patient met criteria for the diagnosis of irritable bowel syndrome with alternating diarrhea and constipation.  Patient also sees Dr. Greg Stevens MD in urology for overactive bladder and has been controlled fairly well on Enablex.  Patient did see Dr. Zee in cardiology because of an abnormal EKG in 2019 and did have an exercise stress test with myocardial perfusion SPECT on 5/22/19.  She had requested medical treatment for the findings of that test which showed ejection fraction 60%, normal myocardial perfusion with no evidence of ischemia, and test consistent with a low risk study.     Patient was seen in the [facility] " emergency room by Dr. Naldo Hi on 4/2520 at 1914.  At the time, patient presented with increasing confusion.   reported that this confusion was related to unstable bipolar disorder and discontinuation of her medication, Seroquel, that she takes for treatment of the disorder.  The ER physician was able to confirm that she had started back on this medication at the request of her psychiatric physician and has been titrated from 50 mg/day up to 100 mg/day recently.  Despite this attempted outpatient titration of the medication, patient had been refusing to eat or drink anything and had developed significant weakness.  On the day of admission she was unable to get out of bed on her own that she denied any chest pain or abdominal pain at the time.  In addition, patient had no evidence of nausea vomiting or diarrhea. Review of systems could not be performed due to the patient's mental status change.  Allergies were reported to codeine, morphine, and sulfa.  Physical exam in the emergency room showed that temperature was 96.5, pulse 85, respiratory rate 17, and blood pressure 145/92 with a sat of 95%.  Exam by the ER physician showed mucous membranes were quite dry.  Patient was awake and answering some questions appropriately such as name and age but was unable to really relate any history of the events which led to her visit in the ER.  Her exam was otherwise totally normal.  Lab results did show several abnormalities: Glucose elevated 123, BUN elevated 78, creatinine elevated at 1.60, and sodium elevated at 151.  Her liver function tests were also elevated with ALT of 236, and AST of 415.  She apparently had normal liver function test at a visit with her hepatitis C physician Dr. Woodruff just 1 month ago.  Patient had no evidence of alcohol in her blood.  Her white blood cell count was 14.62, her hemoglobin was 16.9, hematocrit was 50.8, and her platelet count was normal at 310,000.  Her magnesium was  slightly high at 2.9.  Thyroid function tests were normal.  Urine drug screen was totally negative but UA did show 3+ blood 1+ protein 1+ leukocyte esterase.  CT of head was unremarkable and chest x-ray was unremarkable.  EKG done soon after admission did show a normal sinus rhythm PAC, nonspecific ST changes and slightly prolonged QT interval.     4/26/20.  I personally saw the patient at the time of my rounds on 4/26/2020.  Nurse was present in the room at the time of my visit.  The patient had not spoken with others during the day, she did respond to my voice and actually open her eyes.  She did have a brief conversation with me before falling back off to sleep.  Renal has been adjusting her fluids to replace her volume deficit.  They will continue to follow this with us.  Urology did order a CT scan of abdomen and pelvis with and without contrast.  After consultation with renal, we elected to do the CT with out contrast only.  Results are pending at the time of my visit.  Patient was also seen in consultation by gastroenterology.  They feel that her elevated liver function tests are probably a result of the volume depletion status.  Hopefully these will improve as her volume is corrected.  Otherwise there are no major changes in the patient's condition from their perspective.  They also plan to continue following patient.  Psychiatry did see patient briefly but she was not cooperative with their exam.  Dr. Cruz will see patient tomorrow for further evaluation medication recommendations.  We suspect that the patient will need to be treated in the inpatient psychiatric unit at least short-term in order to stabilize the medications if her patterns follow previous pattern with respect to this issue.  Patient otherwise is hemodynamically stable at the present time.  We did do a blood gas since she is quite lethargic but that was within normal limits.     4/27/2020.  Patient resting quietly in bed at the time  of my rounds this evening.  I did discuss the case at length with the patient's nurse who went to the room with me.  Patient is a little more alert this evening and actually good agreed to take a few bites of ice cream.  In fact, she was feeling well enough to request strawberry ice cream instead of the vanilla ice cream that was available on the floor.  Patient remains quite weak.  She has not been out of bed at this time.  The nurse and I agreed that it would probably be appropriate to have PT or OT attempt to get patient up to bedside chair tomorrow if possible.  Patient has been declining her medications including the Seroquel XR that she is supposed to take each evening for her bipolar state.  Patient does seem oriented to person and place but somewhat lost in time.  GI feels she is stable from their point of view.  Elevated liver function tests probably are secondary to myoglobinuria which occurred after patient has been laying around and in bed most of the time.  Renal continues to adjust fluid for the patient and was agreeable to starting her blood pressure medications again.  Blood pressure did run high throughout the day yesterday and required treatment with Vasotec IV.  Urology had little further to add with the patient today but will plan outpatient cystoscopy after discharge.  Psychiatry did see patient in consultation and they feel she would be appropriate for their Geropsych unit to continue medication adjustment and stabilization of patient's psychiatric situation.  They are happy to take her in transfer once she is medically stable.  Urine culture so far shows no growth.     4/28/2020.  Patient still resting quietly in her bed at the time of my rounds in her room on 4 N.  Patient does wake up as I entered the room and is semi-responsive but her answers to questions are relatively unintelligible and she tends to just mumble words.  Patient has eaten and drunk very little today despite efforts from  nursing staff to encourage her to do so.  Apparently she would not even take orange sherbet which is 1 of her favorite foods when offered to her by the staff.  I did speak briefly to the patient's nurse.  She reported no real new changes in the patient's condition.  Review of notes from occupational therapy showed that the patient did sit up on the side of the bed but did not ambulate effectively at this point.  Patient has not been taking most of her medication at this point.  Psychiatry is still following the patient with plans to admit the patient to the psychiatric unit when she is medically stable.  I discussed the case at length with the patient's .  He feels that this is very similar to a breakdown that she had several years ago which required hospitalization in the psychiatric unit at our [facility].  Nonetheless, after our discussion, I am recommending a neurology consultation and we will obtain an MRI to be sure that there is been no cerebrovascular accident causing these changes.  I also would like to get speech therapy to see the patient in consultation to be sure that her swallow is effective.  We may have to consider short-term feeding tube to stabilize the patient and provide a route for giving her medicine.     4/29/2020.  Patient is essentially unchanged on my rounds today.  She does say a few words but makes no coherent statements.  She still has been refusing her meds and only taking small bites occasionally of food with great urging from the nursing staff.  We did have neurology see the patient in consultation today and I spoke at length with  regarding his findings.  She has no real evidence of stroke.  She does have a small tremor.  MRI was done and did not show anything acute though it does show some hardening of the arteries and a tiny old infarct that radiology feels is insignificant with respect to the current findings.  I also have infectious disease see the patient to  be sure they feel the question of sepsis is completely ruled out.  I did agree with discontinuing all antibiotics at this point and do not feel that there is any signs of acute infection.  Renal has essentially signed off the case also.  Currently she is receiving IV fluids at 50 cc/h.  Speech is to see the patient first thing in the morning and determine swallowing safety.  I did discuss the situation with [facility] and they do feel that if patient needs a short-term stabilization with a Dobbhoff tube for medications and for fluids this could be continued on the psych unit.  This would be done as a short-term tool to continue medically stabilizing her while giving psychiatry an opportunity to adjust her bipolar medications.  Patient is urinating well.  Her labs now show normal BUN, creatinine, and potassium levels.  White blood cell count is now normal at 8.9 and there is no sign of anemia.  Her magnesium has all so returned to normal levels.  At this point, the patient does appear to be medically stable and it  ahould be possible to transfer her to psychiatry tomorrow if we can resolve the issues surrounding her nutritional and medication intake.     4/30/2020.  Patient more awake and alert today than previously at the time of my rounds.  She did take a small amount of her breakfast including some orange sherbet and a few bites of eggs.  Nurse was able to get the patient to take her pills both last night and this morning.  Blood pressure is running slightly up and I did restart her beta-blocker and diuretic which have helped with this issue in the past.  We will monitor her hydration status with labs.  I am going to discontinue the IV fluids and hope to cannulate more thirst to promote increased oral intake.  Nursing continues to encourage patient to eat small bites.  Patient did talk in brief sentences to her  on the phone today.  I did review the case with him and we both felt patient was appropriate  for transfer to psychiatry.  Neurology work-up is complete and relatively negative.  ID work-up is also complete.  Renal has signed off on the case.  At this point it is felt that behavioral modification is needed and adjustment of her psychiatric meds in order to return her to her baseline.  Hopefully psychiatry will be able to manage this in their unit.  I did speak with the access center and they do have a bed available for the patient tomorrow after her discharge is completed in the morning.  Transfer orders are complete and will be signed off in a.m.  If bed is available.  I also discussed the case with the patient's nurse and with PCP.     5/1/2020.  Patient had a quiet evening last night and seems stable still this a.m.  Psychiatry planning discharge from their unit today and at that point a bed will be available for patient in their unit which seems appropriate.  Patient does not need feeding tube at this time since she has started taking small bites of food and small sips of fluids.  We do have a saline lock still in place so patient could be given IV bolus if needed for dehydration.  We will continue to follow patient in the psychiatric unit and monitor her electrolytes while there.  Patient does have a short conversation with me today.  She is watching [employer] and states that she does not like the fake needs.  Patient did work with OT and sat on the edge of bed with better control of body yesterday.  Speech therapy evaluated patient today and does feel that she has an adequate swallow and her trouble eating is behavioral in nature.  At this point patient has maximized benefit from hospitalization and is medically stable.  She will be ready for discharge to psychiatry when bed is available.     ________________________________________________________________________________________________     TRANSFER TO PSYCHIATRY Attending Dr. Cruz  Internal Medicine Consult   [place]  ________________________________________________________________________________________________     5/2/2020.  I was consulted by Dr. Cruz for an internal medicine consult to evaluate this patient.  Patient was seen with nursing assistant present in her room on psychiatry which was room 3330.  Patient was resting quietly in bed but did seem to recognize me as I entered the room and smiled.  She mumbled a few words but made no real coherent conversation while I was in the room.  I spoke with nursing aide who was at bedside.  Patient is currently on with a sitter.  Apparently she did manage to get up well with physical therapy today and sat in the chair at bedside for almost an hour.  She had very little breakfast but lunch did eat all the fruit on her tray and tried to eat some of the meat but unfortunately could not chew the roast beef that was brought to her on the tray today.  I am going to try changing the consistency of the meat and see if she is able to better handle that chopped up.  There is a speech therapy consult in place for the patient on Monday.  Patient seems to be feeling well.  She has no specific complaints of pain or discomfort.  Blood pressure is now under better control.  I did speak with the  and reviewed the case with him.  He is pleased with her progress.  We did obtain an abdominal x-ray series at the request of the  to be sure constipation is not a problem.  She has a bit of excessive gas in her abdomen but there is no significant stool burden or other signs of bowel blockage.  Seroquel has been increased to her regular dialysis which is 150 mg daily.  I also decrease the dose slightly of her losartan to 50 mg which is her usual dose.  We have added Ziac and I will continue to monitor blood pressures while she is on this medication.  She has taken it in the past with excellent control of her blood pressure on that medication.  Overall patient seems to be  "medically stable.  I will continue to follow her with you and see her again on Monday.  We will obtain labs on Monday morning.    5/4/2020. Patient much more interactive than when previously seen 2 days ago.  She completes simple sentences but at times makes no sense.  She has been drinking and eating a bit better today though her labs from earlier this a.m. did show some mild dehydration again.  I have reconsulted the renal team that was following her on an inpatient basis for their input and suggestions on this issue.  Nursing staff has been encouraged to push oral fluids overnight tonight and will try their best to get the patient to take more oral intake.  Patient did drink 2 large glasses of water just prior to my arrival on the unit.  She has been taking her medicines more regularly.  Patient set up in a chair by bedside for approximately 3 hours today.  She does seem to be getting stronger and hopefully is beginning to react favorably to reinstitution of her medication therapy.  We did modify her oxygen orders to say that she only needs the oxygen if sats are below 88% and she did come out to the TV room today and interacted briefly with other residents there.  We continue to hope that the patient will gradually improve.  Her  hopes that she will be able to return home once things totally stabilize.    5/5/2020.  Patient sitting up in chair in room at the time of my visit with sitter at bedside.  Patient reportedly had a fairly good day though she did have one episode of defiance when she refused to eat or take her medicine at dinnertime.  Otherwise she did drink fairly well and a small amount of her food.  She is being offered boost on a regular basis to improve her nutritional status.  Patient seems much brighter to me at the time of my exam tonight.  I asked her where she would want to be if she was not here in the hospital at [facility] and she told me \"[facility]\" According to , they do " have a condo there and have traveled extensively to that location.  Patient is friendly but has difficulty putting her speech together.  At times seems to say random words that do not come together in sentences.  Her swelling seems a bit improved.  Labs today are also improved with a significant improvement in her creatinine.  Vital signs show that she is afebrile but her blood pressure was up slightly earlier this evening at 166/88.  We are continuing to monitor this closely.  Will recheck labs in 2 days.  Renal consult still pending.    Review of Systems:               Review of systems could not be obtained due to  patient confusion.         Past Medical History:   Diagnosis Date   • ADD (attention deficit disorder)    • Anxiety    • ARDS survivor    • Chest pain    • Encounter for annual health examination     Annual Health Assessment: 14, 10/25/13   • GERD (gastroesophageal reflux disease)    • History of mammogram 2011   • Hyperactivity of bladder    • Hypertension    • Pain of right side of body     c/o pain in right side and back   • Psychiatric illness    • Sepsis (CMS/HCC)      Past Surgical History:   Procedure Laterality Date   • APPENDECTOMY     • BLADDER REPAIR     •  SECTION     • CHEST TUBE INSERTION     • CHOLECYSTECTOMY     • COLONOSCOPY N/A 2019    Procedure: COLONOSCOPY into cecum and TI with cold biopsy polypectomies;  Surgeon: Fernandez Hooks MD;  Location: [facility] ENDOSCOPY;  Service: Gastroenterology   • ENDOSCOPY N/A 2019    Procedure: ESOPHAGOGASTRODUODENOSCOPY with biopsies;  Surgeon: Fernandez Hooks MD;  Location: [facility] ENDOSCOPY;  Service: Gastroenterology   • HYSTERECTOMY       Allergies   Allergen Reactions   • Codeine    • Morphine Delirium   • Sulfa Antibiotics        Family History   Problem Relation Age of Onset   • Liver disease Mother    • Crohn's disease Brother        Social History     Socioeconomic History   • Marital status:  "     Spouse name: Tate Sherman   • Number of children: Not on file   • Years of education: Not on file   • Highest education level: Not on file   Occupational History   • Occupation: store owner     Employer: SELF-EMPLOYED   • Occupation: real-estate    Tobacco Use   • Smoking status: Former Smoker     Types: Cigarettes     Last attempt to quit: 1994     Years since quittin.3   • Smokeless tobacco: Never Used   Substance and Sexual Activity   • Alcohol use: No   • Drug use: No   • Sexual activity: Defer     Birth control/protection: Surgical   Social History Narrative    Lives with     Co-owner of [employer]       PMH, FH, SH and ROS completed with Admission History and Physical and updated in EPIC system.        Objective     Scheduled Meds:    bisoprolol 2.5 mg Oral Q24H   clobetasol  Topical Q12H   docusate sodium 100 mg Oral BID   enoxaparin 40 mg Subcutaneous Q24H   famotidine 40 mg Oral Daily   hydroCHLOROthiazide Oral 6.25 mg Oral Daily   ketotifen 1 drop Both Eyes BID   LORazepam 0.5 mg Oral TID   losartan 50 mg Oral Q24H   QUEtiapine fumarate  mg Oral Nightly   vitamin D 50,000 Units Oral Q7 Days     Continuous Infusions:     Vital signs in last 24 hours:  Temp:  [97.6 °F (36.4 °C)-97.7 °F (36.5 °C)] 97.6 °F (36.4 °C)  Heart Rate:  [86-96] 96  Resp:  [18-20] 20  BP: (146-166)/(88-90) 166/88    Intake/Output:    Intake/Output Summary (Last 24 hours) at 2020 6579  Last data filed at 2020 1731  Gross per 24 hour   Intake 0 ml   Output --   Net 0 ml       Exam:  /88 (BP Location: Left arm, Patient Position: Lying)   Pulse 96   Temp 97.6 °F (36.4 °C) (Oral)   Resp 20   Ht 170.2 cm (67\")   Wt 107 kg (235 lb 1.6 oz)   SpO2 93%   BMI 36.82 kg/m²     Constitutional:  Alert, semi-cooperative, no distress, AAOx1, resting comfortably in chair at bedside, drinking fluids this evening   Head:      Normocephalic, without obvious abnormality, " atraumatic   Eyes:     PERRLA, conjunctiva/corneas clear, no icterus, no conjunctival                                     pallor, EOM's intact, both eyes      ENT and Mouth: Lips, tongue, gums normal; oral mucosa pink and moist   Neck:     Supple, symmetrical, trachea midline, no JVD  Respiratory:     Clear to auscultation bilaterally, respirations unlabored  Cardiovascular:  Regular rate and rhythm, S1 and S2 normal, no murmur,      no  Rub or gallop.  Pulses normal.    Gastrointestinal:   BS present x 4 Soft, non-tender, bowel sounds active,      no masses, no hepatosplenomegaly                                                     :       No hernia.  Normal exam for sex.         Musculoskeletal: Extremities normal, atraumatic, no cyanosis or edema     No arthropathy.  No deformity.  Gait normal                                                 Skin:   Skin is warm and dry,  no rashes, swelling or palpable lesions   Neurologic:  CN -XII intact, motor strength grossly intact, sensation grossly intact to light touch, no focal reflex deficits noted    Psychiatric:     Alert,oriented X3, no delusions, psychoses, depression or anxiety    Heme/Lymph/Imun:   No bruises, petechiae.  Lymph nodes normal in size/configuration       Data Review:  Lab Results   Component Value Date    CALCIUM 10.0 05/05/2020    PHOS 2.7 04/29/2020     Results from last 7 days   Lab Units 05/05/20  0854 05/05/20  0730 05/04/20  0623 05/01/20  1905  04/30/20  0244   AST (SGOT) U/L 32  --  37* 50*  --  50*   MAGNESIUM mg/dL  --   --   --   --   --  1.9   SODIUM mmol/L 144  --  139 140  --  137   POTASSIUM mmol/L 5.3*  --  4.5 3.9  --  4.0   CHLORIDE mmol/L 104  --  102 101  --  100   CO2 mmol/L 21.5*  --  18.9* 22.7  --  23.2   BUN mg/dL 52*  --  66* 23  --  15   CREATININE mg/dL 0.92  --  1.60* 0.87  --  0.65   GLUCOSE mg/dL 118*  --  115* 118*  --  108*   CALCIUM mg/dL 10.0  --  9.5 9.8  --  8.6   WBC 10*3/mm3  --  10.02 12.76* 12.47*   < > 8.89    HEMOGLOBIN g/dL  --  16.3* 15.5 16.0*   < > 14.2   PLATELETS 10*3/mm3  --  224 222 248   < > 152   ALT (SGPT) U/L 50*  --  52* 77*  --  78*    < > = values in this interval not displayed.     Lab Results   Component Value Date    CKTOTAL 109 04/30/2020    TROPONINT <0.010 04/26/2020     Estimated Creatinine Clearance: 78.8 mL/min (by C-G formula based on SCr of 0.92 mg/dL).  WEIGHTS:     Wt Readings from Last 1 Encounters:   05/01/20 1909 107 kg (235 lb 1.6 oz)         Assessment:    Affective psychosis, bipolar (CMS/HCC)    Altered mental status    Acute kidney injury (CMS/HCC)    Volume depletion     Generalized weakness    Elevated liver function tests    Bipolar disorder, current episode mixed, moderate (CMS/HCC)    Overactive bladder    Essential hypertension    Abnormal EKG    SOB (shortness of breath)    GERD (gastroesophageal reflux disease)    Vitamin D deficiency    Hyperlipidemia    History of hepatitis C followed by Dr. Ranjan Qiu [id number]    Irritable bowel syndrome with both constipation and diarrhea    Hepatic steatosis    Hematuria    ARDS survivor 1994 Ventilator    ADD (attention deficit disorder)    Obesity (BMI 30-39.9)      Attending Physician Assessment and Plan:    1.  Altered mental status in patient with history of bipolar disorder who has recently been noncompliant with her medication, Seroquel.  Suspect etiology for this problem is medication imbalance despite recent reinitiation of her Seroquel dosing as directed by Dr. Ernst, her regular psychiatrist.  Based on previous history, I suspect patient will need transfer to psychiatry for medication adjustment and stabilization once her medical disorders as discussed below are better controlled.  Dr. Cruz is agreeable to this plan of treatment.  Patient still quite confused.  Neurology consult planned. No neurologic abnormalities found and MRI unremarkable.  ID also saw patient in consultation and found no ongoing evidence of  infection.  Patient medically stable and ready for discharge to psychiatric unit when bed available.  In psychiatry unit patient slowly improving.  We have gone back up to her regular dose of Seroquel with psychiatry to plan further medication changes at this point.  Continues to take her medications regularly.  Slow but steady improvement noted with regular medications.  2.  Severe volume depletion with elevated renal function test.  Etiology is probable poor p.o. intake while psychiatric medications were out of balance.  Patient has critical elevations in both BUN, and creatinine, fluid resuscitation was initiated in the emergency room.  We are consulting renal for their input on the situation and for their help in adjusting her fluids at this point.  Suspect this correction will take 1 to 2 days.  Will monitor electrolytes carefully during this time.  Continued improvement in electrolytes and in particular with renal function tests.  Continue to monitor labs regularly.  Now fully rehydrated and renal has signed off.  Encouraging p.o. intake as much as possible.  I did discuss this with her nurse in the psychiatric unit.  Nursing staff is pushing oral fluids.  Renal has been reconsulted for their input on volume status.  Checking labs every other day to monitor levels of stability with respect to volume status.  3.  Elevated CPK possibly secondary to acute kidney injury versus rhabdomyolysis.  I will send urine for myoglobin studies.  We will continue to monitor CPK.  Renal is following this issue with us at the present time.  Hopefully will resolve with increased hydration and volume stabilization.  Patient does appear to have some mild rhabdomyolysis.  CPK is clearing.  Elevated CPK resolving. rhabdomyolysis has resolved   4.  Generalized weakness felt to be secondary to electrolyte instability.  Will correct electrolytes.  Have added potassium protocol to her medications.  Will monitor elevated magnesium which  is probably result of hemoconcentration.  Suspect elevated hemoglobin is also caused by the same etiology.  Will ask PT and OT to begin evaluation of patient.  Able to sit up on side of bed.  PT continuing to work with patient.  On first day and psychiatry much more successful getting patient up to chair.  Patient continues to get stronger.  Walked herself to the restroom earlier this evening.  Set up in chair for 3 hours today.  Out to TV room today and set up in chair even longer.  5.  Positive sepsis screen with elevated white blood cell count. I suspect the most likely etiology is urine which shows significant hematuria and elevated leukocytosis.  Urology has been consulted for their input since they follow the patient regularly for bladder dysfunction.  I have started patient on IV Rocephin pending results of urine culture.  We will continue to monitor carefully but white count has improved in the first few hours.  I seriously doubt that patient has true sepsis at the present time.   does indicate that her mental status changes have been provoked by urinary tract infections in the past.  Culture of urine negative so far.  Will consider discontinuation of antibiotics if this persists.  Urine culture negative.  CT scan of chest negative.  We will proceed to discontinue antibiotics at this point and monitor patient.  ID finds no evidence of ongoing sepsis.   6.  Elevated liver function tests in patient with history of hepatitis.C treated in the past with Harvoni.  Etiology of this finding unclear at present time.  Abdominal ultrasound unremarkable.  GI consult requested.  Suspect may relate to the acute kidney injury more than an intrinsic problem with liver.  Did send hepatitis C viral load to be sure this has not resurfaced.  Will follow liver function test over the next couple days to be sure they do resolve.  Felt to probably be secondary to rhabdomyolysis.  Will monitor.  Will recheck labs on  Monday  7.  Overactive bladder.  Patient's regular medication is nonformulary.  Have added substitution for now but may need to consider having  bring in medication from home if she does not respond favorably to the generic substitution.  Has discontinued medication at 's request.  Generally she does not need meds for the overactive bladder.  8.  Essential hypertension with recent discontinuation of medications.  Blood pressure is elevated slightly and I have resumed her medications.  We will follow blood pressure and make a decision on this prior to her discharge.  Losartan is adjusted to 50 mg a day.  I will continue to monitor blood pressure with the  9.  Abnormal EKG with no reported shortness of breath at present time.  Suspect these are chronic changes.  We have completed a stress test in 2019 that was low risk for ischemic issues.  We will continue to monitor but suspect CPK is more elevated due to the renal/muscle issues and to any cardiac issue.  Will check troponin just to be sure it is not significantly elevated.  10.  Vitamin D deficiency.  Starting patient back on 50,000 units of vitamin D weekly for now.  We will continue to monitor and treat as needed.  11.  Hyperlipidemia.  Will check lipid profile while here in hospital and adjust medication and diet as needed to treat this issue.  12.  Irritable bowel syndrome with alternating diarrhea and constipation.  This problem sounds like it stable at present time.  Will add no new treatment currently.     Plan for disposition:Where: to Home and When:  Psychiatry feels she is stable     Dr. Loja will continue to follow patient with you and can be reached at [phone].        Toñito Loja MD  5/5/2020  1930

## 2020-05-06 NOTE — PLAN OF CARE
Problem: Patient Care Overview  Goal: Plan of Care Review  Flowsheets (Taken 5/6/2020 1982)  Plan of Care Reviewed With: patient  Note:   OT:  Pt. Was O x 1, and required constant verbal cues to complete AAROM with UE and with LE dressing.  Pt. Was able to doff both socks but required moderate (A) to barry the (L).  Pt. Continues to benefit from OT while she is in the hospital.

## 2020-05-06 NOTE — THERAPY TREATMENT NOTE
Patient Name: Francheska Sherman  : 1955    MRN: [id number]                              Today's Date: 2020       Admit Date: 2020    Visit Dx: No diagnosis found.  Patient Active Problem List   Diagnosis   • Overactive bladder   • Essential hypertension   • Abnormal EKG   • SOB (shortness of breath)   • Altered mental status   • GERD (gastroesophageal reflux disease)   • ARDS survivor  Ventilator   • ADD (attention deficit disorder)   • Vitamin D deficiency   • Acute kidney injury (CMS/HCC)   • Volume depletion    • Generalized weakness   • Hyperlipidemia   • History of hepatitis C followed by Dr. Ranjan Qiu [id number]   • Irritable bowel syndrome with both constipation and diarrhea   • Hepatic steatosis   • Obesity (BMI 30-39.9)   • Hematuria   • Elevated liver function tests   • Bipolar disorder, current episode mixed, moderate (CMS/HCC)   • Affective psychosis, bipolar (CMS/HCC)     Past Medical History:   Diagnosis Date   • ADD (attention deficit disorder)    • Anxiety    • ARDS survivor    • Chest pain    • Encounter for annual health examination     Annual Health Assessment: 14, 10/25/13   • GERD (gastroesophageal reflux disease)    • History of mammogram 2011   • Hyperactivity of bladder    • Hypertension    • Pain of right side of body     c/o pain in right side and back   • Psychiatric illness    • Sepsis (CMS/HCC)      Past Surgical History:   Procedure Laterality Date   • APPENDECTOMY     • BLADDER REPAIR     •  SECTION     • CHEST TUBE INSERTION     • CHOLECYSTECTOMY     • COLONOSCOPY N/A 2019    Procedure: COLONOSCOPY into cecum and TI with cold biopsy polypectomies;  Surgeon: Fernandez Hooks MD;  Location: [facility] ENDOSCOPY;  Service: Gastroenterology   • ENDOSCOPY N/A 2019    Procedure: ESOPHAGOGASTRODUODENOSCOPY with biopsies;  Surgeon: Fernandez Hooks MD;  Location: [facility] ENDOSCOPY;  Service: Gastroenterology   • HYSTERECTOMY       General  Information     Row Name 05/06/20 1401          PT Evaluation Time/Intention    Document Type  therapy note (daily note)  -     Mode of Treatment  physical therapy  -     Row Name 05/06/20 1401          Cognitive Assessment/Intervention- PT/OT    Orientation Status (Cognition)  unable/difficult to assess;oriented to;person  -     Cognitive Assessment/Intervention Comment  Pt followed ~ 25 % of commands. She had non sensical speech. She had flat affect.   -     Row Name 05/06/20 1401          Safety Issues, Functional Mobility    Safety Issues Affecting Function (Mobility)  ability to follow commands;insight into deficits/self awareness;sequencing abilities  -       User Key  (r) = Recorded By, (t) = Taken By, (c) = Cosigned By    Initials Name Provider Type     Claudette Rodriguez, PT Physical Therapist        Mobility     Row Name 05/06/20 1403          Bed Mobility Assessment/Treatment    Supine-Sit Daniels (Bed Mobility)  maximum assist (25% patient effort);verbal cues  -     Sit-Supine Daniels (Bed Mobility)  not tested  -     Assistive Device (Bed Mobility)  head of bed elevated  -     Row Name 05/06/20 1403          Bed-Chair Transfer    Bed-Chair Daniels (Transfers)  minimum assist (75% patient effort);moderate assist (50% patient effort);2 person assist;verbal cues;nonverbal cues (demo/gesture)  -     Assistive Device (Bed-Chair Transfers)  walker, front-wheeled  -     Row Name 05/06/20 1403          Sit-Stand Transfer    Sit-Stand Daniels (Transfers)  moderate assist (50% patient effort);2 person assist height of bed elevated. Attempted x 3 with last attempt successful. Prior to that pt would not stand up, almost resisting to stand up.   -     Assistive Device (Sit-Stand Transfers)  walker, front-wheeled  -     Row Name 05/06/20 1403          Gait/Stairs Assessment/Training    Distance in Feet (Gait)  2 very small steps with walker and mod of 2 to turn to sit in  chair was all PT could get pt to do.   -VERA       User Key  (r) = Recorded By, (t) = Taken By, (c) = Cosigned By    Initials Name Provider Type    Claudette Gonsalez, PT Physical Therapist        Obj/Interventions     Row Name 05/06/20 1406          Therapeutic Exercise    Sets/Reps (Therapeutic Exercise)  Attempted AROM exercises for UE's and then LE's and could not get pt to participate today.   -VERA     Row Name 05/06/20 1406          Static Sitting Balance    Level of [place] (Unsupported Sitting, Static Balance)  supervision  -VERA     Sitting Position (Unsupported Sitting, Static Balance)  sitting on edge of bed  -VERA     Time Able to Maintain Position (Unsupported Sitting, Static Balance)  more than 5 minutes  -VERA       User Key  (r) = Recorded By, (t) = Taken By, (c) = Cosigned By    Initials Name Provider Type    Claudette Gonsalez, PT Physical Therapist        Goals/Plan    No documentation.       Clinical Impression     Row Name 05/06/20 1407          Pain Scale: FACES Pre/Post-Treatment    Pain: FACES Scale, Pretreatment  0-->no hurt  -VERA     Pain: FACES Scale, Post-Treatment  0-->no hurt  -VERA     Row Name 05/06/20 1407          Positioning and Restraints    Pre-Treatment Position  in bed  -VERA     Post Treatment Position  chair  -VERA     In Chair  sitting;patient within staff view  -VERA       User Key  (r) = Recorded By, (t) = Taken By, (c) = Cosigned By    Initials Name Provider Type    Claudette Gonsalez, PT Physical Therapist        Outcome Measures     Row Name 05/06/20 1408          How much help from another person do you currently need...    Turning from your back to your side while in flat bed without using bedrails?  2  -VERA     Moving from lying on back to sitting on the side of a flat bed without bedrails?  2  -VERA     Moving to and from a bed to a chair (including a wheelchair)?  2  -VERA     Standing up from a chair using your arms (e.g., wheelchair, bedside chair)?  1  -VERA     Climbing 3-5  steps with a railing?  1  -VERA     To walk in hospital room?  1  -VERA     AM-PAC 6 Clicks Score (PT)  9  -VERA     Row Name 05/06/20 1408          Functional Assessment    Outcome Measure Options  AM-PAC 6 Clicks Basic Mobility (PT)  -VERA       User Key  (r) = Recorded By, (t) = Taken By, (c) = Cosigned By    Initials Name Provider Type    Claudette Gonsalez, PT Physical Therapist          PT Recommendation and Plan     Outcome Summary/Treatment Plan (PT)  Anticipated Discharge Disposition (PT): (to be determined )  Plan of Care Reviewed With: patient  Progress: no change  Outcome Summary: Pt required encouragement from PT and RN to get her to get up out of bed into chair. She needed much coaxing. Pt would not walk during PT today with transfer to chair only activity.     Time Calculation:   PT Charges     Row Name 05/06/20 1412             Time Calculation    Start Time  1025  -VERA      Stop Time  1043  -VERA      Time Calculation (min)  18 min  -      PT Received On  05/06/20  -VERA      PT - Next Appointment  05/07/20  -         Time Calculation- PT    Total Timed Code Minutes- PT  18 minute(s)  -        User Key  (r) = Recorded By, (t) = Taken By, (c) = Cosigned By    Initials Name Provider Type    Claudette Gonsalez, PT Physical Therapist        Therapy Charges for Today     Code Description Service Date Service Provider Modifiers Qty    17391506104 HC PT THER PROC EA 15 MIN 5/6/2020 Claudette Rodriguez, PT GP 1            PT G-Codes  Outcome Measure Options: AM-PAC 6 Clicks Basic Mobility (PT)  AM-PAC 6 Clicks Score (PT): 9  AM-PAC 6 Clicks Score (OT): 8    Claudette Rodriguez, PT  5/6/2020

## 2020-05-06 NOTE — PLAN OF CARE
Problem: Patient Care Overview  Goal: Interprofessional Rounds/Family Conf  Outcome: Ongoing (interventions implemented as appropriate)  Flowsheets  Taken 5/4/2020 1027  Participants: art therapy;pharmacy;social work;nursing;  Taken 5/6/2020 1026  Summary: Treatment team met to discuss plan of care.  Patient is improving slowly.  Staff are encouraging oral intake and activity.  Will await stabilization.     Problem: Cognitive Impairment (Psychotic Signs/Symptoms) (Adult)  Goal: Improved Thought Clarity/Organization (Psychotic Signs/Symptoms)  Outcome: Ongoing (interventions implemented as appropriate)  Flowsheets  Taken 5/6/2020 1026 by Mallika Byrd RN  Improved Thought Clarity/Organization Action Step/Short Term Goal (STG) Established: 05/06/20  Improved Thought Clarity/Organization Time Frame for Action Step (STG): 4 days  Taken 5/3/2020 1812 by Aubrey Stone RN  Improved Thought Clarity/Organization Action Step (STG) Outcome: making progress toward outcome     Problem: Psychomotor Movement Impairment (Psychotic Signs/Symptoms) (Adult)  Goal: Improved Psychomotor Symptoms (Psychotic Signs/Symptoms)  Outcome: Ongoing (interventions implemented as appropriate)  Flowsheets  Taken 5/6/2020 1026 by Mallika Byrd RN  Improved Psychomotor Symptoms Action Step/Short Term Goal (STG) Established: 05/06/20  Improved Psychomotor Symptoms Time Frame for Action Step (STG): 4 days  Taken 5/3/2020 1812 by Aubrey Stone RN  Improved Psychomotor Symptoms Action Step (STG) Outcome: making progress toward outcome     Problem: Mental State/Mood Impairment (Psychotic Signs/Symptoms) (Adult)  Goal: Improved Mental State/Mood (Psychotic Signs/Symptoms)  Outcome: Ongoing (interventions implemented as appropriate)  Flowsheets  Taken 5/6/2020 1026 by Mallika Byrd RN  Improved Mental State/Mood Action Step/Short Term Goal (STG) Established: 05/06/20  Improved Mental State/Mood Time Frame for  Action Step (STG): 4 days  Taken 5/3/2020 1812 by Chase, Aubrey, RN  Improved Mental State/Mood Action Step (STG) Outcome: making progress toward outcome     Patient/Guardian Signature: __________________________________            Psychiatrist Signature: ______________________________________             Therapist Signature: ________________________________________         Nurse Signature: ___________________________________________          Staff Signature: ____________________________________________            Staff Signature: ____________________________________________          Staff Signature: ____________________________________________          Staff Signature:

## 2020-05-07 LAB
ALBUMIN SERPL-MCNC: 3.6 G/DL (ref 3.5–5.2)
ALBUMIN/GLOB SERPL: 0.9 G/DL
ALP SERPL-CCNC: 56 U/L (ref 39–117)
ALT SERPL W P-5'-P-CCNC: 47 U/L (ref 1–33)
ANION GAP SERPL CALCULATED.3IONS-SCNC: 21.7 MMOL/L (ref 5–15)
AST SERPL-CCNC: 28 U/L (ref 1–32)
BASOPHILS # BLD AUTO: 0.09 10*3/MM3 (ref 0–0.2)
BASOPHILS NFR BLD AUTO: 0.7 % (ref 0–1.5)
BILIRUB SERPL-MCNC: 0.6 MG/DL (ref 0.2–1.2)
BUN BLD-MCNC: 83 MG/DL (ref 8–23)
BUN/CREAT SERPL: 26.9 (ref 7–25)
CALCIUM SPEC-SCNC: 9.9 MG/DL (ref 8.6–10.5)
CHLORIDE SERPL-SCNC: 104 MMOL/L (ref 98–107)
CO2 SERPL-SCNC: 20.3 MMOL/L (ref 22–29)
CREAT BLD-MCNC: 3.08 MG/DL (ref 0.57–1)
DEPRECATED RDW RBC AUTO: 44.2 FL (ref 37–54)
EOSINOPHIL # BLD AUTO: 0.07 10*3/MM3 (ref 0–0.4)
EOSINOPHIL NFR BLD AUTO: 0.5 % (ref 0.3–6.2)
ERYTHROCYTE [DISTWIDTH] IN BLOOD BY AUTOMATED COUNT: 13.1 % (ref 12.3–15.4)
GFR SERPL CREATININE-BSD FRML MDRD: 15 ML/MIN/1.73
GLOBULIN UR ELPH-MCNC: 4 GM/DL
GLUCOSE BLD-MCNC: 125 MG/DL (ref 65–99)
HCT VFR BLD AUTO: 49.9 % (ref 34–46.6)
HGB BLD-MCNC: 16.3 G/DL (ref 12–15.9)
LYMPHOCYTES # BLD AUTO: 3.06 10*3/MM3 (ref 0.7–3.1)
LYMPHOCYTES NFR BLD AUTO: 22.5 % (ref 19.6–45.3)
MCH RBC QN AUTO: 29.7 PG (ref 26.6–33)
MCHC RBC AUTO-ENTMCNC: 32.7 G/DL (ref 31.5–35.7)
MCV RBC AUTO: 91.1 FL (ref 79–97)
MONOCYTES # BLD AUTO: 1.42 10*3/MM3 (ref 0.1–0.9)
MONOCYTES NFR BLD AUTO: 10.4 % (ref 5–12)
NEUTROPHILS # BLD AUTO: 8.88 10*3/MM3 (ref 1.7–7)
NEUTROPHILS NFR BLD AUTO: 65.4 % (ref 42.7–76)
PLATELET # BLD AUTO: 300 10*3/MM3 (ref 140–450)
PMV BLD AUTO: 13 FL (ref 6–12)
POTASSIUM BLD-SCNC: 4.3 MMOL/L (ref 3.5–5.2)
PROT SERPL-MCNC: 7.6 G/DL (ref 6–8.5)
RBC # BLD AUTO: 5.48 10*6/MM3 (ref 3.77–5.28)
SODIUM BLD-SCNC: 146 MMOL/L (ref 136–145)
WBC NRBC COR # BLD: 13.59 10*3/MM3 (ref 3.4–10.8)

## 2020-05-07 PROCEDURE — 80053 COMPREHEN METABOLIC PANEL: CPT | Performed by: INTERNAL MEDICINE

## 2020-05-07 PROCEDURE — 25010000002 ENOXAPARIN PER 10 MG: Performed by: SPECIALIST

## 2020-05-07 PROCEDURE — 85025 COMPLETE CBC W/AUTO DIFF WBC: CPT | Performed by: INTERNAL MEDICINE

## 2020-05-07 RX ADMIN — LORAZEPAM 0.5 MG: 0.5 TABLET ORAL at 20:51

## 2020-05-07 RX ADMIN — LORAZEPAM 0.5 MG: 0.5 TABLET ORAL at 09:37

## 2020-05-07 RX ADMIN — ENOXAPARIN SODIUM 40 MG: 40 INJECTION SUBCUTANEOUS at 09:46

## 2020-05-07 RX ADMIN — FAMOTIDINE 40 MG: 40 TABLET, FILM COATED ORAL at 09:36

## 2020-05-07 RX ADMIN — LOSARTAN POTASSIUM 50 MG: 50 TABLET, FILM COATED ORAL at 09:37

## 2020-05-07 RX ADMIN — HYDROCHLOROTHIAZIDE 6.25 MG: 25 TABLET ORAL at 09:36

## 2020-05-07 RX ADMIN — LORAZEPAM 0.5 MG: 0.5 TABLET ORAL at 17:16

## 2020-05-07 RX ADMIN — BISOPROLOL FUMARATE 2.5 MG: 5 TABLET ORAL at 09:35

## 2020-05-07 RX ADMIN — KETOTIFEN FUMARATE 1 DROP: 0.35 SOLUTION/ DROPS OPHTHALMIC at 20:51

## 2020-05-07 RX ADMIN — CLOBETASOL PROPIONATE: 0.5 CREAM TOPICAL at 20:51

## 2020-05-07 RX ADMIN — DOCUSATE SODIUM 100 MG: 100 CAPSULE, LIQUID FILLED ORAL at 20:51

## 2020-05-07 RX ADMIN — QUETIAPINE FUMARATE 200 MG: 50 TABLET, EXTENDED RELEASE ORAL at 20:50

## 2020-05-07 RX ADMIN — DOCUSATE SODIUM 100 MG: 100 CAPSULE, LIQUID FILLED ORAL at 09:35

## 2020-05-07 NOTE — PLAN OF CARE
Pt remains catatonic. Cooperative and compliant with medications crushed in ice cream. Pt only ate 4 bites of ice cream. Pt did have a large loose bowel movement. Will continue to provide feedback and support.    Problem: Patient Care Overview  Goal: Plan of Care Review  Outcome: Ongoing (interventions implemented as appropriate)  Flowsheets  Taken 5/7/2020 0321  Progress: no change  Plan of Care Reviewed With: patient  Taken 5/7/2020 0107  Patient Agreement with Plan of Care: unable to participate  Goal: Individualization and Mutuality  Outcome: Ongoing (interventions implemented as appropriate)  Goal: Discharge Needs Assessment  Outcome: Ongoing (interventions implemented as appropriate)  Goal: Interprofessional Rounds/Family Conf  Outcome: Ongoing (interventions implemented as appropriate)     Problem: Overarching Goals (Adult)  Goal: Adheres to Safety Considerations for Self and Others  Outcome: Ongoing (interventions implemented as appropriate)  Flowsheets (Taken 5/7/2020 0321)  Adheres to Safety Considerations for Self and Others: making progress toward outcome  Goal: Optimized Coping Skills in Response to Life Stressors  Outcome: Ongoing (interventions implemented as appropriate)  Flowsheets (Taken 5/7/2020 0321)  Optimized Coping Skills in Response to Life Stressors: making progress toward outcome  Goal: Develops/Participates in Therapeutic [place] to Support Successful Transition  Outcome: Ongoing (interventions implemented as appropriate)  Flowsheets (Taken 5/7/2020 0321)  Develops/Participates in Therapeutic [place] to Support Successful Transition: making progress toward outcome     Problem: Cognitive Impairment (Psychotic Signs/Symptoms) (Adult)  Goal: Improved Thought Clarity/Organization (Psychotic Signs/Symptoms)  Outcome: Ongoing (interventions implemented as appropriate)  Flowsheets (Taken 5/7/2020 0321)  Improved Thought Clarity/Organization Action Step (STG) Outcome: making progress toward  outcome     Problem: Psychomotor Movement Impairment (Psychotic Signs/Symptoms) (Adult)  Goal: Improved Psychomotor Symptoms (Psychotic Signs/Symptoms)  Outcome: Ongoing (interventions implemented as appropriate)  Flowsheets (Taken 5/7/2020 0321)  Improved Psychomotor Symptoms Action Step (STG) Outcome: making progress toward outcome     Problem: Mental State/Mood Impairment (Psychotic Signs/Symptoms) (Adult)  Goal: Improved Mental State/Mood (Psychotic Signs/Symptoms)  Outcome: Ongoing (interventions implemented as appropriate)  Flowsheets (Taken 5/7/2020 0321)  Improved Mental State/Mood Action Step (STG) Outcome: making progress toward outcome     Problem: Skin Injury Risk (Adult)  Goal: Skin Health and Integrity  Outcome: Ongoing (interventions implemented as appropriate)  Flowsheets (Taken 5/7/2020 0321)  Skin Health and Integrity: making progress toward outcome     Problem: Fall Risk (Adult)  Goal: Absence of Fall  Outcome: Ongoing (interventions implemented as appropriate)  Flowsheets (Taken 5/7/2020 0321)  Absence of Fall: making progress toward outcome

## 2020-05-07 NOTE — PROGRESS NOTES
Staff reports some improvement this morning stating that the patient seems brighter and has been more conversant.  She is at this point sleeping soundly does not awaken for review.  We continue current treatment.

## 2020-05-07 NOTE — PLAN OF CARE
Problem: Patient Care Overview  Goal: Plan of Care Review  Outcome: Ongoing (interventions implemented as appropriate)  Flowsheets  Taken 5/7/2020 1720  Progress: improving  Outcome Summary: Pt much improved from previous shift. Pt able to converse w/ this RN, presenting w/ less response lag. Improved PO intake and ambulation this shift. Pt has been eating/drinking and ambulating per self (walking up and down hallway) w/o assistance from staff. Stand by assist encouraged. Creatinine and Bun increased, Encouraged fluids. Continue to push fluids and monitor labs. Pt cooperative w/ care and compliant w/ meds in ice cream. No falls or further issues reported. Will continue to provide safe environment.  Taken 5/7/2020 0952  Plan of Care Reviewed With: patient  Patient Agreement with Plan of Care: agrees     Problem: Patient Care Overview  Goal: Individualization and Mutuality  Outcome: Ongoing (interventions implemented as appropriate)     Problem: Patient Care Overview  Goal: Discharge Needs Assessment  Outcome: Ongoing (interventions implemented as appropriate)     Problem: Patient Care Overview  Goal: Interprofessional Rounds/Family Conf  Outcome: Ongoing (interventions implemented as appropriate)     Problem: Overarching Goals (Adult)  Goal: Adheres to Safety Considerations for Self and Others  Outcome: Ongoing (interventions implemented as appropriate)  Flowsheets (Taken 5/7/2020 1720)  Adheres to Safety Considerations for Self and Others: making progress toward outcome     Problem: Overarching Goals (Adult)  Goal: Adheres to Safety Considerations for Self and Others  Intervention: Develop and Maintain Individualized Safety Plan  Flowsheets  Taken 5/7/2020 1600  Safety Measures: safety rounds completed  Taken 5/7/2020 0952  Previous Attempt to Harm Others: no  Q1 Wished to be Dead (Past Month): no  Q2 Suicidal Thoughts (Past Month): no  Feels Like Hurting Others: no  Q6 Suicide Behavior (Lifetime): no  Level of  Risk per Screen: screen negative     Problem: Overarching Goals (Adult)  Goal: Optimized Coping Skills in Response to Life Stressors  Outcome: Ongoing (interventions implemented as appropriate)  Flowsheets (Taken 5/7/2020 1720)  Optimized Coping Skills in Response to Life Stressors: making progress toward outcome     Problem: Overarching Goals (Adult)  Goal: Optimized Coping Skills in Response to Life Stressors  Intervention: Promote Effective Coping Strategies  Flowsheets (Taken 5/7/2020 0952)  Supportive Measures: verbalization of feelings encouraged;self-care encouraged;relaxation techniques promoted;positive reinforcement provided;decision-making supported;goal setting facilitated     Problem: Overarching Goals (Adult)  Goal: Develops/Participates in Therapeutic [place] to Support Successful Transition  Outcome: Ongoing (interventions implemented as appropriate)  Flowsheets (Taken 5/7/2020 1720)  Develops/Participates in Therapeutic [place] to Support Successful Transition: making progress toward outcome     Problem: Overarching Goals (Adult)  Goal: Develops/Participates in Therapeutic [place] to Support Successful Transition  Intervention: Foster Therapeutic [place]  Flowsheets (Taken 5/7/2020 0977)  Trust Relationship/Rapport: care explained;choices provided;thoughts/feelings acknowledged;reassurance provided;questions encouraged;questions answered     Problem: Overarching Goals (Adult)  Goal: Develops/Participates in Therapeutic [place] to Support Successful Transition  Intervention: Mutually Develop Transition Plan  Flowsheets (Taken 5/7/2020 0983)  Transition Support: crisis management plan promoted     Problem: Cognitive Impairment (Psychotic Signs/Symptoms) (Adult)  Goal: Improved Thought Clarity/Organization (Psychotic Signs/Symptoms)  Outcome: Ongoing (interventions implemented as appropriate)  Flowsheets  Taken 5/6/2020 1026 by Mallika Byrd RN  Improved Thought Clarity/Organization  Action Step/Short Term Goal (STG) Established: 05/06/20  Taken 5/7/2020 1720 by Aubrey Stone RN  Improved Thought Clarity/Organization Time Frame for Action Step (STG): 3 days  Improved Thought Clarity/Organization Action Step (STG) Outcome: making progress toward outcome     Problem: Psychomotor Movement Impairment (Psychotic Signs/Symptoms) (Adult)  Goal: Improved Psychomotor Symptoms (Psychotic Signs/Symptoms)  Outcome: Ongoing (interventions implemented as appropriate)  Flowsheets  Taken 5/6/2020 1026 by Mallika Byrd RN  Improved Psychomotor Symptoms Action Step/Short Term Goal (STG) Established: 05/06/20  Taken 5/7/2020 1720 by Aubrey Stone RN  Improved Psychomotor Symptoms Time Frame for Action Step (STG): 3 days  Improved Psychomotor Symptoms Action Step (STG) Outcome: making progress toward outcome     Problem: Mental State/Mood Impairment (Psychotic Signs/Symptoms) (Adult)  Goal: Improved Mental State/Mood (Psychotic Signs/Symptoms)  Outcome: Ongoing (interventions implemented as appropriate)  Flowsheets  Taken 5/6/2020 1026 by Mallika Byrd RN  Improved Mental State/Mood Action Step/Short Term Goal (STG) Established: 05/06/20  Taken 5/7/2020 1720 by Aubrey Stone RN  Improved Mental State/Mood Time Frame for Action Step (STG): 3 days  Improved Mental State/Mood Action Step (STG) Outcome: making progress toward outcome     Problem: Skin Injury Risk (Adult)  Goal: Skin Health and Integrity  Outcome: Ongoing (interventions implemented as appropriate)  Flowsheets (Taken 5/7/2020 1720)  Skin Health and Integrity: making progress toward outcome     Problem: Fall Risk (Adult)  Goal: Absence of Fall  Outcome: Ongoing (interventions implemented as appropriate)  Flowsheets (Taken 5/7/2020 1720)  Absence of Fall: making progress toward outcome

## 2020-05-08 LAB
ALBUMIN SERPL-MCNC: 3.4 G/DL (ref 3.5–5.2)
ALBUMIN SERPL-MCNC: 3.7 G/DL (ref 3.5–5.2)
ALBUMIN/GLOB SERPL: 0.8 G/DL
ALBUMIN/GLOB SERPL: 0.9 G/DL
ALP SERPL-CCNC: 50 U/L (ref 39–117)
ALP SERPL-CCNC: 60 U/L (ref 39–117)
ALT SERPL W P-5'-P-CCNC: 36 U/L (ref 1–33)
ALT SERPL W P-5'-P-CCNC: 39 U/L (ref 1–33)
ANION GAP SERPL CALCULATED.3IONS-SCNC: 19 MMOL/L (ref 5–15)
ANION GAP SERPL CALCULATED.3IONS-SCNC: 25.1 MMOL/L (ref 5–15)
AST SERPL-CCNC: 21 U/L (ref 1–32)
AST SERPL-CCNC: 34 U/L (ref 1–32)
BASOPHILS # BLD AUTO: 0.06 10*3/MM3 (ref 0–0.2)
BASOPHILS # BLD AUTO: 0.07 10*3/MM3 (ref 0–0.2)
BASOPHILS NFR BLD AUTO: 0.5 % (ref 0–1.5)
BASOPHILS NFR BLD AUTO: 0.6 % (ref 0–1.5)
BILIRUB SERPL-MCNC: 0.5 MG/DL (ref 0.2–1.2)
BILIRUB SERPL-MCNC: 0.7 MG/DL (ref 0.2–1.2)
BUN BLD-MCNC: 103 MG/DL (ref 8–23)
BUN BLD-MCNC: 104 MG/DL (ref 8–23)
BUN/CREAT SERPL: 26.1 (ref 7–25)
BUN/CREAT SERPL: 29.7 (ref 7–25)
CALCIUM SPEC-SCNC: 9.6 MG/DL (ref 8.6–10.5)
CALCIUM SPEC-SCNC: 9.9 MG/DL (ref 8.6–10.5)
CHLORIDE SERPL-SCNC: 97 MMOL/L (ref 98–107)
CHLORIDE SERPL-SCNC: 99 MMOL/L (ref 98–107)
CO2 SERPL-SCNC: 16.9 MMOL/L (ref 22–29)
CO2 SERPL-SCNC: 22 MMOL/L (ref 22–29)
CREAT BLD-MCNC: 3.47 MG/DL (ref 0.57–1)
CREAT BLD-MCNC: 3.98 MG/DL (ref 0.57–1)
DEPRECATED RDW RBC AUTO: 42.3 FL (ref 37–54)
DEPRECATED RDW RBC AUTO: 44.6 FL (ref 37–54)
EOSINOPHIL # BLD AUTO: 0.2 10*3/MM3 (ref 0–0.4)
EOSINOPHIL # BLD AUTO: 0.22 10*3/MM3 (ref 0–0.4)
EOSINOPHIL NFR BLD AUTO: 1.8 % (ref 0.3–6.2)
EOSINOPHIL NFR BLD AUTO: 1.8 % (ref 0.3–6.2)
ERYTHROCYTE [DISTWIDTH] IN BLOOD BY AUTOMATED COUNT: 13.1 % (ref 12.3–15.4)
ERYTHROCYTE [DISTWIDTH] IN BLOOD BY AUTOMATED COUNT: 13.1 % (ref 12.3–15.4)
GFR SERPL CREATININE-BSD FRML MDRD: 11 ML/MIN/1.73
GFR SERPL CREATININE-BSD FRML MDRD: 13 ML/MIN/1.73
GFR SERPL CREATININE-BSD FRML MDRD: ABNORMAL ML/MIN/{1.73_M2}
GFR SERPL CREATININE-BSD FRML MDRD: ABNORMAL ML/MIN/{1.73_M2}
GLOBULIN UR ELPH-MCNC: 3.7 GM/DL
GLOBULIN UR ELPH-MCNC: 4.4 GM/DL
GLUCOSE BLD-MCNC: 106 MG/DL (ref 65–99)
GLUCOSE BLD-MCNC: 123 MG/DL (ref 65–99)
HCT VFR BLD AUTO: 45.8 % (ref 34–46.6)
HCT VFR BLD AUTO: 54.2 % (ref 34–46.6)
HGB BLD-MCNC: 15.1 G/DL (ref 12–15.9)
HGB BLD-MCNC: 17.9 G/DL (ref 12–15.9)
IMM GRANULOCYTES # BLD AUTO: 0.03 10*3/MM3 (ref 0–0.05)
IMM GRANULOCYTES # BLD AUTO: 0.04 10*3/MM3 (ref 0–0.05)
IMM GRANULOCYTES NFR BLD AUTO: 0.2 % (ref 0–0.5)
IMM GRANULOCYTES NFR BLD AUTO: 0.4 % (ref 0–0.5)
LYMPHOCYTES # BLD AUTO: 3.01 10*3/MM3 (ref 0.7–3.1)
LYMPHOCYTES # BLD AUTO: 3.47 10*3/MM3 (ref 0.7–3.1)
LYMPHOCYTES NFR BLD AUTO: 27.4 % (ref 19.6–45.3)
LYMPHOCYTES NFR BLD AUTO: 27.9 % (ref 19.6–45.3)
MCH RBC QN AUTO: 29.4 PG (ref 26.6–33)
MCH RBC QN AUTO: 30.3 PG (ref 26.6–33)
MCHC RBC AUTO-ENTMCNC: 33 G/DL (ref 31.5–35.7)
MCHC RBC AUTO-ENTMCNC: 33 G/DL (ref 31.5–35.7)
MCV RBC AUTO: 89.3 FL (ref 79–97)
MCV RBC AUTO: 91.7 FL (ref 79–97)
MONOCYTES # BLD AUTO: 0.8 10*3/MM3 (ref 0.1–0.9)
MONOCYTES # BLD AUTO: 0.82 10*3/MM3 (ref 0.1–0.9)
MONOCYTES NFR BLD AUTO: 6.6 % (ref 5–12)
MONOCYTES NFR BLD AUTO: 7.3 % (ref 5–12)
NEUTROPHILS # BLD AUTO: 6.88 10*3/MM3 (ref 1.7–7)
NEUTROPHILS # BLD AUTO: 7.84 10*3/MM3 (ref 1.7–7)
NEUTROPHILS NFR BLD AUTO: 62.6 % (ref 42.7–76)
NEUTROPHILS NFR BLD AUTO: 62.9 % (ref 42.7–76)
NRBC BLD AUTO-RTO: 0 /100 WBC (ref 0–0.2)
NRBC BLD AUTO-RTO: 0 /100 WBC (ref 0–0.2)
PLATELET # BLD AUTO: 225 10*3/MM3 (ref 140–450)
PLATELET # BLD AUTO: 273 10*3/MM3 (ref 140–450)
PMV BLD AUTO: 12.6 FL (ref 6–12)
PMV BLD AUTO: 12.8 FL (ref 6–12)
POTASSIUM BLD-SCNC: 3.7 MMOL/L (ref 3.5–5.2)
POTASSIUM BLD-SCNC: 4.4 MMOL/L (ref 3.5–5.2)
PROT SERPL-MCNC: 7.1 G/DL (ref 6–8.5)
PROT SERPL-MCNC: 8.1 G/DL (ref 6–8.5)
RBC # BLD AUTO: 5.13 10*6/MM3 (ref 3.77–5.28)
RBC # BLD AUTO: 5.91 10*6/MM3 (ref 3.77–5.28)
SODIUM BLD-SCNC: 139 MMOL/L (ref 136–145)
SODIUM BLD-SCNC: 140 MMOL/L (ref 136–145)
WBC NRBC COR # BLD: 10.99 10*3/MM3 (ref 3.4–10.8)
WBC NRBC COR # BLD: 12.45 10*3/MM3 (ref 3.4–10.8)

## 2020-05-08 PROCEDURE — 25010000002 ENOXAPARIN PER 10 MG: Performed by: SPECIALIST

## 2020-05-08 PROCEDURE — 97110 THERAPEUTIC EXERCISES: CPT

## 2020-05-08 PROCEDURE — 92526 ORAL FUNCTION THERAPY: CPT

## 2020-05-08 PROCEDURE — 80053 COMPREHEN METABOLIC PANEL: CPT | Performed by: INTERNAL MEDICINE

## 2020-05-08 PROCEDURE — 85025 COMPLETE CBC W/AUTO DIFF WBC: CPT | Performed by: INTERNAL MEDICINE

## 2020-05-08 RX ORDER — SODIUM CHLORIDE 9 MG/ML
150 INJECTION, SOLUTION INTRAVENOUS CONTINUOUS
Status: DISCONTINUED | OUTPATIENT
Start: 2020-05-08 | End: 2020-05-09

## 2020-05-08 RX ORDER — SODIUM CHLORIDE 0.9 % (FLUSH) 0.9 %
10 SYRINGE (ML) INJECTION EVERY 12 HOURS SCHEDULED
Status: DISCONTINUED | OUTPATIENT
Start: 2020-05-08 | End: 2020-05-13

## 2020-05-08 RX ORDER — DEXTROSE AND SODIUM CHLORIDE 5; .45 G/100ML; G/100ML
150 INJECTION, SOLUTION INTRAVENOUS CONTINUOUS
Status: DISCONTINUED | OUTPATIENT
Start: 2020-05-08 | End: 2020-05-08

## 2020-05-08 RX ORDER — SODIUM CHLORIDE 0.9 % (FLUSH) 0.9 %
10 SYRINGE (ML) INJECTION AS NEEDED
Status: DISCONTINUED | OUTPATIENT
Start: 2020-05-08 | End: 2020-05-13

## 2020-05-08 RX ORDER — METHYLPHENIDATE HYDROCHLORIDE 10 MG/1
5 TABLET ORAL DAILY
Status: DISCONTINUED | OUTPATIENT
Start: 2020-05-08 | End: 2020-05-10

## 2020-05-08 RX ADMIN — ENOXAPARIN SODIUM 40 MG: 40 INJECTION SUBCUTANEOUS at 12:15

## 2020-05-08 RX ADMIN — FAMOTIDINE 40 MG: 40 TABLET, FILM COATED ORAL at 12:15

## 2020-05-08 RX ADMIN — CLOBETASOL PROPIONATE: 0.5 CREAM TOPICAL at 12:16

## 2020-05-08 RX ADMIN — LOSARTAN POTASSIUM 50 MG: 50 TABLET, FILM COATED ORAL at 12:16

## 2020-05-08 RX ADMIN — LORAZEPAM 0.5 MG: 0.5 TABLET ORAL at 18:24

## 2020-05-08 RX ADMIN — METHYLPHENIDATE HYDROCHLORIDE 5 MG: 10 TABLET ORAL at 12:17

## 2020-05-08 RX ADMIN — NYSTATIN 500000 UNITS: 100000 SUSPENSION ORAL at 12:24

## 2020-05-08 RX ADMIN — SODIUM CHLORIDE, PRESERVATIVE FREE 10 ML: 5 INJECTION INTRAVENOUS at 19:45

## 2020-05-08 RX ADMIN — LORAZEPAM 0.5 MG: 0.5 TABLET ORAL at 20:54

## 2020-05-08 RX ADMIN — SODIUM CHLORIDE 150 ML/HR: 9 INJECTION, SOLUTION INTRAVENOUS at 21:45

## 2020-05-08 RX ADMIN — BISOPROLOL FUMARATE 2.5 MG: 5 TABLET ORAL at 12:17

## 2020-05-08 RX ADMIN — CLOBETASOL PROPIONATE: 0.5 CREAM TOPICAL at 20:55

## 2020-05-08 RX ADMIN — KETOTIFEN FUMARATE 1 DROP: 0.35 SOLUTION/ DROPS OPHTHALMIC at 12:18

## 2020-05-08 RX ADMIN — QUETIAPINE FUMARATE 200 MG: 50 TABLET, EXTENDED RELEASE ORAL at 20:54

## 2020-05-08 RX ADMIN — KETOTIFEN FUMARATE 1 DROP: 0.35 SOLUTION/ DROPS OPHTHALMIC at 20:54

## 2020-05-08 RX ADMIN — LORAZEPAM 0.5 MG: 0.5 TABLET ORAL at 12:25

## 2020-05-08 RX ADMIN — NYSTATIN 500000 UNITS: 100000 SUSPENSION ORAL at 20:54

## 2020-05-08 RX ADMIN — DOCUSATE SODIUM 100 MG: 100 CAPSULE, LIQUID FILLED ORAL at 20:55

## 2020-05-08 RX ADMIN — SODIUM CHLORIDE 1000 ML: 9 INJECTION, SOLUTION INTRAVENOUS at 19:45

## 2020-05-08 NOTE — PLAN OF CARE
Problem: Patient Care Overview  Goal: Plan of Care Review  Outcome: Ongoing (interventions implemented as appropriate)  Flowsheets (Taken 5/8/2020 1512)  Consent Given to Review Plan with: Re-eval of swallow completed. Patient agreeable to minimal trials, ice chips only. Oral holding with expectoration of thin liquids. ? thrush causing pain with swallow. Recommend continue with current diet as long as patient tolerating without s/s aspiration. Meds whole with thin or puree. ST to follow for re-eval as patient appropriate.  Plan of Care Reviewed With: patient

## 2020-05-08 NOTE — PROGRESS NOTES
Continued Stay Note  [facility]     Patient Name: Francheska Sherman  MRN: [id number]  Today's Date: 5/8/2020    Admit Date: 5/1/2020    Discharge Plan     Row Name 05/08/20 1547       Plan    Plan Comments  SW called and spoke w/pt's spouse, Tate Sherman, ph. [phone] to give him update regarding pt's d/c plans.  SW adv that pt would not be d/c'd anytime soon and she would keep him updated on pt's care.        Discharge Codes    No documentation.             ELENI Rivera

## 2020-05-08 NOTE — PLAN OF CARE
Pt was alert and cooperative with PT. Pt required less assistance with bed mobility,transfers and gait. Pt was ableto ambulate ~ 12' with minimal of 1 and a chair following for safety.Pt was alert and cooperative with PT. Pt required less assistance with bed mobility,transfers and gait. Pt was ableto ambulate ~ 12' with minimal of 1 and a chair following for safety.

## 2020-05-08 NOTE — THERAPY TREATMENT NOTE
Acute Care - Physical Therapy Treatment Note  [facility]     Patient Name: Francheska Sherman  : 1955  MRN: [id number]  Today's Date: 2020             Admit Date: 2020    Visit Dx:  No diagnosis found.  Patient Active Problem List   Diagnosis   • Overactive bladder   • Essential hypertension   • Abnormal EKG   • SOB (shortness of breath)   • Altered mental status   • GERD (gastroesophageal reflux disease)   • ARDS survivor  Ventilator   • ADD (attention deficit disorder)   • Vitamin D deficiency   • Acute kidney injury (CMS/HCC)   • Volume depletion    • Generalized weakness   • Hyperlipidemia   • History of hepatitis C followed by Dr. Ranjan Qiu [id number]   • Irritable bowel syndrome with both constipation and diarrhea   • Hepatic steatosis   • Obesity (BMI 30-39.9)   • Hematuria   • Elevated liver function tests   • Bipolar disorder, current episode mixed, moderate (CMS/HCC)   • Affective psychosis, bipolar (CMS/HCC)       Therapy Treatment    Rehabilitation Treatment Summary     Row Name 20 1415             Treatment Time/Intention    Discipline  physical therapist  -LB      Document Type  therapy note (daily note)  -LB      Subjective Information  complains of;pain Pt was wsaking questions regarding PT activities.   -LB      Mode of Treatment  physical therapy  -LB      Patient/Family Observations  Pt was supine in bed with HOB elevated eating ice chips.   -LB      Therapy Frequency (PT Clinical Impression)  5 times/wk  -LB      Patient Effort  adequate  -LB      Comment  Pt required prompting to sit on EOB Once on side of bed pt cooperative with minimal cues.   -LB      Existing Precautions/Restrictions  fall  -LB      Recorded by [LB] Liliana Patrick, PT 20 1509      Row Name 20 1411             Cognitive Assessment/Intervention- PT/OT    Orientation Status (Cognition)  oriented to;person;situation  -LB      Follows Commands (Cognition)  follows one step commands;25-49%  accuracy  -LB      Safety Deficit (Cognitive)  unable/difficult to assess  -LB      Cognitive Assessment/Intervention Comment  Pt's speech was clear and pt asking appropriate questions.   -LB      Recorded by [LB] Liliana Patrick, PT 05/08/20 1509      Row Name 05/08/20 1415             Safety Issues, Functional Mobility    Safety Issues Affecting Function (Mobility)  ability to follow commands  -LB      Impairments Affecting Function (Mobility)  balance;strength;cognition  -LB      Recorded by [LB] Liliana Patrick, PT 05/08/20 1509      Row Name 05/08/20 1415             Bed Mobility Assessment/Treatment    Supine-Sit [place] (Bed Mobility)  moderate assist (50% patient effort)  -LB      Sit-Supine [place] (Bed Mobility)  not tested  -LB      Assistive Device (Bed Mobility)  head of bed elevated  -LB      Recorded by [LB] Liliana Patrick, PT 05/08/20 1509      Row Name 05/08/20 1415             Sit-Stand Transfer    Sit-Stand [place] (Transfers)  moderate assist (50% patient effort);verbal cues from bed, minimal to stand a 2nd time   -LB      Assistive Device (Sit-Stand Transfers)  walker, front-wheeled  -LB      Recorded by [LB] Liliana Patrick, PT 05/08/20 1509      Row Name 05/08/20 1415             Gait/Stairs Assessment/Training    [place] Level (Gait)  minimum assist (75% patient effort);contact guard;2 person assist  -LB      Assistive Device (Gait)  walker, front-wheeled  -LB      Distance in Feet (Gait)  12'  -LB      Pattern (Gait)  step-to;step-through  -LB      Deviations/Abnormal Patterns (Gait)  gait speed decreased;stride length decreased  -LB      Bilateral Gait Deviations  forward flexed posture;heel strike decreased;weight shift ability decreased  -LB      Recorded by [LB] Liliana Patrick, PT 05/08/20 1509      Row Name 05/08/20 1415             Static Sitting Balance    Level of [place] (Unsupported Sitting, Static Balance)  contact guard assist  -LB      Sitting Position  (Unsupported Sitting, Static Balance)  sitting on edge of bed  -LB      Time Able to Maintain Position (Unsupported Sitting, Static Balance)  2 to 3 minutes  -LB      Recorded by [LB] Liliana Patrick, PT 05/08/20 1509      Row Name 05/08/20 1415             Static Standing Balance    Level of [place] (Supported Standing, Static Balance)  minimal assist, 75% patient effort pt stepped forward two steps and then back two steps  -LB      Time Able to Maintain Position (Supported Standing, Static Balance)  45 to 60 seconds  -LB      Assistive Device Utilized (Supported Standing, Static Balance)  walker, rolling  -LB      Recorded by [LB] Liliaan Patrick, PT 05/08/20 1509      Row Name 05/08/20 1415             Positioning and Restraints    Pre-Treatment Position  in bed  -LB      Post Treatment Position  chair  -LB      In Chair  notified nsg;encouraged to call for assist;patient within staff view  -LB      Recorded by [LB] Liliana Patrick, PT 05/08/20 1509      Row Name 05/08/20 1415             Pain Scale: FACES Pre/Post-Treatment    Pain: FACES Scale, Pretreatment  2-->hurts little bit noted when pt first sat on edge of bed   -LB      Pain: FACES Scale, Post-Treatment  0-->no hurt  -LB      Recorded by [LB] Liliana Patrick, PT 05/08/20 1509        User Key  (r) = Recorded By, (t) = Taken By, (c) = Cosigned By    Initials Name Effective Dates Discipline    LB Liliana Patrick, PT 06/08/18 -  PT               Rehab Goal Summary     Row Name 05/08/20 1430             Oral Nutrition/Hydration Goal 1 (SLP)    Oral Nutrition/Hydration Goal 1, SLP  Pt will tolerate least restrictive diet  -OC      Time Frame (Oral Nutrition/Hydration Goal 1, SLP)  by discharge  -OC        User Key  (r) = Recorded By, (t) = Taken By, (c) = Cosigned By    Initials Name Provider Type Discipline    OC Maribell Ramirez MA,[facility]-SLP Speech and Language Pathologist SLP          Physical Therapy Education                 Title: PT OT SLP Therapies (In  Progress)     Topic: Physical Therapy (In Progress)     Point: Mobility training (In Progress)     Description:   Instruct learner(s) on safety and technique for assisting patient out of bed, chair or wheelchair.  Instruct in the proper use of assistive devices, such as walker, crutches, cane or brace.              Patient Friendly Description:   It's important to get you on your feet again, but we need to do so in a way that is safe for you. Falling has serious consequences, and your personal safety is the most important thing of all.        When it's time to get out of bed, one of us or a family member will sit next to you on the bed to give you support.     If your doctor or nurse tells you to use a walker, crutches, a cane, or a brace, be sure you use it every time you get out of bed, even if you think you don't need it.    Learning Progress Summary           Patient Acceptance, E,D, NR by ERIC at 5/8/2020 1512    Acceptance, E,TB, NR by VERA at 5/6/2020 1408    Acceptance, D, NR by ERIC at 5/4/2020 1456    Comment:  transfers    Acceptance, E,TB, NR by VERA at 5/2/2020 1152                   Point: Home exercise program (Not Started)     Description:   Instruct learner(s) on appropriate technique for monitoring, assisting and/or progressing patient with therapeutic exercises and activities.              Learner Progress:   Not documented in this visit.          Point: Body mechanics (Not Started)     Description:   Instruct learner(s) on proper positioning and spine alignment for patient and/or caregiver during mobility tasks and/or exercises.              Learner Progress:   Not documented in this visit.          Point: Precautions (Not Started)     Description:   Instruct learner(s) on prescribed precautions during mobility and gait tasks              Learner Progress:   Not documented in this visit.                      User Key     Initials Effective Dates Name Provider Type Discipline    ERIC 06/08/18 -  Celina  Liliana CHILDS, PT Physical Therapist PT    VERA 06/08/18 -  Claudette Rodriguez PT Physical Therapist PT                PT Recommendation and Plan  Therapy Frequency (PT Clinical Impression): 5 times/wk  Plan of Care Reviewed With: patient  Outcome Summary: Pt cooperative with PT. Pt required frequent cues to perform task. Pt was able to ambulate 8' with a rolling walker with minimal of 2.  Outcome Measures     Row Name 05/06/20 1500             How much help from another is currently needed...    Putting on and taking off regular lower body clothing?  1  -VS      Bathing (including washing, rinsing, and drying)  1  -VS      Toileting (which includes using toilet bed pan or urinal)  1  -VS      Putting on and taking off regular upper body clothing  1  -VS      Taking care of personal grooming (such as brushing teeth)  2  -VS      Eating meals  2  -VS      AM-PAC 6 Clicks Score (OT)  8  -VS         Functional Assessment    Outcome Measure Options  AM-PAC 6 Clicks Daily Activity (OT)  -VS        User Key  (r) = Recorded By, (t) = Taken By, (c) = Cosigned By    Initials Name Provider Type    VS Aruna Calderon OTR Occupational Therapist         Time Calculation:   PT Charges     Row Name 05/08/20 1513             Time Calculation    Start Time  1415  -LB      Stop Time  1435  -LB      Time Calculation (min)  20 min  -LB      PT Received On  05/08/20  -LB      PT - Next Appointment  05/11/20  -LB      PT Goal Re-Cert Due Date  05/16/20  -LB         Time Calculation- PT    Total Timed Code Minutes- PT  20 minute(s)  -LB        User Key  (r) = Recorded By, (t) = Taken By, (c) = Cosigned By    Initials Name Provider Type    Liliana Velasco, PT Physical Therapist        Therapy Charges for Today     Code Description Service Date Service Provider Modifiers Qty    [id number] HC PT THER PROC EA 15 MIN 5/8/2020 Liliana Patrick, PT GP 1        Patient was wearing a face mask during this therapy encounter. Therapist used appropriate  personal protective equipment including mask and gloves.  Mask used was standard procedure mask. Appropriate PPE was worn during the entire therapy session. Hand hygiene was completed before and after therapy session. Patient is not in enhanced droplet precautions.         PT G-Codes  Outcome Measure Options: AM-PAC 6 Clicks Daily Activity (OT)  AM-PAC 6 Clicks Score (PT): 9  AM-PAC 6 Clicks Score (OT): 8    Liliana Patrick, PT  5/8/2020

## 2020-05-08 NOTE — PLAN OF CARE
Pt talking more this shift, cooperative and compliant with medications. Pt had asked to come out in the day room, later asked to go back to her room so she could have a snack, which she did eat. Pt also has increased fluid intake this shift, even asking for more drinks. Will continue to provide feedback and support.    Problem: Patient Care Overview  Goal: Plan of Care Review  Outcome: Ongoing (interventions implemented as appropriate)  Flowsheets (Taken 5/8/2020 0408)  Progress: improving  Plan of Care Reviewed With: patient  Patient Agreement with Plan of Care: agrees  Goal: Individualization and Mutuality  Outcome: Ongoing (interventions implemented as appropriate)  Goal: Discharge Needs Assessment  Outcome: Ongoing (interventions implemented as appropriate)  Goal: Interprofessional Rounds/Family Conf  Outcome: Ongoing (interventions implemented as appropriate)     Problem: Overarching Goals (Adult)  Goal: Adheres to Safety Considerations for Self and Others  Outcome: Ongoing (interventions implemented as appropriate)  Flowsheets (Taken 5/8/2020 0408)  Adheres to Safety Considerations for Self and Others: making progress toward outcome  Goal: Optimized Coping Skills in Response to Life Stressors  Outcome: Ongoing (interventions implemented as appropriate)  Flowsheets (Taken 5/8/2020 0408)  Optimized Coping Skills in Response to Life Stressors: making progress toward outcome  Goal: Develops/Participates in Therapeutic [place] to Support Successful Transition  Outcome: Ongoing (interventions implemented as appropriate)  Flowsheets (Taken 5/8/2020 0408)  Develops/Participates in Therapeutic [place] to Support Successful Transition: making progress toward outcome     Problem: Cognitive Impairment (Psychotic Signs/Symptoms) (Adult)  Goal: Improved Thought Clarity/Organization (Psychotic Signs/Symptoms)  Outcome: Ongoing (interventions implemented as appropriate)  Flowsheets (Taken 5/8/2020 0408)  Improved Thought  Clarity/Organization Action Step (STG) Outcome: making progress toward outcome     Problem: Psychomotor Movement Impairment (Psychotic Signs/Symptoms) (Adult)  Goal: Improved Psychomotor Symptoms (Psychotic Signs/Symptoms)  Outcome: Ongoing (interventions implemented as appropriate)  Flowsheets (Taken 5/8/2020 0408)  Improved Psychomotor Symptoms Action Step (STG) Outcome: making progress toward outcome     Problem: Mental State/Mood Impairment (Psychotic Signs/Symptoms) (Adult)  Goal: Improved Mental State/Mood (Psychotic Signs/Symptoms)  Outcome: Ongoing (interventions implemented as appropriate)  Flowsheets (Taken 5/8/2020 0408)  Improved Mental State/Mood Action Step (STG) Outcome: making progress toward outcome     Problem: Skin Injury Risk (Adult)  Goal: Skin Health and Integrity  Outcome: Ongoing (interventions implemented as appropriate)  Flowsheets (Taken 5/8/2020 0408)  Skin Health and Integrity: making progress toward outcome     Problem: Fall Risk (Adult)  Goal: Absence of Fall  Outcome: Ongoing (interventions implemented as appropriate)  Flowsheets (Taken 5/8/2020 0408)  Absence of Fall: making progress toward outcome
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
## 2020-05-08 NOTE — PROGRESS NOTES
Staff seems to be noting some improvement, however the patient remains extremely psychomotorically retarded though she does speak a bit during today's interview.  I will add low-dose methylphenidate in hopes of addressing her ongoing psychomotoric retardation.

## 2020-05-08 NOTE — PLAN OF CARE
Patient responding to questions today, but with a significant response lag and following commands. Worked with PT/OT and was able to transfer from bed to chair and walk with a walker. Her renal function has worsened and because pt is now c/o a sore throat she states she unable to drink fluids. RN and MD noted there to be some thrush in her mouth potentially causing difficulty swallowing. Pt had a swallow study, but did not actively participate. Denies SI and HI, but admits to having anxiety and depression,  Problem: Patient Care Overview  Goal: Plan of Care Review  Outcome: Ongoing (interventions implemented as appropriate)  Goal: Individualization and Mutuality  Outcome: Ongoing (interventions implemented as appropriate)  Goal: Discharge Needs Assessment  Outcome: Ongoing (interventions implemented as appropriate)  Goal: Interprofessional Rounds/Family Conf  Outcome: Ongoing (interventions implemented as appropriate)     Problem: Overarching Goals (Adult)  Goal: Adheres to Safety Considerations for Self and Others  Outcome: Ongoing (interventions implemented as appropriate)  Goal: Optimized Coping Skills in Response to Life Stressors  Outcome: Ongoing (interventions implemented as appropriate)  Goal: Develops/Participates in Therapeutic [place] to Support Successful Transition  Outcome: Ongoing (interventions implemented as appropriate)     Problem: Cognitive Impairment (Psychotic Signs/Symptoms) (Adult)  Goal: Improved Thought Clarity/Organization (Psychotic Signs/Symptoms)  Outcome: Ongoing (interventions implemented as appropriate)     Problem: Psychomotor Movement Impairment (Psychotic Signs/Symptoms) (Adult)  Goal: Improved Psychomotor Symptoms (Psychotic Signs/Symptoms)  Outcome: Ongoing (interventions implemented as appropriate)     Problem: Mental State/Mood Impairment (Psychotic Signs/Symptoms) (Adult)  Goal: Improved Mental State/Mood (Psychotic Signs/Symptoms)  Outcome: Ongoing (interventions  implemented as appropriate)     Problem: Skin Injury Risk (Adult)  Goal: Skin Health and Integrity  Outcome: Ongoing (interventions implemented as appropriate)     Problem: Fall Risk (Adult)  Goal: Absence of Fall  Outcome: Ongoing (interventions implemented as appropriate)
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
## 2020-05-09 LAB
ALBUMIN SERPL-MCNC: 3 G/DL (ref 3.5–5.2)
ALBUMIN/GLOB SERPL: 0.9 G/DL
ALP SERPL-CCNC: 42 U/L (ref 39–117)
ALT SERPL W P-5'-P-CCNC: 33 U/L (ref 1–33)
ANION GAP SERPL CALCULATED.3IONS-SCNC: 14.9 MMOL/L (ref 5–15)
AST SERPL-CCNC: 26 U/L (ref 1–32)
BASOPHILS # BLD AUTO: 0.03 10*3/MM3 (ref 0–0.2)
BASOPHILS NFR BLD AUTO: 0.3 % (ref 0–1.5)
BILIRUB SERPL-MCNC: 0.4 MG/DL (ref 0.2–1.2)
BUN BLD-MCNC: 71 MG/DL (ref 8–23)
BUN/CREAT SERPL: 47.7 (ref 7–25)
CALCIUM SPEC-SCNC: 8.9 MG/DL (ref 8.6–10.5)
CHLORIDE SERPL-SCNC: 102 MMOL/L (ref 98–107)
CO2 SERPL-SCNC: 23.1 MMOL/L (ref 22–29)
CREAT BLD-MCNC: 1.49 MG/DL (ref 0.57–1)
DEPRECATED RDW RBC AUTO: 42 FL (ref 37–54)
EOSINOPHIL # BLD AUTO: 0.17 10*3/MM3 (ref 0–0.4)
EOSINOPHIL NFR BLD AUTO: 2 % (ref 0.3–6.2)
ERYTHROCYTE [DISTWIDTH] IN BLOOD BY AUTOMATED COUNT: 13 % (ref 12.3–15.4)
GFR SERPL CREATININE-BSD FRML MDRD: 35 ML/MIN/1.73
GLOBULIN UR ELPH-MCNC: 3.4 GM/DL
GLUCOSE BLD-MCNC: 115 MG/DL (ref 65–99)
HCT VFR BLD AUTO: 40.4 % (ref 34–46.6)
HGB BLD-MCNC: 13.5 G/DL (ref 12–15.9)
IMM GRANULOCYTES # BLD AUTO: 0.03 10*3/MM3 (ref 0–0.05)
IMM GRANULOCYTES NFR BLD AUTO: 0.3 % (ref 0–0.5)
LYMPHOCYTES # BLD AUTO: 2.26 10*3/MM3 (ref 0.7–3.1)
LYMPHOCYTES NFR BLD AUTO: 26.2 % (ref 19.6–45.3)
MAGNESIUM SERPL-MCNC: 2 MG/DL (ref 1.6–2.4)
MCH RBC QN AUTO: 29.9 PG (ref 26.6–33)
MCHC RBC AUTO-ENTMCNC: 33.4 G/DL (ref 31.5–35.7)
MCV RBC AUTO: 89.4 FL (ref 79–97)
MONOCYTES # BLD AUTO: 0.64 10*3/MM3 (ref 0.1–0.9)
MONOCYTES NFR BLD AUTO: 7.4 % (ref 5–12)
NEUTROPHILS # BLD AUTO: 5.48 10*3/MM3 (ref 1.7–7)
NEUTROPHILS NFR BLD AUTO: 63.8 % (ref 42.7–76)
NRBC BLD AUTO-RTO: 0 /100 WBC (ref 0–0.2)
PHOSPHATE SERPL-MCNC: 2.9 MG/DL (ref 2.5–4.5)
PLATELET # BLD AUTO: 207 10*3/MM3 (ref 140–450)
PMV BLD AUTO: 12.5 FL (ref 6–12)
POTASSIUM BLD-SCNC: 3.5 MMOL/L (ref 3.5–5.2)
PROT SERPL-MCNC: 6.4 G/DL (ref 6–8.5)
RBC # BLD AUTO: 4.52 10*6/MM3 (ref 3.77–5.28)
SODIUM BLD-SCNC: 140 MMOL/L (ref 136–145)
URATE SERPL-MCNC: 9.7 MG/DL (ref 2.4–5.7)
WBC NRBC COR # BLD: 8.61 10*3/MM3 (ref 3.4–10.8)

## 2020-05-09 PROCEDURE — 85025 COMPLETE CBC W/AUTO DIFF WBC: CPT | Performed by: INTERNAL MEDICINE

## 2020-05-09 PROCEDURE — 84550 ASSAY OF BLOOD/URIC ACID: CPT | Performed by: INTERNAL MEDICINE

## 2020-05-09 PROCEDURE — 84100 ASSAY OF PHOSPHORUS: CPT | Performed by: INTERNAL MEDICINE

## 2020-05-09 PROCEDURE — 80053 COMPREHEN METABOLIC PANEL: CPT | Performed by: INTERNAL MEDICINE

## 2020-05-09 PROCEDURE — 25010000002 ENOXAPARIN PER 10 MG: Performed by: SPECIALIST

## 2020-05-09 PROCEDURE — 83735 ASSAY OF MAGNESIUM: CPT | Performed by: INTERNAL MEDICINE

## 2020-05-09 RX ORDER — SODIUM CHLORIDE 9 MG/ML
75 INJECTION, SOLUTION INTRAVENOUS CONTINUOUS
Status: DISCONTINUED | OUTPATIENT
Start: 2020-05-09 | End: 2020-05-11

## 2020-05-09 RX ADMIN — KETOTIFEN FUMARATE 1 DROP: 0.35 SOLUTION/ DROPS OPHTHALMIC at 09:01

## 2020-05-09 RX ADMIN — FAMOTIDINE 40 MG: 40 TABLET, FILM COATED ORAL at 08:56

## 2020-05-09 RX ADMIN — DOCUSATE SODIUM 100 MG: 100 CAPSULE, LIQUID FILLED ORAL at 20:28

## 2020-05-09 RX ADMIN — LORAZEPAM 0.5 MG: 0.5 TABLET ORAL at 20:29

## 2020-05-09 RX ADMIN — SODIUM CHLORIDE 500 ML/HR: 9 INJECTION, SOLUTION INTRAVENOUS at 13:05

## 2020-05-09 RX ADMIN — DOCUSATE SODIUM 100 MG: 100 CAPSULE, LIQUID FILLED ORAL at 08:56

## 2020-05-09 RX ADMIN — SODIUM CHLORIDE 150 ML/HR: 9 INJECTION, SOLUTION INTRAVENOUS at 04:34

## 2020-05-09 RX ADMIN — KETOTIFEN FUMARATE 1 DROP: 0.35 SOLUTION/ DROPS OPHTHALMIC at 20:28

## 2020-05-09 RX ADMIN — QUETIAPINE FUMARATE 200 MG: 50 TABLET, EXTENDED RELEASE ORAL at 20:29

## 2020-05-09 RX ADMIN — LORAZEPAM 0.5 MG: 0.5 TABLET ORAL at 08:56

## 2020-05-09 RX ADMIN — ENOXAPARIN SODIUM 40 MG: 40 INJECTION SUBCUTANEOUS at 09:02

## 2020-05-09 RX ADMIN — NYSTATIN 500000 UNITS: 100000 SUSPENSION ORAL at 07:45

## 2020-05-09 RX ADMIN — SODIUM CHLORIDE, PRESERVATIVE FREE 10 ML: 5 INJECTION INTRAVENOUS at 09:07

## 2020-05-09 RX ADMIN — CLOBETASOL PROPIONATE: 0.5 CREAM TOPICAL at 20:28

## 2020-05-09 RX ADMIN — NYSTATIN 500000 UNITS: 100000 SUSPENSION ORAL at 18:35

## 2020-05-09 RX ADMIN — LORAZEPAM 0.5 MG: 0.5 TABLET ORAL at 16:38

## 2020-05-09 RX ADMIN — SODIUM CHLORIDE, PRESERVATIVE FREE 10 ML: 5 INJECTION INTRAVENOUS at 20:36

## 2020-05-09 RX ADMIN — METHYLPHENIDATE HYDROCHLORIDE 5 MG: 10 TABLET ORAL at 08:55

## 2020-05-09 RX ADMIN — NYSTATIN 500000 UNITS: 100000 SUSPENSION ORAL at 20:28

## 2020-05-09 NOTE — PLAN OF CARE
"  Problem: Patient Care Overview  Goal: Plan of Care Review  Outcome: Ongoing (interventions implemented as appropriate)  Flowsheets  Taken 5/9/2020 1645  Progress: improving  Patient Agreement with Plan of Care: agrees  Outcome Summary: Pt appears much more verbal and interactive than previously noted. Pt oriented x2 and denies SI/HI. Pt denies anxiety and depression but appears gloomy, fretful and depressed. Pt states that she feels she will never go home. Pt stated that she \"sometimes\" hears voices but denied hearing any today. Pt given 1 L bolus NaCl in afternoon and tolerated well. Pt given shower in afternoon as well. Pt ambulating well with walker. Pt compliant with medications crushed in sherbet. Will continue to monitor and provide support.   Taken 5/9/2020 0856  Plan of Care Reviewed With: patient     Problem: Patient Care Overview  Goal: Individualization and Mutuality  Outcome: Ongoing (interventions implemented as appropriate)     Problem: Patient Care Overview  Goal: Discharge Needs Assessment  Outcome: Ongoing (interventions implemented as appropriate)     Problem: Patient Care Overview  Goal: Interprofessional Rounds/Family Conf  Outcome: Ongoing (interventions implemented as appropriate)     Problem: Overarching Goals (Adult)  Goal: Adheres to Safety Considerations for Self and Others  Outcome: Ongoing (interventions implemented as appropriate)  Flowsheets (Taken 5/9/2020 1645)  Adheres to Safety Considerations for Self and Others: making progress toward outcome     Problem: Overarching Goals (Adult)  Goal: Optimized Coping Skills in Response to Life Stressors  Outcome: Ongoing (interventions implemented as appropriate)  Flowsheets (Taken 5/9/2020 1645)  Optimized Coping Skills in Response to Life Stressors: making progress toward outcome     Problem: Overarching Goals (Adult)  Goal: Develops/Participates in Therapeutic [place] to Support Successful Transition  Outcome: Ongoing (interventions " implemented as appropriate)  Flowsheets (Taken 5/9/2020 1645)  Develops/Participates in Therapeutic [place] to Support Successful Transition: making progress toward outcome     Problem: Cognitive Impairment (Psychotic Signs/Symptoms) (Adult)  Goal: Improved Thought Clarity/Organization (Psychotic Signs/Symptoms)  Outcome: Ongoing (interventions implemented as appropriate)  Flowsheets  Taken 5/6/2020 1026 by Mallika Byrd RN  Improved Thought Clarity/Organization Action Step/Short Term Goal (STG) Established: 05/06/20  Taken 5/9/2020 1645 by Terese Carson RN  Improved Thought Clarity/Organization Time Frame for Action Step (STG): 1 day  Improved Thought Clarity/Organization Action Step (STG) Outcome: making progress toward outcome     Problem: Psychomotor Movement Impairment (Psychotic Signs/Symptoms) (Adult)  Goal: Improved Psychomotor Symptoms (Psychotic Signs/Symptoms)  Outcome: Ongoing (interventions implemented as appropriate)  Flowsheets  Taken 5/6/2020 1026 by Mallika Byrd RN  Improved Psychomotor Symptoms Action Step/Short Term Goal (STG) Established: 05/06/20  Taken 5/9/2020 1645 by Terese Carson RN  Improved Psychomotor Symptoms Time Frame for Action Step (STG): 1 day  Improved Psychomotor Symptoms Action Step (STG) Outcome: making progress toward outcome     Problem: Mental State/Mood Impairment (Psychotic Signs/Symptoms) (Adult)  Goal: Improved Mental State/Mood (Psychotic Signs/Symptoms)  Outcome: Ongoing (interventions implemented as appropriate)  Flowsheets  Taken 5/6/2020 1026 by Mallika Byrd RN  Improved Mental State/Mood Action Step/Short Term Goal (STG) Established: 05/06/20  Taken 5/9/2020 1645 by Terese Carson RN  Improved Mental State/Mood Time Frame for Action Step (STG): 1 day  Improved Mental State/Mood Action Step (STG) Outcome: making progress toward outcome     Problem: Skin Injury Risk (Adult)  Goal: Skin Health and Integrity  Outcome: Ongoing  (interventions implemented as appropriate)  Flowsheets (Taken 5/9/2020 3099)  Skin Health and Integrity: making progress toward outcome

## 2020-05-09 NOTE — PLAN OF CARE
Pt cooperative and compliant with medications whole in pudding which much encouragement. Pt has had a sitter at bedside d/t safety with IV infusing this shift, which will be D/C'd at 0700 per doctors order. Pt has talked more, even asking after the IV was done infusing if she was able to leave. Educated pt that she would not be leaving in the morning, that she was still sick and required hospitalization. Will continue to provide feedback and support.    Problem: Patient Care Overview  Goal: Plan of Care Review  Outcome: Ongoing (interventions implemented as appropriate)  Flowsheets (Taken 5/9/2020 0440)  Progress: improving  Plan of Care Reviewed With: patient  Goal: Individualization and Mutuality  Outcome: Ongoing (interventions implemented as appropriate)  Goal: Discharge Needs Assessment  Outcome: Ongoing (interventions implemented as appropriate)  Goal: Interprofessional Rounds/Family Conf  Outcome: Ongoing (interventions implemented as appropriate)     Problem: Overarching Goals (Adult)  Goal: Adheres to Safety Considerations for Self and Others  Outcome: Ongoing (interventions implemented as appropriate)  Flowsheets (Taken 5/9/2020 0440)  Adheres to Safety Considerations for Self and Others: making progress toward outcome  Goal: Optimized Coping Skills in Response to Life Stressors  Outcome: Ongoing (interventions implemented as appropriate)  Flowsheets (Taken 5/9/2020 0440)  Optimized Coping Skills in Response to Life Stressors: making progress toward outcome  Goal: Develops/Participates in Therapeutic [place] to Support Successful Transition  Outcome: Ongoing (interventions implemented as appropriate)  Flowsheets (Taken 5/9/2020 0440)  Develops/Participates in Therapeutic [place] to Support Successful Transition: making progress toward outcome     Problem: Cognitive Impairment (Psychotic Signs/Symptoms) (Adult)  Goal: Improved Thought Clarity/Organization (Psychotic Signs/Symptoms)  Outcome: Ongoing  (interventions implemented as appropriate)  Flowsheets (Taken 5/9/2020 0440)  Improved Thought Clarity/Organization Action Step (STG) Outcome: making progress toward outcome     Problem: Psychomotor Movement Impairment (Psychotic Signs/Symptoms) (Adult)  Goal: Improved Psychomotor Symptoms (Psychotic Signs/Symptoms)  Outcome: Ongoing (interventions implemented as appropriate)  Flowsheets (Taken 5/9/2020 0440)  Improved Psychomotor Symptoms Action Step (STG) Outcome: making progress toward outcome     Problem: Mental State/Mood Impairment (Psychotic Signs/Symptoms) (Adult)  Goal: Improved Mental State/Mood (Psychotic Signs/Symptoms)  Outcome: Ongoing (interventions implemented as appropriate)  Flowsheets (Taken 5/9/2020 0440)  Improved Mental State/Mood Action Step (STG) Outcome: making progress toward outcome     Problem: Skin Injury Risk (Adult)  Goal: Skin Health and Integrity  Outcome: Ongoing (interventions implemented as appropriate)  Flowsheets (Taken 5/9/2020 0440)  Skin Health and Integrity: making progress toward outcome     Problem: Fall Risk (Adult)  Goal: Absence of Fall  Outcome: Ongoing (interventions implemented as appropriate)  Flowsheets (Taken 5/9/2020 0440)  Absence of Fall: making progress toward outcome

## 2020-05-09 NOTE — PROGRESS NOTES
The patient is beginning to show slow improvement.  She is drowsy this morning and does not awaken for interview but staff reports that she has been more cooperative with physical therapy and has engaged in more conversation.  We continue current pharmacotherapy.

## 2020-05-09 NOTE — PROGRESS NOTES
NEPHROLOGY PROGRESS NOTE    PATIENT IDENTIFICATION:   Name:  Francheska Sherman      MRN:  [id number]     65 y.o.  female             Reason for visit: AURELIO     SUBJECTIVE:   Seen and examined.  Drowsy.  Had her medication earlier today.  Discussed with the nurse.  No acute event overnight    OBJECTIVE:  Vitals:    05/08/20 0629 05/08/20 1515 05/09/20 0640 05/09/20 1119   BP: 122/84 104/72 107/68 122/72   BP Location: Right arm Right arm Left arm Left arm   Patient Position: Lying Sitting Lying Lying   Pulse: 94 99 80 76   Resp: 18 18 20 18   Temp: 98.4 °F (36.9 °C) 97.9 °F (36.6 °C) 97.7 °F (36.5 °C)    TempSrc: Oral Oral Oral    SpO2: 93% 94% 94% 96%   Weight:       Height:               Body mass index is 36.82 kg/m².    Intake/Output Summary (Last 24 hours) at 5/9/2020 1135  Last data filed at 5/9/2020 0434  Gross per 24 hour   Intake 3220 ml   Output --   Net 3220 ml     Wt Readings from Last 1 Encounters:   05/01/20 1909 107 kg (235 lb 1.6 oz)     Wt Readings from Last 3 Encounters:   05/01/20 107 kg (235 lb 1.6 oz)   04/30/20 97.4 kg (214 lb 12.8 oz)   06/11/19 112 kg (247 lb)         Exam:  GEN:  Overweight; chronically ill appearing; conversant  Psychiatric:     Flat affect; depressed mood; knows year and place but not month  EYES:   Anicteric sclera; no eye discharge  ENT:    No scleral icterus. Oral mucosa very dry; thrush  NECK:             JVD normal.   HEART: RRR no s3 or rub  Lungs:  Clear to auscultation, no wheezing; not labored on room air  ABD:  Non-tender, non-distended, + bs, no abdominal wall edema.   EXT:  Doughy, non-pitting edema upper and lower ext.     Scheduled meds:      bisoprolol 2.5 mg Oral Q24H   clobetasol  Topical Q12H   docusate sodium 100 mg Oral BID   enoxaparin 40 mg Subcutaneous Q24H   famotidine 40 mg Oral Daily   ketotifen 1 drop Both Eyes BID   LORazepam 0.5 mg Oral TID   methylphenidate 5 mg Oral Daily   nystatin 5 mL Swish & Spit 4x Daily   QUEtiapine fumarate  mg  Oral Nightly   sodium chloride 10 mL Intravenous Q12H   vitamin D 50,000 Units Oral Q7 Days     IV meds:                          sodium chloride 150 mL/hr Last Rate: 150 mL/hr (05/09/20 0434)       Data Review:    Results from last 7 days   Lab Units 05/09/20  1026 05/08/20  1307 05/08/20  0613   SODIUM mmol/L 140 139 140   POTASSIUM mmol/L 3.5 4.4 3.7   CHLORIDE mmol/L 102 97* 99   CO2 mmol/L 23.1 16.9* 22.0   BUN mg/dL 71* 103* 104*   CREATININE mg/dL 1.49* 3.47* 3.98*   CALCIUM mg/dL 8.9 9.9 9.6   BILIRUBIN mg/dL 0.4 0.7 0.5   ALK PHOS U/L 42 60 50   ALT (SGPT) U/L 33 39* 36*   AST (SGOT) U/L 26 34* 21   GLUCOSE mg/dL 115* 123* 106*     Estimated Creatinine Clearance: 48.7 mL/min (A) (by C-G formula based on SCr of 1.49 mg/dL (H)).  Results from last 7 days   Lab Units 05/09/20  1026   URIC ACID mg/dL 9.7*     Results from last 7 days   Lab Units 05/09/20  1026   MAGNESIUM mg/dL 2.0   PHOSPHORUS mg/dL 2.9       Results from last 7 days   Lab Units 05/09/20  1026 05/08/20  1307 05/08/20  0613 05/07/20  0638 05/05/20  0730   WBC 10*3/mm3 8.61 12.45* 10.99* 13.59* 10.02   HEMOGLOBIN g/dL 13.5 17.9* 15.1 16.3* 16.3*   PLATELETS 10*3/mm3 207 273 225 300 224                   ASSESSMENT:     Affective psychosis, bipolar (CMS/HCC)    Overactive bladder    Essential hypertension    Abnormal EKG    SOB (shortness of breath)    Altered mental status    GERD (gastroesophageal reflux disease)    ARDS survivor 1994 Ventilator    ADD (attention deficit disorder)    Vitamin D deficiency    Acute kidney injury (CMS/HCC)    Volume depletion     Generalized weakness    Hyperlipidemia    History of hepatitis C followed by Dr. Ranjan Qiu [id number]    Irritable bowel syndrome with both constipation and diarrhea    Hepatic steatosis    Obesity (BMI 30-39.9)    Hematuria    Elevated liver function tests    Bipolar disorder, current episode mixed, moderate (CMS/HCC)    1.  Acute kidney injury, with UOP not recorded:  AURELIO d/t  hypovolemia and hypotension, with compromised autoregulation due to ARB and diuretic. K normal; + AG metabolic acidosis.  Volume depleted  2.  Hypernatremia, resolved  3.  Bipolar disorder  4.  Hypotension  5.  Thrush  6.  Erythrocytosis: Due to significant volume contraction  7.  Transaminitis    PLAN:    Serum creatinine down to 1.4 with a potassium 3.5.  Will restart IV fluids at 75 cc/h  Surveillance labs      Fred Hernandez MD  5/9/2020    11:35

## 2020-05-10 LAB
ALBUMIN SERPL-MCNC: 3.3 G/DL (ref 3.5–5.2)
ALBUMIN/GLOB SERPL: 1 G/DL
ALP SERPL-CCNC: 45 U/L (ref 39–117)
ALT SERPL W P-5'-P-CCNC: 39 U/L (ref 1–33)
ANION GAP SERPL CALCULATED.3IONS-SCNC: 13.9 MMOL/L (ref 5–15)
AST SERPL-CCNC: 33 U/L (ref 1–32)
BASOPHILS # BLD AUTO: 0.03 10*3/MM3 (ref 0–0.2)
BASOPHILS NFR BLD AUTO: 0.4 % (ref 0–1.5)
BILIRUB SERPL-MCNC: 0.6 MG/DL (ref 0.2–1.2)
BUN BLD-MCNC: 37 MG/DL (ref 8–23)
BUN/CREAT SERPL: 43 (ref 7–25)
CALCIUM SPEC-SCNC: 9.2 MG/DL (ref 8.6–10.5)
CHLORIDE SERPL-SCNC: 101 MMOL/L (ref 98–107)
CO2 SERPL-SCNC: 23.1 MMOL/L (ref 22–29)
CREAT BLD-MCNC: 0.86 MG/DL (ref 0.57–1)
DEPRECATED RDW RBC AUTO: 41.6 FL (ref 37–54)
EOSINOPHIL # BLD AUTO: 0.19 10*3/MM3 (ref 0–0.4)
EOSINOPHIL NFR BLD AUTO: 2.2 % (ref 0.3–6.2)
ERYTHROCYTE [DISTWIDTH] IN BLOOD BY AUTOMATED COUNT: 12.9 % (ref 12.3–15.4)
GFR SERPL CREATININE-BSD FRML MDRD: 67 ML/MIN/1.73
GLOBULIN UR ELPH-MCNC: 3.4 GM/DL
GLUCOSE BLD-MCNC: 123 MG/DL (ref 65–99)
HCT VFR BLD AUTO: 43.4 % (ref 34–46.6)
HGB BLD-MCNC: 14.1 G/DL (ref 12–15.9)
IMM GRANULOCYTES # BLD AUTO: 0.04 10*3/MM3 (ref 0–0.05)
IMM GRANULOCYTES NFR BLD AUTO: 0.5 % (ref 0–0.5)
LYMPHOCYTES # BLD AUTO: 2.03 10*3/MM3 (ref 0.7–3.1)
LYMPHOCYTES NFR BLD AUTO: 23.7 % (ref 19.6–45.3)
MCH RBC QN AUTO: 29.1 PG (ref 26.6–33)
MCHC RBC AUTO-ENTMCNC: 32.5 G/DL (ref 31.5–35.7)
MCV RBC AUTO: 89.5 FL (ref 79–97)
MONOCYTES # BLD AUTO: 0.73 10*3/MM3 (ref 0.1–0.9)
MONOCYTES NFR BLD AUTO: 8.5 % (ref 5–12)
NEUTROPHILS # BLD AUTO: 5.53 10*3/MM3 (ref 1.7–7)
NEUTROPHILS NFR BLD AUTO: 64.7 % (ref 42.7–76)
NRBC BLD AUTO-RTO: 0 /100 WBC (ref 0–0.2)
PLATELET # BLD AUTO: 233 10*3/MM3 (ref 140–450)
PMV BLD AUTO: 13 FL (ref 6–12)
POTASSIUM BLD-SCNC: 3.6 MMOL/L (ref 3.5–5.2)
PROT SERPL-MCNC: 6.7 G/DL (ref 6–8.5)
RBC # BLD AUTO: 4.85 10*6/MM3 (ref 3.77–5.28)
SODIUM BLD-SCNC: 138 MMOL/L (ref 136–145)
WBC NRBC COR # BLD: 8.55 10*3/MM3 (ref 3.4–10.8)

## 2020-05-10 PROCEDURE — 80053 COMPREHEN METABOLIC PANEL: CPT | Performed by: INTERNAL MEDICINE

## 2020-05-10 PROCEDURE — 25010000002 ENOXAPARIN PER 10 MG: Performed by: SPECIALIST

## 2020-05-10 PROCEDURE — 85025 COMPLETE CBC W/AUTO DIFF WBC: CPT | Performed by: INTERNAL MEDICINE

## 2020-05-10 RX ORDER — METHYLPHENIDATE HYDROCHLORIDE 10 MG/1
5 TABLET ORAL
Status: DISCONTINUED | OUTPATIENT
Start: 2020-05-11 | End: 2020-05-20 | Stop reason: HOSPADM

## 2020-05-10 RX ADMIN — LORAZEPAM 0.5 MG: 0.5 TABLET ORAL at 16:04

## 2020-05-10 RX ADMIN — KETOTIFEN FUMARATE 1 DROP: 0.35 SOLUTION/ DROPS OPHTHALMIC at 20:12

## 2020-05-10 RX ADMIN — NYSTATIN 500000 UNITS: 100000 SUSPENSION ORAL at 12:38

## 2020-05-10 RX ADMIN — DOCUSATE SODIUM 100 MG: 100 CAPSULE, LIQUID FILLED ORAL at 08:45

## 2020-05-10 RX ADMIN — QUETIAPINE FUMARATE 200 MG: 50 TABLET, EXTENDED RELEASE ORAL at 20:04

## 2020-05-10 RX ADMIN — LORAZEPAM 0.5 MG: 0.5 TABLET ORAL at 08:44

## 2020-05-10 RX ADMIN — METHYLPHENIDATE HYDROCHLORIDE 5 MG: 10 TABLET ORAL at 08:43

## 2020-05-10 RX ADMIN — DOCUSATE SODIUM 100 MG: 100 CAPSULE, LIQUID FILLED ORAL at 20:04

## 2020-05-10 RX ADMIN — ENOXAPARIN SODIUM 40 MG: 40 INJECTION SUBCUTANEOUS at 08:47

## 2020-05-10 RX ADMIN — FAMOTIDINE 40 MG: 40 TABLET, FILM COATED ORAL at 08:43

## 2020-05-10 RX ADMIN — SODIUM CHLORIDE, PRESERVATIVE FREE 10 ML: 5 INJECTION INTRAVENOUS at 08:04

## 2020-05-10 RX ADMIN — BISOPROLOL FUMARATE 2.5 MG: 5 TABLET ORAL at 08:43

## 2020-05-10 RX ADMIN — SODIUM CHLORIDE, PRESERVATIVE FREE 10 ML: 5 INJECTION INTRAVENOUS at 22:51

## 2020-05-10 RX ADMIN — LORAZEPAM 0.5 MG: 0.5 TABLET ORAL at 20:04

## 2020-05-10 RX ADMIN — NYSTATIN 500000 UNITS: 100000 SUSPENSION ORAL at 20:12

## 2020-05-10 RX ADMIN — ERGOCALCIFEROL 50000 UNITS: 1.25 CAPSULE ORAL at 08:45

## 2020-05-10 RX ADMIN — NYSTATIN 500000 UNITS: 100000 SUSPENSION ORAL at 18:09

## 2020-05-10 RX ADMIN — CLOBETASOL PROPIONATE: 0.5 CREAM TOPICAL at 20:12

## 2020-05-10 RX ADMIN — KETOTIFEN FUMARATE 1 DROP: 0.35 SOLUTION/ DROPS OPHTHALMIC at 08:04

## 2020-05-10 RX ADMIN — NYSTATIN 500000 UNITS: 100000 SUSPENSION ORAL at 07:37

## 2020-05-10 NOTE — PROGRESS NOTES
[facility]IE [facility]IC[facility]  INTERNAL MEDICINE  TOÑITO LOJA MD  [address] [place] [address]  Phone [phone] Fax [phone]  E-mail:  [email or web address]      INTERNAL MEDICINE DAILY PROGRESS NOTE  Toñito Loja M.D.  2020              Patient Identification:  Name: Francheska Sherman  Age: 65 y.o.  Sex: female  :  1955  MRN: [id number]         Primary Care Physician: Toñito Loja MD  LENGTH OF STAY 8 DAYS    Consults     Date and Time Order Name Status Description    2020 0830 Inpatient Nephrology Consult      2020 1854 Inpatient Hospitalist Consult Completed     2020 1143 Inpatient Infectious Diseases Consult Completed     2020 0834 Inpatient Neurology Consult General Completed     2020 1205 Inpatient Psychiatrist Consult Completed     2020 2244 Inpatient Gastroenterology Consult Completed     2020 224 Inpatient Urology Consult Completed     2020 Inpatient Nephrology Consult Completed     2020 1913 Family Medicine Consult Completed               Chief Complaint:  Affective Psychosis, Bipolar Disorder with acute behavioral change     History of Present Illness:     Subjective         Interval History: Patient is a 65 y.o.female who presented with altered mental status and generalized weakness with dehydration to the [facility] emergency room by private car on 2020.  Most of the history is provided by her  who brought her to the hospital since patient was quite confused and unable to really provide much concrete detail and during her initial evaluation.  Patient is regularly followed by Dr. Hernesto Ernst in psychiatry at [facility] and has in the past been admitted to [facility] for episodes very similar to this one.  Patient does suffer from bipolar disorder and takes Seroquel on a regular basis for control and stabilization of this issue.  A few weeks ago,  patient was feeling good and decided to discontinue her Seroquel.  She also discontinued several of her other medications including her blood pressure medicine and her vitamin D.  Patient's  noticed that over the last 2 weeks she has begun to have a decline in her mental l condition and specifically he noticed that she was having increased confusion, insomnia, and extreme paranoia at times.  The  contacted Dr. Ernst and was instructed to start the patient back on Seroquel at 50 mg daily with titration up to 100 mg after 1 week on the 50 mg dose.  She has now had the 100 mg dose for the last 4 days but has not really responded to the medication at this point.  Patient has been talking nonstop and sleeping less than 4 hours per night.  This is very similar to episode she has had in the past that have been described as hypomanic in nature.  Patient did threaten to leave the home but has been has been able to manage this by using the alarm system at home to no when she goes out the door and has had her under careful observation for the last 10 days.  In the last 48 hours, patient has been drinking little to no liquid and on 2 occasions the  has found her collapsed and on the floor.  He had no evidence of broken bones or excessive trauma and each time he has been able to eventually get her back up on a chair or the sofa.  When he discussed bringing her to the hospital, patient has had an extreme fear of contacting another individual with COVID-19 and has refused to come in.  Patient has had no exposure to other individuals in over a month and has no symptoms of COVID-19 such as fever, cough, or shortness of breath.   was finally able to convince the patient to come to the emergency room yesterday evening when her weakness became so extreme that she could not get up to move about the house.  She denied nausea, vomiting, or diarrhea.  Family helped the  get her into his truck to bring  "her to the emergency room and ER staff helped get her out of the truck and bring her into the facility for evaluation.     Mrs. Sherman is a delightful 65-year-old female who I had the pleasure of following in my office since she transferred there just prior to snf of her regular physician, my former partner, Lemuel Turcios MD.   the medical problems for which I follow her include: Hypertension, hyperlipidemia, overactive bladder, and vitamin D deficiency.  Her compliance with medications and treating these issues has been somewhat marginal.  Her last set of labs in January 2020 did show normal liver function, normal electrolytes, mild hyperlipidemia with a cholesterol of 226, normal thyroid function, and a normal STD work-up.  The STD work-up was done at request of the patient because she was concerned that \" her  might be cheating on her\".  In general, patient seen today quite stable mentally at the time of her last visit with me on March 6 when she came in with complaints of urinary frequency and otherwise had a normal medical checkup.  Review of records shows that she did see Dr. Fernandez Hooks MD and recently underwent endoscopy and colonoscopy on June 11, 2019.  Polyps were removed by cold biopsy and plans for 3-year follow-up were instituted. Dr. Hooks did feel that the patient met criteria for the diagnosis of irritable bowel syndrome with alternating diarrhea and constipation.  Patient also sees Dr. Greg Stevens MD in urology for overactive bladder and has been controlled fairly well on Enablex.  Patient did see Dr. Zee in cardiology because of an abnormal EKG in 2019 and did have an exercise stress test with myocardial perfusion SPECT on 5/22/19.  She had requested medical treatment for the findings of that test which showed ejection fraction 60%, normal myocardial perfusion with no evidence of ischemia, and test consistent with a low risk study.     Patient was seen in the [facility] emergency room by Dr. Naldo Hi on 4/2520 at 1914.  At the time, patient presented with increasing confusion.   reported that this confusion was related to unstable bipolar disorder and discontinuation of her medication, Seroquel, that she takes for treatment of the disorder.  The ER physician was able to confirm that she had started back on this medication at the request of her psychiatric physician and has been titrated from 50 mg/day up to 100 mg/day recently.  Despite this attempted outpatient titration of the medication, patient had been refusing to eat or drink anything and had developed significant weakness.  On the day of admission she was unable to get out of bed on her own that she denied any chest pain or abdominal pain at the time.  In addition, patient had no evidence of nausea vomiting or diarrhea. Review of systems could not be performed due to the patient's mental status change.  Allergies were reported to codeine, morphine, and sulfa.  Physical exam in the emergency room showed that temperature was 96.5, pulse 85, respiratory rate 17, and blood pressure 145/92 with a sat of 95%.  Exam by the ER physician showed mucous membranes were quite dry.  Patient was awake and answering some questions appropriately such as name and age but was unable to really relate any history of the events which led to her visit in the ER.  Her exam was otherwise totally normal.  Lab results did show several abnormalities: Glucose elevated 123, BUN elevated 78, creatinine elevated at 1.60, and sodium elevated at 151.  Her liver function tests were also elevated with ALT of 236, and AST of 415.  She apparently had normal liver function test at a visit with her hepatitis C physician Dr. Woodruff just 1 month ago.  Patient had no evidence of alcohol in her blood.  Her white blood cell count was 14.62, her hemoglobin was 16.9, hematocrit was 50.8, and her platelet count was normal at 310,000.  Her magnesium was  slightly high at 2.9.  Thyroid function tests were normal.  Urine drug screen was totally negative but UA did show 3+ blood 1+ protein 1+ leukocyte esterase.  CT of head was unremarkable and chest x-ray was unremarkable.  EKG done soon after admission did show a normal sinus rhythm PAC, nonspecific ST changes and slightly prolonged QT interval.     4/26/20.  I personally saw the patient at the time of my rounds on 4/26/2020.  Nurse was present in the room at the time of my visit.  The patient had not spoken with others during the day, she did respond to my voice and actually open her eyes.  She did have a brief conversation with me before falling back off to sleep.  Renal has been adjusting her fluids to replace her volume deficit.  They will continue to follow this with us.  Urology did order a CT scan of abdomen and pelvis with and without contrast.  After consultation with renal, we elected to do the CT with out contrast only.  Results are pending at the time of my visit.  Patient was also seen in consultation by gastroenterology.  They feel that her elevated liver function tests are probably a result of the volume depletion status.  Hopefully these will improve as her volume is corrected.  Otherwise there are no major changes in the patient's condition from their perspective.  They also plan to continue following patient.  Psychiatry did see patient briefly but she was not cooperative with their exam.  Dr. Cruz will see patient tomorrow for further evaluation medication recommendations.  We suspect that the patient will need to be treated in the inpatient psychiatric unit at least short-term in order to stabilize the medications if her patterns follow previous pattern with respect to this issue.  Patient otherwise is hemodynamically stable at the present time.  We did do a blood gas since she is quite lethargic but that was within normal limits.     4/27/2020.  Patient resting quietly in bed at the time  of my rounds this evening.  I did discuss the case at length with the patient's nurse who went to the room with me.  Patient is a little more alert this evening and actually good agreed to take a few bites of ice cream.  In fact, she was feeling well enough to request strawberry ice cream instead of the vanilla ice cream that was available on the floor.  Patient remains quite weak.  She has not been out of bed at this time.  The nurse and I agreed that it would probably be appropriate to have PT or OT attempt to get patient up to bedside chair tomorrow if possible.  Patient has been declining her medications including the Seroquel XR that she is supposed to take each evening for her bipolar state.  Patient does seem oriented to person and place but somewhat lost in time.  GI feels she is stable from their point of view.  Elevated liver function tests probably are secondary to myoglobinuria which occurred after patient has been laying around and in bed most of the time.  Renal continues to adjust fluid for the patient and was agreeable to starting her blood pressure medications again.  Blood pressure did run high throughout the day yesterday and required treatment with Vasotec IV.  Urology had little further to add with the patient today but will plan outpatient cystoscopy after discharge.  Psychiatry did see patient in consultation and they feel she would be appropriate for their Geropsych unit to continue medication adjustment and stabilization of patient's psychiatric situation.  They are happy to take her in transfer once she is medically stable.  Urine culture so far shows no growth.     4/28/2020.  Patient still resting quietly in her bed at the time of my rounds in her room on 4 [address].  Patient does wake up as I entered the room and is semi-responsive but her answers to questions are relatively unintelligible and she tends to just mumble words.  Patient has eaten and drunk very little today despite efforts from  nursing staff to encourage her to do so.  Apparently she would not even take orange sherbet which is 1 of her favorite foods when offered to her by the staff.  I did speak briefly to the patient's nurse.  She reported no real new changes in the patient's condition.  Review of notes from occupational therapy showed that the patient did sit up on the side of the bed but did not ambulate effectively at this point.  Patient has not been taking most of her medication at this point.  Psychiatry is still following the patient with plans to admit the patient to the psychiatric unit when she is medically stable.  I discussed the case at length with the patient's .  He feels that this is very similar to a breakdown that she had several years ago which required hospitalization in the psychiatric unit at our [facility].  Nonetheless, after our discussion, I am recommending a neurology consultation and we will obtain an MRI to be sure that there is been no cerebrovascular accident causing these changes.  I also would like to get speech therapy to see the patient in consultation to be sure that her swallow is effective.  We may have to consider short-term feeding tube to stabilize the patient and provide a route for giving her medicine.     4/29/2020.  Patient is essentially unchanged on my rounds today.  She does say a few words but makes no coherent statements.  She still has been refusing her meds and only taking small bites occasionally of food with great urging from the nursing staff.  We did have neurology see the patient in consultation today and I spoke at length with  regarding his findings.  She has no real evidence of stroke.  She does have a small tremor.  MRI was done and did not show anything acute though it does show some hardening of the arteries and a tiny old infarct that radiology feels is insignificant with respect to the current findings.  I also have infectious disease see the patient to  be sure they feel the question of sepsis is completely ruled out.  I did agree with discontinuing all antibiotics at this point and do not feel that there is any signs of acute infection.  Renal has essentially signed off the case also.  Currently she is receiving IV fluids at 50 cc/h.  Speech is to see the patient first thing in the morning and determine swallowing safety.  I did discuss the situation with [facility] and they do feel that if patient needs a short-term stabilization with a Dobbhoff tube for medications and for fluids this could be continued on the psych unit.  This would be done as a short-term tool to continue medically stabilizing her while giving psychiatry an opportunity to adjust her bipolar medications.  Patient is urinating well.  Her labs now show normal BUN, creatinine, and potassium levels.  White blood cell count is now normal at 8.9 and there is no sign of anemia.  Her magnesium has all so returned to normal levels.  At this point, the patient does appear to be medically stable and it  ahould be possible to transfer her to psychiatry tomorrow if we can resolve the issues surrounding her nutritional and medication intake.     4/30/2020.  Patient more awake and alert today than previously at the time of my rounds.  She did take a small amount of her breakfast including some orange sherbet and a few bites of eggs.  Nurse was able to get the patient to take her pills both last night and this morning.  Blood pressure is running slightly up and I did restart her beta-blocker and diuretic which have helped with this issue in the past.  We will monitor her hydration status with labs.  I am going to discontinue the IV fluids and hope to cannulate more thirst to promote increased oral intake.  Nursing continues to encourage patient to eat small bites.  Patient did talk in brief sentences to her  on the phone today.  I did review the case with him and we both felt patient was appropriate  for transfer to psychiatry.  Neurology work-up is complete and relatively negative.  ID work-up is also complete.  Renal has signed off on the case.  At this point it is felt that behavioral modification is needed and adjustment of her psychiatric meds in order to return her to her baseline.  Hopefully psychiatry will be able to manage this in their unit.  I did speak with the access center and they do have a bed available for the patient tomorrow after her discharge is completed in the morning.  Transfer orders are complete and will be signed off in a.m.  If bed is available.  I also discussed the case with the patient's nurse and with PCP.     5/1/2020.  Patient had a quiet evening last night and seems stable still this a.m.  Psychiatry planning discharge from their unit today and at that point a bed will be available for patient in their unit which seems appropriate.  Patient does not need feeding tube at this time since she has started taking small bites of food and small sips of fluids.  We do have a saline lock still in place so patient could be given IV bolus if needed for dehydration.  We will continue to follow patient in the psychiatric unit and monitor her electrolytes while there.  Patient does have a short conversation with me today.  She is watching [employer] and states that she does not like the fake needs.  Patient did work with OT and sat on the edge of bed with better control of body yesterday.  Speech therapy evaluated patient today and does feel that she has an adequate swallow and her trouble eating is behavioral in nature.  At this point patient has maximized benefit from hospitalization and is medically stable.  She will be ready for discharge to psychiatry when bed is available.     ________________________________________________________________________________________________     TRANSFER TO PSYCHIATRY Attending Dr. Cruz  Internal Medicine Consult   [place]  ________________________________________________________________________________________________     5/2/2020.  I was consulted by Dr. Cruz for an internal medicine consult to evaluate this patient.  Patient was seen with nursing assistant present in her room on psychiatry which was room 3330.  Patient was resting quietly in bed but did seem to recognize me as I entered the room and smiled.  She mumbled a few words but made no real coherent conversation while I was in the room.  I spoke with nursing aide who was at bedside.  Patient is currently on with a sitter.  Apparently she did manage to get up well with physical therapy today and sat in the chair at bedside for almost an hour.  She had very little breakfast but lunch did eat all the fruit on her tray and tried to eat some of the meat but unfortunately could not chew the roast beef that was brought to her on the tray today.  I am going to try changing the consistency of the meat and see if she is able to better handle that chopped up.  There is a speech therapy consult in place for the patient on Monday.  Patient seems to be feeling well.  She has no specific complaints of pain or discomfort.  Blood pressure is now under better control.  I did speak with the  and reviewed the case with him.  He is pleased with her progress.  We did obtain an abdominal x-ray series at the request of the  to be sure constipation is not a problem.  She has a bit of excessive gas in her abdomen but there is no significant stool burden or other signs of bowel blockage.  Seroquel has been increased to her regular dialysis which is 150 mg daily.  I also decrease the dose slightly of her losartan to 50 mg which is her usual dose.  We have added Ziac and I will continue to monitor blood pressures while she is on this medication.  She has taken it in the past with excellent control of her blood pressure on that medication.  Overall patient seems to be  "medically stable.  I will continue to follow her with you and see her again on Monday.  We will obtain labs on Monday morning.    5/4/2020. Patient much more interactive than when previously seen 2 days ago.  She completes simple sentences but at times makes no sense.  She has been drinking and eating a bit better today though her labs from earlier this a.m. did show some mild dehydration again.  I have reconsulted the renal team that was following her on an inpatient basis for their input and suggestions on this issue.  Nursing staff has been encouraged to push oral fluids overnight tonight and will try their best to get the patient to take more oral intake.  Patient did drink 2 large glasses of water just prior to my arrival on the unit.  She has been taking her medicines more regularly.  Patient set up in a chair by bedside for approximately 3 hours today.  She does seem to be getting stronger and hopefully is beginning to react favorably to reinstitution of her medication therapy.  We did modify her oxygen orders to say that she only needs the oxygen if sats are below 88% and she did come out to the TV room today and interacted briefly with other residents there.  We continue to hope that the patient will gradually improve.  Her  hopes that she will be able to return home once things totally stabilize.    5/5/2020.  Patient sitting up in chair in room at the time of my visit with sitter at bedside.  Patient reportedly had a fairly good day though she did have one episode of defiance when she refused to eat or take her medicine at dinnertime.  Otherwise she did drink fairly well and a small amount of her food.  She is being offered boost on a regular basis to improve her nutritional status.  Patient seems much brighter to me at the time of my exam tonight.  I asked her where she would want to be if she was not here in the hospital at [facility] and she told me \"[facility]\" According to , they do " have a condo there and have traveled extensively to that location.  Patient is friendly but has difficulty putting her speech together.  At times seems to say random words that do not come together in sentences.  Her swelling seems a bit improved.  Labs today are also improved with a significant improvement in her creatinine.  Vital signs show that she is afebrile but her blood pressure was up slightly earlier this evening at 166/88.  We are continuing to monitor this closely.  Will recheck labs in 2 days.  Renal consult still pending.    5/9/2020.  Patient much more alert and conversant at the time of my rounds today.  She is sitting up in the bedside chair with walker in front of her.  Patient states that she is planning to take a shower this afternoon and once a big glass of cold ice water prior to the shower.  Her appetite has improved and she is eating more of her tray.  She did receive IV fluids and is in the middle of a 1 L bolus at the time of my visit.  Her renal function has significantly improved today.  Renal continues to follow and I did review their note.  It appears that her decreased volume status in combination with her Diovan HCTZ was the reason for the elevated BUN and creatinine and with discontinuation of the medication and IV rehydration she is nearly back to baseline.  Blood pressure off the medication is a bit higher but still only in the normal range of 130/74.  Patient's conversation is still a bit confused anytime and she does make some paranoid statements from time to time.  Patient states that her , Tate, is now  to his second wife.  She feels that she has nowhere to go when she is discharged.   himself has been very involved with her care and has spoken with me several times over the course of this hospitalization.  He intends to take her home as soon as she is stable enough to maintain her own hydration status and ambulate without falling.  Overall patient's  condition does not seem to be improving and we will continue to follow    Review of Systems:               Review of systems could not be obtained due to  patient confusion.         Past Medical History:   Diagnosis Date   • ADD (attention deficit disorder)    • Anxiety    • ARDS survivor    • Chest pain    • Encounter for annual health examination     Annual Health Assessment: 14, 10/25/13   • GERD (gastroesophageal reflux disease)    • History of mammogram 2011   • Hyperactivity of bladder    • Hypertension    • Pain of right side of body     c/o pain in right side and back   • Psychiatric illness    • Sepsis (CMS/HCC)      Past Surgical History:   Procedure Laterality Date   • APPENDECTOMY     • BLADDER REPAIR     •  SECTION     • CHEST TUBE INSERTION     • CHOLECYSTECTOMY     • COLONOSCOPY N/A 2019    Procedure: COLONOSCOPY into cecum and TI with cold biopsy polypectomies;  Surgeon: Fernandez Hooks MD;  Location: [facility] ENDOSCOPY;  Service: Gastroenterology   • ENDOSCOPY N/A 2019    Procedure: ESOPHAGOGASTRODUODENOSCOPY with biopsies;  Surgeon: Fernandez Hooks MD;  Location: [facility] ENDOSCOPY;  Service: Gastroenterology   • HYSTERECTOMY       Allergies   Allergen Reactions   • Codeine    • Morphine Delirium   • Sulfa Antibiotics    as  Family History   Problem Relation Age of Onset   • Liver disease Mother    • Crohn's disease Brother    e Brother         Socioeconomic History   • Marital status:      Spouse name: Tate Sherman   • Number of children: Not on file   • Years of education: Not on file   • Highest education level: Not on file   Occupational History   • Occupation: store owner     Employer: SELF-EMPLOYED   • Occupation: real-estate    Tobacco Use   • Smoking status: Former Smoker     Types: Cigarettes     Last attempt to quit: 1994     Years since quittin.3   • Smokeless tobacco: Never Used   Substance and Sexual Activity   • Alcohol use:  "No   • Drug use: No   • Sexual activity: Defer     Birth control/protection: Surgical   Social History Narrative    Lives with     Co-owner of garden store    Real-estate        PMH, FH, SH and ROS completed with Admission History and Physical and updated in EPIC system.        Objective     Scheduled Meds:    bisoprolol 2.5 mg Oral Q24H   clobetasol  Topical Q12H   docusate sodium 100 mg Oral BID   enoxaparin 40 mg Subcutaneous Q24H   famotidine 40 mg Oral Daily   ketotifen 1 drop Both Eyes BID   LORazepam 0.5 mg Oral TID   methylphenidate 5 mg Oral Daily   nystatin 5 mL Swish & Spit 4x Daily   QUEtiapine fumarate  mg Oral Nightly   sodium chloride 10 mL Intravenous Q12H   vitamin D 50,000 Units Oral Q7 Days     Continuous Infusions:    sodium chloride 75 mL/hr Last Rate: 500 mL/hr (05/09/20 1305)       Vital signs in last 24 hours:  Temp:  [97.6 °F (36.4 °C)-97.7 °F (36.5 °C)] 97.6 °F (36.4 °C)  Heart Rate:  [74-80] 74  Resp:  [18-20] 18  BP: (107-130)/(68-74) 130/74    Intake/Output:    Intake/Output Summary (Last 24 hours) at 5/9/2020 2340  Last data filed at 5/9/2020 1746  Gross per 24 hour   Intake 1490 ml   Output --   Net 1490 ml       Exam:  /74 (BP Location: Right arm, Patient Position: Sitting)   Pulse 74   Temp 97.6 °F (36.4 °C) (Oral)   Resp 18   Ht 170.2 cm (67\")   Wt 107 kg (235 lb 1.6 oz)   SpO2 97%   BMI 36.82 kg/m²     Constitutional:  Alert, semi-cooperative, no distress, AAOx1, resting comfortably in chair at bedside, drinking fluids but currently on IV route due to elevated renal   Head:      Normocephalic, without obvious abnormality, atraumatic   Eyes:     PERRLA, conjunctiva/corneas clear, no icterus, no conjunctival                                     pallor, EOM's intact, both eyes      ENT and Mouth: Lips, tongue, gums normal; oral mucosa pink and moist   Neck:     Supple, symmetrical, trachea midline, no JVD  Respiratory:     Clear to auscultation " bilaterally, respirations unlabored  Cardiovascular:  Regular rate and rhythm, S1 and S2 normal, no murmur,      no  Rub or gallop.  Pulses normal.    Gastrointestinal:   BS present x 4 Soft, non-tender, bowel sounds active,      no masses, no hepatosplenomegaly                                                     :       No hernia.  Normal exam for sex.         Musculoskeletal: Extremities normal, atraumatic, no cyanosis or edema     No arthropathy.  No deformity.  Gait normal                                                 Skin:   Skin is warm and dry,  no rashes, swelling or palpable lesions   Neurologic:  CN -XII intact, motor strength grossly intact, sensation grossly intact to light touch, no focal reflex deficits noted    Psychiatric:     Alert,oriented X2, more alert and conversant, no delusions, psychoses, depression or anxiety    Heme/Lymph/Imun:   No bruises, petechiae.  Lymph nodes normal in size/configuration       Data Review:  Lab Results   Component Value Date    CALCIUM 8.9 05/09/2020    PHOS 2.9 05/09/2020     Results from last 7 days   Lab Units 05/09/20  1026 05/08/20  1307 05/08/20  0613   AST (SGOT) U/L 26 34* 21   MAGNESIUM mg/dL 2.0  --   --    SODIUM mmol/L 140 139 140   POTASSIUM mmol/L 3.5 4.4 3.7   CHLORIDE mmol/L 102 97* 99   CO2 mmol/L 23.1 16.9* 22.0   BUN mg/dL 71* 103* 104*   CREATININE mg/dL 1.49* 3.47* 3.98*   GLUCOSE mg/dL 115* 123* 106*   CALCIUM mg/dL 8.9 9.9 9.6   WBC 10*3/mm3 8.61 12.45* 10.99*   HEMOGLOBIN g/dL 13.5 17.9* 15.1   PLATELETS 10*3/mm3 207 273 225   ALT (SGPT) U/L 33 39* 36*     Lab Results   Component Value Date    CKTOTAL 109 04/30/2020    TROPONINT <0.010 04/26/2020     Estimated Creatinine Clearance: 48.7 mL/min (A) (by C-G formula based on SCr of 1.49 mg/dL (H)).  WEIGHTS:     Wt Readings from Last 1 Encounters:   05/01/20 1909 107 kg (235 lb 1.6 oz)         Assessment:    Affective psychosis, bipolar (CMS/HCC)    Altered mental status    Acute kidney injury  (CMS/HCC)    Volume depletion     Generalized weakness    Elevated liver function tests    Bipolar disorder, current episode mixed, moderate (CMS/HCC)    Overactive bladder    Essential hypertension    Abnormal EKG    SOB (shortness of breath)    GERD (gastroesophageal reflux disease)    Vitamin D deficiency    Hyperlipidemia    History of hepatitis C followed by Dr. Ranjan Qiu [id number]    Irritable bowel syndrome with both constipation and diarrhea    Hepatic steatosis    Hematuria    ARDS survivor 1994 Ventilator    ADD (attention deficit disorder)    Obesity (BMI 30-39.9)      Attending Physician Assessment and Plan:    1.  Altered mental status in patient with history of bipolar disorder who has recently been noncompliant with her medication, Seroquel.  Suspect etiology for this problem is medication imbalance despite recent reinitiation of her Seroquel dosing as directed by Dr. Ernst, her regular psychiatrist.  Based on previous history, I suspect patient will need transfer to psychiatry for medication adjustment and stabilization once her medical disorders as discussed below are better controlled.  Dr. Cruz is agreeable to this plan of treatment.  Patient still quite confused.  Neurology consult planned. No neurologic abnormalities found and MRI unremarkable.  ID also saw patient in consultation and found no ongoing evidence of infection.  Patient medically stable and ready for discharge to psychiatric unit when bed available.  In psychiatry unit patient slowly improving.  We have gone back up to her regular dose of Seroquel with psychiatry to plan further medication changes at this point.  Continues to take her medications regularly.  Slow but steady improvement noted with regular medications.  Patient appears more alert today and makes more sensible conversation.  Still has some paranoid ideation and evidence of depression.  2.  Severe volume depletion with elevated renal function test.  Etiology  is probable poor p.o. intake while psychiatric medications were out of balance.  Patient has critical elevations in both BUN, and creatinine, fluid resuscitation was initiated in the emergency room.  We are consulting renal for their input on the situation and for their help in adjusting her fluids at this point.  Suspect this correction will take 1 to 2 days.  Will monitor electrolytes carefully during this time.  Continued improvement in electrolytes and in particular with renal function tests.  Continue to monitor labs regularly.  Now fully rehydrated and renal has signed off.  Encouraging p.o. intake as much as possible.  I did discuss this with her nurse in the psychiatric unit.  Nursing staff is pushing oral fluids.  Renal has been reconsulted for their input on volume status.  Checking labs every other day to monitor levels of stability with respect to volume status.  Volume depletion repleted using IV fluids.  Renal following.  3.  Elevated CPK possibly secondary to acute kidney injury versus rhabdomyolysis.  I will send urine for myoglobin studies.  We will continue to monitor CPK.  Renal is following this issue with us at the present time.  Hopefully will resolve with increased hydration and volume stabilization.  Patient does appear to have some mild rhabdomyolysis.  CPK is clearing.  Elevated CPK resolving. rhabdomyolysis has resolved   4.  Generalized weakness felt to be secondary to electrolyte instability.  Will correct electrolytes.  Have added potassium protocol to her medications.  Will monitor elevated magnesium which is probably result of hemoconcentration.  Suspect elevated hemoglobin is also caused by the same etiology.  Will ask PT and OT to begin evaluation of patient.  Able to sit up on side of bed.  PT continuing to work with patient.  On first day and psychiatry much more successful getting patient up to chair.  Patient continues to get stronger.  Walked herself to the restroom earlier  this evening.  Set up in chair for 3 hours today.  Out to TV room today and set up in chair even longer.  Planning to take shower today and more alert.  5.  Positive sepsis screen with elevated white blood cell count. I suspect the most likely etiology is urine which shows significant hematuria and elevated leukocytosis.  Urology has been consulted for their input since they follow the patient regularly for bladder dysfunction.  I have started patient on IV Rocephin pending results of urine culture.  We will continue to monitor carefully but white count has improved in the first few hours.  I seriously doubt that patient has true sepsis at the present time.   does indicate that her mental status changes have been provoked by urinary tract infections in the past.  Culture of urine negative so far.  Will consider discontinuation of antibiotics if this persists.  Urine culture negative.  CT scan of chest negative.  We will proceed to discontinue antibiotics at this point and monitor patient.  ID finds no evidence of ongoing sepsis.   6.  Elevated liver function tests in patient with history of hepatitis.C treated in the past with Harvoni.  Etiology of this finding unclear at present time.  Abdominal ultrasound unremarkable.  GI consult requested.  Suspect may relate to the acute kidney injury more than an intrinsic problem with liver.  Did send hepatitis C viral load to be sure this has not resurfaced.  Will follow liver function test over the next couple days to be sure they do resolve.  Felt to probably be secondary to rhabdomyolysis.  Will monitor.  Will recheck labs on Monday  7.  Overactive bladder.  Patient's regular medication is nonformulary.  Have added substitution for now but may need to consider having  bring in medication from home if she does not respond favorably to the generic substitution.  Has discontinued medication at 's request.  Generally she does not need meds for the  overactive bladder.  8.  Essential hypertension with recent discontinuation of medications.  Blood pressure is elevated slightly and I have resumed her medications.  We will follow blood pressure and make a decision on this prior to her discharge.  Losartan is adjusted to 50 mg a day.  I will continue to monitor blood pressure with the  9.  Abnormal EKG with no reported shortness of breath at present time.  Suspect these are chronic changes.  We have completed a stress test in 2019 that was low risk for ischemic issues.  We will continue to monitor but suspect CPK is more elevated due to the renal/muscle issues and to any cardiac issue.  Will check troponin just to be sure it is not significantly elevated.  10.  Vitamin D deficiency.  Starting patient back on 50,000 units of vitamin D weekly for now.  We will continue to monitor and treat as needed.  11.  Hyperlipidemia.  Will check lipid profile while here in hospital and adjust medication and diet as needed to treat this issue.  12.  Irritable bowel syndrome with alternating diarrhea and constipation.  This problem sounds like it stable at present time.  Will add no new treatment currently.     Plan for disposition:Where: to Home and When:  Psychiatry feels she is stable     Dr. Loja will continue to follow patient with you and can be reached at [phone].        Toñito Loja MD  5/9/2020  6114

## 2020-05-10 NOTE — PLAN OF CARE
Problem: Patient Care Overview  Goal: Plan of Care Review  Outcome: Ongoing (interventions implemented as appropriate)  Flowsheets (Taken 5/10/2020 0507)  Progress: improving  Plan of Care Reviewed With: patient  Patient Agreement with Plan of Care: agrees  Note:   Pt more verbally appropriate this shift. Pt at times did make some confused comments such as stating she is not  and has never been . Later in the night the patient was speaking with this RN more sensibly, and was able to articulate that she needed to get up to the bathroom. Gait appears more steady with walker and 1 person stand by assist. The patient was walking per self with her walker up and down the hallway early in the evening. Pt did drink more this shift, taking in 2 whole styrofoam cups of water. Pt also was compliant with her medications crushed in ice cream, and took a few extra bites of the ice cream. Pt has remained continent during the shift thus far. Will continue to monitor.   Goal: Individualization and Mutuality  Outcome: Ongoing (interventions implemented as appropriate)  Goal: Discharge Needs Assessment  Outcome: Ongoing (interventions implemented as appropriate)  Goal: Interprofessional Rounds/Family Conf  Outcome: Ongoing (interventions implemented as appropriate)     Problem: Overarching Goals (Adult)  Goal: Adheres to Safety Considerations for Self and Others  Outcome: Ongoing (interventions implemented as appropriate)  Flowsheets (Taken 5/9/2020 1645 by Terese Carson RN)  Adheres to Safety Considerations for Self and Others: making progress toward outcome  Goal: Optimized Coping Skills in Response to Life Stressors  Outcome: Ongoing (interventions implemented as appropriate)  Flowsheets (Taken 5/9/2020 1645 by Terese Carson RN)  Optimized Coping Skills in Response to Life Stressors: making progress toward outcome  Goal: Develops/Participates in Therapeutic [place] to Support Successful Transition  Outcome: Ongoing  (interventions implemented as appropriate)  Flowsheets (Taken 5/9/2020 1645 by Terese Carson RN)  Develops/Participates in Therapeutic [place] to Support Successful Transition: making progress toward outcome     Problem: Cognitive Impairment (Psychotic Signs/Symptoms) (Adult)  Goal: Improved Thought Clarity/Organization (Psychotic Signs/Symptoms)  Outcome: Ongoing (interventions implemented as appropriate)  Flowsheets  Taken 5/10/2020 0507 by Terese Wong RN  Improved Thought Clarity/Organization Time Frame for Action Step (STG): 3 days  Taken 5/9/2020 1645 by Terese Carson RN  Improved Thought Clarity/Organization Action Step (STG) Outcome: making progress toward outcome     Problem: Psychomotor Movement Impairment (Psychotic Signs/Symptoms) (Adult)  Goal: Improved Psychomotor Symptoms (Psychotic Signs/Symptoms)  Outcome: Ongoing (interventions implemented as appropriate)  Flowsheets  Taken 5/10/2020 0507 by Terese Wong RN  Improved Psychomotor Symptoms Time Frame for Action Step (STG): 3 days  Taken 5/9/2020 1645 by Terese Carson RN  Improved Psychomotor Symptoms Action Step (STG) Outcome: making progress toward outcome     Problem: Mental State/Mood Impairment (Psychotic Signs/Symptoms) (Adult)  Goal: Improved Mental State/Mood (Psychotic Signs/Symptoms)  Outcome: Ongoing (interventions implemented as appropriate)  Flowsheets  Taken 5/10/2020 0507 by Terese Wong RN  Improved Mental State/Mood Time Frame for Action Step (STG): 3 days  Taken 5/9/2020 1645 by Terese Carson RN  Improved Mental State/Mood Action Step (STG) Outcome: making progress toward outcome     Problem: Skin Injury Risk (Adult)  Goal: Skin Health and Integrity  Outcome: Ongoing (interventions implemented as appropriate)  Flowsheets (Taken 5/10/2020 0507)  Skin Health and Integrity: making progress toward outcome     Problem: Fall Risk (Adult)  Goal: Absence of Fall  Outcome: Ongoing (interventions implemented as  appropriate)  Flowsheets (Taken 5/10/2020 3991)  Absence of Fall: making progress toward outcome

## 2020-05-10 NOTE — PLAN OF CARE
Problem: Patient Care Overview  Goal: Plan of Care Review  Outcome: Ongoing (interventions implemented as appropriate)  Flowsheets  Taken 5/10/2020 1510  Progress: improving  Outcome Summary: Pt continues to show improvement in ambulation and ADLs. Pt oriented x2. Pt much more verbal and occasionally will voice some paranoid ideations and make bizarre statements. Pt denies hallucinations. Pt confused at times and stated that somebody switched her room. Pt preoccupied with wanting to clean her room and take a shower. Pt compliant with medications and did attend one group therapy. Will continue to monitor and provide support.   Taken 5/10/2020 0843  Plan of Care Reviewed With: patient  Patient Agreement with Plan of Care: agrees     Problem: Patient Care Overview  Goal: Individualization and Mutuality  Outcome: Ongoing (interventions implemented as appropriate)     Problem: Patient Care Overview  Goal: Discharge Needs Assessment  Outcome: Ongoing (interventions implemented as appropriate)     Problem: Patient Care Overview  Goal: Interprofessional Rounds/Family Conf  Outcome: Ongoing (interventions implemented as appropriate)     Problem: Overarching Goals (Adult)  Goal: Adheres to Safety Considerations for Self and Others  Outcome: Ongoing (interventions implemented as appropriate)  Flowsheets (Taken 5/10/2020 1510)  Adheres to Safety Considerations for Self and Others: making progress toward outcome     Problem: Overarching Goals (Adult)  Goal: Optimized Coping Skills in Response to Life Stressors  Outcome: Ongoing (interventions implemented as appropriate)  Flowsheets (Taken 5/10/2020 1510)  Optimized Coping Skills in Response to Life Stressors: making progress toward outcome     Problem: Overarching Goals (Adult)  Goal: Develops/Participates in Therapeutic [place] to Support Successful Transition  Outcome: Ongoing (interventions implemented as appropriate)  Flowsheets (Taken 5/10/2020  1510)  Develops/Participates in Therapeutic [place] to Support Successful Transition: making progress toward outcome     Problem: Cognitive Impairment (Psychotic Signs/Symptoms) (Adult)  Goal: Improved Thought Clarity/Organization (Psychotic Signs/Symptoms)  Outcome: Ongoing (interventions implemented as appropriate)  Flowsheets  Taken 5/6/2020 1026 by Mallika Byrd RN  Improved Thought Clarity/Organization Action Step/Short Term Goal (STG) Established: 05/06/20  Taken 5/10/2020 1510 by Terese Carson RN  Improved Thought Clarity/Organization Time Frame for Action Step (STG): 4 days  Improved Thought Clarity/Organization Action Step (STG) Outcome: making progress toward outcome     Problem: Psychomotor Movement Impairment (Psychotic Signs/Symptoms) (Adult)  Goal: Improved Psychomotor Symptoms (Psychotic Signs/Symptoms)  Outcome: Ongoing (interventions implemented as appropriate)  Flowsheets  Taken 5/6/2020 1026 by Mallika Byrd RN  Improved Psychomotor Symptoms Action Step/Short Term Goal (STG) Established: 05/06/20  Taken 5/10/2020 1510 by Terese Carson RN  Improved Psychomotor Symptoms Time Frame for Action Step (STG): 4 days  Improved Psychomotor Symptoms Action Step (STG) Outcome: making progress toward outcome     Problem: Mental State/Mood Impairment (Psychotic Signs/Symptoms) (Adult)  Goal: Improved Mental State/Mood (Psychotic Signs/Symptoms)  Outcome: Ongoing (interventions implemented as appropriate)  Flowsheets  Taken 5/6/2020 1026 by Mallika Byrd RN  Improved Mental State/Mood Action Step/Short Term Goal (STG) Established: 05/06/20  Taken 5/10/2020 1510 by Terese Carson RN  Improved Mental State/Mood Time Frame for Action Step (STG): 4 days  Improved Mental State/Mood Action Step (STG) Outcome: making progress toward outcome     Problem: Skin Injury Risk (Adult)  Goal: Skin Health and Integrity  Outcome: Ongoing (interventions implemented as appropriate)  Flowsheets (Taken  5/10/2020 1510)  Skin Health and Integrity: making progress toward outcome     Problem: Fall Risk (Adult)  Goal: Absence of Fall  Outcome: Ongoing (interventions implemented as appropriate)  Flowsheets (Taken 5/10/2020 1510)  Absence of Fall: making progress toward outcome

## 2020-05-10 NOTE — PROGRESS NOTES
The patient seems marginally improved today.  She is walking in the ribeiro using her walker and is a bit more verbal.  Her trend of improvement is cautiously encouraging at this point.  I will increase her Ritalin to twice daily dosing.

## 2020-05-10 NOTE — PROGRESS NOTES
NEPHROLOGY PROGRESS NOTE    PATIENT IDENTIFICATION:   Name:  Francheska Sherman      MRN:  [id number]     65 y.o.  female             Reason for visit: AURELIO     SUBJECTIVE:   Seen and examined. Discussed with the nurse.  No acute event overnight    OBJECTIVE:  Vitals:    05/09/20 0640 05/09/20 1119 05/09/20 1452 05/10/20 0655   BP: 107/68 122/72 130/74 116/70   BP Location: Left arm Left arm Right arm Left arm   Patient Position: Lying Lying Sitting Lying   Pulse: 80 76 74 82   Resp: 20 18 18 16   Temp: 97.7 °F (36.5 °C)  97.6 °F (36.4 °C) 96.8 °F (36 °C)   TempSrc: Oral  Oral Oral   SpO2: 94% 96% 97% 92%   Weight:       Height:               Body mass index is 36.82 kg/m².    Intake/Output Summary (Last 24 hours) at 5/10/2020 1045  Last data filed at 5/10/2020 0824  Gross per 24 hour   Intake 860 ml   Output --   Net 860 ml     Wt Readings from Last 1 Encounters:   05/01/20 1909 107 kg (235 lb 1.6 oz)     Wt Readings from Last 3 Encounters:   05/01/20 107 kg (235 lb 1.6 oz)   04/30/20 97.4 kg (214 lb 12.8 oz)   06/11/19 112 kg (247 lb)         Exam:  GEN:  Overweight; chronically ill appearing; conversant  Psychiatric:     Flat affect; depressed mood; knows year and place but not month  EYES:   Anicteric sclera; no eye discharge  ENT:    No scleral icterus. Oral mucosa very dry; thrush  NECK:             JVD normal.   HEART: RRR no s3 or rub  Lungs:  Clear to auscultation, no wheezing; not labored on room air  ABD:  Non-tender, non-distended, + bs, no abdominal wall edema.   EXT:  Doughy, non-pitting edema upper and lower ext.     Scheduled meds:      bisoprolol 2.5 mg Oral Q24H   clobetasol  Topical Q12H   docusate sodium 100 mg Oral BID   enoxaparin 40 mg Subcutaneous Q24H   famotidine 40 mg Oral Daily   ketotifen 1 drop Both Eyes BID   LORazepam 0.5 mg Oral TID   methylphenidate 5 mg Oral Daily   nystatin 5 mL Swish & Spit 4x Daily   QUEtiapine fumarate  mg Oral Nightly   sodium chloride 10 mL Intravenous  Q12H   vitamin D 50,000 Units Oral Q7 Days     IV meds:                          sodium chloride 75 mL/hr Last Rate: 500 mL/hr (05/09/20 1305)       Data Review:    Results from last 7 days   Lab Units 05/10/20  0932 05/09/20  1026 05/08/20  1307   SODIUM mmol/L 138 140 139   POTASSIUM mmol/L 3.6 3.5 4.4   CHLORIDE mmol/L 101 102 97*   CO2 mmol/L 23.1 23.1 16.9*   BUN mg/dL 37* 71* 103*   CREATININE mg/dL 0.86 1.49* 3.47*   CALCIUM mg/dL 9.2 8.9 9.9   BILIRUBIN mg/dL 0.6 0.4 0.7   ALK PHOS U/L 45 42 60   ALT (SGPT) U/L 39* 33 39*   AST (SGOT) U/L 33* 26 34*   GLUCOSE mg/dL 123* 115* 123*     Estimated Creatinine Clearance: 84.3 mL/min (by C-G formula based on SCr of 0.86 mg/dL).  Results from last 7 days   Lab Units 05/09/20  1026   URIC ACID mg/dL 9.7*     Results from last 7 days   Lab Units 05/09/20  1026   MAGNESIUM mg/dL 2.0   PHOSPHORUS mg/dL 2.9       Results from last 7 days   Lab Units 05/10/20  0932 05/09/20  1026 05/08/20  1307 05/08/20  0613 05/07/20  0638   WBC 10*3/mm3 8.55 8.61 12.45* 10.99* 13.59*   HEMOGLOBIN g/dL 14.1 13.5 17.9* 15.1 16.3*   PLATELETS 10*3/mm3 233 207 273 225 300                   ASSESSMENT:     Affective psychosis, bipolar (CMS/HCC)    Overactive bladder    Essential hypertension    Abnormal EKG    SOB (shortness of breath)    Altered mental status    GERD (gastroesophageal reflux disease)    ARDS survivor 1994 Ventilator    ADD (attention deficit disorder)    Vitamin D deficiency    Acute kidney injury (CMS/HCC)    Volume depletion     Generalized weakness    Hyperlipidemia    History of hepatitis C followed by Dr. Ranjan Qiu [id number]    Irritable bowel syndrome with both constipation and diarrhea    Hepatic steatosis    Obesity (BMI 30-39.9)    Hematuria    Elevated liver function tests    Bipolar disorder, current episode mixed, moderate (CMS/HCC)    1.  Acute kidney injury, with UOP not recorded:  AURELIO d/t hypovolemia and hypotension, with compromised autoregulation due to  ARB and diuretic. K normal; + AG metabolic acidosis.  Volume depleted  2.  Hypernatremia, resolved  3.  Bipolar disorder  4.  Hypotension  5.  Thrush  6.  Erythrocytosis: Due to significant volume contraction  7.  Transaminitis    PLAN:  Kidney functio is back to baseline  No further need for IVF  Avoid thiazide at discharge  Plan to start torsemide 20 mg daily  BP is acceptable .   D/W nurse  Surveillance labs      Fred Hernandez MD  5/10/2020    10:45

## 2020-05-11 LAB
ALBUMIN SERPL-MCNC: 2.9 G/DL (ref 3.5–5.2)
ALBUMIN/GLOB SERPL: 1 G/DL
ALP SERPL-CCNC: 37 U/L (ref 39–117)
ALT SERPL W P-5'-P-CCNC: 35 U/L (ref 1–33)
ANION GAP SERPL CALCULATED.3IONS-SCNC: 13.5 MMOL/L (ref 5–15)
AST SERPL-CCNC: 26 U/L (ref 1–32)
BASOPHILS # BLD AUTO: 0.02 10*3/MM3 (ref 0–0.2)
BASOPHILS NFR BLD AUTO: 0.3 % (ref 0–1.5)
BILIRUB SERPL-MCNC: 0.5 MG/DL (ref 0.2–1.2)
BUN BLD-MCNC: 23 MG/DL (ref 8–23)
BUN/CREAT SERPL: 31.9 (ref 7–25)
CALCIUM SPEC-SCNC: 8.9 MG/DL (ref 8.6–10.5)
CHLORIDE SERPL-SCNC: 101 MMOL/L (ref 98–107)
CO2 SERPL-SCNC: 24.5 MMOL/L (ref 22–29)
CREAT BLD-MCNC: 0.72 MG/DL (ref 0.57–1)
DEPRECATED RDW RBC AUTO: 40.7 FL (ref 37–54)
EOSINOPHIL # BLD AUTO: 0.23 10*3/MM3 (ref 0–0.4)
EOSINOPHIL NFR BLD AUTO: 4 % (ref 0.3–6.2)
ERYTHROCYTE [DISTWIDTH] IN BLOOD BY AUTOMATED COUNT: 12.8 % (ref 12.3–15.4)
GFR SERPL CREATININE-BSD FRML MDRD: 82 ML/MIN/1.73
GLOBULIN UR ELPH-MCNC: 2.9 GM/DL
GLUCOSE BLD-MCNC: 91 MG/DL (ref 65–99)
HCT VFR BLD AUTO: 37.1 % (ref 34–46.6)
HGB BLD-MCNC: 12.4 G/DL (ref 12–15.9)
IMM GRANULOCYTES # BLD AUTO: 0.02 10*3/MM3 (ref 0–0.05)
IMM GRANULOCYTES NFR BLD AUTO: 0.3 % (ref 0–0.5)
LYMPHOCYTES # BLD AUTO: 2.67 10*3/MM3 (ref 0.7–3.1)
LYMPHOCYTES NFR BLD AUTO: 46.4 % (ref 19.6–45.3)
MCH RBC QN AUTO: 29.5 PG (ref 26.6–33)
MCHC RBC AUTO-ENTMCNC: 33.4 G/DL (ref 31.5–35.7)
MCV RBC AUTO: 88.1 FL (ref 79–97)
MONOCYTES # BLD AUTO: 0.55 10*3/MM3 (ref 0.1–0.9)
MONOCYTES NFR BLD AUTO: 9.5 % (ref 5–12)
NEUTROPHILS # BLD AUTO: 2.27 10*3/MM3 (ref 1.7–7)
NEUTROPHILS NFR BLD AUTO: 39.5 % (ref 42.7–76)
NRBC BLD AUTO-RTO: 0 /100 WBC (ref 0–0.2)
PLATELET # BLD AUTO: 160 10*3/MM3 (ref 140–450)
PMV BLD AUTO: 13 FL (ref 6–12)
POTASSIUM BLD-SCNC: 3.4 MMOL/L (ref 3.5–5.2)
POTASSIUM BLD-SCNC: 3.9 MMOL/L (ref 3.5–5.2)
PROT SERPL-MCNC: 5.8 G/DL (ref 6–8.5)
RBC # BLD AUTO: 4.21 10*6/MM3 (ref 3.77–5.28)
SODIUM BLD-SCNC: 139 MMOL/L (ref 136–145)
WBC NRBC COR # BLD: 5.76 10*3/MM3 (ref 3.4–10.8)

## 2020-05-11 PROCEDURE — 85025 COMPLETE CBC W/AUTO DIFF WBC: CPT | Performed by: INTERNAL MEDICINE

## 2020-05-11 PROCEDURE — 25010000002 ENOXAPARIN PER 10 MG: Performed by: SPECIALIST

## 2020-05-11 PROCEDURE — 84132 ASSAY OF SERUM POTASSIUM: CPT | Performed by: INTERNAL MEDICINE

## 2020-05-11 PROCEDURE — 97110 THERAPEUTIC EXERCISES: CPT

## 2020-05-11 PROCEDURE — 97535 SELF CARE MNGMENT TRAINING: CPT

## 2020-05-11 PROCEDURE — 80053 COMPREHEN METABOLIC PANEL: CPT | Performed by: INTERNAL MEDICINE

## 2020-05-11 RX ORDER — POTASSIUM CHLORIDE 750 MG/1
40 CAPSULE, EXTENDED RELEASE ORAL AS NEEDED
Status: DISCONTINUED | OUTPATIENT
Start: 2020-05-11 | End: 2020-05-19

## 2020-05-11 RX ORDER — POTASSIUM CHLORIDE 1.5 G/1.77G
40 POWDER, FOR SOLUTION ORAL AS NEEDED
Status: DISCONTINUED | OUTPATIENT
Start: 2020-05-11 | End: 2020-05-19

## 2020-05-11 RX ADMIN — NYSTATIN 500000 UNITS: 100000 SUSPENSION ORAL at 20:38

## 2020-05-11 RX ADMIN — DOCUSATE SODIUM 100 MG: 100 CAPSULE, LIQUID FILLED ORAL at 20:38

## 2020-05-11 RX ADMIN — NYSTATIN 500000 UNITS: 100000 SUSPENSION ORAL at 18:38

## 2020-05-11 RX ADMIN — KETOTIFEN FUMARATE 1 DROP: 0.35 SOLUTION/ DROPS OPHTHALMIC at 09:03

## 2020-05-11 RX ADMIN — METHYLPHENIDATE HYDROCHLORIDE 5 MG: 10 TABLET ORAL at 09:02

## 2020-05-11 RX ADMIN — LORAZEPAM 0.5 MG: 0.5 TABLET ORAL at 16:04

## 2020-05-11 RX ADMIN — QUETIAPINE FUMARATE 200 MG: 50 TABLET, EXTENDED RELEASE ORAL at 20:38

## 2020-05-11 RX ADMIN — CLOBETASOL PROPIONATE: 0.5 CREAM TOPICAL at 20:38

## 2020-05-11 RX ADMIN — NYSTATIN 500000 UNITS: 100000 SUSPENSION ORAL at 12:52

## 2020-05-11 RX ADMIN — KETOTIFEN FUMARATE 1 DROP: 0.35 SOLUTION/ DROPS OPHTHALMIC at 20:38

## 2020-05-11 RX ADMIN — METHYLPHENIDATE HYDROCHLORIDE 5 MG: 10 TABLET ORAL at 12:53

## 2020-05-11 RX ADMIN — ENOXAPARIN SODIUM 40 MG: 40 INJECTION SUBCUTANEOUS at 12:52

## 2020-05-11 RX ADMIN — LORAZEPAM 0.5 MG: 0.5 TABLET ORAL at 20:38

## 2020-05-11 NOTE — PROGRESS NOTES
"The patient is mildly improved.  She is ambulating with a walker and is a bit more verbal, but today states that this physician had previously told her to \"get lost\" and she states that she has nowhere to go when she leaves the hospital.  Clearly, she continues to exhibit symptoms of psychotic depression but has at least emerged from her near catatonia.  "

## 2020-05-11 NOTE — PLAN OF CARE
Problem: Patient Care Overview  Goal: Individualization and Mutuality  Outcome: Ongoing (interventions implemented as appropriate)  Flowsheets (Taken 5/11/2020 1027)  Patient Personal Strengths: family/social support; medication/treatment adherence     Problem: Patient Care Overview  Goal: Interprofessional Rounds/Family Conf  Outcome: Ongoing (interventions implemented as appropriate)  Flowsheets (Taken 5/11/2020 1027)  Participants: art therapy; ; pharmacy; psychiatrist; social work; nursing  Summary: Treatment team met to review pt's plan of care.  Pt is slowly improving.  SW will continue to collaborate w/pt's family.  Progress & svcs needed will be assessed ongoing.     Problem: Cognitive Impairment (Psychotic Signs/Symptoms) (Adult)  Goal: Improved Thought Clarity/Organization (Psychotic Signs/Symptoms)  Outcome: Ongoing (interventions implemented as appropriate)  Flowsheets  Taken 5/11/2020 1027 by Sharri Ruby CSW  Improved Thought Clarity/Organization Time Frame for Action Step (STG): 4 days  Taken 5/10/2020 1510 by Terese Carson RN  Improved Thought Clarity/Organization Action Step (STG) Outcome: making progress toward outcome     Problem: Psychomotor Movement Impairment (Psychotic Signs/Symptoms) (Adult)  Goal: Improved Psychomotor Symptoms (Psychotic Signs/Symptoms)  Outcome: Ongoing (interventions implemented as appropriate)  Flowsheets  Taken 5/11/2020 1027 by Sharri Ruby CSW  Improved Psychomotor Symptoms Time Frame for Action Step (STG): 4 days  Taken 5/10/2020 1510 by Terese Carson RN  Improved Psychomotor Symptoms Action Step (STG) Outcome: making progress toward outcome     Problem: Mental State/Mood Impairment (Psychotic Signs/Symptoms) (Adult)  Goal: Improved Mental State/Mood (Psychotic Signs/Symptoms)  Outcome: Ongoing (interventions implemented as appropriate)  Flowsheets  Taken 5/11/2020 1027 by Sharri Ruby CSW  Improved Mental State/Mood Time Frame for Action  Step (STG): 4 days  Taken 5/10/2020 1510 by Alli, Terese, RN  Improved Mental State/Mood Action Step (STG) Outcome: making progress toward outcome     Patient/Guardian Signature: __________________________________            Psychiatrist Signature: ______________________________________             Therapist Signature: ________________________________________         Nurse Signature: ___________________________________________          Staff Signature: ____________________________________________            Staff Signature: ____________________________________________          Staff Signature: ____________________________________________          Staff Signature:

## 2020-05-11 NOTE — THERAPY TREATMENT NOTE
Patient Name: Francheska Sherman  : 1955    MRN: [id number]                              Today's Date: 2020       Admit Date: 2020    Visit Dx: No diagnosis found.  Patient Active Problem List   Diagnosis   • Overactive bladder   • Essential hypertension   • Abnormal EKG   • SOB (shortness of breath)   • Altered mental status   • GERD (gastroesophageal reflux disease)   • ARDS survivor  Ventilator   • ADD (attention deficit disorder)   • Vitamin D deficiency   • Acute kidney injury (CMS/HCC)   • Volume depletion    • Generalized weakness   • Hyperlipidemia   • History of hepatitis C followed by Dr. Ranjan Qiu [phone]   • Irritable bowel syndrome with both constipation and diarrhea   • Hepatic steatosis   • Obesity (BMI 30-39.9)   • Hematuria   • Elevated liver function tests   • Bipolar disorder, current episode mixed, moderate (CMS/HCC)   • Affective psychosis, bipolar (CMS/HCC)     Past Medical History:   Diagnosis Date   • ADD (attention deficit disorder)    • Anxiety    • ARDS survivor    • Chest pain    • Encounter for annual health examination     Annual Health Assessment: 14, 10/25/13   • GERD (gastroesophageal reflux disease)    • History of mammogram 2011   • Hyperactivity of bladder    • Hypertension    • Pain of right side of body     c/o pain in right side and back   • Psychiatric illness    • Sepsis (CMS/HCC)      Past Surgical History:   Procedure Laterality Date   • APPENDECTOMY     • BLADDER REPAIR     •  SECTION     • CHEST TUBE INSERTION     • CHOLECYSTECTOMY     • COLONOSCOPY N/A 2019    Procedure: COLONOSCOPY into cecum and TI with cold biopsy polypectomies;  Surgeon: Fernandez Hooks MD;  Location: [facility] ENDOSCOPY;  Service: Gastroenterology   • ENDOSCOPY N/A 2019    Procedure: ESOPHAGOGASTRODUODENOSCOPY with biopsies;  Surgeon: Fernandez Hooks MD;  Location: [facility] ENDOSCOPY;  Service: Gastroenterology   • HYSTERECTOMY        General Information     Row Name 05/11/20 1104          PT Evaluation Time/Intention    Document Type  therapy note (daily note)  -     Mode of Treatment  physical therapy  -     Row Name 05/11/20 1104          General Information    Existing Precautions/Restrictions  fall  -     Row Name 05/11/20 1104          Cognitive Assessment/Intervention- PT/OT    Orientation Status (Cognition)  oriented to;person  -VERA     Cognitive Assessment/Intervention Comment  Pt was more clear. However, pt talked about she did not have a house to go to if d/c. She said her  is remarried. Pt is following one step commands up to 90% of the time.   -     Row Name 05/11/20 1104          Safety Issues, Functional Mobility    Safety Issues Affecting Function (Mobility)  ability to follow commands;safety precautions follow-through/compliance  -     Impairments Affecting Function (Mobility)  balance;cognition;strength  -       User Key  (r) = Recorded By, (t) = Taken By, (c) = Cosigned By    Initials Name Provider Type     Claudette Rodriguez, PT Physical Therapist        Mobility     Row Name 05/11/20 1105          Bed Mobility Assessment/Treatment    Comment (Bed Mobility)  NT - up in chair   -[facility] Name 05/11/20 1105          Sit-Stand Transfer    Sit-Stand [place] (Transfers)  contact guard  -VERA     Assistive Device (Sit-Stand Transfers)  walker, front-wheeled and then no device   -[facility] Name 05/11/20 1105          Gait/Stairs Assessment/Training    [place] Level (Gait)  stand by assist;contact guard SBA with rolling walker; CG with HHA   -VERA     Assistive Device (Gait)  walker, front-wheeled or HHA   -EVRA     Distance in Feet (Gait)  100' with rolling walker; 40' x 1 with HHA   -EVRA     Deviations/Abnormal Patterns (Gait)  gait speed decreased  -VERA     Bilateral Gait Deviations  forward flexed posture;heel strike decreased  -VERA       User Key  (r) = Recorded By, (t) = Taken By, (c) = Cosigned By     Initials Name Provider Type    Claudette Gonsalez, PT Physical Therapist        Obj/Interventions     Row Name 05/11/20 1108          Therapeutic Exercise    Lower Extremity Range of Motion (Therapeutic Exercise)  -- Standing holding wall rail -- MIP, partial knee bends and heel raises   -VERA     Exercise Type (Therapeutic Exercise)  AROM (active range of motion)  -VERA     Position (Therapeutic Exercise)  standing holding wall rail   -VERA     Sets/Reps (Therapeutic Exercise)  1/5   -VERA     Comment (Therapeutic Exercise)  needed verbal cues and demo   -VERA       User Key  (r) = Recorded By, (t) = Taken By, (c) = Cosigned By    Initials Name Provider Type    Claudette Gonsalez, PT Physical Therapist        Goals/Plan     Row Name 05/11/20 1111          Bed Mobility Goal 1 (PT)    Activity/Assistive Device (Bed Mobility Goal 1, PT)  bed mobility activities, all  -VERA     [place] Level/Cues Needed (Bed Mobility Goal 1, PT)  supervision required;conditional independence  -VERA     Time Frame (Bed Mobility Goal 1, PT)  2 weeks  -VERA     Progress/Outcomes (Bed Mobility Goal 1, PT)  goal ongoing;continuing progress toward goal  -VERA     Row Name 05/11/20 1111          Transfer Goal 1 (PT)    Activity/Assistive Device (Transfer Goal 1, PT)  sit-to-stand/stand-to-sit;bed-to-chair/chair-to-bed with/without device   -VERA     [place] Level/Cues Needed (Transfer Goal 1, PT)  supervision required  -VERA     Time Frame (Transfer Goal 1, PT)  2 weeks  -VERA     Progress/Outcome (Transfer Goal 1, PT)  good progress toward goal;goal ongoing  -VERA     Row Name 05/11/20 1111          Gait Training Goal 1 (PT)    Activity/Assistive Device (Gait Training Goal 1, PT)  gait (walking locomotion) without device   -VERA     [place] Level (Gait Training Goal 1, PT)  supervision required no device   -VERA     Distance (Gait Goal 1, PT)  150  -VERA     Time Frame (Gait Training Goal 1, PT)  2 weeks  -VERA     Progress/Outcome (Gait Training Goal 1,  PT)  goal revised this date  -VERA       User Key  (r) = Recorded By, (t) = Taken By, (c) = Cosigned By    Initials Name Provider Type    Claudette Gonsalez PT Physical Therapist        Clinical Impression     Row Name 05/11/20 1110          Pain Scale: FACES Pre/Post-Treatment    Pain: FACES Scale, Pretreatment  2-->hurts little bit  -VERA     Pain: FACES Scale, Post-Treatment  2-->hurts little bit  -VERA     Pre/Post Treatment Pain Comment  pain in low back   -VERA     Row Name 05/11/20 1110          Positioning and Restraints    Pre-Treatment Position  sitting in chair/recliner  -VERA     Post Treatment Position  chair  -VERA     In Chair  notified nsg;with other staff;sitting pt c/o stomach hurting - informed NA who then told RN   -VERA       User Key  (r) = Recorded By, (t) = Taken By, (c) = Cosigned By    Initials Name Provider Type    Claudette Gonsalez, PT Physical Therapist        Outcome Measures     Row Name 05/11/20 1114          How much help from another person do you currently need...    Turning from your back to your side while in flat bed without using bedrails?  3  -VERA     Moving from lying on back to sitting on the side of a flat bed without bedrails?  3  -VERA     Moving to and from a bed to a chair (including a wheelchair)?  3  -VERA     Standing up from a chair using your arms (e.g., wheelchair, bedside chair)?  3  -VERA     Climbing 3-5 steps with a railing?  2  -VERA     To walk in hospital room?  3  -VERA     AM-PAC 6 Clicks Score (PT)  17  -VERA     Row Name 05/11/20 1114          Functional Assessment    Outcome Measure Options  AM-PAC 6 Clicks Basic Mobility (PT)  -VERA       User Key  (r) = Recorded By, (t) = Taken By, (c) = Cosigned By    Initials Name Provider Type    Claudette Gonsalez PT Physical Therapist        Physical Therapy Education                 Title: PT OT SLP Therapies (In Progress)     Topic: Physical Therapy (In Progress)     Point: Mobility training (Done)     Description:   Instruct  learner(s) on safety and technique for assisting patient out of bed, chair or wheelchair.  Instruct in the proper use of assistive devices, such as walker, crutches, cane or brace.              Patient Friendly Description:   It's important to get you on your feet again, but we need to do so in a way that is safe for you. Falling has serious consequences, and your personal safety is the most important thing of all.        When it's time to get out of bed, one of us or a family member will sit next to you on the bed to give you support.     If your doctor or nurse tells you to use a walker, crutches, a cane, or a brace, be sure you use it every time you get out of bed, even if you think you don't need it.    Learning Progress Summary           Patient Acceptance, E,TB, VU,NR by VERA at 5/11/2020 1115    Acceptance, E,D, NR by ERIC at 5/8/2020 1512    Acceptance, E,TB, NR by VERA at 5/6/2020 1408    Acceptance, D, NR by ERIC at 5/4/2020 1456    Comment:  transfers    Acceptance, E,TB, NR by VERA at 5/2/2020 1152                   Point: Home exercise program (Not Started)     Description:   Instruct learner(s) on appropriate technique for monitoring, assisting and/or progressing patient with therapeutic exercises and activities.              Learner Progress:   Not documented in this visit.          Point: Body mechanics (Not Started)     Description:   Instruct learner(s) on proper positioning and spine alignment for patient and/or caregiver during mobility tasks and/or exercises.              Learner Progress:   Not documented in this visit.          Point: Precautions (Not Started)     Description:   Instruct learner(s) on prescribed precautions during mobility and gait tasks              Learner Progress:   Not documented in this visit.                      User Key     Initials Effective Dates Name Provider Type Discipline    LB 06/08/18 -  Liliana Patrick, PT Physical Therapist PT    VERA 06/08/18 -  Claudette Rodriguez PT  Physical Therapist PT              PT Recommendation and Plan     Outcome Summary/Treatment Plan (PT)  Anticipated Discharge Disposition (PT): (to be determined )  Plan of Care Reviewed With: patient  Progress: improving  Outcome Summary: Pt's mobility has improved dramatically since last PT visit. She transferred to stand with CG with walker and ambulating with walker with supervision.Pt said she did not use a device at home to ambulate so tried HHA with CG. Pt was able to follow commands up to 90% of the time today. She will continue to benefit from PT to improve her balance and ambulation.     Time Calculation:   PT Charges     Row Name 05/11/20 1119             Time Calculation    Start Time  0906  -      Stop Time  0918  -      Time Calculation (min)  12 min  -      PT Received On  05/11/20  -      PT - Next Appointment  05/12/20  -      PT Goal Re-Cert Due Date  05/25/20  -         Time Calculation- PT    Total Timed Code Minutes- PT  12 minute(s)  -        User Key  (r) = Recorded By, (t) = Taken By, (c) = Cosigned By    Initials Name Provider Type    Claudette Gonsalez, PT Physical Therapist        Therapy Charges for Today     Code Description Service Date Service Provider Modifiers Qty    [id number] HC PT THER PROC EA 15 MIN 5/11/2020 Claudette Rodriguez, PT GP 1          PT G-Codes  Outcome Measure Options: AM-PAC 6 Clicks Basic Mobility (PT)  AM-PAC 6 Clicks Score (PT): 17  AM-PAC 6 Clicks Score (OT): 8    Claudette Rodriguez PT  5/11/2020

## 2020-05-11 NOTE — PLAN OF CARE
Problem: Patient Care Overview  Goal: Plan of Care Review  Outcome: Ongoing (interventions implemented as appropriate)  Flowsheets (Taken 5/11/2020 1115)  Progress: improving  Plan of Care Reviewed With: patient  Patient Agreement with Plan of Care: agrees  Outcome Summary: Pt's mobility has improved dramatically since last PT visit. She transferred to stand with CG with walker and ambulating with walker with supervision.Pt said she did not use a device at home to ambulate so tried HHA with CG. Pt was able to follow commands up to 90% of the time today. She will continue to benefit from PT to improve her balance and ambulation.

## 2020-05-11 NOTE — PROGRESS NOTES
Nephrology    AURELIO has resolved; SCR is 0.7 mg/dL  Electrolytes stable other than low K  Can stop IVF once she is taking PO well  Add K protocol  Will sign off, but please call if any questions    --Carl Cody MD

## 2020-05-11 NOTE — THERAPY DISCHARGE NOTE
Acute Care - Occupational Therapy Treatment Note/Discharge  [facility]     Patient Name: Francheska Sherman  : 1955  MRN: [id number]  Today's Date: 2020     Date of Referral to OT: 20         Admit Date: 2020    Visit Dx:   No diagnosis found.  Patient Active Problem List   Diagnosis   • Overactive bladder   • Essential hypertension   • Abnormal EKG   • SOB (shortness of breath)   • Altered mental status   • GERD (gastroesophageal reflux disease)   • ARDS survivor  Ventilator   • ADD (attention deficit disorder)   • Vitamin D deficiency   • Acute kidney injury (CMS/HCC)   • Volume depletion    • Generalized weakness   • Hyperlipidemia   • History of hepatitis C followed by Dr. Ranjan Qiu [id number]   • Irritable bowel syndrome with both constipation and diarrhea   • Hepatic steatosis   • Obesity (BMI 30-39.9)   • Hematuria   • Elevated liver function tests   • Bipolar disorder, current episode mixed, moderate (CMS/HCC)   • Affective psychosis, bipolar (CMS/HCC)       Therapy Treatment    Rehabilitation Treatment Summary     Row Name 20 1548             Treatment Time/Intention    Discipline  occupational therapist  -AF      Document Type  therapy note (daily note);discharge evaluation/summary  -AF      Subjective Information  no complaints  -AF      Mode of Treatment  occupational therapy  -AF      Patient/Family Observations  sitting up in recliner chair  -AF      Patient Effort  good  -AF      Comment  increased independence with ADL and transfers.   -AF      Existing Precautions/Restrictions  fall  -AF      Recorded by [AF] Melinda Grider, OTR 20 1553      Row Name 20 1548             Cognitive Assessment/Intervention- PT/OT    Orientation Status (Cognition)  oriented to;person  -AF      Follows Commands (Cognition)  follows one step commands;75-90% accuracy;increased processing time needed  -AF      Cognitive Assessment/Intervention Comment  increased alertness and  following directions   -AF      Recorded by [AF] Melinda Grider, OTR 05/11/20 1553      Row Name 05/11/20 1548             Bed Mobility Assessment/Treatment    Comment (Bed Mobility)  not tested in up chair   -AF      Recorded by [AF] Melinda Grider, OTR 05/11/20 1553      Row Name 05/11/20 1548             Functional Mobility    Functional Mobility- Comment  walked in room, bathroom and hallway  -AF      Recorded by [AF] Melinda Grider, OTR 05/11/20 1553      Row Name 05/11/20 1548             Transfer Assessment/Treatment    Transfer Assessment/Treatment  toilet transfer;sit-stand transfer;stand-sit transfer  -AF      Recorded by [AF] Melinda Grider, OTR 05/11/20 1553      Row Name 05/11/20 1548             Bed-Chair Transfer    Bed-Chair [place] (Transfers)  stand by assist  -AF      Assistive Device (Bed-Chair Transfers)  walker, front-wheeled  -AF      Recorded by [AF] Melinda Grider, OTR 05/11/20 1553      Row Name 05/11/20 1548             Sit-Stand Transfer    Sit-Stand [place] (Transfers)  stand by assist  -AF      Assistive Device (Sit-Stand Transfers)  walker, front-wheeled  -AF      Recorded by [AF] Melinda Grider, OTR 05/11/20 1553      Row Name 05/11/20 1548             Toilet Transfer    Type (Toilet Transfer)  sit-stand;stand-sit  -AF      [place] Level (Toilet Transfer)  stand by assist  -AF      Assistive Device (Toilet Transfer)  commode;grab bars/safety frame;walker, front-wheeled  -AF      Recorded by [AF] Melinda Grider, OTR 05/11/20 1553      Row Name 05/11/20 1548             ADL Assessment/Intervention    BADL Assessment/Intervention  toileting  -AF      Recorded by [AF] Melinda Grider, OTR 05/11/20 1553      Row Name 05/11/20 1548             Grooming Assessment/Training    [place] Level (Grooming)  grooming skills;supervision  -AF      Grooming Position  supported standing  -AF      Comment (Grooming)  RWX level   -AF      Recorded by [AF] Melinda Grider, OTR 05/11/20  1553      Row Name 05/11/20 1548             Toileting Assessment/Training    [place] Level (Toileting)  toileting skills;supervision  -AF      Assistive Devices (Toileting)  commode;grab bar/safety frame;raised toilet seat  -AF      Toileting Position  supported sitting;supported standing  -AF      Comment (Toileting)  RWX level   -AF      Recorded by [AF] Melinda Grider, OTR 05/11/20 1553      Row Name 05/11/20 1548             General ROM    GENERAL ROM COMMENTS  BUE AROM WFL   -AF      Recorded by [AF] Melinda Grider, OTR 05/11/20 1553      Row Name 05/11/20 1548             MMT (Manual Muscle Testing)    General MMT Comments  BUE WFL  -AF      Recorded by [AF] Melinda Grider, OTR 05/11/20 1553      Row Name 05/11/20 1548             Static Sitting Balance    Level of [place] (Unsupported Sitting, Static Balance)  supervision  -AF      Recorded by [AF] Melinda Grider, R 05/11/20 1553      Row Name 05/11/20 1548             Static Standing Balance    Level of [place] (Supported Standing, Static Balance)  standby assist  -AF      Time Able to Maintain Position (Supported Standing, Static Balance)  1 to 2 minutes  -AF      Assistive Device Utilized (Supported Standing, Static Balance)  walker, rolling  -AF      Recorded by [AF] Melinda Grider, OTR 05/11/20 1553      Row Name 05/11/20 1548             Positioning and Restraints    Pre-Treatment Position  sitting in chair/recliner  -AF      Post Treatment Position  chair  -AF      In Chair  sitting;patient within staff view in community room  -AF      Recorded by [AF] Melinda Grider, OTR 05/11/20 1553      Row Name 05/11/20 1548             Pain Scale: Numbers Pre/Post-Treatment    Pain Scale: Numbers, Pretreatment  0/10 - no pain  -AF      Recorded by [AF] Melinda Grider, OTR 05/11/20 1553        User Key  (r) = Recorded By, (t) = Taken By, (c) = Cosigned By    Initials Name Effective Dates Discipline    AF Melinda Grider, OTR 04/14/20 -  OT              Rehab Goal Summary     Row Name 05/11/20 1554 05/11/20 1111          Bed Mobility Goal 1 (PT)    Activity/Assistive Device (Bed Mobility Goal 1, PT)  --  bed mobility activities, all  -VERA     [place] Level/Cues Needed (Bed Mobility Goal 1, PT)  --  supervision required;conditional independence  -VERA     Time Frame (Bed Mobility Goal 1, PT)  --  2 weeks  -VERA     Progress/Outcomes (Bed Mobility Goal 1, PT)  --  goal ongoing;continuing progress toward goal  -VERA        Transfer Goal 1 (PT)    Activity/Assistive Device (Transfer Goal 1, PT)  --  sit-to-stand/stand-to-sit;bed-to-chair/chair-to-bed with/without device   -VERA     [place] Level/Cues Needed (Transfer Goal 1, PT)  --  supervision required  -VERA     Time Frame (Transfer Goal 1, PT)  --  2 weeks  -VERA     Progress/Outcome (Transfer Goal 1, PT)  --  good progress toward goal;goal ongoing  -VERA        Gait Training Goal 1 (PT)    Activity/Assistive Device (Gait Training Goal 1, PT)  --  gait (walking locomotion) without device   -VERA     [place] Level (Gait Training Goal 1, PT)  --  supervision required no device   -VERA     Distance (Gait Goal 1, PT)  --  150  -VERA     Time Frame (Gait Training Goal 1, PT)  --  2 weeks  -VERA     Progress/Outcome (Gait Training Goal 1, PT)  --  goal revised this date  -VERA        Transfer Goal 1 (OT)    Progress/Outcome (Transfer Goal 1, OT)  goal met  -AF  --        Grooming Goal 1 (OT)    Progress/Outcome (Grooming Goal 1, OT)  goal met  -AF  --        Self-Feeding Goal 1 (OT)    Progress/Outcomes (Self-Feeding Goal 1, OT)  goal met  -AF  --       User Key  (r) = Recorded By, (t) = Taken By, (c) = Cosigned By    Initials Name Provider Type Discipline    Claudette Gonsalez, PT Physical Therapist PT    Melinda Light OTR Occupational Therapist OT          Occupational Therapy Education                 Title: PT OT SLP Therapies (In Progress)     Topic: Occupational Therapy (Resolved)     Point: ADL training  (Resolved)     Description:   Instruct learner(s) on proper safety adaptation and remediation techniques during self care or transfers.   Instruct in proper use of assistive devices.              Learning Progress Summary           Patient Acceptance, E, NR by LR at 5/11/2020 1525    Acceptance, E, NR,NL by LR at 5/8/2020 1601    Nonacceptance, E, NR,NL by AF at 5/2/2020 1556    Comment:  educated on OT                   Point: Home exercise program (Resolved)     Description:   Instruct learner(s) on appropriate technique for monitoring, assisting and/or progressing therapeutic exercises/activities.              Learner Progress:   Not documented in this visit.          Point: Precautions (Resolved)     Description:   Instruct learner(s) on prescribed precautions during self-care and functional transfers.              Learning Progress Summary           Patient Acceptance, E, NR by LR at 5/11/2020 1525                   Point: Body mechanics (Resolved)     Description:   Instruct learner(s) on proper positioning and spine alignment during self-care, functional mobility activities and/or exercises.              Learner Progress:   Not documented in this visit.                      User Key     Initials Effective Dates Name Provider Type Discipline    AF 04/14/20 -  Melinda Grider OTR Occupational Therapist OT    LR 08/29/19 -  Palma Back, RN Registered Nurse Nurse                OT Recommendation and Plan  Outcome Summary/Treatment Plan (OT)  Anticipated Discharge Disposition (OT): home with 24/7 care  Planned Therapy Interventions (OT Eval): BADL retraining, transfer/mobility retraining, ROM/therapeutic exercise  Therapy Frequency (OT Eval): 3 times/wk       Outcome Measures     Row Name 05/11/20 6280             How much help from another is currently needed...    Putting on and taking off regular lower body clothing?  3  -AF      Bathing (including washing, rinsing, and drying)  3  -AF      Toileting (which  includes using toilet bed pan or urinal)  3  -AF      Putting on and taking off regular upper body clothing  3  -AF      Taking care of personal grooming (such as brushing teeth)  3  -AF      Eating meals  3  -AF      AM-PAC 6 Clicks Score (OT)  18  -AF        User Key  (r) = Recorded By, (t) = Taken By, (c) = Cosigned By    Initials Name Provider Type    Melinda Light OTR Occupational Therapist           Time Calculation:    Time Calculation- OT     Row Name 05/11/20 1555             Time Calculation- OT    OT Start Time  1455  -AF      OT Stop Time  1506  -AF      OT Time Calculation (min)  11 min  -AF      Total Timed Code Minutes- OT  11 minute(s)  -AF        User Key  (r) = Recorded By, (t) = Taken By, (c) = Cosigned By    Initials Name Provider Type    Melinad Light OTR Occupational Therapist        Therapy Suggested Charges     Code   Minutes Charges    None           Therapy Charges for Today     Code Description Service Date Service Provider Modifiers Qty    06245093422 HC OT SELF CARE/MGMT/TRAIN EA 15 MIN 5/11/2020 Melinda Grider OTR GO 1               OT Discharge Summary  Anticipated Discharge Disposition (OT): home with 24/7 care  Reason for Discharge: All goals achieved, Maximum functional potential achieved  Outcomes Achieved: Able to achieve all goals within established timeline  Discharge Destination: (recommend 24 hour supervision at d/c)    GONZALO Rich  5/11/2020

## 2020-05-11 NOTE — PLAN OF CARE
Pt participated in OT session, increased to SBA with toileting, walking with RWX and increased ability to follow commands. Pt has met her OT goals and OT will sign off at this time. Pt will require 24 hour supervision at d/c and assist with IADL tasks.     Patient was wearing a face mask during this therapy encounter. Therapist used appropriate personal protective equipment including mask and gloves.  Mask used was standard procedure mask. Appropriate PPE was worn during the entire therapy session. Hand hygiene was completed before and after therapy session. Patient is not in enhanced droplet precautions.

## 2020-05-11 NOTE — PROGRESS NOTES
[facility]  INTERNAL MEDICINE  TOÑITO LOJA MD  [address] [place] [address]  Phone [phone] Fax [phone]  E-mail:  [email or web address]      INTERNAL MEDICINE DAILY PROGRESS NOTE  Toñito Loja M.D.  5/10/2020              Patient Identification:  Name: Francheska Sherman  Age: 65 y.o.  Sex: female  :  1955  MRN: [id number]         Primary Care Physician: Toñito Loja MD  LENGTH OF STAY 9 DAYS    Consults     Date and Time Order Name Status Description    2020 0830 Inpatient Nephrology Consult      2020 1854 Inpatient Hospitalist Consult Completed     2020 1143 Inpatient Infectious Diseases Consult Completed     2020 0834 Inpatient Neurology Consult General Completed     2020 1205 Inpatient Psychiatrist Consult Completed     2020 2244 Inpatient Gastroenterology Consult Completed     2020 224 Inpatient Urology Consult Completed     2020 224 Inpatient Nephrology Consult Completed     2020 1913 Family Medicine Consult Completed               Chief Complaint:  Affective Psychosis, Bipolar Disorder with acute behavioral change     History of Present Illness:     Subjective         Interval History: Patient is a 65 y.o.female who presented with altered mental status and generalized weakness with dehydration to the [facility] emergency room by private car on 2020.  Most of the history is provided by her  who brought her to the hospital since patient was quite confused and unable to really provide much concrete detail and during her initial evaluation.  Patient is regularly followed by Dr. Hernesto Ernst in psychiatry at [facility] and has in the past been admitted to [facility] for episodes very similar to this one.  Patient does suffer from bipolar disorder and takes Seroquel on a regular basis for control and stabilization of this issue.  A few weeks ago,  patient was feeling good and decided to discontinue her Seroquel.  She also discontinued several of her other medications including her blood pressure medicine and her vitamin D.  Patient's  noticed that over the last 2 weeks she has begun to have a decline in her mental l condition and specifically he noticed that she was having increased confusion, insomnia, and extreme paranoia at times.  The  contacted Dr. Ernst and was instructed to start the patient back on Seroquel at 50 mg daily with titration up to 100 mg after 1 week on the 50 mg dose.  She has now had the 100 mg dose for the last 4 days but has not really responded to the medication at this point.  Patient has been talking nonstop and sleeping less than 4 hours per night.  This is very similar to episode she has had in the past that have been described as hypomanic in nature.  Patient did threaten to leave the home but has been has been able to manage this by using the alarm system at home to no when she goes out the door and has had her under careful observation for the last 10 days.  In the last 48 hours, patient has been drinking little to no liquid and on 2 occasions the  has found her collapsed and on the floor.  He had no evidence of broken bones or excessive trauma and each time he has been able to eventually get her back up on a chair or the sofa.  When he discussed bringing her to the hospital, patient has had an extreme fear of contacting another individual with COVID-19 and has refused to come in.  Patient has had no exposure to other individuals in over a month and has no symptoms of COVID-19 such as fever, cough, or shortness of breath.   was finally able to convince the patient to come to the emergency room yesterday evening when her weakness became so extreme that she could not get up to move about the house.  She denied nausea, vomiting, or diarrhea.  Family helped the  get her into his truck to bring  "her to the emergency room and ER staff helped get her out of the truck and bring her into the facility for evaluation.     Mrs. Sherman is a delightful 65-year-old female who I had the pleasure of following in my office since she transferred there just prior to care home of her regular physician, my former partner, Lemuel Turcios MD.   the medical problems for which I follow her include: Hypertension, hyperlipidemia, overactive bladder, and vitamin D deficiency.  Her compliance with medications and treating these issues has been somewhat marginal.  Her last set of labs in January 2020 did show normal liver function, normal electrolytes, mild hyperlipidemia with a cholesterol of 226, normal thyroid function, and a normal STD work-up.  The STD work-up was done at request of the patient because she was concerned that \" her  might be cheating on her\".  In general, patient seen today quite stable mentally at the time of her last visit with me on March 6 when she came in with complaints of urinary frequency and otherwise had a normal medical checkup.  Review of records shows that she did see Dr. Fernandez Hooks MD and recently underwent endoscopy and colonoscopy on June 11, 2019.  Polyps were removed by cold biopsy and plans for 3-year follow-up were instituted. Dr. Hooks did feel that the patient met criteria for the diagnosis of irritable bowel syndrome with alternating diarrhea and constipation.  Patient also sees Dr. Greg Stevens MD in urology for overactive bladder and has been controlled fairly well on Enablex.  Patient did see Dr. Zee in cardiology because of an abnormal EKG in 2019 and did have an exercise stress test with myocardial perfusion SPECT on 5/22/19.  She had requested medical treatment for the findings of that test which showed ejection fraction 60%, normal myocardial perfusion with no evidence of ischemia, and test consistent with a low risk study.     Patient was seen in the [facility] room by Dr. Naldo Hi on 4/2520 at 1914.  At the time, patient presented with increasing confusion.   reported that this confusion was related to unstable bipolar disorder and discontinuation of her medication, Seroquel, that she takes for treatment of the disorder.  The ER physician was able to confirm that she had started back on this medication at the request of her psychiatric physician and has been titrated from 50 mg/day up to 100 mg/day recently.  Despite this attempted outpatient titration of the medication, patient had been refusing to eat or drink anything and had developed significant weakness.  On the day of admission she was unable to get out of bed on her own that she denied any chest pain or abdominal pain at the time.  In addition, patient had no evidence of nausea vomiting or diarrhea. Review of systems could not be performed due to the patient's mental status change.  Allergies were reported to codeine, morphine, and sulfa.  Physical exam in the emergency room showed that temperature was 96.5, pulse 85, respiratory rate 17, and blood pressure 145/92 with a sat of 95%.  Exam by the ER physician showed mucous membranes were quite dry.  Patient was awake and answering some questions appropriately such as name and age but was unable to really relate any history of the events which led to her visit in the ER.  Her exam was otherwise totally normal.  Lab results did show several abnormalities: Glucose elevated 123, BUN elevated 78, creatinine elevated at 1.60, and sodium elevated at 151.  Her liver function tests were also elevated with ALT of 236, and AST of 415.  She apparently had normal liver function test at a visit with her hepatitis C physician Dr. Woodruff just 1 month ago.  Patient had no evidence of alcohol in her blood.  Her white blood cell count was 14.62, her hemoglobin was 16.9, hematocrit was 50.8, and her platelet count was normal at 310,000.  Her magnesium was  slightly high at 2.9.  Thyroid function tests were normal.  Urine drug screen was totally negative but UA did show 3+ blood 1+ protein 1+ leukocyte esterase.  CT of head was unremarkable and chest x-ray was unremarkable.  EKG done soon after admission did show a normal sinus rhythm PAC, nonspecific ST changes and slightly prolonged QT interval.     4/26/20.  I personally saw the patient at the time of my rounds on 4/26/2020.  Nurse was present in the room at the time of my visit.  The patient had not spoken with others during the day, she did respond to my voice and actually open her eyes.  She did have a brief conversation with me before falling back off to sleep.  Renal has been adjusting her fluids to replace her volume deficit.  They will continue to follow this with us.  Urology did order a CT scan of abdomen and pelvis with and without contrast.  After consultation with renal, we elected to do the CT with out contrast only.  Results are pending at the time of my visit.  Patient was also seen in consultation by gastroenterology.  They feel that her elevated liver function tests are probably a result of the volume depletion status.  Hopefully these will improve as her volume is corrected.  Otherwise there are no major changes in the patient's condition from their perspective.  They also plan to continue following patient.  Psychiatry did see patient briefly but she was not cooperative with their exam.  Dr. Cruz will see patient tomorrow for further evaluation medication recommendations.  We suspect that the patient will need to be treated in the inpatient psychiatric unit at least short-term in order to stabilize the medications if her patterns follow previous pattern with respect to this issue.  Patient otherwise is hemodynamically stable at the present time.  We did do a blood gas since she is quite lethargic but that was within normal limits.     4/27/2020.  Patient resting quietly in bed at the time  of my rounds this evening.  I did discuss the case at length with the patient's nurse who went to the room with me.  Patient is a little more alert this evening and actually good agreed to take a few bites of ice cream.  In fact, she was feeling well enough to request strawberry ice cream instead of the vanilla ice cream that was available on the floor.  Patient remains quite weak.  She has not been out of bed at this time.  The nurse and I agreed that it would probably be appropriate to have PT or OT attempt to get patient up to bedside chair tomorrow if possible.  Patient has been declining her medications including the Seroquel XR that she is supposed to take each evening for her bipolar state.  Patient does seem oriented to person and place but somewhat lost in time.  GI feels she is stable from their point of view.  Elevated liver function tests probably are secondary to myoglobinuria which occurred after patient has been laying around and in bed most of the time.  Renal continues to adjust fluid for the patient and was agreeable to starting her blood pressure medications again.  Blood pressure did run high throughout the day yesterday and required treatment with Vasotec IV.  Urology had little further to add with the patient today but will plan outpatient cystoscopy after discharge.  Psychiatry did see patient in consultation and they feel she would be appropriate for their Geropsych unit to continue medication adjustment and stabilization of patient's psychiatric situation.  They are happy to take her in transfer once she is medically stable.  Urine culture so far shows no growth.     4/28/2020.  Patient still resting quietly in her bed at the time of my rounds in her room on 4 [address].  Patient does wake up as I entered the room and is semi-responsive but her answers to questions are relatively unintelligible and she tends to just mumble words.  Patient has eaten and drunk very little today despite efforts from  nursing staff to encourage her to do so.  Apparently she would not even take orange sherbet which is 1 of her favorite foods when offered to her by the staff.  I did speak briefly to the patient's nurse.  She reported no real new changes in the patient's condition.  Review of notes from occupational therapy showed that the patient did sit up on the side of the bed but did not ambulate effectively at this point.  Patient has not been taking most of her medication at this point.  Psychiatry is still following the patient with plans to admit the patient to the psychiatric unit when she is medically stable.  I discussed the case at length with the patient's .  He feels that this is very similar to a breakdown that she had several years ago which required hospitalization in the psychiatric unit at our [facility].  Nonetheless, after our discussion, I am recommending a neurology consultation and we will obtain an MRI to be sure that there is been no cerebrovascular accident causing these changes.  I also would like to get speech therapy to see the patient in consultation to be sure that her swallow is effective.  We may have to consider short-term feeding tube to stabilize the patient and provide a route for giving her medicine.     4/29/2020.  Patient is essentially unchanged on my rounds today.  She does say a few words but makes no coherent statements.  She still has been refusing her meds and only taking small bites occasionally of food with great urging from the nursing staff.  We did have neurology see the patient in consultation today and I spoke at length with  regarding his findings.  She has no real evidence of stroke.  She does have a small tremor.  MRI was done and did not show anything acute though it does show some hardening of the arteries and a tiny old infarct that radiology feels is insignificant with respect to the current findings.  I also have infectious disease see the patient to  be sure they feel the question of sepsis is completely ruled out.  I did agree with discontinuing all antibiotics at this point and do not feel that there is any signs of acute infection.  Renal has essentially signed off the case also.  Currently she is receiving IV fluids at 50 cc/h.  Speech is to see the patient first thing in the morning and determine swallowing safety.  I did discuss the situation with [facility] and they do feel that if patient needs a short-term stabilization with a Dobbhoff tube for medications and for fluids this could be continued on the psych unit.  This would be done as a short-term tool to continue medically stabilizing her while giving psychiatry an opportunity to adjust her bipolar medications.  Patient is urinating well.  Her labs now show normal BUN, creatinine, and potassium levels.  White blood cell count is now normal at 8.9 and there is no sign of anemia.  Her magnesium has all so returned to normal levels.  At this point, the patient does appear to be medically stable and it  ahould be possible to transfer her to psychiatry tomorrow if we can resolve the issues surrounding her nutritional and medication intake.     4/30/2020.  Patient more awake and alert today than previously at the time of my rounds.  She did take a small amount of her breakfast including some orange sherbet and a few bites of eggs.  Nurse was able to get the patient to take her pills both last night and this morning.  Blood pressure is running slightly up and I did restart her beta-blocker and diuretic which have helped with this issue in the past.  We will monitor her hydration status with labs.  I am going to discontinue the IV fluids and hope to cannulate more thirst to promote increased oral intake.  Nursing continues to encourage patient to eat small bites.  Patient did talk in brief sentences to her  on the phone today.  I did review the case with him and we both felt patient was appropriate  for transfer to psychiatry.  Neurology work-up is complete and relatively negative.  ID work-up is also complete.  Renal has signed off on the case.  At this point it is felt that behavioral modification is needed and adjustment of her psychiatric meds in order to return her to her baseline.  Hopefully psychiatry will be able to manage this in their unit.  I did speak with the access center and they do have a bed available for the patient tomorrow after her discharge is completed in the morning.  Transfer orders are complete and will be signed off in a.m.  If bed is available.  I also discussed the case with the patient's nurse and with PCP.     5/1/2020.  Patient had a quiet evening last night and seems stable still this a.m.  Psychiatry planning discharge from their unit today and at that point a bed will be available for patient in their unit which seems appropriate.  Patient does not need feeding tube at this time since she has started taking small bites of food and small sips of fluids.  We do have a saline lock still in place so patient could be given IV bolus if needed for dehydration.  We will continue to follow patient in the psychiatric unit and monitor her electrolytes while there.  Patient does have a short conversation with me today.  She is watching [employer] and states that she does not like the fake needs.  Patient did work with OT and sat on the edge of bed with better control of body yesterday.  Speech therapy evaluated patient today and does feel that she has an adequate swallow and her trouble eating is behavioral in nature.  At this point patient has maximized benefit from hospitalization and is medically stable.  She will be ready for discharge to psychiatry when bed is available.     ________________________________________________________________________________________________     TRANSFER TO PSYCHIATRY Attending Dr. Cruz  Internal Medicine Consult   [place]  ________________________________________________________________________________________________     5/2/2020.  I was consulted by Dr. Cruz for an internal medicine consult to evaluate this patient.  Patient was seen with nursing assistant present in her room on psychiatry which was room 3330.  Patient was resting quietly in bed but did seem to recognize me as I entered the room and smiled.  She mumbled a few words but made no real coherent conversation while I was in the room.  I spoke with nursing aide who was at bedside.  Patient is currently on with a sitter.  Apparently she did manage to get up well with physical therapy today and sat in the chair at bedside for almost an hour.  She had very little breakfast but lunch did eat all the fruit on her tray and tried to eat some of the meat but unfortunately could not chew the roast beef that was brought to her on the tray today.  I am going to try changing the consistency of the meat and see if she is able to better handle that chopped up.  There is a speech therapy consult in place for the patient on Monday.  Patient seems to be feeling well.  She has no specific complaints of pain or discomfort.  Blood pressure is now under better control.  I did speak with the  and reviewed the case with him.  He is pleased with her progress.  We did obtain an abdominal x-ray series at the request of the  to be sure constipation is not a problem.  She has a bit of excessive gas in her abdomen but there is no significant stool burden or other signs of bowel blockage.  Seroquel has been increased to her regular dialysis which is 150 mg daily.  I also decrease the dose slightly of her losartan to 50 mg which is her usual dose.  We have added Ziac and I will continue to monitor blood pressures while she is on this medication.  She has taken it in the past with excellent control of her blood pressure on that medication.  Overall patient seems to be  "medically stable.  I will continue to follow her with you and see her again on Monday.  We will obtain labs on Monday morning.    5/4/2020. Patient much more interactive than when previously seen 2 days ago.  She completes simple sentences but at times makes no sense.  She has been drinking and eating a bit better today though her labs from earlier this a.m. did show some mild dehydration again.  I have reconsulted the renal team that was following her on an inpatient basis for their input and suggestions on this issue.  Nursing staff has been encouraged to push oral fluids overnight tonight and will try their best to get the patient to take more oral intake.  Patient did drink 2 large glasses of water just prior to my arrival on the unit.  She has been taking her medicines more regularly.  Patient set up in a chair by bedside for approximately 3 hours today.  She does seem to be getting stronger and hopefully is beginning to react favorably to reinstitution of her medication therapy.  We did modify her oxygen orders to say that she only needs the oxygen if sats are below 88% and she did come out to the TV room today and interacted briefly with other residents there.  We continue to hope that the patient will gradually improve.  Her  hopes that she will be able to return home once things totally stabilize.    5/5/2020.  Patient sitting up in chair in room at the time of my visit with sitter at bedside.  Patient reportedly had a fairly good day though she did have one episode of defiance when she refused to eat or take her medicine at dinnertime.  Otherwise she did drink fairly well and a small amount of her food.  She is being offered boost on a regular basis to improve her nutritional status.  Patient seems much brighter to me at the time of my exam tonight.  I asked her where she would want to be if she was not here in the hospital at [facility] [place] and she told me \"[facility]\" According to , they do " have a condo there and have traveled extensively to that location.  Patient is friendly but has difficulty putting her speech together.  At times seems to say random words that do not come together in sentences.  Her swelling seems a bit improved.  Labs today are also improved with a significant improvement in her creatinine.  Vital signs show that she is afebrile but her blood pressure was up slightly earlier this evening at 166/88.  We are continuing to monitor this closely.  Will recheck labs in 2 days.  Renal consult still pending.    5/9/2020.  Patient much more alert and conversant at the time of my rounds today.  She is sitting up in the bedside chair with walker in front of her.  Patient states that she is planning to take a shower this afternoon and once a big glass of cold ice water prior to the shower.  Her appetite has improved and she is eating more of her tray.  She did receive IV fluids and is in the middle of a 1 L bolus at the time of my visit.  Her renal function has significantly improved today.  Renal continues to follow and I did review their note.  It appears that her decreased volume status in combination with her Diovan HCTZ was the reason for the elevated BUN and creatinine and with discontinuation of the medication and IV rehydration she is nearly back to baseline.  Blood pressure off the medication is a bit higher but still only in the normal range of 130/74.  Patient's conversation is still a bit confused anytime and she does make some paranoid statements from time to time.  Patient states that her , Tate, is now  to his second wife.  She feels that she has nowhere to go when she is discharged.   himself has been very involved with her care and has spoken with me several times over the course of this hospitalization.  He intends to take her home as soon as she is stable enough to maintain her own hydration status and ambulate without falling.  Overall patient's  condition does not seem to be improving and we will continue to follow    5/10/2020.  Patient still continuing to slowly improve.  Much more ambulatory today and did attend 1 group therapy session.  Patient remains somewhat confused with her speech patterns and does have some paranoid ideation with respect to infection risk.  Patient asks for hand  in her room and I did obtain a small bottle and gave her a small amount of hand  in her hands.  Discussed case at length with nurse.  She has been cooperative with her medicines and it does seem that her appetite is improved.  I did review Dr. Cruz's note and he has increased slightly her Ritalin dose in an effort to continue improving depression levels and overall mobility.  Patient continues to state that her  has  and new woman and that she does not know what her last name is at the present time.  Staff continue to support patient and openly discussed the errors she is making.  Situation may improve later this week when  can visit if [place] follows state guidelines for relaxation of strict made basilar policy.  Overall patient's condition is stable.  Renal notes that BUN and creatinine are normal at present and no further IV fluid needed.  I do feel we should avoid HCTZ with patient in future but did give patient a small dose of Bumex today.  We will continue to follow patient with you.    Review of Systems:               Review of systems could not be obtained due to  patient confusion.         Past Medical History:   Diagnosis Date   • ADD (attention deficit disorder)    • Anxiety    • ARDS survivor 1994   • Chest pain    • Encounter for annual health examination     Annual Health Assessment: 07/30/14, 10/25/13   • GERD (gastroesophageal reflux disease)    • History of mammogram 04/14/2011   • Hyperactivity of bladder    • Hypertension    • Pain of right side of body     c/o pain in right side and back   • Psychiatric  illness    • Sepsis (CMS/HCC)      Past Surgical History:   Procedure Laterality Date   • APPENDECTOMY     • BLADDER REPAIR     •  SECTION     • CHEST TUBE INSERTION     • CHOLECYSTECTOMY     • COLONOSCOPY N/A 2019    Procedure: COLONOSCOPY into cecum and TI with cold biopsy polypectomies;  Surgeon: Fernandez Hooks MD;  Location: [facility] ENDOSCOPY;  Service: Gastroenterology   • ENDOSCOPY N/A 2019    Procedure: ESOPHAGOGASTRODUODENOSCOPY with biopsies;  Surgeon: Fernandez Hooks MD;  Location:  [facility];  Service: Gastroenterology   • HYSTERECTOMY       Allergies   Allergen Reactions   • Codeine    • Morphine Delirium   • Sulfa Antibiotics    as  Family History   Problem Relation Age of Onset   • Liver disease Mother    • Crohn's disease Brother    e Brother         Socioeconomic History   • Marital status:      Spouse name: Tate Sherman   • Number of children: Not on file   • Years of education: Not on file   • Highest education level: Not on file   Occupational History   • Occupation: store owner     Employer: SELF-EMPLOYED   • Occupation: [employer]   Tobacco Use   • Smoking status: Former Smoker     Types: Cigarettes     Last attempt to quit: 1994     Years since quittin.3   • Smokeless tobacco: Never Used   Substance and Sexual Activity   • Alcohol use: No   • Drug use: No   • Sexual activity: Defer     Birth control/protection: Surgical   Social History Narrative    Lives with     Co-owner of [employer]       PMH, FH, SH and ROS completed with Admission History and Physical and updated in EPIC system.        Objective     Scheduled Meds:    bisoprolol 2.5 mg Oral Q24H   clobetasol  Topical Q12H   docusate sodium 100 mg Oral BID   enoxaparin 40 mg Subcutaneous Q24H   famotidine 40 mg Oral Daily   ketotifen 1 drop Both Eyes BID   LORazepam 0.5 mg Oral TID   methylphenidate 5 mg Oral Daily   nystatin 5 mL Swish & Spit 4x  "Daily   QUEtiapine fumarate  mg Oral Nightly   sodium chloride 10 mL Intravenous Q12H   vitamin D 50,000 Units Oral Q7 Days     Continuous Infusions:    sodium chloride 75 mL/hr Last Rate: 500 mL/hr (05/09/20 1305)       Vital signs in last 24 hours:  Temp:  [97.6 °F (36.4 °C)-97.7 °F (36.5 °C)] 97.6 °F (36.4 °C)  Heart Rate:  [74-80] 74  Resp:  [18-20] 18  BP: (107-130)/(68-74) 130/74    Intake/Output:    Intake/Output Summary (Last 24 hours) at 5/9/2020 2340  Last data filed at 5/9/2020 1746  Gross per 24 hour   Intake 1490 ml   Output --   Net 1490 ml       Exam:  /74 (BP Location: Right arm, Patient Position: Sitting)   Pulse 74   Temp 97.6 °F (36.4 °C) (Oral)   Resp 18   Ht 170.2 cm (67\")   Wt 107 kg (235 lb 1.6 oz)   SpO2 97%   BMI 36.82 kg/m²     Constitutional:  Alert, semi-cooperative, no distress, AAOx1, resting comfortably in chair at bedside and walking in ribeiro with walker, drinking fluids but currently on IV route due to elevated renal   Head:      Normocephalic, without obvious abnormality, atraumatic   Eyes:     PERRLA, conjunctiva/corneas clear, no icterus, no conjunctival                                     pallor, EOM's intact, both eyes      ENT and Mouth: Lips, tongue, gums normal; oral mucosa pink and moist   Neck:     Supple, symmetrical, trachea midline, no JVD  Respiratory:     Clear to auscultation bilaterally, respirations unlabored  Cardiovascular:  Regular rate and rhythm, S1 and S2 normal, no murmur,      no  Rub or gallop.  Pulses normal.    Gastrointestinal:   BS present x 4 Soft, non-tender, bowel sounds active,      no masses, no hepatosplenomegaly                                                     :       No hernia.  Normal exam for sex.         Musculoskeletal: Extremities normal, atraumatic, no cyanosis or edema     No arthropathy.  No deformity.  Gait normal                                                 Skin:   Skin is warm and dry,  no rashes, swelling or " palpable lesions   Neurologic:  CN -XII intact, motor strength grossly intact, sensation grossly intact to light touch, no focal reflex deficits noted    Psychiatric:     Alert,oriented X2, more alert and conversant, no delusions, psychoses, depression or anxiety    Heme/Lymph/Imun:   No bruises, petechiae.  Lymph nodes normal in size/configuration       Data Review:  Lab Results   Component Value Date    CALCIUM 8.9 05/09/2020    PHOS 2.9 05/09/2020     Results from last 7 days   Lab Units 05/09/20  1026 05/08/20  1307 05/08/20  0613   AST (SGOT) U/L 26 34* 21   MAGNESIUM mg/dL 2.0  --   --    SODIUM mmol/L 140 139 140   POTASSIUM mmol/L 3.5 4.4 3.7   CHLORIDE mmol/L 102 97* 99   CO2 mmol/L 23.1 16.9* 22.0   BUN mg/dL 71* 103* 104*   CREATININE mg/dL 1.49* 3.47* 3.98*   GLUCOSE mg/dL 115* 123* 106*   CALCIUM mg/dL 8.9 9.9 9.6   WBC 10*3/mm3 8.61 12.45* 10.99*   HEMOGLOBIN g/dL 13.5 17.9* 15.1   PLATELETS 10*3/mm3 207 273 225   ALT (SGPT) U/L 33 39* 36*     Lab Results   Component Value Date    CKTOTAL 109 04/30/2020    TROPONINT <0.010 04/26/2020     Estimated Creatinine Clearance: 48.7 mL/min (A) (by C-G formula based on SCr of 1.49 mg/dL (H)).  WEIGHTS:     Wt Readings from Last 1 Encounters:   05/01/20 1909 107 kg (235 lb 1.6 oz)         Assessment:    Affective psychosis, bipolar (CMS/HCC)    Altered mental status    Acute kidney injury (CMS/HCC)    Volume depletion     Generalized weakness    Elevated liver function tests    Bipolar disorder, current episode mixed, moderate (CMS/HCC)    Overactive bladder    Essential hypertension    Abnormal EKG    SOB (shortness of breath)    GERD (gastroesophageal reflux disease)    Vitamin D deficiency    Hyperlipidemia    History of hepatitis C followed by Dr. Ranjan Qiu [phone]    Irritable bowel syndrome with both constipation and diarrhea    Hepatic steatosis    Hematuria    ARDS survivor 1994 Ventilator    ADD (attention deficit disorder)    Obesity (BMI  30-39.9)      Attending Physician Assessment and Plan:    1.  Altered mental status in patient with history of bipolar disorder who has recently been noncompliant with her medication, Seroquel.  Suspect etiology for this problem is medication imbalance despite recent reinitiation of her Seroquel dosing as directed by Dr. Ernst, her regular psychiatrist.  Based on previous history, I suspect patient will need transfer to psychiatry for medication adjustment and stabilization once her medical disorders as discussed below are better controlled.  Dr. Cruz is agreeable to this plan of treatment.  Patient still quite confused.  Neurology consult planned. No neurologic abnormalities found and MRI unremarkable.  ID also saw patient in consultation and found no ongoing evidence of infection.  Patient medically stable and ready for discharge to psychiatric unit when bed available.  In psychiatry unit patient slowly improving.  We have gone back up to her regular dose of Seroquel with psychiatry to plan further medication changes at this point.  Continues to take her medications regularly.  Slow but steady improvement noted with regular medications.  Patient appears more alert today and makes more sensible conversation.  Still has some paranoid ideation and evidence of depression.  Psych increasing Ritalin dose today.  2.  Severe volume depletion with elevated renal function test.  Etiology is probable poor p.o. intake while psychiatric medications were out of balance.  Patient has critical elevations in both BUN, and creatinine, fluid resuscitation was initiated in the emergency room.  We are consulting renal for their input on the situation and for their help in adjusting her fluids at this point.  Suspect this correction will take 1 to 2 days.  Will monitor electrolytes carefully during this time.  Continued improvement in electrolytes and in particular with renal function tests.  Continue to monitor labs regularly.   Now fully rehydrated and renal has signed off.  Encouraging p.o. intake as much as possible.  I did discuss this with her nurse in the psychiatric unit.  Nursing staff is pushing oral fluids.  Renal has been reconsulted for their input on volume status.  Checking labs every other day to monitor levels of stability with respect to volume status.  Volume depletion repleted using IV fluids.  Renal following.  Volume depletion now resolved.  Avoiding HCTZ in future.  3.  Elevated CPK possibly secondary to acute kidney injury versus rhabdomyolysis.  I will send urine for myoglobin studies.  We will continue to monitor CPK.  Renal is following this issue with us at the present time.  Hopefully will resolve with increased hydration and volume stabilization.  Patient does appear to have some mild rhabdomyolysis.  CPK is clearing.  Elevated CPK resolving. rhabdomyolysis has resolved   4.  Generalized weakness felt to be secondary to electrolyte instability.  Will correct electrolytes.  Have added potassium protocol to her medications.  Will monitor elevated magnesium which is probably result of hemoconcentration.  Suspect elevated hemoglobin is also caused by the same etiology.  Will ask PT and OT to begin evaluation of patient.  Able to sit up on side of bed.  PT continuing to work with patient.  On first day and psychiatry much more successful getting patient up to chair.  Patient continues to get stronger.  Walked herself to the restroom earlier this evening.  Set up in chair for 3 hours today.  Out to TV room today and set up in chair even longer.  Planning to take shower today and more alert.  5.  Positive sepsis screen with elevated white blood cell count. I suspect the most likely etiology is urine which shows significant hematuria and elevated leukocytosis.  Urology has been consulted for their input since they follow the patient regularly for bladder dysfunction.  I have started patient on IV Rocephin pending  results of urine culture.  We will continue to monitor carefully but white count has improved in the first few hours.  I seriously doubt that patient has true sepsis at the present time.   does indicate that her mental status changes have been provoked by urinary tract infections in the past.  Culture of urine negative so far.  Will consider discontinuation of antibiotics if this persists.  Urine culture negative.  CT scan of chest negative.  We will proceed to discontinue antibiotics at this point and monitor patient.  ID finds no evidence of ongoing sepsis.   6.  Elevated liver function tests in patient with history of hepatitis.C treated in the past with Harvoni.  Etiology of this finding unclear at present time.  Abdominal ultrasound unremarkable.  GI consult requested.  Suspect may relate to the acute kidney injury more than an intrinsic problem with liver.  Did send hepatitis C viral load to be sure this has not resurfaced.  Will follow liver function test over the next couple days to be sure they do resolve.  Felt to probably be secondary to rhabdomyolysis.  Will monitor.  Will recheck labs on Monday  7.  Overactive bladder.  Patient's regular medication is nonformulary.  Have added substitution for now but may need to consider having  bring in medication from home if she does not respond favorably to the generic substitution.  Has discontinued medication at 's request.  Generally she does not need meds for the overactive bladder.  8.  Essential hypertension with recent discontinuation of medications.  Blood pressure is elevated slightly and I have resumed her medications.  We will follow blood pressure and make a decision on this prior to her discharge.  Losartan is adjusted to 50 mg a day.  I will continue to monitor blood pressure.  Currently BP is stable  9.  Abnormal EKG with no reported shortness of breath at present time.  Suspect these are chronic changes.  We have completed a  stress test in 2019 that was low risk for ischemic issues.  We will continue to monitor but suspect CPK is more elevated due to the renal/muscle issues and to any cardiac issue.  Will check troponin just to be sure it is not significantly elevated.  10.  Vitamin D deficiency.  Starting patient back on 50,000 units of vitamin D weekly for now.  We will continue to monitor and treat as needed.  11.  Hyperlipidemia.  Will check lipid profile while here in hospital and adjust medication and diet as needed to treat this issue.  12.  Irritable bowel syndrome with alternating diarrhea and constipation.  This problem sounds like it stable at present time.  Will add no new treatment currently.  Denies bowel movement at present time     Plan for disposition:Where: to Home and When:  Psychiatry feels she is stable     Dr. Loja will continue to follow patient with you and can be reached at [phone].        Toñito Loja MD  5/10/2020  5311

## 2020-05-11 NOTE — PLAN OF CARE
Problem: Patient Care Overview  Goal: Plan of Care Review  Outcome: Ongoing (interventions implemented as appropriate)  Flowsheets (Taken 5/11/2020 0350)  Progress: no change  Plan of Care Reviewed With: patient  Patient Agreement with Plan of Care: agrees  Note:   Pt was cooperative with care and medications this evening. Pt spoke more to this RN in a logical fashion, and at times smiled and laughed. Pt took medications crushed in ice cream without issue, and ate half of the ice cream cup with it. Pt drank multiple glasses of ice water. Gait appears more steady than previously noted with the walker. Remains continent. Will continue to monitor.   Goal: Individualization and Mutuality  Outcome: Ongoing (interventions implemented as appropriate)  Goal: Discharge Needs Assessment  Outcome: Ongoing (interventions implemented as appropriate)  Goal: Interprofessional Rounds/Family Conf  Outcome: Ongoing (interventions implemented as appropriate)     Problem: Overarching Goals (Adult)  Goal: Adheres to Safety Considerations for Self and Others  Outcome: Ongoing (interventions implemented as appropriate)  Flowsheets (Taken 5/11/2020 0359)  Adheres to Safety Considerations for Self and Others: making progress toward outcome  Goal: Optimized Coping Skills in Response to Life Stressors  Outcome: Ongoing (interventions implemented as appropriate)  Flowsheets (Taken 5/11/2020 0359)  Optimized Coping Skills in Response to Life Stressors: making progress toward outcome  Goal: Develops/Participates in Therapeutic [place] to Support Successful Transition  Outcome: Ongoing (interventions implemented as appropriate)  Flowsheets (Taken 5/10/2020 1510 by Terese Carson, HELENA)  Develops/Participates in Therapeutic [place] to Support Successful Transition: making progress toward outcome     Problem: Cognitive Impairment (Psychotic Signs/Symptoms) (Adult)  Goal: Improved Thought Clarity/Organization (Psychotic Signs/Symptoms)  Outcome:  Ongoing (interventions implemented as appropriate)  Flowsheets  Taken 5/11/2020 0359 by Terese Wong RN  Improved Thought Clarity/Organization Time Frame for Action Step (STG): 3 days  Taken 5/10/2020 1510 by Terese Carson RN  Improved Thought Clarity/Organization Action Step (STG) Outcome: making progress toward outcome     Problem: Psychomotor Movement Impairment (Psychotic Signs/Symptoms) (Adult)  Goal: Improved Psychomotor Symptoms (Psychotic Signs/Symptoms)  Outcome: Ongoing (interventions implemented as appropriate)  Flowsheets  Taken 5/11/2020 0359 by Terese Wong RN  Improved Psychomotor Symptoms Time Frame for Action Step (STG): 3 days  Taken 5/10/2020 1510 by Terese Carson RN  Improved Psychomotor Symptoms Action Step (STG) Outcome: making progress toward outcome     Problem: Mental State/Mood Impairment (Psychotic Signs/Symptoms) (Adult)  Goal: Improved Mental State/Mood (Psychotic Signs/Symptoms)  Outcome: Ongoing (interventions implemented as appropriate)  Flowsheets  Taken 5/11/2020 0359 by Terese Wong RN  Improved Mental State/Mood Time Frame for Action Step (STG): 3 days  Taken 5/10/2020 1510 by Terese Carson RN  Improved Mental State/Mood Action Step (STG) Outcome: making progress toward outcome     Problem: Skin Injury Risk (Adult)  Goal: Skin Health and Integrity  Outcome: Ongoing (interventions implemented as appropriate)  Flowsheets (Taken 5/11/2020 0359)  Skin Health and Integrity: making progress toward outcome     Problem: Fall Risk (Adult)  Goal: Absence of Fall  Outcome: Ongoing (interventions implemented as appropriate)  Flowsheets (Taken 5/11/2020 0359)  Absence of Fall: making progress toward outcome

## 2020-05-12 PROCEDURE — 25010000002 ENOXAPARIN PER 10 MG: Performed by: SPECIALIST

## 2020-05-12 PROCEDURE — 92526 ORAL FUNCTION THERAPY: CPT | Performed by: SPEECH-LANGUAGE PATHOLOGIST

## 2020-05-12 RX ORDER — BISOPROLOL FUMARATE 5 MG/1
5 TABLET, FILM COATED ORAL
Status: DISCONTINUED | OUTPATIENT
Start: 2020-05-13 | End: 2020-05-20 | Stop reason: HOSPADM

## 2020-05-12 RX ADMIN — LORAZEPAM 0.5 MG: 0.5 TABLET ORAL at 08:44

## 2020-05-12 RX ADMIN — KETOTIFEN FUMARATE 1 DROP: 0.35 SOLUTION/ DROPS OPHTHALMIC at 20:26

## 2020-05-12 RX ADMIN — NYSTATIN 500000 UNITS: 100000 SUSPENSION ORAL at 12:33

## 2020-05-12 RX ADMIN — QUETIAPINE FUMARATE 200 MG: 50 TABLET, EXTENDED RELEASE ORAL at 20:23

## 2020-05-12 RX ADMIN — NYSTATIN 500000 UNITS: 100000 SUSPENSION ORAL at 20:23

## 2020-05-12 RX ADMIN — NYSTATIN 500000 UNITS: 100000 SUSPENSION ORAL at 17:42

## 2020-05-12 RX ADMIN — FAMOTIDINE 40 MG: 40 TABLET, FILM COATED ORAL at 08:43

## 2020-05-12 RX ADMIN — LORAZEPAM 0.5 MG: 0.5 TABLET ORAL at 16:19

## 2020-05-12 RX ADMIN — DOCUSATE SODIUM 100 MG: 100 CAPSULE, LIQUID FILLED ORAL at 08:44

## 2020-05-12 RX ADMIN — LORAZEPAM 0.5 MG: 0.5 TABLET ORAL at 20:23

## 2020-05-12 RX ADMIN — BISOPROLOL FUMARATE 2.5 MG: 5 TABLET ORAL at 08:43

## 2020-05-12 RX ADMIN — METHYLPHENIDATE HYDROCHLORIDE 5 MG: 10 TABLET ORAL at 08:44

## 2020-05-12 RX ADMIN — Medication 5 MG: at 20:24

## 2020-05-12 RX ADMIN — NYSTATIN 500000 UNITS: 100000 SUSPENSION ORAL at 08:44

## 2020-05-12 RX ADMIN — DOCUSATE SODIUM 100 MG: 100 CAPSULE, LIQUID FILLED ORAL at 20:23

## 2020-05-12 RX ADMIN — METHYLPHENIDATE HYDROCHLORIDE 5 MG: 10 TABLET ORAL at 12:33

## 2020-05-12 RX ADMIN — ENOXAPARIN SODIUM 40 MG: 40 INJECTION SUBCUTANEOUS at 08:43

## 2020-05-12 RX ADMIN — KETOTIFEN FUMARATE 1 DROP: 0.35 SOLUTION/ DROPS OPHTHALMIC at 08:44

## 2020-05-12 RX ADMIN — CLOBETASOL PROPIONATE: 0.5 CREAM TOPICAL at 08:43

## 2020-05-12 NOTE — PROGRESS NOTES
"The patient seems to be improving marginally each day.  While still somewhat paranoid and focused on \"who is paying for this\" the patient seems less paranoid than yesterday and seems to be tolerating aggressive pharmacotherapy well.  "

## 2020-05-12 NOTE — SIGNIFICANT NOTE
05/12/20 1602   Rehab Treatment   Discipline physical therapist   Reason Treatment Not Performed unavailable for treatment  (pt checked on 3x this date, pt in class. pt and nsg report pt has been ambulatory on unit SBA rwx w good tolerance, pt ambulating to/from pt room to class. will follow up next date.)   Recommendation   PT - Next Appointment 05/13/20

## 2020-05-12 NOTE — PROGRESS NOTES
Adult Nutrition  Assessment/PES    Patient Name:  Francheska Sherman  YOB: 1955  MRN: [id number]  Admit Date:  5/1/2020    Assessment Date:  5/12/2020    Comments:  LOS follow-up, starting to eat a little better - at least not refusing - and has come out of catatonia. Has required IV fluid support d/t rising BUN/Cr, electrolytes. We have food prefs on file from conversation with spouse. Continues to get Boost plus TID. Will continue to monitor, nothing new to add.     Reason for Assessment     Row Name 05/12/20 0807          Reason for Assessment    Reason For Assessment  follow-up protocol         Nutrition/Diet History     Row Name 05/12/20 0807          Nutrition/Diet History    Typical Food/Fluid Intake  Intake 25-50%, not refusing but still not optimal. She's been on IV fluids. Has emerged from catatonia, mild improvement noted.      Factors Affecting Nutritional Intake  depression;impaired cognitive status/motor control         Anthropometrics     Row Name 05/12/20 0808          Usual Body Weight (UBW)    Weight Loss Time Frame  no new wt         Labs/Tests/Procedures/Meds     Row Name 05/12/20 0808          Labs/Procedures/Meds    Lab Results Reviewed  reviewed     Lab Results Comments  5/11: Alb, ALT, Glu, BUN/Cr improved with IVF         Medications    Pertinent Medications Comments  colace, pepcid, ativan, nystatin swish/spit, Vit D         Physical Findings     Row Name 05/12/20 0810          Physical Findings    Overall Physical Appearance  obese     Skin  other (see comments) B 23           Nutrition Prescription Ordered     Row Name 05/12/20 0811          Nutrition Prescription PO    Current PO Diet  Soft Texture     Texture  Chopped     Fluid Consistency  Thin     Supplement  Boost Plus (Ensure Enlive, Ensure Plus)     Supplement Frequency  3 times a day         Evaluation of Received Nutrient/Fluid Intake     Row Name 05/12/20 0812          PO Evaluation    Number of Days PO Intake  Evaluated  3 days     % PO Intake  25-50%               Problem/Interventions:  Problem 1     Row Name 05/12/20 0812          Nutrition Diagnoses Problem 1    Problem 1  Inadequate Intake/Infusion     Inadequate Intake Type  Oral     Macronutrient  Kcal;Protein     Etiology (related to)  Medical Diagnosis     Neurological  AMS     Signs/Symptoms (evidenced by)  Report of Mnimal PO Intake;Report/Observation     Appetite  Improved               Intervention Goal     Row Name 05/12/20 0812          Intervention Goal    General  Maintain nutrition     PO  Continue positive trend;Tolerate PO;Increase intake;PO intake (%)     PO Intake %  75 %     Weight  No significant weight loss         Nutrition Intervention     Row Name 05/12/20 0814          Nutrition Intervention    RD/Tech Action  Follow Tx progress;Care plan reviewd;Encourage intake           Education/Evaluation     Row Name 05/12/20 0814          Education    Education  Education not appropriate at this time     Please explain  Patient confusion        Monitor/Evaluation    Monitor  Per protocol           Electronically signed by:  Francisca Walls RD  05/12/20 08:14

## 2020-05-12 NOTE — PLAN OF CARE
"  Problem: Patient Care Overview  Goal: Plan of Care Review  Outcome: Ongoing (interventions implemented as appropriate)  Flowsheets (Taken 5/12/2020 0451)  Progress: improving  Plan of Care Reviewed With: patient  Patient Agreement with Plan of Care: agrees  Note:   Pt cooperative with care and medications this evening. Continues to make paranoid comments at times such as \"you're not gonna lock me in my room are you?\". Medications crushed in ice cream. Pt took medications without issue and ate the entire ice cream up. Pt has had multiple glasses of water and ginger ale this evening. Gait appears steady with walker. Pt has remained continent. Will continue to monitor.   Goal: Individualization and Mutuality  Outcome: Ongoing (interventions implemented as appropriate)  Goal: Discharge Needs Assessment  Outcome: Ongoing (interventions implemented as appropriate)  Goal: Interprofessional Rounds/Family Conf  Outcome: Ongoing (interventions implemented as appropriate)     Problem: Overarching Goals (Adult)  Goal: Adheres to Safety Considerations for Self and Others  Outcome: Ongoing (interventions implemented as appropriate)  Flowsheets (Taken 5/12/2020 0451)  Adheres to Safety Considerations for Self and Others: making progress toward outcome  Goal: Optimized Coping Skills in Response to Life Stressors  Outcome: Ongoing (interventions implemented as appropriate)  Flowsheets (Taken 5/12/2020 0451)  Optimized Coping Skills in Response to Life Stressors: making progress toward outcome  Goal: Develops/Participates in Therapeutic [place] to Support Successful Transition  Outcome: Ongoing (interventions implemented as appropriate)  Flowsheets (Taken 5/12/2020 0451)  Develops/Participates in Therapeutic Spring to Support Successful Transition: making progress toward outcome     Problem: Cognitive Impairment (Psychotic Signs/Symptoms) (Adult)  Goal: Improved Thought Clarity/Organization (Psychotic Signs/Symptoms)  Outcome: " Ongoing (interventions implemented as appropriate)  Flowsheets  Taken 5/12/2020 0451 by Terese Wong RN  Improved Thought Clarity/Organization Time Frame for Action Step (STG): 3 days  Taken 5/10/2020 1510 by Terese Carson RN  Improved Thought Clarity/Organization Action Step (STG) Outcome: making progress toward outcome     Problem: Psychomotor Movement Impairment (Psychotic Signs/Symptoms) (Adult)  Goal: Improved Psychomotor Symptoms (Psychotic Signs/Symptoms)  Outcome: Ongoing (interventions implemented as appropriate)  Flowsheets  Taken 5/12/2020 0451 by Terese Wong RN  Improved Psychomotor Symptoms Time Frame for Action Step (STG): 3 days  Taken 5/10/2020 1510 by Terese Carson RN  Improved Psychomotor Symptoms Action Step (STG) Outcome: making progress toward outcome     Problem: Mental State/Mood Impairment (Psychotic Signs/Symptoms) (Adult)  Goal: Improved Mental State/Mood (Psychotic Signs/Symptoms)  Outcome: Ongoing (interventions implemented as appropriate)  Flowsheets  Taken 5/12/2020 0451 by Terese Wong RN  Improved Mental State/Mood Time Frame for Action Step (STG): 3 days  Taken 5/10/2020 1510 by Terese Carsno RN  Improved Mental State/Mood Action Step (STG) Outcome: making progress toward outcome     Problem: Skin Injury Risk (Adult)  Goal: Skin Health and Integrity  Outcome: Ongoing (interventions implemented as appropriate)     Problem: Fall Risk (Adult)  Goal: Absence of Fall  Outcome: Ongoing (interventions implemented as appropriate)  Flowsheets (Taken 5/12/2020 0451)  Absence of Fall: making progress toward outcome

## 2020-05-12 NOTE — PLAN OF CARE
Problem: Patient Care Overview  Goal: Plan of Care Review  Outcome: Ongoing (interventions implemented as appropriate)  Flowsheets (Taken 5/12/2020 9723)  Plan of Care Reviewed With: patient  Outcome Summary: Pt is tolerating current diet with no overt s/s aspiration.  Intake has improved and has taken up to 50% of trays.  No complaints to diet consistency.  Dietary has added supplements and preference list.  REC continue with current diet.

## 2020-05-12 NOTE — THERAPY TREATMENT NOTE
Acute Care - Speech Language Pathology   Swallow Treatment Note [facility]     Patient Name: Francheska Sherman  : 1955  MRN: [id number]  Today's Date: 2020               Admit Date: 2020    Visit Dx:    No diagnosis found.  Patient Active Problem List   Diagnosis   • Overactive bladder   • Essential hypertension   • Abnormal EKG   • SOB (shortness of breath)   • Altered mental status   • GERD (gastroesophageal reflux disease)   • ARDS survivor  Ventilator   • ADD (attention deficit disorder)   • Vitamin D deficiency   • Acute kidney injury (CMS/HCC)   • Volume depletion    • Generalized weakness   • Hyperlipidemia   • History of hepatitis C followed by Dr. Ranjan Qiu [id number]   • Irritable bowel syndrome with both constipation and diarrhea   • Hepatic steatosis   • Obesity (BMI 30-39.9)   • Hematuria   • Elevated liver function tests   • Bipolar disorder, current episode mixed, moderate (CMS/HCC)   • Affective psychosis, bipolar (CMS/HCC)       Therapy Treatment  Rehabilitation Treatment Summary     Row Name 20 1300             Treatment Time/Intention    Discipline  speech language pathologist  -SA      Document Type  therapy note (daily note)  -SA      Subjective Information  no complaints  -SA      Mode of Treatment  speech-language pathology  -SA      Recorded by [SA] Francheska Massey MS CCC-SLP 20 2239        User Key  (r) = Recorded By, (t) = Taken By, (c) = Cosigned By    Initials Name Effective Dates Discipline    SA Francheska Massey MS CCC-SLP 18 -  SLP          Outcome Summary         SLP GOALS     Row Name 20 1300             Oral Nutrition/Hydration Goal 1 (SLP)    Oral Nutrition/Hydration Goal 1, SLP  Pt will tolerate least restrictive diet  -SA      Time Frame (Oral Nutrition/Hydration Goal 1, SLP)  by discharge  -SA      Barriers (Oral Nutrition/Hydration Goal 1, SLP)  Progress:   Pt is tolerating current diet with no overt s/s aspiration.  Intake has  improved and has taken up to 50% of trays.  No complaints to diet consistency.  Dietary has added supplements and preference list.  REC continue with current diet.  -SA      Progress/Outcomes (Oral Nutrition/Hydration Goal 1, SLP)  continuing progress toward goal  -        User Key  (r) = Recorded By, (t) = Taken By, (c) = Cosigned By    Initials Name Provider Type    Francheska Avery MS CCC-SLP Speech and Language Pathologist          EDUCATION  The patient has been educated in the following areas:   Dysphagia (Swallowing Impairment) Modified Diet Instruction.    SLP Recommendation and Plan                                Time Calculation:   Time Calculation- SLP     Row Name 05/12/20 1349             Time Calculation- SLP    SLP Start Time  1300  -      SLP Received On  05/12/20  -        User Key  (r) = Recorded By, (t) = Taken By, (c) = Cosigned By    Initials Name Provider Type    Francheska Avery MS CCC-SLP Speech and Language Pathologist          Therapy Charges for Today     Code Description Service Date Service Provider Modifiers Qty    05557342510 HC ST TREATMENT SWALLOW 2 5/12/2020 Francheska Massey MS CCC-ZOLTAN GN 1                 MS TOBY [place]  5/12/2020

## 2020-05-12 NOTE — PLAN OF CARE
Problem: Patient Care Overview  Goal: Plan of Care Review  Outcome: Ongoing (interventions implemented as appropriate)  Flowsheets (Taken 5/12/2020 1644)  Progress: improving  Plan of Care Reviewed With: patient  Patient Agreement with Plan of Care: agrees  Outcome Summary: Patient is becoming more physically active and engaging socially with this RN and other patients in the [place].  Preoccupation with getting a shower today, but patient has stopped perseverating about the topic after obtaining the shower.  Patient has smiled and used humor today, a first.    Problem: Overarching Goals (Adult)  Goal: Adheres to Safety Considerations for Self and Others  Outcome: Ongoing (interventions implemented as appropriate)  Flowsheets (Taken 5/12/2020 1644)  Adheres to Safety Considerations for Self and Others: making progress toward outcome     Problem: Overarching Goals (Adult)  Goal: Optimized Coping Skills in Response to Life Stressors  Outcome: Ongoing (interventions implemented as appropriate)  Flowsheets (Taken 5/12/2020 1644)  Optimized Coping Skills in Response to Life Stressors: making progress toward outcome     Problem: Overarching Goals (Adult)  Goal: Develops/Participates in Therapeutic [place] to Support Successful Transition  Outcome: Ongoing (interventions implemented as appropriate)  Flowsheets (Taken 5/12/2020 1644)  Develops/Participates in Therapeutic [place] to Support Successful Transition: making progress toward outcome     Problem: Cognitive Impairment (Psychotic Signs/Symptoms) (Adult)  Goal: Improved Thought Clarity/Organization (Psychotic Signs/Symptoms)  Outcome: Ongoing (interventions implemented as appropriate)  Flowsheets (Taken 5/12/2020 1644)  Improved Thought Clarity/Organization Action Step (STG) Outcome: making progress toward outcome     Problem: Psychomotor Movement Impairment (Psychotic Signs/Symptoms) (Adult)  Goal: Improved Psychomotor Symptoms (Psychotic  Signs/Symptoms)  Outcome: Ongoing (interventions implemented as appropriate)  Flowsheets (Taken 5/12/2020 1644)  Improved Psychomotor Symptoms Action Step (STG) Outcome: making progress toward outcome     Problem: Mental State/Mood Impairment (Psychotic Signs/Symptoms) (Adult)  Goal: Improved Mental State/Mood (Psychotic Signs/Symptoms)  Outcome: Ongoing (interventions implemented as appropriate)  Flowsheets (Taken 5/12/2020 1644)  Improved Mental State/Mood Action Step (STG) Outcome: making progress toward outcome     Problem: Skin Injury Risk (Adult)  Goal: Skin Health and Integrity  Outcome: Ongoing (interventions implemented as appropriate)  Flowsheets (Taken 5/12/2020 1644)  Skin Health and Integrity: making progress toward outcome     Problem: Fall Risk (Adult)  Goal: Absence of Fall  Outcome: Ongoing (interventions implemented as appropriate)  Flowsheets (Taken 5/12/2020 1644)  Absence of Fall: making progress toward outcome

## 2020-05-13 LAB
ALBUMIN SERPL-MCNC: 3.1 G/DL (ref 3.5–5.2)
ALBUMIN/GLOB SERPL: 1.1 G/DL
ALP SERPL-CCNC: 38 U/L (ref 39–117)
ALT SERPL W P-5'-P-CCNC: 52 U/L (ref 1–33)
ANION GAP SERPL CALCULATED.3IONS-SCNC: 10.8 MMOL/L (ref 5–15)
AST SERPL-CCNC: 39 U/L (ref 1–32)
BASOPHILS # BLD AUTO: 0.03 10*3/MM3 (ref 0–0.2)
BASOPHILS NFR BLD AUTO: 0.5 % (ref 0–1.5)
BILIRUB SERPL-MCNC: 0.4 MG/DL (ref 0.2–1.2)
BUN BLD-MCNC: 10 MG/DL (ref 8–23)
BUN/CREAT SERPL: 16.4 (ref 7–25)
CALCIUM SPEC-SCNC: 8.7 MG/DL (ref 8.6–10.5)
CHLORIDE SERPL-SCNC: 104 MMOL/L (ref 98–107)
CO2 SERPL-SCNC: 24.2 MMOL/L (ref 22–29)
CREAT BLD-MCNC: 0.61 MG/DL (ref 0.57–1)
DEPRECATED RDW RBC AUTO: 42.7 FL (ref 37–54)
EOSINOPHIL # BLD AUTO: 0.29 10*3/MM3 (ref 0–0.4)
EOSINOPHIL NFR BLD AUTO: 5.2 % (ref 0.3–6.2)
ERYTHROCYTE [DISTWIDTH] IN BLOOD BY AUTOMATED COUNT: 13 % (ref 12.3–15.4)
GFR SERPL CREATININE-BSD FRML MDRD: 99 ML/MIN/1.73
GLOBULIN UR ELPH-MCNC: 2.8 GM/DL
GLUCOSE BLD-MCNC: 112 MG/DL (ref 65–99)
HCT VFR BLD AUTO: 38.6 % (ref 34–46.6)
HGB BLD-MCNC: 12.8 G/DL (ref 12–15.9)
IMM GRANULOCYTES # BLD AUTO: 0.05 10*3/MM3 (ref 0–0.05)
IMM GRANULOCYTES NFR BLD AUTO: 0.9 % (ref 0–0.5)
LYMPHOCYTES # BLD AUTO: 2.01 10*3/MM3 (ref 0.7–3.1)
LYMPHOCYTES NFR BLD AUTO: 36 % (ref 19.6–45.3)
MCH RBC QN AUTO: 29.7 PG (ref 26.6–33)
MCHC RBC AUTO-ENTMCNC: 33.2 G/DL (ref 31.5–35.7)
MCV RBC AUTO: 89.6 FL (ref 79–97)
MONOCYTES # BLD AUTO: 0.64 10*3/MM3 (ref 0.1–0.9)
MONOCYTES NFR BLD AUTO: 11.5 % (ref 5–12)
NEUTROPHILS # BLD AUTO: 2.56 10*3/MM3 (ref 1.7–7)
NEUTROPHILS NFR BLD AUTO: 45.9 % (ref 42.7–76)
NRBC BLD AUTO-RTO: 0 /100 WBC (ref 0–0.2)
PLATELET # BLD AUTO: 154 10*3/MM3 (ref 140–450)
PMV BLD AUTO: 13.1 FL (ref 6–12)
POTASSIUM BLD-SCNC: 3.6 MMOL/L (ref 3.5–5.2)
PROT SERPL-MCNC: 5.9 G/DL (ref 6–8.5)
RBC # BLD AUTO: 4.31 10*6/MM3 (ref 3.77–5.28)
SODIUM BLD-SCNC: 139 MMOL/L (ref 136–145)
WBC NRBC COR # BLD: 5.58 10*3/MM3 (ref 3.4–10.8)

## 2020-05-13 PROCEDURE — 85025 COMPLETE CBC W/AUTO DIFF WBC: CPT | Performed by: INTERNAL MEDICINE

## 2020-05-13 PROCEDURE — 80053 COMPREHEN METABOLIC PANEL: CPT | Performed by: INTERNAL MEDICINE

## 2020-05-13 PROCEDURE — 25010000002 ENOXAPARIN PER 10 MG: Performed by: SPECIALIST

## 2020-05-13 RX ADMIN — NYSTATIN 500000 UNITS: 100000 SUSPENSION ORAL at 08:59

## 2020-05-13 RX ADMIN — LORAZEPAM 0.5 MG: 0.5 TABLET ORAL at 15:37

## 2020-05-13 RX ADMIN — BISOPROLOL FUMARATE 5 MG: 5 TABLET ORAL at 08:58

## 2020-05-13 RX ADMIN — NYSTATIN 500000 UNITS: 100000 SUSPENSION ORAL at 20:29

## 2020-05-13 RX ADMIN — NYSTATIN 500000 UNITS: 100000 SUSPENSION ORAL at 11:56

## 2020-05-13 RX ADMIN — DOCUSATE SODIUM 100 MG: 100 CAPSULE, LIQUID FILLED ORAL at 08:59

## 2020-05-13 RX ADMIN — ENOXAPARIN SODIUM 40 MG: 40 INJECTION SUBCUTANEOUS at 08:59

## 2020-05-13 RX ADMIN — KETOTIFEN FUMARATE 1 DROP: 0.35 SOLUTION/ DROPS OPHTHALMIC at 20:29

## 2020-05-13 RX ADMIN — FAMOTIDINE 40 MG: 40 TABLET, FILM COATED ORAL at 08:58

## 2020-05-13 RX ADMIN — METHYLPHENIDATE HYDROCHLORIDE 5 MG: 10 TABLET ORAL at 11:55

## 2020-05-13 RX ADMIN — CLOBETASOL PROPIONATE: 0.5 CREAM TOPICAL at 08:59

## 2020-05-13 RX ADMIN — KETOTIFEN FUMARATE 1 DROP: 0.35 SOLUTION/ DROPS OPHTHALMIC at 08:59

## 2020-05-13 RX ADMIN — DOCUSATE SODIUM 100 MG: 100 CAPSULE, LIQUID FILLED ORAL at 20:29

## 2020-05-13 RX ADMIN — CLOBETASOL PROPIONATE: 0.5 CREAM TOPICAL at 20:29

## 2020-05-13 RX ADMIN — LORAZEPAM 0.5 MG: 0.5 TABLET ORAL at 20:29

## 2020-05-13 RX ADMIN — NYSTATIN 500000 UNITS: 100000 SUSPENSION ORAL at 18:27

## 2020-05-13 RX ADMIN — LORAZEPAM 0.5 MG: 0.5 TABLET ORAL at 08:59

## 2020-05-13 RX ADMIN — QUETIAPINE FUMARATE 200 MG: 50 TABLET, EXTENDED RELEASE ORAL at 20:29

## 2020-05-13 RX ADMIN — METHYLPHENIDATE HYDROCHLORIDE 5 MG: 10 TABLET ORAL at 08:59

## 2020-05-13 NOTE — PROGRESS NOTES
The patient's progress is notable though painstakingly slow.  She remains very flat but is much more verbal though anxious dysphoric and continues to complain of symptoms consistent with a psychotic depressive episode of bipolar illness.  We continue her current aggressive pharmacotherapy.

## 2020-05-13 NOTE — PROGRESS NOTES
ELIE [facility]IC[facility]  INTERNAL MEDICINE  TOÑITO LOJA MD  [address] [place] [address]  Phone [phone] Fax [phone]  E-mail:  [email or web address]      INTERNAL MEDICINE DAILY PROGRESS NOTE  Toñito Loja M.D.  2020              Patient Identification:  Name: Fracnheska Sherman  Age: 65 y.o.  Sex: female  :  1955  MRN: [id number]         Primary Care Physician: Toñito Loja MD  LENGTH OF STAY 11 DAYS    Consults     Date and Time Order Name Status Description    2020 0830 Inpatient Nephrology Consult      2020 1854 Inpatient Hospitalist Consult Completed     2020 1143 Inpatient Infectious Diseases Consult Completed     2020 0834 Inpatient Neurology Consult General Completed     2020 1205 Inpatient Psychiatrist Consult Completed     2020 2244 Inpatient Gastroenterology Consult Completed     2020 224 Inpatient Urology Consult Completed     2020 224 Inpatient Nephrology Consult Completed     2020 1913 Family Medicine Consult Completed               Chief Complaint:  Affective Psychosis, Bipolar Disorder with acute behavioral change     History of Present Illness:     Subjective         Interval History: Patient is a 65 y.o.female who presented with altered mental status and generalized weakness with dehydration to the [facility] emergency room by private car on 2020.  Most of the history is provided by her  who brought her to the hospital since patient was quite confused and unable to really provide much concrete detail and during her initial evaluation.  Patient is regularly followed by Dr. Hernesto Ernst in psychiatry at [facility] and has in the past been admitted to [facility] for episodes very similar to this one.  Patient does suffer from bipolar disorder and takes Seroquel on a regular basis for control and stabilization of this issue.  A few weeks  ago, patient was feeling good and decided to discontinue her Seroquel.  She also discontinued several of her other medications including her blood pressure medicine and her vitamin D.  Patient's  noticed that over the last 2 weeks she has begun to have a decline in her mental l condition and specifically he noticed that she was having increased confusion, insomnia, and extreme paranoia at times.  The  contacted Dr. Ernst and was instructed to start the patient back on Seroquel at 50 mg daily with titration up to 100 mg after 1 week on the 50 mg dose.  She has now had the 100 mg dose for the last 4 days but has not really responded to the medication at this point.  Patient has been talking nonstop and sleeping less than 4 hours per night.  This is very similar to episode she has had in the past that have been described as hypomanic in nature.  Patient did threaten to leave the home but has been has been able to manage this by using the alarm system at home to no when she goes out the door and has had her under careful observation for the last 10 days.  In the last 48 hours, patient has been drinking little to no liquid and on 2 occasions the  has found her collapsed and on the floor.  He had no evidence of broken bones or excessive trauma and each time he has been able to eventually get her back up on a chair or the sofa.  When he discussed bringing her to the hospital, patient has had an extreme fear of contacting another individual with COVID-19 and has refused to come in.  Patient has had no exposure to other individuals in over a month and has no symptoms of COVID-19 such as fever, cough, or shortness of breath.   was finally able to convince the patient to come to the emergency room yesterday evening when her weakness became so extreme that she could not get up to move about the house.  She denied nausea, vomiting, or diarrhea.  Family helped the  get her into his truck to  "bring her to the emergency room and ER staff helped get her out of the truck and bring her into the facility for evaluation.     Mrs. Sherman is a delightful 65-year-old female who I had the pleasure of following in my office since she transferred there just prior to [facility] her regular physician, my former partner, Lemuel Turcios MD.   the medical problems for which I follow her include: Hypertension, hyperlipidemia, overactive bladder, and vitamin D deficiency.  Her compliance with medications and treating these issues has been somewhat marginal.  Her last set of labs in January 2020 did show normal liver function, normal electrolytes, mild hyperlipidemia with a cholesterol of 226, normal thyroid function, and a normal STD work-up.  The STD work-up was done at request of the patient because she was concerned that \" her  might be cheating on her\".  In general, patient seen today quite stable mentally at the time of her last visit with me on March 6 when she came in with complaints of urinary frequency and otherwise had a normal medical checkup.  Review of records shows that she did see Dr. Fernandez Hooks MD and recently underwent endoscopy and colonoscopy on June 11, 2019.  Polyps were removed by cold biopsy and plans for 3-year follow-up were instituted. Dr. Hooks did feel that the patient met criteria for the diagnosis of irritable bowel syndrome with alternating diarrhea and constipation.  Patient also sees Dr. Greg Stevens MD in urology for overactive bladder and has been controlled fairly well on Enablex.  Patient did see Dr. Zee in cardiology because of an abnormal EKG in 2019 and did have an exercise stress test with myocardial perfusion SPECT on 5/22/19.  She had requested medical treatment for the findings of that test which showed ejection fraction 60%, normal myocardial perfusion with no evidence of ischemia, and test consistent with a low risk study.     [facility] was seen in [facility] room by Dr. Naldo Hi on 4/2520 at 1914.  At the time, patient presented with increasing confusion.   reported that this confusion was related to unstable bipolar disorder and discontinuation of her medication, Seroquel, that she takes for treatment of the disorder.  The ER physician was able to confirm that she had started back on this medication at the request of her psychiatric physician and has been titrated from 50 mg/day up to 100 mg/day recently.  Despite this attempted outpatient titration of the medication, patient had been refusing to eat or drink anything and had developed significant weakness.  On the day of admission she was unable to get out of bed on her own that she denied any chest pain or abdominal pain at the time.  In addition, patient had no evidence of nausea vomiting or diarrhea. Review of systems could not be performed due to the patient's mental status change.  Allergies were reported to codeine, morphine, and sulfa.  Physical exam in the emergency room showed that temperature was 96.5, pulse 85, respiratory rate 17, and blood pressure 145/92 with a sat of 95%.  Exam by the ER physician showed mucous membranes were quite dry.  Patient was awake and answering some questions appropriately such as name and age but was unable to really relate any history of the events which led to her visit in the ER.  Her exam was otherwise totally normal.  Lab results did show several abnormalities: Glucose elevated 123, BUN elevated 78, creatinine elevated at 1.60, and sodium elevated at 151.  Her liver function tests were also elevated with ALT of 236, and AST of 415.  She apparently had normal liver function test at a visit with her hepatitis C physician Dr. Woodruff just 1 month ago.  Patient had no evidence of alcohol in her blood.  Her white blood cell count was 14.62, her hemoglobin was 16.9, hematocrit was 50.8, and her platelet count was normal at 310,000.  Her magnesium was  slightly high at 2.9.  Thyroid function tests were normal.  Urine drug screen was totally negative but UA did show 3+ blood 1+ protein 1+ leukocyte esterase.  CT of head was unremarkable and chest x-ray was unremarkable.  EKG done soon after admission did show a normal sinus rhythm PAC, nonspecific ST changes and slightly prolonged QT interval.     4/26/20.  I personally saw the patient at the time of my rounds on 4/26/2020.  Nurse was present in the room at the time of my visit.  The patient had not spoken with others during the day, she did respond to my voice and actually open her eyes.  She did have a brief conversation with me before falling back off to sleep.  Renal has been adjusting her fluids to replace her volume deficit.  They will continue to follow this with us.  Urology did order a CT scan of abdomen and pelvis with and without contrast.  After consultation with renal, we elected to do the CT with out contrast only.  Results are pending at the time of my visit.  Patient was also seen in consultation by gastroenterology.  They feel that her elevated liver function tests are probably a result of the volume depletion status.  Hopefully these will improve as her volume is corrected.  Otherwise there are no major changes in the patient's condition from their perspective.  They also plan to continue following patient.  Psychiatry did see patient briefly but she was not cooperative with their exam.  Dr. Cruz will see patient tomorrow for further evaluation medication recommendations.  We suspect that the patient will need to be treated in the inpatient psychiatric unit at least short-term in order to stabilize the medications if her patterns follow previous pattern with respect to this issue.  Patient otherwise is hemodynamically stable at the present time.  We did do a blood gas since she is quite lethargic but that was within normal limits.     4/27/2020.  Patient resting quietly in bed at the time  of my rounds this evening.  I did discuss the case at length with the patient's nurse who went to the room with me.  Patient is a little more alert this evening and actually good agreed to take a few bites of ice cream.  In fact, she was feeling well enough to request strawberry ice cream instead of the vanilla ice cream that was available on the floor.  Patient remains quite weak.  She has not been out of bed at this time.  The nurse and I agreed that it would probably be appropriate to have PT or OT attempt to get patient up to bedside chair tomorrow if possible.  Patient has been declining her medications including the Seroquel XR that she is supposed to take each evening for her bipolar state.  Patient does seem oriented to person and place but somewhat lost in time.  GI feels she is stable from their point of view.  Elevated liver function tests probably are secondary to myoglobinuria which occurred after patient has been laying around and in bed most of the time.  Renal continues to adjust fluid for the patient and was agreeable to starting her blood pressure medications again.  Blood pressure did run high throughout the day yesterday and required treatment with Vasotec IV.  Urology had little further to add with the patient today but will plan outpatient cystoscopy after discharge.  Psychiatry did see patient in consultation and they feel she would be appropriate for their Geropsych unit to continue medication adjustment and stabilization of patient's psychiatric situation.  They are happy to take her in transfer once she is medically stable.  Urine culture so far shows no growth.     4/28/2020.  Patient still resting quietly in her bed at the time of my rounds in her room on 4 [address].  Patient does wake up as I entered the room and is semi-responsive but her answers to questions are relatively unintelligible and she tends to just mumble words.  Patient has eaten and drunk very little today despite efforts from  nursing staff to encourage her to do so.  Apparently she would not even take orange sherbet which is 1 of her favorite foods when offered to her by the staff.  I did speak briefly to the patient's nurse.  She reported no real new changes in the patient's condition.  Review of notes from occupational therapy showed that the patient did sit up on the side of the bed but did not ambulate effectively at this point.  Patient has not been taking most of her medication at this point.  Psychiatry is still following the patient with plans to admit the patient to the psychiatric unit when she is medically stable.  I discussed the case at length with the patient's .  He feels that this is very similar to a breakdown that she had several years ago which required hospitalization in the psychiatric unit at our [facility].  Nonetheless, after our discussion, I am recommending a neurology consultation and we will obtain an MRI to be sure that there is been no cerebrovascular accident causing these changes.  I also would like to get speech therapy to see the patient in consultation to be sure that her swallow is effective.  We may have to consider short-term feeding tube to stabilize the patient and provide a route for giving her medicine.     4/29/2020.  Patient is essentially unchanged on my rounds today.  She does say a few words but makes no coherent statements.  She still has been refusing her meds and only taking small bites occasionally of food with great urging from the nursing staff.  We did have neurology see the patient in consultation today and I spoke at length with  regarding his findings.  She has no real evidence of stroke.  She does have a small tremor.  MRI was done and did not show anything acute though it does show some hardening of the arteries and a tiny old infarct that radiology feels is insignificant with respect to the current findings.  I also have infectious disease see the patient to  be sure they feel the question of sepsis is completely ruled out.  I did agree with discontinuing all antibiotics at this point and do not feel that there is any signs of acute infection.  Renal has essentially signed off the case also.  Currently she is receiving IV fluids at 50 cc/h.  Speech is to see the patient first thing in the morning and determine swallowing safety.  I did discuss the situation with [facility] and they do feel that if patient needs a short-term stabilization with a Dobbhoff tube for medications and for fluids this could be continued on the psych unit.  This would be done as a short-term tool to continue medically stabilizing her while giving psychiatry an opportunity to adjust her bipolar medications.  Patient is urinating well.  Her labs now show normal BUN, creatinine, and potassium levels.  White blood cell count is now normal at 8.9 and there is no sign of anemia.  Her magnesium has all so returned to normal levels.  At this point, the patient does appear to be medically stable and it  ahould be possible to transfer her to psychiatry tomorrow if we can resolve the issues surrounding her nutritional and medication intake.     4/30/2020.  Patient more awake and alert today than previously at the time of my rounds.  She did take a small amount of her breakfast including some orange sherbet and a few bites of eggs.  Nurse was able to get the patient to take her pills both last night and this morning.  Blood pressure is running slightly up and I did restart her beta-blocker and diuretic which have helped with this issue in the past.  We will monitor her hydration status with labs.  I am going to discontinue the IV fluids and hope to cannulate more thirst to promote increased oral intake.  Nursing continues to encourage patient to eat small bites.  Patient did talk in brief sentences to her  on the phone today.  I did review the case with him and we both felt patient was appropriate  for transfer to psychiatry.  Neurology work-up is complete and relatively negative.  ID work-up is also complete.  Renal has signed off on the case.  At this point it is felt that behavioral modification is needed and adjustment of her psychiatric meds in order to return her to her baseline.  Hopefully psychiatry will be able to manage this in their unit.  I did speak with the access center and they do have a bed available for the patient tomorrow after her discharge is completed in the morning.  Transfer orders are complete and will be signed off in a.m.  If bed is available.  I also discussed the case with the patient's nurse and with PCP.     5/1/2020.  Patient had a quiet evening last night and seems stable still this a.m.  Psychiatry planning discharge from their unit today and at that point a bed will be available for patient in their unit which seems appropriate.  Patient does not need feeding tube at this time since she has started taking small bites of food and small sips of fluids.  We do have a saline lock still in place so patient could be given IV bolus if needed for dehydration.  We will continue to follow patient in the psychiatric unit and monitor her electrolytes while there.  Patient does have a short conversation with me today.  She is watching CNN and states that she does not like the fake needs.  Patient did work with OT and sat on the edge of bed with better control of body yesterday.  Speech therapy evaluated patient today and does feel that she has an adequate swallow and her trouble eating is behavioral in nature.  At this point patient has maximized benefit from hospitalization and is medically stable.  She will be ready for discharge to psychiatry when bed is available.     ________________________________________________________________________________________________     TRANSFER TO PSYCHIATRY Attending Dr. Cruz  Internal Medicine Consult   [place]  ________________________________________________________________________________________________     5/2/2020.  I was consulted by Dr. Cruz for an internal medicine consult to evaluate this patient.  Patient was seen with nursing assistant present in her room on psychiatry which was room 3330.  Patient was resting quietly in bed but did seem to recognize me as I entered the room and smiled.  She mumbled a few words but made no real coherent conversation while I was in the room.  I spoke with nursing aide who was at bedside.  Patient is currently on with a sitter.  Apparently she did manage to get up well with physical therapy today and sat in the chair at bedside for almost an hour.  She had very little breakfast but lunch did eat all the fruit on her tray and tried to eat some of the meat but unfortunately could not chew the roast beef that was brought to her on the tray today.  I am going to try changing the consistency of the meat and see if she is able to better handle that chopped up.  There is a speech therapy consult in place for the patient on Monday.  Patient seems to be feeling well.  She has no specific complaints of pain or discomfort.  Blood pressure is now under better control.  I did speak with the  and reviewed the case with him.  He is pleased with her progress.  We did obtain an abdominal x-ray series at the request of the  to be sure constipation is not a problem.  She has a bit of excessive gas in her abdomen but there is no significant stool burden or other signs of bowel blockage.  Seroquel has been increased to her regular dialysis which is 150 mg daily.  I also decrease the dose slightly of her losartan to 50 mg which is her usual dose.  We have added Ziac and I will continue to monitor blood pressures while she is on this medication.  She has taken it in the past with excellent control of her blood pressure on that medication.  Overall patient seems to be  "medically stable.  I will continue to follow her with you and see her again on Monday.  We will obtain labs on Monday morning.    5/4/2020. Patient much more interactive than when previously seen 2 days ago.  She completes simple sentences but at times makes no sense.  She has been drinking and eating a bit better today though her labs from earlier this a.m. did show some mild dehydration again.  I have reconsulted the renal team that was following her on an inpatient basis for their input and suggestions on this issue.  Nursing staff has been encouraged to push oral fluids overnight tonight and will try their best to get the patient to take more oral intake.  Patient did drink 2 large glasses of water just prior to my arrival on the unit.  She has been taking her medicines more regularly.  Patient set up in a chair by bedside for approximately 3 hours today.  She does seem to be getting stronger and hopefully is beginning to react favorably to reinstitution of her medication therapy.  We did modify her oxygen orders to say that she only needs the oxygen if sats are below 88% and she did come out to the TV room today and interacted briefly with other residents there.  We continue to hope that the patient will gradually improve.  Her  hopes that she will be able to return home once things totally stabilize.    5/5/2020.  Patient sitting up in chair in room at the time of my visit with sitter at bedside.  Patient reportedly had a fairly good day though she did have one episode of defiance when she refused to eat or take her medicine at dinnertime.  Otherwise she did drink fairly well and a small amount of her food.  She is being offered boost on a regular basis to improve her nutritional status.  Patient seems much brighter to me at the time of my exam tonight.  I asked her where she would want to be if she was not here in the hospital at [facility] and she told me \"[facility]\" According to , they do " have a condo there and have traveled extensively to that location.  Patient is friendly but has difficulty putting her speech together.  At times seems to say random words that do not come together in sentences.  Her swelling seems a bit improved.  Labs today are also improved with a significant improvement in her creatinine.  Vital signs show that she is afebrile but her blood pressure was up slightly earlier this evening at 166/88.  We are continuing to monitor this closely.  Will recheck labs in 2 days.  Renal consult still pending.    5/9/2020.  Patient much more alert and conversant at the time of my rounds today.  She is sitting up in the bedside chair with walker in front of her.  Patient states that she is planning to take a shower this afternoon and once a big glass of cold ice water prior to the shower.  Her appetite has improved and she is eating more of her tray.  She did receive IV fluids and is in the middle of a 1 L bolus at the time of my visit.  Her renal function has significantly improved today.  Renal continues to follow and I did review their note.  It appears that her decreased volume status in combination with her Diovan HCTZ was the reason for the elevated BUN and creatinine and with discontinuation of the medication and IV rehydration she is nearly back to baseline.  Blood pressure off the medication is a bit higher but still only in the normal range of 130/74.  Patient's conversation is still a bit confused anytime and she does make some paranoid statements from time to time.  Patient states that her , Tate, is now  to his second wife.  She feels that she has nowhere to go when she is discharged.   himself has been very involved with her care and has spoken with me several times over the course of this hospitalization.  He intends to take her home as soon as she is stable enough to maintain her own hydration status and ambulate without falling.  Overall patient's  condition does not seem to be improving and we will continue to follow    5/10/2020.  Patient still continuing to slowly improve.  Much more ambulatory today and did attend 1 group therapy session.  Patient remains somewhat confused with her speech patterns and does have some paranoid ideation with respect to infection risk.  Patient asks for hand  in her room and I did obtain a small bottle and gave her a small amount of hand  in her hands.  Discussed case at length with nurse.  She has been cooperative with her medicines and it does seem that her appetite is improved.  I did review Dr. Cruz's note and he has increased slightly her Ritalin dose in an effort to continue improving depression levels and overall mobility.  Patient continues to state that her  has  and new woman and that she does not know what her last name is at the present time.  Staff continue to support patient and openly discussed the errors she is making.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

## 2020-05-13 NOTE — PLAN OF CARE
Problem: Patient Care Overview  Goal: Plan of Care Review  Outcome: Ongoing (interventions implemented as appropriate)  Flowsheets (Taken 5/13/2020 0434)  Progress: improving  Plan of Care Reviewed With: patient  Patient Agreement with Plan of Care: agrees  Note:   Pt has been pleasant and cooperative this shift.  Cooperative  with medications and care.  Pt reports feeling better and less anxious.  Will continue to monitor and provide a safe environment.  Goal: Individualization and Mutuality  Outcome: Ongoing (interventions implemented as appropriate)  Goal: Discharge Needs Assessment  Outcome: Ongoing (interventions implemented as appropriate)  Goal: Interprofessional Rounds/Family Conf  Outcome: Ongoing (interventions implemented as appropriate)     Problem: Overarching Goals (Adult)  Goal: Adheres to Safety Considerations for Self and Others  Outcome: Ongoing (interventions implemented as appropriate)  Flowsheets (Taken 5/13/2020 0430)  Adheres to Safety Considerations for Self and Others: making progress toward outcome  Goal: Optimized Coping Skills in Response to Life Stressors  Outcome: Ongoing (interventions implemented as appropriate)  Flowsheets (Taken 5/13/2020 0430)  Optimized Coping Skills in Response to Life Stressors: making progress toward outcome  Goal: Develops/Participates in Therapeutic [place] to Support Successful Transition  Outcome: Ongoing (interventions implemented as appropriate)  Flowsheets (Taken 5/13/2020 0430)  Develops/Participates in Therapeutic [place] to Support Successful Transition: making progress toward outcome     Problem: Cognitive Impairment (Psychotic Signs/Symptoms) (Adult)  Goal: Improved Thought Clarity/Organization (Psychotic Signs/Symptoms)  Outcome: Ongoing (interventions implemented as appropriate)  Flowsheets (Taken 5/13/2020 0430)  Improved Thought Clarity/Organization Action Step (STG) Outcome: making progress toward outcome     Problem: Psychomotor Movement  Impairment (Psychotic Signs/Symptoms) (Adult)  Goal: Improved Psychomotor Symptoms (Psychotic Signs/Symptoms)  Outcome: Ongoing (interventions implemented as appropriate)  Flowsheets (Taken 5/13/2020 0430)  Improved Psychomotor Symptoms Action Step (STG) Outcome: making progress toward outcome     Problem: Mental State/Mood Impairment (Psychotic Signs/Symptoms) (Adult)  Goal: Improved Mental State/Mood (Psychotic Signs/Symptoms)  Outcome: Ongoing (interventions implemented as appropriate)  Flowsheets (Taken 5/13/2020 0430)  Improved Mental State/Mood Action Step (STG) Outcome: making progress toward outcome     Problem: Skin Injury Risk (Adult)  Goal: Skin Health and Integrity  Outcome: Ongoing (interventions implemented as appropriate)  Flowsheets (Taken 5/13/2020 0430)  Skin Health and Integrity: making progress toward outcome     Problem: Fall Risk (Adult)  Goal: Absence of Fall  Outcome: Ongoing (interventions implemented as appropriate)  Flowsheets (Taken 5/13/2020 0430)  Absence of Fall: making progress toward outcome

## 2020-05-13 NOTE — PLAN OF CARE
Problem: Patient Care Overview  Goal: Plan of Care Review  Outcome: Ongoing (interventions implemented as appropriate)  Flowsheets (Taken 5/13/2020 1447)  Progress: improving  Plan of Care Reviewed With: patient  Patient Agreement with Plan of Care: agrees  Outcome Summary: Patient is again, engaging in group activities and conversation in the millieu, calm and cooperative.  Preoccupied with discharge.     Problem: Overarching Goals (Adult)  Goal: Adheres to Safety Considerations for Self and Others  Outcome: Ongoing (interventions implemented as appropriate)  Flowsheets (Taken 5/13/2020 1448)  Adheres to Safety Considerations for Self and Others: making progress toward outcome     Problem: Overarching Goals (Adult)  Goal: Optimized Coping Skills in Response to Life Stressors  Outcome: Ongoing (interventions implemented as appropriate)  Flowsheets (Taken 5/13/2020 1448)  Optimized Coping Skills in Response to Life Stressors: making progress toward outcome     Problem: Overarching Goals (Adult)  Goal: Develops/Participates in Therapeutic [place] to Support Successful Transition  Outcome: Ongoing (interventions implemented as appropriate)  Flowsheets (Taken 5/13/2020 1448)  Develops/Participates in Therapeutic [place] to Support Successful Transition: making progress toward outcome     Problem: Cognitive Impairment (Psychotic Signs/Symptoms) (Adult)  Goal: Improved Thought Clarity/Organization (Psychotic Signs/Symptoms)  Outcome: Ongoing (interventions implemented as appropriate)  Flowsheets (Taken 5/13/2020 1448)  Improved Thought Clarity/Organization Action Step (STG) Outcome: making progress toward outcome     Problem: Psychomotor Movement Impairment (Psychotic Signs/Symptoms) (Adult)  Goal: Improved Psychomotor Symptoms (Psychotic Signs/Symptoms)  Outcome: Ongoing (interventions implemented as appropriate)  Flowsheets (Taken 5/13/2020 1448)  Improved Psychomotor Symptoms Action Step (STG) Outcome: making  progress toward outcome     Problem: Mental State/Mood Impairment (Psychotic Signs/Symptoms) (Adult)  Goal: Improved Mental State/Mood (Psychotic Signs/Symptoms)  Outcome: Ongoing (interventions implemented as appropriate)  Flowsheets (Taken 5/13/2020 1440)  Improved Mental State/Mood Action Step (STG) Outcome: making progress toward outcome     Problem: Skin Injury Risk (Adult)  Goal: Skin Health and Integrity  Outcome: Ongoing (interventions implemented as appropriate)  Flowsheets (Taken 5/13/2020 1448)  Skin Health and Integrity: making progress toward outcome     Problem: Fall Risk (Adult)  Goal: Absence of Fall  Outcome: Ongoing (interventions implemented as appropriate)  Flowsheets (Taken 5/13/2020 1448)  Absence of Fall: making progress toward outcome
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
## 2020-05-14 PROCEDURE — 25010000002 ENOXAPARIN PER 10 MG: Performed by: SPECIALIST

## 2020-05-14 RX ADMIN — METHYLPHENIDATE HYDROCHLORIDE 5 MG: 10 TABLET ORAL at 12:33

## 2020-05-14 RX ADMIN — DOCUSATE SODIUM 100 MG: 100 CAPSULE, LIQUID FILLED ORAL at 21:14

## 2020-05-14 RX ADMIN — METHYLPHENIDATE HYDROCHLORIDE 5 MG: 10 TABLET ORAL at 08:14

## 2020-05-14 RX ADMIN — NYSTATIN 500000 UNITS: 100000 SUSPENSION ORAL at 12:32

## 2020-05-14 RX ADMIN — NYSTATIN 500000 UNITS: 100000 SUSPENSION ORAL at 18:06

## 2020-05-14 RX ADMIN — FAMOTIDINE 40 MG: 40 TABLET, FILM COATED ORAL at 08:14

## 2020-05-14 RX ADMIN — CLOBETASOL PROPIONATE: 0.5 CREAM TOPICAL at 08:15

## 2020-05-14 RX ADMIN — LORAZEPAM 0.5 MG: 0.5 TABLET ORAL at 21:14

## 2020-05-14 RX ADMIN — LORAZEPAM 0.5 MG: 0.5 TABLET ORAL at 08:15

## 2020-05-14 RX ADMIN — KETOTIFEN FUMARATE 1 DROP: 0.35 SOLUTION/ DROPS OPHTHALMIC at 21:14

## 2020-05-14 RX ADMIN — NYSTATIN 500000 UNITS: 100000 SUSPENSION ORAL at 21:14

## 2020-05-14 RX ADMIN — BISOPROLOL FUMARATE 5 MG: 5 TABLET ORAL at 08:14

## 2020-05-14 RX ADMIN — DOCUSATE SODIUM 100 MG: 100 CAPSULE, LIQUID FILLED ORAL at 08:15

## 2020-05-14 RX ADMIN — LORAZEPAM 0.5 MG: 0.5 TABLET ORAL at 18:06

## 2020-05-14 RX ADMIN — CLOBETASOL PROPIONATE: 0.5 CREAM TOPICAL at 21:14

## 2020-05-14 RX ADMIN — KETOTIFEN FUMARATE 1 DROP: 0.35 SOLUTION/ DROPS OPHTHALMIC at 08:15

## 2020-05-14 RX ADMIN — ENOXAPARIN SODIUM 40 MG: 40 INJECTION SUBCUTANEOUS at 08:15

## 2020-05-14 RX ADMIN — QUETIAPINE FUMARATE 200 MG: 50 TABLET, EXTENDED RELEASE ORAL at 21:13

## 2020-05-14 RX ADMIN — Medication 5 MG: at 21:25

## 2020-05-14 RX ADMIN — NYSTATIN 500000 UNITS: 100000 SUSPENSION ORAL at 08:15

## 2020-05-14 NOTE — PLAN OF CARE
Patient much brighter today than Monday when this RN last had patient. Signed off by PT as she seems to be close to baseline as far ambulating is concerned. She is less confused today and more conversive today with peers and staff. Denies SI and HI.  Problem: Patient Care Overview  Goal: Plan of Care Review  Outcome: Ongoing (interventions implemented as appropriate)  Goal: Individualization and Mutuality  Outcome: Ongoing (interventions implemented as appropriate)  Goal: Discharge Needs Assessment  Outcome: Ongoing (interventions implemented as appropriate)  Goal: Interprofessional Rounds/Family Conf  Outcome: Ongoing (interventions implemented as appropriate)     Problem: Overarching Goals (Adult)  Goal: Adheres to Safety Considerations for Self and Others  Outcome: Ongoing (interventions implemented as appropriate)  Goal: Optimized Coping Skills in Response to Life Stressors  Outcome: Ongoing (interventions implemented as appropriate)  Goal: Develops/Participates in Therapeutic [place] to Support Successful Transition  Outcome: Ongoing (interventions implemented as appropriate)     Problem: Cognitive Impairment (Psychotic Signs/Symptoms) (Adult)  Goal: Improved Thought Clarity/Organization (Psychotic Signs/Symptoms)  Outcome: Ongoing (interventions implemented as appropriate)     Problem: Psychomotor Movement Impairment (Psychotic Signs/Symptoms) (Adult)  Goal: Improved Psychomotor Symptoms (Psychotic Signs/Symptoms)  Outcome: Ongoing (interventions implemented as appropriate)     Problem: Mental State/Mood Impairment (Psychotic Signs/Symptoms) (Adult)  Goal: Improved Mental State/Mood (Psychotic Signs/Symptoms)  Outcome: Ongoing (interventions implemented as appropriate)     Problem: Skin Injury Risk (Adult)  Goal: Skin Health and Integrity  Outcome: Ongoing (interventions implemented as appropriate)     Problem: Fall Risk (Adult)  Goal: Absence of Fall  Outcome: Ongoing (interventions implemented as  appropriate)

## 2020-05-14 NOTE — PLAN OF CARE
Pt present out in milieu this shift. Cooperative and compliant with medications. Pt talking more this shift, asked for a snack. Response lag still present but improving. Pt did not rate anxiety or depression, but did seem brighter this shift, remains somewhat blunted. Will continue to provide feedback and support.    Problem: Patient Care Overview  Goal: Plan of Care Review  Outcome: Ongoing (interventions implemented as appropriate)  Flowsheets (Taken 5/14/2020 0418)  Progress: improving  Plan of Care Reviewed With: patient  Patient Agreement with Plan of Care: agrees  Goal: Individualization and Mutuality  Outcome: Ongoing (interventions implemented as appropriate)  Goal: Discharge Needs Assessment  Outcome: Ongoing (interventions implemented as appropriate)  Goal: Interprofessional Rounds/Family Conf  Outcome: Ongoing (interventions implemented as appropriate)     Problem: Overarching Goals (Adult)  Goal: Adheres to Safety Considerations for Self and Others  Outcome: Ongoing (interventions implemented as appropriate)  Flowsheets (Taken 5/14/2020 0418)  Adheres to Safety Considerations for Self and Others: making progress toward outcome  Goal: Optimized Coping Skills in Response to Life Stressors  Outcome: Ongoing (interventions implemented as appropriate)  Flowsheets (Taken 5/14/2020 0418)  Optimized Coping Skills in Response to Life Stressors: making progress toward outcome  Goal: Develops/Participates in Therapeutic [place] to Support Successful Transition  Outcome: Ongoing (interventions implemented as appropriate)  Flowsheets (Taken 5/14/2020 0418)  Develops/Participates in Therapeutic [place] to Support Successful Transition: making progress toward outcome     Problem: Cognitive Impairment (Psychotic Signs/Symptoms) (Adult)  Goal: Improved Thought Clarity/Organization (Psychotic Signs/Symptoms)  Outcome: Ongoing (interventions implemented as appropriate)  Flowsheets (Taken 5/14/2020 0418)  Improved Thought  Clarity/Organization Action Step (STG) Outcome: making progress toward outcome     Problem: Psychomotor Movement Impairment (Psychotic Signs/Symptoms) (Adult)  Goal: Improved Psychomotor Symptoms (Psychotic Signs/Symptoms)  Outcome: Ongoing (interventions implemented as appropriate)  Flowsheets (Taken 5/14/2020 0418)  Improved Psychomotor Symptoms Action Step (STG) Outcome: making progress toward outcome     Problem: Mental State/Mood Impairment (Psychotic Signs/Symptoms) (Adult)  Goal: Improved Mental State/Mood (Psychotic Signs/Symptoms)  Outcome: Ongoing (interventions implemented as appropriate)  Flowsheets (Taken 5/14/2020 0418)  Improved Mental State/Mood Action Step (STG) Outcome: making progress toward outcome     Problem: Skin Injury Risk (Adult)  Goal: Skin Health and Integrity  Outcome: Ongoing (interventions implemented as appropriate)  Flowsheets (Taken 5/14/2020 0418)  Skin Health and Integrity: making progress toward outcome     Problem: Fall Risk (Adult)  Goal: Absence of Fall  Outcome: Ongoing (interventions implemented as appropriate)  Flowsheets (Taken 5/14/2020 0418)  Absence of Fall: making progress toward outcome

## 2020-05-14 NOTE — PROGRESS NOTES
The patient remains somewhat dysphoric and ambivalent regarding discharge.  Overall, she seems to be showing slow but steady improvement with aggressive pharmacotherapy, however.  I have spoken with the patient's  regarding her care today.  He indicates that she was admitted to our [facility] in February 2019.  We will seek old records from that admission.

## 2020-05-14 NOTE — PROGRESS NOTES
[facility]  INTERNAL MEDICINE  TOÑITO LOJA MD  [address] [place] [address]  Phone [phone] Fax [phone]  E-mail:  [email or web address]      INTERNAL MEDICINE DAILY PROGRESS NOTE  Toñito Loja M.D.  2020              Patient Identification:  Name: Francheska Sherman  Age: 65 y.o.  Sex: female  :  1955  MRN: [id number]         Primary Care Physician: Toñito Loja MD  LENGTH OF STAY 11 DAYS    Consults     Date and Time Order Name Status Description    2020 0830 Inpatient Nephrology Consult      2020 1854 Inpatient Hospitalist Consult Completed     2020 1143 Inpatient Infectious Diseases Consult Completed     2020 0834 Inpatient Neurology Consult General Completed     2020 1205 Inpatient Psychiatrist Consult Completed     2020 2244 Inpatient Gastroenterology Consult Completed     2020 224 Inpatient Urology Consult Completed     2020 Inpatient Nephrology Consult Completed     2020 1913 Family Medicine Consult Completed               Chief Complaint:  Affective Psychosis, Bipolar Disorder with acute behavioral change     History of Present Illness:     Subjective         Interval History: Patient is a 65 y.o.female who presented with altered mental status and generalized weakness with dehydration to the [facility] emergency room by private car on 2020.  Most of the history is provided by her  who brought her to the hospital since patient was quite confused and unable to really provide much concrete detail and during her initial evaluation.  Patient is regularly followed by Dr. Hernesto Ernst in psychiatry at [facility] and has in the past been admitted to [facility] for episodes very similar to this one.  Patient does suffer from bipolar disorder and takes Seroquel on a regular basis for control and stabilization of this issue.  A few weeks  ago, patient was feeling good and decided to discontinue her Seroquel.  She also discontinued several of her other medications including her blood pressure medicine and her vitamin D.  Patient's  noticed that over the last 2 weeks she has begun to have a decline in her mental l condition and specifically he noticed that she was having increased confusion, insomnia, and extreme paranoia at times.  The  contacted Dr. Ernst and was instructed to start the patient back on Seroquel at 50 mg daily with titration up to 100 mg after 1 week on the 50 mg dose.  She has now had the 100 mg dose for the last 4 days but has not really responded to the medication at this point.  Patient has been talking nonstop and sleeping less than 4 hours per night.  This is very similar to episode she has had in the past that have been described as hypomanic in nature.  Patient did threaten to leave the home but has been has been able to manage this by using the alarm system at home to no when she goes out the door and has had her under careful observation for the last 10 days.  In the last 48 hours, patient has been drinking little to no liquid and on 2 occasions the  has found her collapsed and on the floor.  He had no evidence of broken bones or excessive trauma and each time he has been able to eventually get her back up on a chair or the sofa.  When he discussed bringing her to the hospital, patient has had an extreme fear of contacting another individual with COVID-19 and has refused to come in.  Patient has had no exposure to other individuals in over a month and has no symptoms of COVID-19 such as fever, cough, or shortness of breath.   was finally able to convince the patient to come to the emergency room yesterday evening when her weakness became so extreme that she could not get up to move about the house.  She denied nausea, vomiting, or diarrhea.  Family helped the  get her into his truck to  "bring her to the emergency room and ER staff helped get her out of the truck and bring her into the facility for evaluation.     Mrs. Sherman is a delightful 65-year-old female who I had the pleasure of following in my office since she transferred there just prior to [facility] of her regular physician, my former partner, Lemuel Turcios MD.   the medical problems for which I follow her include: Hypertension, hyperlipidemia, overactive bladder, and vitamin D deficiency.  Her compliance with medications and treating these issues has been somewhat marginal.  Her last set of labs in January 2020 did show normal liver function, normal electrolytes, mild hyperlipidemia with a cholesterol of 226, normal thyroid function, and a normal STD work-up.  The STD work-up was done at request of the patient because she was concerned that \" her  might be cheating on her\".  In general, patient seen today quite stable mentally at the time of her last visit with me on March 6 when she came in with complaints of urinary frequency and otherwise had a normal medical checkup.  Review of records shows that she did see Dr. Fernandez Hooks MD and recently underwent endoscopy and colonoscopy on June 11, 2019.  Polyps were removed by cold biopsy and plans for 3-year follow-up were instituted. Dr. Hooks did feel that the patient met criteria for the diagnosis of irritable bowel syndrome with alternating diarrhea and constipation.  Patient also sees Dr. Greg Stevens MD in urology for overactive bladder and has been controlled fairly well on Enablex.  Patient did see Dr. Zee in cardiology because of an abnormal EKG in 2019 and did have an exercise stress test with myocardial perfusion SPECT on 5/22/19.  She had requested medical treatment for the findings of that test which showed ejection fraction 60%, normal myocardial perfusion with no evidence of ischemia, and test consistent with a low risk study.     [facility] by Dr. Naldo Hi on 4/2520 at 1914.  At the time, patient presented with increasing confusion.   reported that this confusion was related to unstable bipolar disorder and discontinuation of her medication, Seroquel, that she takes for treatment of the disorder.  The ER physician was able to confirm that she had started back on this medication at the request of her psychiatric physician and has been titrated from 50 mg/day up to 100 mg/day recently.  Despite this attempted outpatient titration of the medication, patient had been refusing to eat or drink anything and had developed significant weakness.  On the day of admission she was unable to get out of bed on her own that she denied any chest pain or abdominal pain at the time.  In addition, patient had no evidence of nausea vomiting or diarrhea. Review of systems could not be performed due to the patient's mental status change.  Allergies were reported to codeine, morphine, and sulfa.  Physical exam in the emergency room showed that temperature was 96.5, pulse 85, respiratory rate 17, and blood pressure 145/92 with a sat of 95%.  Exam by the ER physician showed mucous membranes were quite dry.  Patient was awake and answering some questions appropriately such as name and age but was unable to really relate any history of the events which led to her visit in the ER.  Her exam was otherwise totally normal.  Lab results did show several abnormalities: Glucose elevated 123, BUN elevated 78, creatinine elevated at 1.60, and sodium elevated at 151.  Her liver function tests were also elevated with ALT of 236, and AST of 415.  She apparently had normal liver function test at a visit with her hepatitis C physician Dr. Woodruff just 1 month ago.  Patient had no evidence of alcohol in her blood.  Her white blood cell count was 14.62, her hemoglobin was 16.9, hematocrit was 50.8, and her platelet count was normal at 310,000.  Her magnesium was  slightly high at 2.9.  Thyroid function tests were normal.  Urine drug screen was totally negative but UA did show 3+ blood 1+ protein 1+ leukocyte esterase.  CT of head was unremarkable and chest x-ray was unremarkable.  EKG done soon after admission did show a normal sinus rhythm PAC, nonspecific ST changes and slightly prolonged QT interval.     4/26/20.  I personally saw the patient at the time of my rounds on 4/26/2020.  Nurse was present in the room at the time of my visit.  The patient had not spoken with others during the day, she did respond to my voice and actually open her eyes.  She did have a brief conversation with me before falling back off to sleep.  Renal has been adjusting her fluids to replace her volume deficit.  They will continue to follow this with us.  Urology did order a CT scan of abdomen and pelvis with and without contrast.  After consultation with renal, we elected to do the CT with out contrast only.  Results are pending at the time of my visit.  Patient was also seen in consultation by gastroenterology.  They feel that her elevated liver function tests are probably a result of the volume depletion status.  Hopefully these will improve as her volume is corrected.  Otherwise there are no major changes in the patient's condition from their perspective.  They also plan to continue following patient.  Psychiatry did see patient briefly but she was not cooperative with their exam.  Dr. Cruz will see patient tomorrow for further evaluation medication recommendations.  We suspect that the patient will need to be treated in the inpatient psychiatric unit at least short-term in order to stabilize the medications if her patterns follow previous pattern with respect to this issue.  Patient otherwise is hemodynamically stable at the present time.  We did do a blood gas since she is quite lethargic but that was within normal limits.     4/27/2020.  Patient resting quietly in bed at the time  of my rounds this evening.  I did discuss the case at length with the patient's nurse who went to the room with me.  Patient is a little more alert this evening and actually good agreed to take a few bites of ice cream.  In fact, she was feeling well enough to request strawberry ice cream instead of the vanilla ice cream that was available on the floor.  Patient remains quite weak.  She has not been out of bed at this time.  The nurse and I agreed that it would probably be appropriate to have PT or OT attempt to get patient up to bedside chair tomorrow if possible.  Patient has been declining her medications including the Seroquel XR that she is supposed to take each evening for her bipolar state.  Patient does seem oriented to person and place but somewhat lost in time.  GI feels she is stable from their point of view.  Elevated liver function tests probably are secondary to myoglobinuria which occurred after patient has been laying around and in bed most of the time.  Renal continues to adjust fluid for the patient and was agreeable to starting her blood pressure medications again.  Blood pressure did run high throughout the day yesterday and required treatment with Vasotec IV.  Urology had little further to add with the patient today but will plan outpatient cystoscopy after discharge.  Psychiatry did see patient in consultation and they feel she would be appropriate for their Geropsych unit to continue medication adjustment and stabilization of patient's psychiatric situation.  They are happy to take her in transfer once she is medically stable.  Urine culture so far shows no growth.     4/28/2020.  Patient still resting quietly in her bed at the time of my rounds in her room on 4 [address].  Patient does wake up as I entered the room and is semi-responsive but her answers to questions are relatively unintelligible and she tends to just mumble words.  Patient has eaten and drunk very little today despite efforts from  nursing staff to encourage her to do so.  Apparently she would not even take orange sherbet which is 1 of her favorite foods when offered to her by the staff.  I did speak briefly to the patient's nurse.  She reported no real new changes in the patient's condition.  Review of notes from occupational therapy showed that the patient did sit up on the side of the bed but did not ambulate effectively at this point.  Patient has not been taking most of her medication at this point.  Psychiatry is still following the patient with plans to admit the patient to the psychiatric unit when she is medically stable.  I discussed the case at length with the patient's .  He feels that this is very similar to a breakdown that she had several years ago which required hospitalization in the psychiatric unit at our [facility].  Nonetheless, after our discussion, I am recommending a neurology consultation and we will obtain an MRI to be sure that there is been no cerebrovascular accident causing these changes.  I also would like to get speech therapy to see the patient in consultation to be sure that her swallow is effective.  We may have to consider short-term feeding tube to stabilize the patient and provide a route for giving her medicine.     4/29/2020.  Patient is essentially unchanged on my rounds today.  She does say a few words but makes no coherent statements.  She still has been refusing her meds and only taking small bites occasionally of food with great urging from the nursing staff.  We did have neurology see the patient in consultation today and I spoke at length with  regarding his findings.  She has no real evidence of stroke.  She does have a small tremor.  MRI was done and did not show anything acute though it does show some hardening of the arteries and a tiny old infarct that radiology feels is insignificant with respect to the current findings.  I also have infectious disease see the patient to  be sure they feel the question of sepsis is completely ruled out.  I did agree with discontinuing all antibiotics at this point and do not feel that there is any signs of acute infection.  Renal has essentially signed off the case also.  Currently she is receiving IV fluids at 50 cc/h.  Speech is to see the patient first thing in the morning and determine swallowing safety.  I did discuss the situation with [facility] and they do feel that if patient needs a short-term stabilization with a Dobbhoff tube for medications and for fluids this could be continued on the psych unit.  This would be done as a short-term tool to continue medically stabilizing her while giving psychiatry an opportunity to adjust her bipolar medications.  Patient is urinating well.  Her labs now show normal BUN, creatinine, and potassium levels.  White blood cell count is now normal at 8.9 and there is no sign of anemia.  Her magnesium has all so returned to normal levels.  At this point, the patient does appear to be medically stable and it  ahould be possible to transfer her to psychiatry tomorrow if we can resolve the issues surrounding her nutritional and medication intake.     4/30/2020.  Patient more awake and alert today than previously at the time of my rounds.  She did take a small amount of her breakfast including some orange sherbet and a few bites of eggs.  Nurse was able to get the patient to take her pills both last night and this morning.  Blood pressure is running slightly up and I did restart her beta-blocker and diuretic which have helped with this issue in the past.  We will monitor her hydration status with labs.  I am going to discontinue the IV fluids and hope to cannulate more thirst to promote increased oral intake.  Nursing continues to encourage patient to eat small bites.  Patient did talk in brief sentences to her  on the phone today.  I did review the case with him and we both felt patient was appropriate  for transfer to psychiatry.  Neurology work-up is complete and relatively negative.  ID work-up is also complete.  Renal has signed off on the case.  At this point it is felt that behavioral modification is needed and adjustment of her psychiatric meds in order to return her to her baseline.  Hopefully psychiatry will be able to manage this in their unit.  I did speak with the access center and they do have a bed available for the patient tomorrow after her discharge is completed in the morning.  Transfer orders are complete and will be signed off in a.m.  If bed is available.  I also discussed the case with the patient's nurse and with PCP.     5/1/2020.  Patient had a quiet evening last night and seems stable still this a.m.  Psychiatry planning discharge from their unit today and at that point a bed will be available for patient in their unit which seems appropriate.  Patient does not need feeding tube at this time since she has started taking small bites of food and small sips of fluids.  We do have a saline lock still in place so patient could be given IV bolus if needed for dehydration.  We will continue to follow patient in the psychiatric unit and monitor her electrolytes while there.  Patient does have a short conversation with me today.  She is watching [employer] and states that she does not like the fake needs.  Patient did work with OT and sat on the edge of bed with better control of body yesterday.  Speech therapy evaluated patient today and does feel that she has an adequate swallow and her trouble eating is behavioral in nature.  At this point patient has maximized benefit from hospitalization and is medically stable.  She will be ready for discharge to psychiatry when bed is available.     ________________________________________________________________________________________________     TRANSFER TO PSYCHIATRY Attending Dr. Cruz  Internal Medicine Consult   [place]  ________________________________________________________________________________________________     5/2/2020.  I was consulted by Dr. Cruz for an internal medicine consult to evaluate this patient.  Patient was seen with nursing assistant present in her room on psychiatry which was room 3330.  Patient was resting quietly in bed but did seem to recognize me as I entered the room and smiled.  She mumbled a few words but made no real coherent conversation while I was in the room.  I spoke with nursing aide who was at bedside.  Patient is currently on with a sitter.  Apparently she did manage to get up well with physical therapy today and sat in the chair at bedside for almost an hour.  She had very little breakfast but lunch did eat all the fruit on her tray and tried to eat some of the meat but unfortunately could not chew the roast beef that was brought to her on the tray today.  I am going to try changing the consistency of the meat and see if she is able to better handle that chopped up.  There is a speech therapy consult in place for the patient on Monday.  Patient seems to be feeling well.  She has no specific complaints of pain or discomfort.  Blood pressure is now under better control.  I did speak with the  and reviewed the case with him.  He is pleased with her progress.  We did obtain an abdominal x-ray series at the request of the  to be sure constipation is not a problem.  She has a bit of excessive gas in her abdomen but there is no significant stool burden or other signs of bowel blockage.  Seroquel has been increased to her regular dialysis which is 150 mg daily.  I also decrease the dose slightly of her losartan to 50 mg which is her usual dose.  We have added Ziac and I will continue to monitor blood pressures while she is on this medication.  She has taken it in the past with excellent control of her blood pressure on that medication.  Overall patient seems to be  "medically stable.  I will continue to follow her with you and see her again on Monday.  We will obtain labs on Monday morning.    5/4/2020. Patient much more interactive than when previously seen 2 days ago.  She completes simple sentences but at times makes no sense.  She has been drinking and eating a bit better today though her labs from earlier this a.m. did show some mild dehydration again.  I have reconsulted the renal team that was following her on an inpatient basis for their input and suggestions on this issue.  Nursing staff has been encouraged to push oral fluids overnight tonight and will try their best to get the patient to take more oral intake.  Patient did drink 2 large glasses of water just prior to my arrival on the unit.  She has been taking her medicines more regularly.  Patient set up in a chair by bedside for approximately 3 hours today.  She does seem to be getting stronger and hopefully is beginning to react favorably to reinstitution of her medication therapy.  We did modify her oxygen orders to say that she only needs the oxygen if sats are below 88% and she did come out to the TV room today and interacted briefly with other residents there.  We continue to hope that the patient will gradually improve.  Her  hopes that she will be able to return home once things totally stabilize.    5/5/2020.  Patient sitting up in chair in room at the time of my visit with sitter at bedside.  Patient reportedly had a fairly good day though she did have one episode of defiance when she refused to eat or take her medicine at dinnertime.  Otherwise she did drink fairly well and a small amount of her food.  She is being offered boost on a regular basis to improve her nutritional status.  Patient seems much brighter to me at the time of my exam tonight.  I asked her where she would want to be if she was not here in the hospital at [facility] and she told me \"[facility]\" According to , they do " have a condo there and have traveled extensively to that location.  Patient is friendly but has difficulty putting her speech together.  At times seems to say random words that do not come together in sentences.  Her swelling seems a bit improved.  Labs today are also improved with a significant improvement in her creatinine.  Vital signs show that she is afebrile but her blood pressure was up slightly earlier this evening at 166/88.  We are continuing to monitor this closely.  Will recheck labs in 2 days.  Renal consult still pending.    5/9/2020.  Patient much more alert and conversant at the time of my rounds today.  She is sitting up in the bedside chair with walker in front of her.  Patient states that she is planning to take a shower this afternoon and once a big glass of cold ice water prior to the shower.  Her appetite has improved and she is eating more of her tray.  She did receive IV fluids and is in the middle of a 1 L bolus at the time of my visit.  Her renal function has significantly improved today.  Renal continues to follow and I did review their note.  It appears that her decreased volume status in combination with her Diovan HCTZ was the reason for the elevated BUN and creatinine and with discontinuation of the medication and IV rehydration she is nearly back to baseline.  Blood pressure off the medication is a bit higher but still only in the normal range of 130/74.  Patient's conversation is still a bit confused anytime and she does make some paranoid statements from time to time.  Patient states that her , Tate, is now  to his second wife.  She feels that she has nowhere to go when she is discharged.   himself has been very involved with her care and has spoken with me several times over the course of this hospitalization.  He intends to take her home as soon as she is stable enough to maintain her own hydration status and ambulate without falling.  Overall patient's  condition does not seem to be improving and we will continue to follow    5/10/2020.  Patient still continuing to slowly improve.  Much more ambulatory today and did attend 1 group therapy session.  Patient remains somewhat confused with her speech patterns and does have some paranoid ideation with respect to infection risk.  Patient asks for hand  in her room and I did obtain a small bottle and gave her a small amount of hand  in her hands.  Discussed case at length with nurse.  She has been cooperative with her medicines and it does seem that her appetite is improved.  I did review Dr. Cruz's note and he has increased slightly her Ritalin dose in an effort to continue improving depression levels and overall mobility.  Patient continues to state that her  has  and new woman and that she does not know what her last name is at the present time.  Staff continue to support patient and openly discussed the errors she is making.  Situation may improve later this week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

## 2020-05-15 PROCEDURE — 25010000002 ENOXAPARIN PER 10 MG: Performed by: SPECIALIST

## 2020-05-15 RX ADMIN — LORAZEPAM 0.5 MG: 0.5 TABLET ORAL at 08:13

## 2020-05-15 RX ADMIN — BISOPROLOL FUMARATE 5 MG: 5 TABLET ORAL at 10:58

## 2020-05-15 RX ADMIN — NYSTATIN 500000 UNITS: 100000 SUSPENSION ORAL at 08:14

## 2020-05-15 RX ADMIN — DOCUSATE SODIUM 100 MG: 100 CAPSULE, LIQUID FILLED ORAL at 08:12

## 2020-05-15 RX ADMIN — QUETIAPINE FUMARATE 200 MG: 50 TABLET, EXTENDED RELEASE ORAL at 20:37

## 2020-05-15 RX ADMIN — LORAZEPAM 0.5 MG: 0.5 TABLET ORAL at 16:57

## 2020-05-15 RX ADMIN — METHYLPHENIDATE HYDROCHLORIDE 5 MG: 10 TABLET ORAL at 13:11

## 2020-05-15 RX ADMIN — FAMOTIDINE 40 MG: 40 TABLET, FILM COATED ORAL at 08:13

## 2020-05-15 RX ADMIN — CLOBETASOL PROPIONATE: 0.5 CREAM TOPICAL at 20:37

## 2020-05-15 RX ADMIN — NYSTATIN 500000 UNITS: 100000 SUSPENSION ORAL at 13:12

## 2020-05-15 RX ADMIN — METHYLPHENIDATE HYDROCHLORIDE 5 MG: 10 TABLET ORAL at 08:11

## 2020-05-15 RX ADMIN — NYSTATIN 500000 UNITS: 100000 SUSPENSION ORAL at 18:30

## 2020-05-15 RX ADMIN — NYSTATIN 500000 UNITS: 100000 SUSPENSION ORAL at 20:37

## 2020-05-15 RX ADMIN — KETOTIFEN FUMARATE 1 DROP: 0.35 SOLUTION/ DROPS OPHTHALMIC at 20:37

## 2020-05-15 RX ADMIN — LORAZEPAM 0.5 MG: 0.5 TABLET ORAL at 20:37

## 2020-05-15 RX ADMIN — ENOXAPARIN SODIUM 40 MG: 40 INJECTION SUBCUTANEOUS at 08:19

## 2020-05-15 RX ADMIN — Medication 5 MG: at 20:37

## 2020-05-15 RX ADMIN — DOCUSATE SODIUM 100 MG: 100 CAPSULE, LIQUID FILLED ORAL at 20:37

## 2020-05-15 NOTE — NURSING NOTE
Pt's medical records faxed over from [facility] and placed in pt's chartlet.

## 2020-05-15 NOTE — PLAN OF CARE
Pt was present out in milieu engaging well with peers and staff. Pt cooperative and compliant with medications. No behavioral issue. Will continue to provide feedback and support.    Problem: Patient Care Overview  Goal: Plan of Care Review  Outcome: Ongoing (interventions implemented as appropriate)  Flowsheets (Taken 5/15/2020 0408)  Progress: improving  Plan of Care Reviewed With: patient  Patient Agreement with Plan of Care: agrees  Goal: Individualization and Mutuality  Outcome: Ongoing (interventions implemented as appropriate)  Goal: Discharge Needs Assessment  Outcome: Ongoing (interventions implemented as appropriate)  Goal: Interprofessional Rounds/Family Conf  Outcome: Ongoing (interventions implemented as appropriate)     Problem: Overarching Goals (Adult)  Goal: Adheres to Safety Considerations for Self and Others  Outcome: Ongoing (interventions implemented as appropriate)  Flowsheets (Taken 5/15/2020 0408)  Adheres to Safety Considerations for Self and Others: making progress toward outcome  Goal: Optimized Coping Skills in Response to Life Stressors  Outcome: Ongoing (interventions implemented as appropriate)  Flowsheets (Taken 5/15/2020 0408)  Optimized Coping Skills in Response to Life Stressors: making progress toward outcome  Goal: Develops/Participates in Therapeutic [place] to Support Successful Transition  Outcome: Ongoing (interventions implemented as appropriate)  Flowsheets (Taken 5/15/2020 0408)  Develops/Participates in Therapeutic [place] to Support Successful Transition: making progress toward outcome     Problem: Cognitive Impairment (Psychotic Signs/Symptoms) (Adult)  Goal: Improved Thought Clarity/Organization (Psychotic Signs/Symptoms)  Outcome: Ongoing (interventions implemented as appropriate)  Flowsheets (Taken 5/15/2020 0408)  Improved Thought Clarity/Organization Action Step (STG) Outcome: making progress toward outcome     Problem: Psychomotor Movement Impairment (Psychotic  Signs/Symptoms) (Adult)  Goal: Improved Psychomotor Symptoms (Psychotic Signs/Symptoms)  Outcome: Ongoing (interventions implemented as appropriate)  Flowsheets (Taken 5/15/2020 0408)  Improved Psychomotor Symptoms Action Step (STG) Outcome: making progress toward outcome     Problem: Mental State/Mood Impairment (Psychotic Signs/Symptoms) (Adult)  Goal: Improved Mental State/Mood (Psychotic Signs/Symptoms)  Outcome: Ongoing (interventions implemented as appropriate)  Flowsheets (Taken 5/15/2020 0408)  Improved Mental State/Mood Action Step (STG) Outcome: making progress toward outcome     Problem: Skin Injury Risk (Adult)  Goal: Skin Health and Integrity  Outcome: Ongoing (interventions implemented as appropriate)  Flowsheets (Taken 5/15/2020 0408)  Skin Health and Integrity: making progress toward outcome     Problem: Fall Risk (Adult)  Goal: Absence of Fall  Outcome: Ongoing (interventions implemented as appropriate)  Flowsheets (Taken 5/15/2020 0408)  Absence of Fall: making progress toward outcome

## 2020-05-15 NOTE — PROGRESS NOTES
The patient is a 65-year-old white female admitted in transfer from the main hospital where she had been admitted in the state of [place] after a lengthy period of medication noncompliance.  She has shown slow improvement with aggressive pharmacotherapy including Seroquel, Ritalin and Ativan.    The patient remains paranoid and is persistent in the belief that her  has taken a new wife.  While significantly improved from admission, the patient continues to exhibit symptoms of paranoia related to her bipolar depressed episode.  I have reviewed old records from our [facility] where the patient was cared for by my partner, Dr. Ernst.

## 2020-05-15 NOTE — PLAN OF CARE
Problem: Patient Care Overview  Goal: Plan of Care Review  Outcome: Ongoing (interventions implemented as appropriate)  Flowsheets  Taken 5/15/2020 1715  Progress: improving  Outcome Summary: Pt pleasant and cooperative with care this shift. Pt oriented x3. Pt is talkative and often seeks out staff. Pt voiced anxiety and depression, stating that she wants to leave but does not have a home to go to. Pt remains paranoid thinking that her  does not want to take her back. Pt compliant with medications and attending group therapy. Will continue to monitor and provide support.   Taken 5/15/2020 0812  Plan of Care Reviewed With: patient  Patient Agreement with Plan of Care: agrees     Problem: Patient Care Overview  Goal: Individualization and Mutuality  Outcome: Ongoing (interventions implemented as appropriate)     Problem: Patient Care Overview  Goal: Discharge Needs Assessment  Outcome: Ongoing (interventions implemented as appropriate)     Problem: Patient Care Overview  Goal: Interprofessional Rounds/Family Conf  Outcome: Ongoing (interventions implemented as appropriate)     Problem: Overarching Goals (Adult)  Goal: Adheres to Safety Considerations for Self and Others  Outcome: Ongoing (interventions implemented as appropriate)  Flowsheets (Taken 5/15/2020 1715)  Adheres to Safety Considerations for Self and Others: making progress toward outcome     Problem: Overarching Goals (Adult)  Goal: Optimized Coping Skills in Response to Life Stressors  Outcome: Ongoing (interventions implemented as appropriate)  Flowsheets (Taken 5/15/2020 1715)  Optimized Coping Skills in Response to Life Stressors: making progress toward outcome     Problem: Overarching Goals (Adult)  Goal: Develops/Participates in Therapeutic [place] to Support Successful Transition  Outcome: Ongoing (interventions implemented as appropriate)  Flowsheets (Taken 5/15/2020 1715)  Develops/Participates in Therapeutic [place] to Support  Successful Transition: making progress toward outcome     Problem: Cognitive Impairment (Psychotic Signs/Symptoms) (Adult)  Goal: Improved Thought Clarity/Organization (Psychotic Signs/Symptoms)  Outcome: Ongoing (interventions implemented as appropriate)  Flowsheets (Taken 5/15/2020 1715)  Improved Thought Clarity/Organization Action Step/Short Term Goal (STG) Established: 5/15/2020  Improved Thought Clarity/Organization Time Frame for Action Step (STG): 4 days  Improved Thought Clarity/Organization Action Step (STG) Outcome: making progress toward outcome     Problem: Psychomotor Movement Impairment (Psychotic Signs/Symptoms) (Adult)  Goal: Improved Psychomotor Symptoms (Psychotic Signs/Symptoms)  Outcome: Ongoing (interventions implemented as appropriate)  Flowsheets (Taken 5/15/2020 1715)  Improved Psychomotor Symptoms Action Step/Short Term Goal (STG) Established: 5/15/2020  Improved Psychomotor Symptoms Time Frame for Action Step (STG): 4 days  Improved Psychomotor Symptoms Action Step (STG) Outcome: making progress toward outcome     Problem: Mental State/Mood Impairment (Psychotic Signs/Symptoms) (Adult)  Goal: Improved Mental State/Mood (Psychotic Signs/Symptoms)  Outcome: Ongoing (interventions implemented as appropriate)  Flowsheets (Taken 5/15/2020 1715)  Improved Mental State/Mood Action Step/Short Term Goal (STG) Established: 5/15/2020  Improved Mental State/Mood Time Frame for Action Step (STG): 4 days  Improved Mental State/Mood Action Step (STG) Outcome: making progress toward outcome     Problem: Skin Injury Risk (Adult)  Goal: Skin Health and Integrity  Outcome: Ongoing (interventions implemented as appropriate)  Flowsheets (Taken 5/15/2020 1715)  Skin Health and Integrity: making progress toward outcome     Problem: Fall Risk (Adult)  Goal: Absence of Fall  Outcome: Ongoing (interventions implemented as appropriate)  Flowsheets (Taken 5/15/2020 1715)  Absence of Fall: making progress toward  outcome

## 2020-05-16 PROCEDURE — 25010000002 ENOXAPARIN PER 10 MG: Performed by: SPECIALIST

## 2020-05-16 RX ADMIN — Medication 5 MG: at 20:37

## 2020-05-16 RX ADMIN — FAMOTIDINE 40 MG: 40 TABLET, FILM COATED ORAL at 08:17

## 2020-05-16 RX ADMIN — NYSTATIN 500000 UNITS: 100000 SUSPENSION ORAL at 12:11

## 2020-05-16 RX ADMIN — BISOPROLOL FUMARATE 5 MG: 5 TABLET ORAL at 08:18

## 2020-05-16 RX ADMIN — DOCUSATE SODIUM 100 MG: 100 CAPSULE, LIQUID FILLED ORAL at 08:17

## 2020-05-16 RX ADMIN — NYSTATIN 500000 UNITS: 100000 SUSPENSION ORAL at 08:17

## 2020-05-16 RX ADMIN — METHYLPHENIDATE HYDROCHLORIDE 5 MG: 10 TABLET ORAL at 08:17

## 2020-05-16 RX ADMIN — DOCUSATE SODIUM 100 MG: 100 CAPSULE, LIQUID FILLED ORAL at 20:37

## 2020-05-16 RX ADMIN — KETOTIFEN FUMARATE 1 DROP: 0.35 SOLUTION/ DROPS OPHTHALMIC at 08:18

## 2020-05-16 RX ADMIN — METHYLPHENIDATE HYDROCHLORIDE 5 MG: 10 TABLET ORAL at 12:11

## 2020-05-16 RX ADMIN — LORAZEPAM 0.5 MG: 0.5 TABLET ORAL at 20:37

## 2020-05-16 RX ADMIN — CLOBETASOL PROPIONATE: 0.5 CREAM TOPICAL at 08:18

## 2020-05-16 RX ADMIN — QUETIAPINE FUMARATE 200 MG: 50 TABLET, EXTENDED RELEASE ORAL at 20:37

## 2020-05-16 RX ADMIN — LORAZEPAM 0.5 MG: 0.5 TABLET ORAL at 16:25

## 2020-05-16 RX ADMIN — ENOXAPARIN SODIUM 40 MG: 40 INJECTION SUBCUTANEOUS at 08:17

## 2020-05-16 RX ADMIN — LORAZEPAM 0.5 MG: 0.5 TABLET ORAL at 08:18

## 2020-05-16 NOTE — PROGRESS NOTES
Patient is seen, evaluated, and chart reviewed. Discussed with staff.  Patient is seen for Dr. Cruz.    Staff reports that patient has been cooperative and compliant with medications.  She has been attending groups. No behavioral issues.    On examination, patient is found in the milieu.  Patient slept fair last night.  Mood is depressed and anxious.  Affect congruent.  No SI/HI/AVH.  She continues to have paranoia.  Thought processes are circumstantial.  Judgment and insight are poor.    No medication side effects.  Patient is provided with supportive therapy.  Continue current medications, therapy, and inpatient treatment plan for safety and stabilization.

## 2020-05-16 NOTE — PLAN OF CARE
Problem: Patient Care Overview  Goal: Plan of Care Review  Outcome: Ongoing (interventions implemented as appropriate)  Flowsheets (Taken 5/16/2020 0318)  Progress: improving  Plan of Care Reviewed With: patient  Patient Agreement with Plan of Care: agrees  Note:   Pt pleasant and cooperative with medications and care. Medications given whole with water. Gait appears steady with use of walker. Pt has remained continent. Pt can be somewhat intrusive to others, and has been gently redirected when doing so. Appetite and thirst much improved from previous notation. Will continue to monitor.   Goal: Individualization and Mutuality  Outcome: Ongoing (interventions implemented as appropriate)  Goal: Discharge Needs Assessment  Outcome: Ongoing (interventions implemented as appropriate)  Goal: Interprofessional Rounds/Family Conf  Outcome: Ongoing (interventions implemented as appropriate)     Problem: Overarching Goals (Adult)  Goal: Adheres to Safety Considerations for Self and Others  Outcome: Ongoing (interventions implemented as appropriate)  Flowsheets (Taken 5/16/2020 0318)  Adheres to Safety Considerations for Self and Others: making progress toward outcome  Goal: Optimized Coping Skills in Response to Life Stressors  Outcome: Ongoing (interventions implemented as appropriate)  Flowsheets (Taken 5/16/2020 0318)  Optimized Coping Skills in Response to Life Stressors: making progress toward outcome  Goal: Develops/Participates in Therapeutic [place] to Support Successful Transition  Outcome: Ongoing (interventions implemented as appropriate)  Flowsheets (Taken 5/16/2020 0318)  Develops/Participates in Therapeutic [place] to Support Successful Transition: making progress toward outcome     Problem: Cognitive Impairment (Psychotic Signs/Symptoms) (Adult)  Goal: Improved Thought Clarity/Organization (Psychotic Signs/Symptoms)  Outcome: Ongoing (interventions implemented as appropriate)  Flowsheets  Taken 5/16/2020  0318 by Terese Wong RN  Improved Thought Clarity/Organization Time Frame for Action Step (STG): 3 days  Taken 5/15/2020 1715 by Terese Carson RN  Improved Thought Clarity/Organization Action Step (STG) Outcome: making progress toward outcome     Problem: Psychomotor Movement Impairment (Psychotic Signs/Symptoms) (Adult)  Goal: Improved Psychomotor Symptoms (Psychotic Signs/Symptoms)  Outcome: Ongoing (interventions implemented as appropriate)  Flowsheets  Taken 5/16/2020 0318 by Terese Wong RN  Improved Psychomotor Symptoms Time Frame for Action Step (STG): 3 days  Taken 5/15/2020 1715 by Terese Carson RN  Improved Psychomotor Symptoms Action Step (STG) Outcome: making progress toward outcome     Problem: Mental State/Mood Impairment (Psychotic Signs/Symptoms) (Adult)  Goal: Improved Mental State/Mood (Psychotic Signs/Symptoms)  Outcome: Ongoing (interventions implemented as appropriate)  Flowsheets  Taken 5/16/2020 0318 by Terese Wong RN  Improved Mental State/Mood Time Frame for Action Step (STG): 3 days  Taken 5/15/2020 1715 by Terese Carson RN  Improved Mental State/Mood Action Step (STG) Outcome: making progress toward outcome     Problem: Skin Injury Risk (Adult)  Goal: Skin Health and Integrity  Outcome: Ongoing (interventions implemented as appropriate)  Flowsheets (Taken 5/16/2020 0318)  Skin Health and Integrity: making progress toward outcome     Problem: Fall Risk (Adult)  Goal: Absence of Fall  Outcome: Ongoing (interventions implemented as appropriate)  Flowsheets (Taken 5/16/2020 0318)  Absence of Fall: achieves outcome

## 2020-05-16 NOTE — PLAN OF CARE
Patient has had a good day today. Takes medications as directed and has attended both groups. Expresses interest in going home soon. Seems more paranoid today about staff not wanting her here, but oriented otherwise. Denies SI and HI.  Problem: Patient Care Overview  Goal: Plan of Care Review  Outcome: Ongoing (interventions implemented as appropriate)  Goal: Individualization and Mutuality  Outcome: Ongoing (interventions implemented as appropriate)  Goal: Discharge Needs Assessment  Outcome: Ongoing (interventions implemented as appropriate)  Goal: Interprofessional Rounds/Family Conf  Outcome: Ongoing (interventions implemented as appropriate)     Problem: Overarching Goals (Adult)  Goal: Adheres to Safety Considerations for Self and Others  Outcome: Ongoing (interventions implemented as appropriate)  Goal: Optimized Coping Skills in Response to Life Stressors  Outcome: Ongoing (interventions implemented as appropriate)  Goal: Develops/Participates in Therapeutic [place] to Support Successful Transition  Outcome: Ongoing (interventions implemented as appropriate)     Problem: Cognitive Impairment (Psychotic Signs/Symptoms) (Adult)  Goal: Improved Thought Clarity/Organization (Psychotic Signs/Symptoms)  Outcome: Ongoing (interventions implemented as appropriate)     Problem: Psychomotor Movement Impairment (Psychotic Signs/Symptoms) (Adult)  Goal: Improved Psychomotor Symptoms (Psychotic Signs/Symptoms)  Outcome: Ongoing (interventions implemented as appropriate)     Problem: Mental State/Mood Impairment (Psychotic Signs/Symptoms) (Adult)  Goal: Improved Mental State/Mood (Psychotic Signs/Symptoms)  Outcome: Ongoing (interventions implemented as appropriate)     Problem: Skin Injury Risk (Adult)  Goal: Skin Health and Integrity  Outcome: Ongoing (interventions implemented as appropriate)     Problem: Fall Risk (Adult)  Goal: Absence of Fall  Outcome: Ongoing (interventions implemented as appropriate)

## 2020-05-17 RX ADMIN — ERGOCALCIFEROL 50000 UNITS: 1.25 CAPSULE ORAL at 10:59

## 2020-05-17 RX ADMIN — LORAZEPAM 0.5 MG: 0.5 TABLET ORAL at 10:58

## 2020-05-17 RX ADMIN — FAMOTIDINE 40 MG: 40 TABLET, FILM COATED ORAL at 10:58

## 2020-05-17 RX ADMIN — LORAZEPAM 0.5 MG: 0.5 TABLET ORAL at 15:55

## 2020-05-17 RX ADMIN — DOCUSATE SODIUM 100 MG: 100 CAPSULE, LIQUID FILLED ORAL at 20:23

## 2020-05-17 RX ADMIN — METHYLPHENIDATE HYDROCHLORIDE 5 MG: 10 TABLET ORAL at 15:55

## 2020-05-17 RX ADMIN — LORAZEPAM 0.5 MG: 0.5 TABLET ORAL at 20:23

## 2020-05-17 RX ADMIN — BISOPROLOL FUMARATE 5 MG: 5 TABLET ORAL at 10:59

## 2020-05-17 RX ADMIN — QUETIAPINE FUMARATE 200 MG: 50 TABLET, EXTENDED RELEASE ORAL at 20:23

## 2020-05-17 RX ADMIN — METHYLPHENIDATE HYDROCHLORIDE 5 MG: 10 TABLET ORAL at 10:59

## 2020-05-17 NOTE — PROGRESS NOTES
Patient is seen, evaluated, and chart reviewed. Discussed with staff.  Patient is seen for Dr. Cruz.    Staff reports that patient has been cooperative and compliant with medications.  No behavioral issues.  She has been attending groups.  Staff has noted ongoing paranoia.    On examination, patient is found in the milieu.  She slept okay last night.  Mood is a little depressed and anxious.  Affect congruent.  No SI/HI/AVH.  Thought processes are circumstantial.  Judgment and insight are poor.    Patient does have bilateral lower extremity edema.  She does feel that her feet hurt a little.  She states that she has had this before.  This could be related to 1 of her medications.  We will request medical consult.  No other medication side effects.  Patient is provided with supportive therapy.  Continue current medications, therapy, and inpatient treatment plan for safety and stabilization.  Anthony Azevedo will resume care tomorrow.

## 2020-05-17 NOTE — PLAN OF CARE
Patient continuously seems to be improving daily. She has been alert and oriented, with only intermittent forgetfulness. She is hesitant to admit to progress, but staff continues to encourage her improvement. Patient denies SI or HI.  Problem: Patient Care Overview  Goal: Plan of Care Review  Outcome: Ongoing (interventions implemented as appropriate)  Goal: Individualization and Mutuality  Outcome: Ongoing (interventions implemented as appropriate)  Goal: Discharge Needs Assessment  Outcome: Ongoing (interventions implemented as appropriate)  Goal: Interprofessional Rounds/Family Conf  Outcome: Ongoing (interventions implemented as appropriate)     Problem: Overarching Goals (Adult)  Goal: Adheres to Safety Considerations for Self and Others  Outcome: Ongoing (interventions implemented as appropriate)  Goal: Optimized Coping Skills in Response to Life Stressors  Outcome: Ongoing (interventions implemented as appropriate)  Goal: Develops/Participates in Therapeutic [place] to Support Successful Transition  Outcome: Ongoing (interventions implemented as appropriate)     Problem: Cognitive Impairment (Psychotic Signs/Symptoms) (Adult)  Goal: Improved Thought Clarity/Organization (Psychotic Signs/Symptoms)  Outcome: Ongoing (interventions implemented as appropriate)     Problem: Psychomotor Movement Impairment (Psychotic Signs/Symptoms) (Adult)  Goal: Improved Psychomotor Symptoms (Psychotic Signs/Symptoms)  Outcome: Ongoing (interventions implemented as appropriate)     Problem: Mental State/Mood Impairment (Psychotic Signs/Symptoms) (Adult)  Goal: Improved Mental State/Mood (Psychotic Signs/Symptoms)  Outcome: Ongoing (interventions implemented as appropriate)     Problem: Skin Injury Risk (Adult)  Goal: Skin Health and Integrity  Outcome: Ongoing (interventions implemented as appropriate)     Problem: Fall Risk (Adult)  Goal: Absence of Fall  Outcome: Ongoing (interventions implemented as appropriate)

## 2020-05-17 NOTE — PLAN OF CARE
Problem: Patient Care Overview  Goal: Plan of Care Review  Outcome: Ongoing (interventions implemented as appropriate)  Flowsheets (Taken 5/17/2020 0537)  Progress: improving  Plan of Care Reviewed With: patient  Patient Agreement with Plan of Care: agrees  Note:   Pt pleasant and cooperative with care.  Compliant with medications.  Pt denies anxiety, depression, SI, HI and pain.  Will continue to  monitor and provide a safe environment.  Goal: Individualization and Mutuality  Outcome: Ongoing (interventions implemented as appropriate)  Goal: Discharge Needs Assessment  Outcome: Ongoing (interventions implemented as appropriate)  Goal: Interprofessional Rounds/Family Conf  Outcome: Ongoing (interventions implemented as appropriate)     Problem: Overarching Goals (Adult)  Goal: Adheres to Safety Considerations for Self and Others  Outcome: Ongoing (interventions implemented as appropriate)  Flowsheets (Taken 5/17/2020 0537)  Adheres to Safety Considerations for Self and Others: making progress toward outcome  Goal: Optimized Coping Skills in Response to Life Stressors  Outcome: Ongoing (interventions implemented as appropriate)  Flowsheets (Taken 5/17/2020 0537)  Optimized Coping Skills in Response to Life Stressors: making progress toward outcome  Goal: Develops/Participates in Therapeutic [place] to Support Successful Transition  Outcome: Ongoing (interventions implemented as appropriate)  Flowsheets (Taken 5/17/2020 0537)  Develops/Participates in Therapeutic [place] to Support Successful Transition: making progress toward outcome     Problem: Cognitive Impairment (Psychotic Signs/Symptoms) (Adult)  Goal: Improved Thought Clarity/Organization (Psychotic Signs/Symptoms)  Outcome: Ongoing (interventions implemented as appropriate)  Flowsheets (Taken 5/17/2020 0537)  Improved Thought Clarity/Organization Action Step (STG) Outcome: making progress toward outcome     Problem: Psychomotor Movement Impairment  (Psychotic Signs/Symptoms) (Adult)  Goal: Improved Psychomotor Symptoms (Psychotic Signs/Symptoms)  Outcome: Ongoing (interventions implemented as appropriate)  Flowsheets (Taken 5/17/2020 0537)  Improved Psychomotor Symptoms Action Step (STG) Outcome: making progress toward outcome     Problem: Mental State/Mood Impairment (Psychotic Signs/Symptoms) (Adult)  Goal: Improved Mental State/Mood (Psychotic Signs/Symptoms)  Outcome: Ongoing (interventions implemented as appropriate)  Flowsheets (Taken 5/17/2020 0537)  Improved Mental State/Mood Action Step (STG) Outcome: making progress toward outcome     Problem: Skin Injury Risk (Adult)  Goal: Skin Health and Integrity  Outcome: Ongoing (interventions implemented as appropriate)  Flowsheets (Taken 5/17/2020 0537)  Skin Health and Integrity: making progress toward outcome     Problem: Fall Risk (Adult)  Goal: Absence of Fall  Outcome: Ongoing (interventions implemented as appropriate)  Flowsheets (Taken 5/17/2020 0537)  Absence of Fall: making progress toward outcome

## 2020-05-18 RX ADMIN — DOCUSATE SODIUM 100 MG: 100 CAPSULE, LIQUID FILLED ORAL at 20:58

## 2020-05-18 RX ADMIN — BISOPROLOL FUMARATE 5 MG: 5 TABLET ORAL at 08:20

## 2020-05-18 RX ADMIN — FAMOTIDINE 40 MG: 40 TABLET, FILM COATED ORAL at 08:20

## 2020-05-18 RX ADMIN — METHYLPHENIDATE HYDROCHLORIDE 5 MG: 10 TABLET ORAL at 13:00

## 2020-05-18 RX ADMIN — LORAZEPAM 0.5 MG: 0.5 TABLET ORAL at 16:07

## 2020-05-18 RX ADMIN — LORAZEPAM 0.5 MG: 0.5 TABLET ORAL at 20:58

## 2020-05-18 RX ADMIN — METHYLPHENIDATE HYDROCHLORIDE 5 MG: 10 TABLET ORAL at 08:20

## 2020-05-18 RX ADMIN — DOCUSATE SODIUM 100 MG: 100 CAPSULE, LIQUID FILLED ORAL at 08:22

## 2020-05-18 RX ADMIN — LORAZEPAM 0.5 MG: 0.5 TABLET ORAL at 08:21

## 2020-05-18 RX ADMIN — Medication 5 MG: at 20:59

## 2020-05-18 RX ADMIN — QUETIAPINE FUMARATE 200 MG: 50 TABLET, EXTENDED RELEASE ORAL at 20:58

## 2020-05-18 NOTE — PROGRESS NOTES
Adult Nutrition  Assessment/PES    Patient Name:  Francheska Sherman  YOB: 1955  MRN: [id number]  Admit Date:  5/1/2020    Assessment Date:  5/18/2020      Reason for Assessment     Row Name 05/18/20 0900          Reason for Assessment    Reason For Assessment  follow-up protocol         Nutrition/Diet History     Row Name 05/18/20 0900          Nutrition/Diet History    Typical Food/Fluid Intake  Intake much improved, % most meals.            Labs/Tests/Procedures/Meds     Row Name 05/18/20 0901          Labs/Procedures/Meds    Lab Results Reviewed  reviewed     Lab Results Comments  5/13: Glu, ALT, Alb        Diagnostic Tests/Procedures    Diagnostic Test/Procedure Reviewed  reviewed        Medications    Pertinent Medications Reviewed  reviewed         Physical Findings     Row Name 05/18/20 0901          Physical Findings    Overall Physical Appearance  obese     Skin  edema           Nutrition Prescription Ordered     Row Name 05/18/20 0902          Nutrition Prescription PO    Current PO Diet  Soft Texture     Texture  Chopped     Fluid Consistency  Thin     Supplement  Boost Plus (Ensure Enlive, Ensure Plus)     Supplement Frequency  3 times a day     Common Modifiers  Low Sodium     Low Sodium Details  2,000 mg Sodium         Evaluation of Received Nutrient/Fluid Intake     Row Name 05/18/20 0902          PO Evaluation    Number of Days PO Intake Evaluated  2 days     % PO Intake  %               Problem/Interventions:  Problem 1     Row Name 05/18/20 0903          Nutrition Diagnoses Problem 1    Resolved?  Yes               Intervention Goal     Row Name 05/18/20 0903          Intervention Goal    General  Maintain nutrition     PO  Continue positive trend;Maintain intake     PO Intake %  75 %         Nutrition Intervention     Row Name 05/18/20 0904          Nutrition Intervention    RD/Tech Action  Encourage intake;Follow Tx progress           Education/Evaluation     Row Name  05/18/20 0906          Monitor/Evaluation    Monitor  Per protocol;I&O;PO intake;Pertinent labs;Skin status;Weight           Electronically signed by:  Francisca Walls RD  05/18/20 09:06

## 2020-05-18 NOTE — PROGRESS NOTES
The patient seems much brighter today.  She is pleasant cooperative and is active within the therapeutic milieu.  She does complain about her new dietary restrictions regarding sodium, however.  We would like for a family therapy session to take place in the next day or so with discharge to take place shortly thereafter if things go well.

## 2020-05-18 NOTE — PLAN OF CARE
Problem: Patient Care Overview  Goal: Plan of Care Review  Outcome: Ongoing (interventions implemented as appropriate)  Flowsheets (Taken 5/18/2020 0354)  Plan of Care Reviewed With: patient  Patient Agreement with Plan of Care: agrees  Note:   Pt pleasant and cooperative with care and medications.  Pt talkative with staff and peers.  Will continue to monitor mood and behavior and provide a safe environment.  Goal: Individualization and Mutuality  Outcome: Ongoing (interventions implemented as appropriate)  Goal: Discharge Needs Assessment  Outcome: Ongoing (interventions implemented as appropriate)  Goal: Interprofessional Rounds/Family Conf  Outcome: Ongoing (interventions implemented as appropriate)     Problem: Overarching Goals (Adult)  Goal: Adheres to Safety Considerations for Self and Others  Outcome: Ongoing (interventions implemented as appropriate)  Flowsheets (Taken 5/18/2020 0354)  Adheres to Safety Considerations for Self and Others: making progress toward outcome  Goal: Optimized Coping Skills in Response to Life Stressors  Outcome: Ongoing (interventions implemented as appropriate)  Flowsheets (Taken 5/18/2020 0354)  Optimized Coping Skills in Response to Life Stressors: making progress toward outcome  Goal: Develops/Participates in Therapeutic [place] to Support Successful Transition  Outcome: Ongoing (interventions implemented as appropriate)  Flowsheets (Taken 5/18/2020 0354)  Develops/Participates in Therapeutic [place] to Support Successful Transition: making progress toward outcome     Problem: Cognitive Impairment (Psychotic Signs/Symptoms) (Adult)  Goal: Improved Thought Clarity/Organization (Psychotic Signs/Symptoms)  Outcome: Ongoing (interventions implemented as appropriate)  Flowsheets (Taken 5/18/2020 0354)  Improved Thought Clarity/Organization Action Step (STG) Outcome: making progress toward outcome     Problem: Psychomotor Movement Impairment (Psychotic Signs/Symptoms)  (Adult)  Goal: Improved Psychomotor Symptoms (Psychotic Signs/Symptoms)  Outcome: Ongoing (interventions implemented as appropriate)  Flowsheets (Taken 5/18/2020 0354)  Improved Psychomotor Symptoms Action Step (STG) Outcome: making progress toward outcome     Problem: Mental State/Mood Impairment (Psychotic Signs/Symptoms) (Adult)  Goal: Improved Mental State/Mood (Psychotic Signs/Symptoms)  Outcome: Ongoing (interventions implemented as appropriate)  Flowsheets (Taken 5/18/2020 0354)  Improved Mental State/Mood Action Step (STG) Outcome: making progress toward outcome     Problem: Skin Injury Risk (Adult)  Goal: Skin Health and Integrity  Outcome: Ongoing (interventions implemented as appropriate)  Flowsheets (Taken 5/18/2020 0354)  Skin Health and Integrity: making progress toward outcome     Problem: Fall Risk (Adult)  Goal: Absence of Fall  Outcome: Ongoing (interventions implemented as appropriate)  Flowsheets (Taken 5/18/2020 0354)  Absence of Fall: making progress toward outcome

## 2020-05-18 NOTE — SIGNIFICANT NOTE
"Met with pt and had  Tate on phone for family session [id number]. Pt started the session sharing the past beliefs that  may have  another woman since there were items in the home that would disappear and then reappear. Pt then shared that the hospital stay had helped by pt feeling stronger. Tate shared feeling good about this and shared did not want pt to need to go to the hospital and took pt since pt was getting weaker from not eating or drinking and getting dehydrated. Pt shared the meds are helping and plans to stay on the meds at home. Tate shared desire to be \"healthy and safe at home.\" Pt then shared \"if you didn't want me to go home, I won't have anywhere to go.\" Tate reassured pt desire to have pt home and be safe and play with the dogs. Pt shared \"plan on doing therapy classes, not just medications.\" This therapist informed pt and  that the staff could talk to give options for out pt therapy. Tate shared the computer groups may be a challenge since may be \"computer illiterate.\" Both pt and Tate shared of past times together and plans for the future. Pt denied any thoughts of harm to self or others. Pt shared interest in changing out pt psychiatrist since did not feel good about current doctor. Tate shared would prefer a doctor who would admit to [facility]. Discussed the possible discharge from [facility] tomorrow. Tate shared would like this and voiced support for pt to continue feeling better. After this, pt shared feeling Tate and this therapist may be \"pulling something\" on pt. Pt was reassured by and Tate and this therapist that the session was truthful.   "

## 2020-05-18 NOTE — PLAN OF CARE
Problem: Patient Care Overview  Goal: Individualization and Mutuality  Outcome: Ongoing (interventions implemented as appropriate)  Flowsheets (Taken 5/18/2020 1005)  Patient Personal Strengths: family/social support; stable living environment; medication/treatment adherence     Problem: Patient Care Overview  Goal: Interprofessional Rounds/Family Conf  Outcome: Ongoing (interventions implemented as appropriate)  Flowsheets (Taken 5/18/2020 1005)  Participants: art therapy; ; nursing; social work; psychiatrist; pharmacy  Summary: Treatment team met to review pt's plan of care.  Pt is improving in tx.  Progress & svcs needed will be assessed ongoing.     Problem: Cognitive Impairment (Psychotic Signs/Symptoms) (Adult)  Goal: Improved Thought Clarity/Organization (Psychotic Signs/Symptoms)  Outcome: Ongoing (interventions implemented as appropriate)  Flowsheets  Taken 5/18/2020 1005 by Sharri Ruby CSW  Improved Thought Clarity/Organization Time Frame for Action Step (STG): 4 days  Taken 5/18/2020 0354 by Venus Prince RN  Improved Thought Clarity/Organization Action Step (STG) Outcome: making progress toward outcome     Problem: Psychomotor Movement Impairment (Psychotic Signs/Symptoms) (Adult)  Goal: Improved Psychomotor Symptoms (Psychotic Signs/Symptoms)  Outcome: Ongoing (interventions implemented as appropriate)  Flowsheets  Taken 5/18/2020 1005 by Sharri Ruby CSW  Improved Psychomotor Symptoms Time Frame for Action Step (STG): 4 days  Taken 5/18/2020 0354 by Venus Prince RN  Improved Psychomotor Symptoms Action Step (STG) Outcome: making progress toward outcome     Problem: Mental State/Mood Impairment (Psychotic Signs/Symptoms) (Adult)  Goal: Improved Mental State/Mood (Psychotic Signs/Symptoms)  Outcome: Ongoing (interventions implemented as appropriate)  Flowsheets  Taken 5/18/2020 1005 by Sharri Ruby CSW  Improved Mental State/Mood Time Frame for Action Step (STG): 4  days  Taken 5/18/2020 0354 by Suresh, Venus, RN  Improved Mental State/Mood Action Step (STG) Outcome: making progress toward outcome    Patient/Guardian Signature: __________________________________            Psychiatrist Signature: ______________________________________             Therapist Signature: ________________________________________         Nurse Signature: ___________________________________________          Staff Signature: ____________________________________________            Staff Signature: ____________________________________________          Staff Signature: ____________________________________________          Staff Signature:

## 2020-05-18 NOTE — PLAN OF CARE
Problem: Patient Care Overview  Goal: Plan of Care Review  Outcome: Ongoing (interventions implemented as appropriate)  Flowsheets  Taken 5/18/2020 1616 by Terese Carson RN  Progress: improving  Outcome Summary: This RN took over pt care from HELENA Abbott at 1500. Pt pleasant and cooperative. Pt has remained active in milieu, compliant with medication, and attending group therapy. Pt had family session with  in afternoon. Pt remains paranoid at times but has been easily redirectable. Will continue to monitor and provide support.   Taken 5/18/2020 0816 by Vianca Sykes RN  Plan of Care Reviewed With: patient  Patient Agreement with Plan of Care: agrees     Problem: Patient Care Overview  Goal: Individualization and Mutuality  Outcome: Ongoing (interventions implemented as appropriate)     Problem: Patient Care Overview  Goal: Discharge Needs Assessment  Outcome: Ongoing (interventions implemented as appropriate)     Problem: Patient Care Overview  Goal: Interprofessional Rounds/Family Conf  Outcome: Ongoing (interventions implemented as appropriate)     Problem: Overarching Goals (Adult)  Goal: Adheres to Safety Considerations for Self and Others  Outcome: Ongoing (interventions implemented as appropriate)  Flowsheets (Taken 5/18/2020 1616)  Adheres to Safety Considerations for Self and Others: making progress toward outcome     Problem: Overarching Goals (Adult)  Goal: Optimized Coping Skills in Response to Life Stressors  Outcome: Ongoing (interventions implemented as appropriate)  Flowsheets (Taken 5/18/2020 1616)  Optimized Coping Skills in Response to Life Stressors: making progress toward outcome     Problem: Overarching Goals (Adult)  Goal: Develops/Participates in Therapeutic [place] to Support Successful Transition  Outcome: Ongoing (interventions implemented as appropriate)  Flowsheets (Taken 5/18/2020 1616)  Develops/Participates in Therapeutic [place] to Support Successful Transition:  making progress toward outcome     Problem: Cognitive Impairment (Psychotic Signs/Symptoms) (Adult)  Goal: Improved Thought Clarity/Organization (Psychotic Signs/Symptoms)  Outcome: Ongoing (interventions implemented as appropriate)  Flowsheets  Taken 5/15/2020 1715  Improved Thought Clarity/Organization Action Step/Short Term Goal (STG) Established: 05/15/20  Taken 5/18/2020 1616  Improved Thought Clarity/Organization Time Frame for Action Step (STG): 1 day  Improved Thought Clarity/Organization Action Step (STG) Outcome: making progress toward outcome     Problem: Psychomotor Movement Impairment (Psychotic Signs/Symptoms) (Adult)  Goal: Improved Psychomotor Symptoms (Psychotic Signs/Symptoms)  Outcome: Ongoing (interventions implemented as appropriate)  Flowsheets  Taken 5/15/2020 1715  Improved Psychomotor Symptoms Action Step/Short Term Goal (STG) Established: 05/15/20  Taken 5/18/2020 1616  Improved Psychomotor Symptoms Time Frame for Action Step (STG): 1 day  Improved Psychomotor Symptoms Action Step (STG) Outcome: making progress toward outcome     Problem: Mental State/Mood Impairment (Psychotic Signs/Symptoms) (Adult)  Goal: Improved Mental State/Mood (Psychotic Signs/Symptoms)  Outcome: Ongoing (interventions implemented as appropriate)  Flowsheets  Taken 5/15/2020 1715  Improved Mental State/Mood Action Step/Short Term Goal (STG) Established: 05/15/20  Taken 5/18/2020 1616  Improved Mental State/Mood Time Frame for Action Step (STG): 1 day  Improved Mental State/Mood Action Step (STG) Outcome: making progress toward outcome     Problem: Skin Injury Risk (Adult)  Goal: Skin Health and Integrity  Outcome: Ongoing (interventions implemented as appropriate)  Flowsheets (Taken 5/18/2020 1616)  Skin Health and Integrity: making progress toward outcome     Problem: Fall Risk (Adult)  Goal: Absence of Fall  Outcome: Ongoing (interventions implemented as appropriate)  Flowsheets (Taken 5/18/2020 1616)  Absence of  Fall: making progress toward outcome

## 2020-05-19 LAB
ALBUMIN SERPL-MCNC: 2.9 G/DL (ref 3.5–5.2)
ALBUMIN/GLOB SERPL: 1.1 G/DL
ALP SERPL-CCNC: 39 U/L (ref 39–117)
ALT SERPL W P-5'-P-CCNC: 26 U/L (ref 1–33)
ANION GAP SERPL CALCULATED.3IONS-SCNC: 6.6 MMOL/L (ref 5–15)
AST SERPL-CCNC: 21 U/L (ref 1–32)
BASOPHILS # BLD AUTO: 0.03 10*3/MM3 (ref 0–0.2)
BASOPHILS NFR BLD AUTO: 0.7 % (ref 0–1.5)
BILIRUB SERPL-MCNC: 0.2 MG/DL (ref 0.2–1.2)
BUN BLD-MCNC: 5 MG/DL (ref 8–23)
BUN/CREAT SERPL: 8.6 (ref 7–25)
CALCIUM SPEC-SCNC: 8.6 MG/DL (ref 8.6–10.5)
CHLORIDE SERPL-SCNC: 105 MMOL/L (ref 98–107)
CO2 SERPL-SCNC: 29.4 MMOL/L (ref 22–29)
CREAT BLD-MCNC: 0.58 MG/DL (ref 0.57–1)
DEPRECATED RDW RBC AUTO: 45.7 FL (ref 37–54)
EOSINOPHIL # BLD AUTO: 0.27 10*3/MM3 (ref 0–0.4)
EOSINOPHIL NFR BLD AUTO: 5.9 % (ref 0.3–6.2)
ERYTHROCYTE [DISTWIDTH] IN BLOOD BY AUTOMATED COUNT: 13.5 % (ref 12.3–15.4)
GFR SERPL CREATININE-BSD FRML MDRD: 105 ML/MIN/1.73
GLOBULIN UR ELPH-MCNC: 2.7 GM/DL
GLUCOSE BLD-MCNC: 109 MG/DL (ref 65–99)
HCT VFR BLD AUTO: 35.5 % (ref 34–46.6)
HGB BLD-MCNC: 11.3 G/DL (ref 12–15.9)
IMM GRANULOCYTES # BLD AUTO: 0.02 10*3/MM3 (ref 0–0.05)
IMM GRANULOCYTES NFR BLD AUTO: 0.4 % (ref 0–0.5)
LYMPHOCYTES # BLD AUTO: 1.63 10*3/MM3 (ref 0.7–3.1)
LYMPHOCYTES NFR BLD AUTO: 35.5 % (ref 19.6–45.3)
MCH RBC QN AUTO: 29.4 PG (ref 26.6–33)
MCHC RBC AUTO-ENTMCNC: 31.8 G/DL (ref 31.5–35.7)
MCV RBC AUTO: 92.2 FL (ref 79–97)
MONOCYTES # BLD AUTO: 0.53 10*3/MM3 (ref 0.1–0.9)
MONOCYTES NFR BLD AUTO: 11.5 % (ref 5–12)
NEUTROPHILS # BLD AUTO: 2.11 10*3/MM3 (ref 1.7–7)
NEUTROPHILS NFR BLD AUTO: 46 % (ref 42.7–76)
NRBC BLD AUTO-RTO: 0 /100 WBC (ref 0–0.2)
NT-PROBNP SERPL-MCNC: 951.9 PG/ML (ref 5–900)
PLATELET # BLD AUTO: 170 10*3/MM3 (ref 140–450)
PMV BLD AUTO: 11.6 FL (ref 6–12)
POTASSIUM BLD-SCNC: 3.5 MMOL/L (ref 3.5–5.2)
PROT SERPL-MCNC: 5.6 G/DL (ref 6–8.5)
RBC # BLD AUTO: 3.85 10*6/MM3 (ref 3.77–5.28)
SODIUM BLD-SCNC: 141 MMOL/L (ref 136–145)
WBC NRBC COR # BLD: 4.59 10*3/MM3 (ref 3.4–10.8)

## 2020-05-19 PROCEDURE — 92526 ORAL FUNCTION THERAPY: CPT | Performed by: SPEECH-LANGUAGE PATHOLOGIST

## 2020-05-19 PROCEDURE — 85025 COMPLETE CBC W/AUTO DIFF WBC: CPT | Performed by: INTERNAL MEDICINE

## 2020-05-19 PROCEDURE — 83880 ASSAY OF NATRIURETIC PEPTIDE: CPT | Performed by: INTERNAL MEDICINE

## 2020-05-19 PROCEDURE — 80053 COMPREHEN METABOLIC PANEL: CPT | Performed by: INTERNAL MEDICINE

## 2020-05-19 RX ORDER — POTASSIUM CHLORIDE 750 MG/1
20 CAPSULE, EXTENDED RELEASE ORAL DAILY
Status: DISCONTINUED | OUTPATIENT
Start: 2020-05-19 | End: 2020-05-20 | Stop reason: HOSPADM

## 2020-05-19 RX ORDER — BUMETANIDE 0.5 MG/1
0.5 TABLET ORAL DAILY
Status: DISCONTINUED | OUTPATIENT
Start: 2020-05-19 | End: 2020-05-20 | Stop reason: HOSPADM

## 2020-05-19 RX ADMIN — BUMETANIDE 0.5 MG: 0.5 TABLET ORAL at 17:31

## 2020-05-19 RX ADMIN — Medication 5 MG: at 21:29

## 2020-05-19 RX ADMIN — LORAZEPAM 0.5 MG: 0.5 TABLET ORAL at 08:26

## 2020-05-19 RX ADMIN — LORAZEPAM 0.5 MG: 0.5 TABLET ORAL at 16:15

## 2020-05-19 RX ADMIN — LORAZEPAM 0.5 MG: 0.5 TABLET ORAL at 21:29

## 2020-05-19 RX ADMIN — POTASSIUM CHLORIDE 20 MEQ: 10 CAPSULE, COATED, EXTENDED RELEASE ORAL at 17:34

## 2020-05-19 RX ADMIN — METHYLPHENIDATE HYDROCHLORIDE 5 MG: 10 TABLET ORAL at 12:05

## 2020-05-19 RX ADMIN — QUETIAPINE FUMARATE 200 MG: 50 TABLET, EXTENDED RELEASE ORAL at 21:29

## 2020-05-19 RX ADMIN — DOCUSATE SODIUM 100 MG: 100 CAPSULE, LIQUID FILLED ORAL at 21:29

## 2020-05-19 RX ADMIN — FAMOTIDINE 40 MG: 40 TABLET, FILM COATED ORAL at 08:26

## 2020-05-19 RX ADMIN — METHYLPHENIDATE HYDROCHLORIDE 5 MG: 10 TABLET ORAL at 09:44

## 2020-05-19 RX ADMIN — BISOPROLOL FUMARATE 5 MG: 5 TABLET ORAL at 08:25

## 2020-05-19 RX ADMIN — DOCUSATE SODIUM 100 MG: 100 CAPSULE, LIQUID FILLED ORAL at 08:25

## 2020-05-19 NOTE — THERAPY DISCHARGE NOTE
Acute Care - Speech Language Pathology   Swallow Treatment Note/Discharge   [facility]     Patient Name: Francheska Sherman  : 1955  MRN: [id number]  Today's Date: 2020               Admit Date: 2020    Visit Dx:    No diagnosis found.  Patient Active Problem List   Diagnosis   • Overactive bladder   • Essential hypertension   • Abnormal EKG   • SOB (shortness of breath)   • Altered mental status   • GERD (gastroesophageal reflux disease)   • ARDS survivor  Ventilator   • ADD (attention deficit disorder)   • Vitamin D deficiency   • Acute kidney injury (CMS/HCC)   • Volume depletion    • Generalized weakness   • Hyperlipidemia   • History of hepatitis C followed by Dr. Ranjan Qiu [id number]   • Irritable bowel syndrome with both constipation and diarrhea   • Hepatic steatosis   • Obesity (BMI 30-39.9)   • Hematuria   • Elevated liver function tests   • Bipolar disorder, current episode mixed, moderate (CMS/HCC)   • Affective psychosis, bipolar (CMS/HCC)       Therapy Treatment  Rehabilitation Treatment Summary     Row Name 20 1600             Treatment Time/Intention    Discipline  speech language pathologist  -SA      Document Type  discharge treatment  -SA      Subjective Information  no complaints  -SA      Mode of Treatment  speech-language pathology  -SA      Recorded by [SA] Francheska Massey MS CCC-SLP 20 [id number]        User Key  (r) = Recorded By, (t) = Taken By, (c) = Cosigned By    Initials Name Effective Dates Discipline    SA Francheska Massey MS CCC-SLP 18 -  SLP        Outcome Summary     SLP GOALS     Row Name 20 1600             Oral Nutrition/Hydration Goal 1 (SLP)    Oral Nutrition/Hydration Goal 1, SLP  Pt will tolerate least restrictive diet  -SA      Time Frame (Oral Nutrition/Hydration Goal 1, SLP)  by discharge  -SA      Barriers (Oral Nutrition/Hydration Goal 1, SLP)  Progress:   Pt is tolerating current diet with no overt s/s aspiration.  No complaints  regarding modified diet.  Pt to d/c tomorrow.  -SA      Progress/Outcomes (Oral Nutrition/Hydration Goal 1, SLP)  goal met  -        User Key  (r) = Recorded By, (t) = Taken By, (c) = Cosigned By    Initials Name Provider Type    Francheska Avery MS CCC-SLP Speech and Language Pathologist          EDUCATION  The patient has been educated in the following areas:   Dysphagia (Swallowing Impairment).    SLP Recommendation and Plan                                Time Calculation:   Time Calculation- SLP     Row Name 05/19/20 1621             Time Calculation- SLP    SLP Start Time  1600  -      SLP Received On  05/19/20  -        User Key  (r) = Recorded By, (t) = Taken By, (c) = Cosigned By    Initials Name Provider Type    Francheska Avery MS CCC-SLP Speech and Language Pathologist          Therapy Charges for Today     Code Description Service Date Service Provider Modifiers Qty    [id number]  ST TREATMENT SWALLOW 2 5/19/2020 Francheska Massey MS CCC-ZOLTAN GN 1                    MS [facility] [place]  5/19/2020

## 2020-05-19 NOTE — PROGRESS NOTES
[facility]  INTERNAL MEDICINE  TOÑITO LOJA MD  [address] [place] [address]  Phone [phone] Fax [phone]  E-mail:  [email or web address]      INTERNAL MEDICINE DAILY PROGRESS NOTE  Toñito Loja M.D.  2020              Patient Identification:  Name: Francheska Sherman  Age: 65 y.o.  Sex: female  :  1955  MRN: [id number]         Primary Care Physician: Toñito Loja MD  LENGTH OF STAY 17 DAYS    Consults     Date and Time Order Name Status Description    2020 0830 Inpatient Nephrology Consult      2020 1854 Inpatient Hospitalist Consult Completed     2020 1143 Inpatient Infectious Diseases Consult Completed     2020 0834 Inpatient Neurology Consult General Completed     2020 1205 Inpatient Psychiatrist Consult Completed     2020 2244 Inpatient Gastroenterology Consult Completed     2020 224 Inpatient Urology Consult Completed     2020 224 Inpatient Nephrology Consult Completed     2020 1913 Family Medicine Consult Completed               Chief Complaint:  Affective Psychosis, Bipolar Disorder with acute behavioral change     History of Present Illness:     Subjective         Interval History: Patient is a 65 y.o.female who presented with altered mental status and generalized weakness with dehydration to the [facility] emergency room by private car on 2020.  Most of the history is provided by her  who brought her to the hospital since patient was quite confused and unable to really provide much concrete detail and during her initial evaluation.  Patient is regularly followed by Dr. Hernesto Ernst in psychiatry at [facility] and has in the past been admitted to [facility] for episodes very similar to this one.  Patient does suffer from bipolar disorder and takes Seroquel on a regular basis for control and stabilization of this issue.  A few weeks  ago, patient was feeling good and decided to discontinue her Seroquel.  She also discontinued several of her other medications including her blood pressure medicine and her vitamin D.  Patient's  noticed that over the last 2 weeks she has begun to have a decline in her mental l condition and specifically he noticed that she was having increased confusion, insomnia, and extreme paranoia at times.  The  contacted Dr. Ernst and was instructed to start the patient back on Seroquel at 50 mg daily with titration up to 100 mg after 1 week on the 50 mg dose.  She has now had the 100 mg dose for the last 4 days but has not really responded to the medication at this point.  Patient has been talking nonstop and sleeping less than 4 hours per night.  This is very similar to episode she has had in the past that have been described as hypomanic in nature.  Patient did threaten to leave the home but has been has been able to manage this by using the alarm system at home to no when she goes out the door and has had her under careful observation for the last 10 days.  In the last 48 hours, patient has been drinking little to no liquid and on 2 occasions the  has found her collapsed and on the floor.  He had no evidence of broken bones or excessive trauma and each time he has been able to eventually get her back up on a chair or the sofa.  When he discussed bringing her to the hospital, patient has had an extreme fear of contacting another individual with COVID-19 and has refused to come in.  Patient has had no exposure to other individuals in over a month and has no symptoms of COVID-19 such as fever, cough, or shortness of breath.   was finally able to convince the patient to come to the emergency room yesterday evening when her weakness became so extreme that she could not get up to move about the house.  She denied nausea, vomiting, or diarrhea.  Family helped the  get her into his truck to  "bring her to the emergency room and ER staff helped get her out of the truck and bring her into the facility for evaluation.     Mrs. Sherman is a delightful 65-year-old female who I had the pleasure of following in my office since she transferred there just prior to correction of her regular physician, my former partner, Lemuel Turcios MD.   the medical problems for which I follow her include: Hypertension, hyperlipidemia, overactive bladder, and vitamin D deficiency.  Her compliance with medications and treating these issues has been somewhat marginal.  Her last set of labs in January 2020 did show normal liver function, normal electrolytes, mild hyperlipidemia with a cholesterol of 226, normal thyroid function, and a normal STD work-up.  The STD work-up was done at request of the patient because she was concerned that \" her  might be cheating on her\".  In general, patient seen today quite stable mentally at the time of her last visit with me on March 6 when she came in with complaints of urinary frequency and otherwise had a normal medical checkup.  Review of records shows that she did see Dr. Fernandez Hooks MD and recently underwent endoscopy and colonoscopy on June 11, 2019.  Polyps were removed by cold biopsy and plans for 3-year follow-up were instituted. Dr. Hooks did feel that the patient met criteria for the diagnosis of irritable bowel syndrome with alternating diarrhea and constipation.  Patient also sees Dr. Greg Stevens MD in urology for overactive bladder and has been controlled fairly well on Enablex.  Patient did see Dr. Zee in cardiology because of an abnormal EKG in 2019 and did have an exercise stress test with myocardial perfusion SPECT on 5/22/19.  She had requested medical treatment for the findings of that test which showed ejection fraction 60%, normal myocardial perfusion with no evidence of ischemia, and test consistent with a low risk study.     Patient was seen in [facility] emergency room by Dr. Naldo Hi on 4/2520 at 1914.  At the time, patient presented with increasing confusion.   reported that this confusion was related to unstable bipolar disorder and discontinuation of her medication, Seroquel, that she takes for treatment of the disorder.  The ER physician was able to confirm that she had started back on this medication at the request of her psychiatric physician and has been titrated from 50 mg/day up to 100 mg/day recently.  Despite this attempted outpatient titration of the medication, patient had been refusing to eat or drink anything and had developed significant weakness.  On the day of admission she was unable to get out of bed on her own that she denied any chest pain or abdominal pain at the time.  In addition, patient had no evidence of nausea vomiting or diarrhea. Review of systems could not be performed due to the patient's mental status change.  Allergies were reported to codeine, morphine, and sulfa.  Physical exam in the emergency room showed that temperature was 96.5, pulse 85, respiratory rate 17, and blood pressure 145/92 with a sat of 95%.  Exam by the ER physician showed mucous membranes were quite dry.  Patient was awake and answering some questions appropriately such as name and age but was unable to really relate any history of the events which led to her visit in the ER.  Her exam was otherwise totally normal.  Lab results did show several abnormalities: Glucose elevated 123, BUN elevated 78, creatinine elevated at 1.60, and sodium elevated at 151.  Her liver function tests were also elevated with ALT of 236, and AST of 415.  She apparently had normal liver function test at a visit with her hepatitis C physician Dr. Woodruff just 1 month ago.  Patient had no evidence of alcohol in her blood.  Her white blood cell count was 14.62, her hemoglobin was 16.9, hematocrit was 50.8, and her platelet count was normal at 310,000.  Her magnesium was  slightly high at 2.9.  Thyroid function tests were normal.  Urine drug screen was totally negative but UA did show 3+ blood 1+ protein 1+ leukocyte esterase.  CT of head was unremarkable and chest x-ray was unremarkable.  EKG done soon after admission did show a normal sinus rhythm PAC, nonspecific ST changes and slightly prolonged QT interval.     4/26/20.  I personally saw the patient at the time of my rounds on 4/26/2020.  Nurse was present in the room at the time of my visit.  The patient had not spoken with others during the day, she did respond to my voice and actually open her eyes.  She did have a brief conversation with me before falling back off to sleep.  Renal has been adjusting her fluids to replace her volume deficit.  They will continue to follow this with us.  Urology did order a CT scan of abdomen and pelvis with and without contrast.  After consultation with renal, we elected to do the CT with out contrast only.  Results are pending at the time of my visit.  Patient was also seen in consultation by gastroenterology.  They feel that her elevated liver function tests are probably a result of the volume depletion status.  Hopefully these will improve as her volume is corrected.  Otherwise there are no major changes in the patient's condition from their perspective.  They also plan to continue following patient.  Psychiatry did see patient briefly but she was not cooperative with their exam.  Dr. Cruz will see patient tomorrow for further evaluation medication recommendations.  We suspect that the patient will need to be treated in the inpatient psychiatric unit at least short-term in order to stabilize the medications if her patterns follow previous pattern with respect to this issue.  Patient otherwise is hemodynamically stable at the present time.  We did do a blood gas since she is quite lethargic but that was within normal limits.     4/27/2020.  Patient resting quietly in bed at the time  of my rounds this evening.  I did discuss the case at length with the patient's nurse who went to the room with me.  Patient is a little more alert this evening and actually good agreed to take a few bites of ice cream.  In fact, she was feeling well enough to request strawberry ice cream instead of the vanilla ice cream that was available on the floor.  Patient remains quite weak.  She has not been out of bed at this time.  The nurse and I agreed that it would probably be appropriate to have PT or OT attempt to get patient up to bedside chair tomorrow if possible.  Patient has been declining her medications including the Seroquel XR that she is supposed to take each evening for her bipolar state.  Patient does seem oriented to person and place but somewhat lost in time.  GI feels she is stable from their point of view.  Elevated liver function tests probably are secondary to myoglobinuria which occurred after patient has been laying around and in bed most of the time.  Renal continues to adjust fluid for the patient and was agreeable to starting her blood pressure medications again.  Blood pressure did run high throughout the day yesterday and required treatment with Vasotec IV.  Urology had little further to add with the patient today but will plan outpatient cystoscopy after discharge.  Psychiatry did see patient in consultation and they feel she would be appropriate for their Geropsych unit to continue medication adjustment and stabilization of patient's psychiatric situation.  They are happy to take her in transfer once she is medically stable.  Urine culture so far shows no growth.     4/28/2020.  Patient still resting quietly in her bed at the time of my rounds in her room on 4 [address].  Patient does wake up as I entered the room and is semi-responsive but her answers to questions are relatively unintelligible and she tends to just mumble words.  Patient has eaten and drunk very little today despite efforts from  nursing staff to encourage her to do so.  Apparently she would not even take orange sherbet which is 1 of her favorite foods when offered to her by the staff.  I did speak briefly to the patient's nurse.  She reported no real new changes in the patient's condition.  Review of notes from occupational therapy showed that the patient did sit up on the side of the bed but did not ambulate effectively at this point.  Patient has not been taking most of her medication at this point.  Psychiatry is still following the patient with plans to admit the patient to the psychiatric unit when she is medically stable.  I discussed the case at length with the patient's .  He feels that this is very similar to a breakdown that she had several years ago which required hospitalization in the psychiatric unit at our [facility].  Nonetheless, after our discussion, I am recommending a neurology consultation and we will obtain an MRI to be sure that there is been no cerebrovascular accident causing these changes.  I also would like to get speech therapy to see the patient in consultation to be sure that her swallow is effective.  We may have to consider short-term feeding tube to stabilize the patient and provide a route for giving her medicine.     4/29/2020.  Patient is essentially unchanged on my rounds today.  She does say a few words but makes no coherent statements.  She still has been refusing her meds and only taking small bites occasionally of food with great urging from the nursing staff.  We did have neurology see the patient in consultation today and I spoke at length with  regarding his findings.  She has no real evidence of stroke.  She does have a small tremor.  MRI was done and did not show anything acute though it does show some hardening of the arteries and a tiny old infarct that radiology feels is insignificant with respect to the current findings.  I also have infectious disease see the patient to  be sure they feel the question of sepsis is completely ruled out.  I did agree with discontinuing all antibiotics at this point and do not feel that there is any signs of acute infection.  Renal has essentially signed off the case also.  Currently she is receiving IV fluids at 50 cc/h.  Speech is to see the patient first thing in the morning and determine swallowing safety.  I did discuss the situation with [facility] and they do feel that if patient needs a short-term stabilization with a Dobbhoff tube for medications and for fluids this could be continued on the psych unit.  This would be done as a short-term tool to continue medically stabilizing her while giving psychiatry an opportunity to adjust her bipolar medications.  Patient is urinating well.  Her labs now show normal BUN, creatinine, and potassium levels.  White blood cell count is now normal at 8.9 and there is no sign of anemia.  Her magnesium has all so returned to normal levels.  At this point, the patient does appear to be medically stable and it  ahould be possible to transfer her to psychiatry tomorrow if we can resolve the issues surrounding her nutritional and medication intake.     4/30/2020.  Patient more awake and alert today than previously at the time of my rounds.  She did take a small amount of her breakfast including some orange sherbet and a few bites of eggs.  Nurse was able to get the patient to take her pills both last night and this morning.  Blood pressure is running slightly up and I did restart her beta-blocker and diuretic which have helped with this issue in the past.  We will monitor her hydration status with labs.  I am going to discontinue the IV fluids and hope to cannulate more thirst to promote increased oral intake.  Nursing continues to encourage patient to eat small bites.  Patient did talk in brief sentences to her  on the phone today.  I did review the case with him and we both felt patient was appropriate  for transfer to psychiatry.  Neurology work-up is complete and relatively negative.  ID work-up is also complete.  Renal has signed off on the case.  At this point it is felt that behavioral modification is needed and adjustment of her psychiatric meds in order to return her to her baseline.  Hopefully psychiatry will be able to manage this in their unit.  I did speak with the access center and they do have a bed available for the patient tomorrow after her discharge is completed in the morning.  Transfer orders are complete and will be signed off in a.m.  If bed is available.  I also discussed the case with the patient's nurse and with PCP.     5/1/2020.  Patient had a quiet evening last night and seems stable still this a.m.  Psychiatry planning discharge from their unit today and at that point a bed will be available for patient in their unit which seems appropriate.  Patient does not need feeding tube at this time since she has started taking small bites of food and small sips of fluids.  We do have a saline lock still in place so patient could be given IV bolus if needed for dehydration.  We will continue to follow patient in the psychiatric unit and monitor her electrolytes while there.  Patient does have a short conversation with me today.  She is watching [employer] and states that she does not like the fake needs.  Patient did work with OT and sat on the edge of bed with better control of body yesterday.  Speech therapy evaluated patient today and does feel that she has an adequate swallow and her trouble eating is behavioral in nature.  At this point patient has maximized benefit from hospitalization and is medically stable.  She will be ready for discharge to psychiatry when bed is available.     ________________________________________________________________________________________________     TRANSFER TO PSYCHIATRY Attending Dr. Cruz  Internal Medicine Consult   [place]  ________________________________________________________________________________________________     5/2/2020.  I was consulted by Dr. Cruz for an internal medicine consult to evaluate this patient.  Patient was seen with nursing assistant present in her room on psychiatry which was room 3330.  Patient was resting quietly in bed but did seem to recognize me as I entered the room and smiled.  She mumbled a few words but made no real coherent conversation while I was in the room.  I spoke with nursing aide who was at bedside.  Patient is currently on with a sitter.  Apparently she did manage to get up well with physical therapy today and sat in the chair at bedside for almost an hour.  She had very little breakfast but lunch did eat all the fruit on her tray and tried to eat some of the meat but unfortunately could not chew the roast beef that was brought to her on the tray today.  I am going to try changing the consistency of the meat and see if she is able to better handle that chopped up.  There is a speech therapy consult in place for the patient on Monday.  Patient seems to be feeling well.  She has no specific complaints of pain or discomfort.  Blood pressure is now under better control.  I did speak with the  and reviewed the case with him.  He is pleased with her progress.  We did obtain an abdominal x-ray series at the request of the  to be sure constipation is not a problem.  She has a bit of excessive gas in her abdomen but there is no significant stool burden or other signs of bowel blockage.  Seroquel has been increased to her regular dialysis which is 150 mg daily.  I also decrease the dose slightly of her losartan to 50 mg which is her usual dose.  We have added Ziac and I will continue to monitor blood pressures while she is on this medication.  She has taken it in the past with excellent control of her blood pressure on that medication.  Overall patient seems to be  "medically stable.  I will continue to follow her with you and see her again on Monday.  We will obtain labs on Monday morning.    5/4/2020. Patient much more interactive than when previously seen 2 days ago.  She completes simple sentences but at times makes no sense.  She has been drinking and eating a bit better today though her labs from earlier this a.m. did show some mild dehydration again.  I have reconsulted the renal team that was following her on an inpatient basis for their input and suggestions on this issue.  Nursing staff has been encouraged to push oral fluids overnight tonight and will try their best to get the patient to take more oral intake.  Patient did drink 2 large glasses of water just prior to my arrival on the unit.  She has been taking her medicines more regularly.  Patient set up in a chair by bedside for approximately 3 hours today.  She does seem to be getting stronger and hopefully is beginning to react favorably to reinstitution of her medication therapy.  We did modify her oxygen orders to say that she only needs the oxygen if sats are below 88% and she did come out to the TV room today and interacted briefly with other residents there.  We continue to hope that the patient will gradually improve.  Her  hopes that she will be able to return home once things totally stabilize.    5/5/2020.  Patient sitting up in chair in room at the time of my visit with sitter at bedside.  Patient reportedly had a fairly good day though she did have one episode of defiance when she refused to eat or take her medicine at dinnertime.  Otherwise she did drink fairly well and a small amount of her food.  She is being offered boost on a regular basis to improve her nutritional status.  Patient seems much brighter to me at the time of my exam tonight.  I asked her where she would want to be if she was not here in the hospital at [facility] and she told me \"[facility]\" According to , they do " have a condo there and have traveled extensively to that location.  Patient is friendly but has difficulty putting her speech together.  At times seems to say random words that do not come together in sentences.  Her swelling seems a bit improved.  Labs today are also improved with a significant improvement in her creatinine.  Vital signs show that she is afebrile but her blood pressure was up slightly earlier this evening at 166/88.  We are continuing to monitor this closely.  Will recheck labs in 2 days.  Renal consult still pending.    5/9/2020.  Patient much more alert and conversant at the time of my rounds today.  She is sitting up in the bedside chair with walker in front of her.  Patient states that she is planning to take a shower this afternoon and once a big glass of cold ice water prior to the shower.  Her appetite has improved and she is eating more of her tray.  She did receive IV fluids and is in the middle of a 1 L bolus at the time of my visit.  Her renal function has significantly improved today.  Renal continues to follow and I did review their note.  It appears that her decreased volume status in combination with her Diovan HCTZ was the reason for the elevated BUN and creatinine and with discontinuation of the medication and IV rehydration she is nearly back to baseline.  Blood pressure off the medication is a bit higher but still only in the normal range of 130/74.  Patient's conversation is still a bit confused anytime and she does make some paranoid statements from time to time.  Patient states that her , Tate, is now  to his second wife.  She feels that she has nowhere to go when she is discharged.   himself has been very involved with her care and has spoken with me several times over the course of this hospitalization.  He intends to take her home as soon as she is stable enough to maintain her own hydration status and ambulate without falling.  Overall patient's  condition does not seem to be improving and we will continue to follow    5/10/2020.  Patient still continuing to slowly improve.  Much more ambulatory today and did attend 1 group therapy session.  Patient remains somewhat confused with her speech patterns and does have some paranoid ideation with respect to infection risk.  Patient asks for hand  in her room and I did obtain a small bottle and gave her a small amount of hand  in her hands.  Discussed case at length with nurse.  She has been cooperative with her medicines and it does seem that her appetite is improved.  I did review Dr. Cruz's note and he has increased slightly her Ritalin dose in an effort to continue improving depression levels and overall mobility.  Patient continues to state that her  has  and new woman and that she does not know what her last name is at the present time.  Staff continue to support patient and openly discussed the errors she is making.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

## 2020-05-19 NOTE — PLAN OF CARE
Pt has been pleasant, cooperative with care and compliant with all meds this shift. Pt oriented x4 and looking forward to going home. Pt slept all night.   Problem: Patient Care Overview  Goal: Plan of Care Review  Outcome: Ongoing (interventions implemented as appropriate)  Goal: Individualization and Mutuality  Outcome: Ongoing (interventions implemented as appropriate)  Goal: Discharge Needs Assessment  Outcome: Ongoing (interventions implemented as appropriate)  Goal: Interprofessional Rounds/Family Conf  Outcome: Ongoing (interventions implemented as appropriate)     Problem: Overarching Goals (Adult)  Goal: Adheres to Safety Considerations for Self and Others  Outcome: Ongoing (interventions implemented as appropriate)  Goal: Optimized Coping Skills in Response to Life Stressors  Outcome: Ongoing (interventions implemented as appropriate)  Goal: Develops/Participates in Therapeutic [place] to Support Successful Transition  Outcome: Ongoing (interventions implemented as appropriate)     Problem: Cognitive Impairment (Psychotic Signs/Symptoms) (Adult)  Goal: Improved Thought Clarity/Organization (Psychotic Signs/Symptoms)  Outcome: Ongoing (interventions implemented as appropriate)     Problem: Psychomotor Movement Impairment (Psychotic Signs/Symptoms) (Adult)  Goal: Improved Psychomotor Symptoms (Psychotic Signs/Symptoms)  Outcome: Ongoing (interventions implemented as appropriate)     Problem: Mental State/Mood Impairment (Psychotic Signs/Symptoms) (Adult)  Goal: Improved Mental State/Mood (Psychotic Signs/Symptoms)  Outcome: Ongoing (interventions implemented as appropriate)     Problem: Skin Injury Risk (Adult)  Goal: Skin Health and Integrity  Outcome: Ongoing (interventions implemented as appropriate)     Problem: Fall Risk (Adult)  Goal: Absence of Fall  Outcome: Ongoing (interventions implemented as appropriate)

## 2020-05-19 NOTE — DISCHARGE PLACEMENT REQUEST
"LaneFrancheska AMY (65 y.o. Female)     Date of Birth Social Security Number Address Home Phone MRN    1955  [id number]  [facility] [address] [phone] [id number]    [employer] Marital Status          Presybeterian        Admission Date Admission Type Admitting Provider Attending Provider Department, Room/Bed    5/1/20 Emergency Oscar Cruz III, MD B[place]ver, Charles Brook III, MD [facility] CRISIS MGMT, 3330/2    Discharge Date Discharge Disposition Discharge Destination                       Attending Provider:  Oscar Cruz III, MD    Allergies:  Codeine, Morphine, Sulfa Antibiotics    Isolation:  None   Infection:  None   Code Status:  CPR    Ht:  170.2 cm (67\")   Wt:  107 kg (235 lb 1.6 oz)    Admission Cmt:  None   Principal Problem:  Affective psychosis, bipolar (CMS/[facility]) [F31.9]                 Active Insurance as of 5/1/2020     Primary Coverage     Payor Plan Insurance Group Employer/Plan Group    [facility] [employer] [facility] [employer] [facility] PPO [id number]     Payor Plan Address Payor Plan Phone Number Payor Plan Fax Number Effective Dates    [address] [phone]  5/1/2018 - None Entered    [facility] [id number]       Subscriber Name Subscriber Birth Date Member ID       FRANCHESKA LU 1955 [id number]                 Emergency Contacts      (Rel.) Home Phone Work Phone Mobile Phone    LaneTate (Spouse) -- [phone]              "

## 2020-05-19 NOTE — PROGRESS NOTES
The patient has been pleasant and cooperative but is very ambivalent regarding possible discharge when seen today.  We will hold on discharge today, but hope to discharge the patient tomorrow.  She will follow-up in the intensive outpatient program.

## 2020-05-19 NOTE — PLAN OF CARE
"  Problem: Patient Care Overview  Goal: Plan of Care Review  Outcome: Ongoing (interventions implemented as appropriate)  Flowsheets (Taken 5/19/2020 1441)  Progress: improving  Plan of Care Reviewed With: patient  Patient Agreement with Plan of Care: agrees  Outcome Summary: Patient cooperative with care.  This afternoon she is grieving about not being discharged today;  however, patient states that when Dr. Cruz rounded she told him she was \"not sure\" about whether she felt ready for discharge.  This RN educated patient that she needs to be prepared to communicate clearly and decisively with the MD tomorrow if she feels ready to discharge.     Problem: Overarching Goals (Adult)  Goal: Adheres to Safety Considerations for Self and Others  Outcome: Ongoing (interventions implemented as appropriate)  Flowsheets (Taken 5/19/2020 7645)  Adheres to Safety Considerations for Self and Others: making progress toward outcome     Problem: Overarching Goals (Adult)  Goal: Optimized Coping Skills in Response to Life Stressors  Outcome: Ongoing (interventions implemented as appropriate)  Flowsheets (Taken 5/19/2020 1442)  Optimized Coping Skills in Response to Life Stressors: making progress toward outcome     Problem: Overarching Goals (Adult)  Goal: Develops/Participates in Therapeutic [place] to Support Successful Transition  Outcome: Ongoing (interventions implemented as appropriate)  Flowsheets (Taken 5/19/2020 1441)  Develops/Participates in Therapeutic [place] to Support Successful Transition: making progress toward outcome     Problem: Cognitive Impairment (Psychotic Signs/Symptoms) (Adult)  Goal: Improved Thought Clarity/Organization (Psychotic Signs/Symptoms)  Outcome: Ongoing (interventions implemented as appropriate)  Flowsheets (Taken 5/19/2020 1447)  Improved Thought Clarity/Organization Action Step (STG) Outcome: making progress toward outcome     Problem: Psychomotor Movement Impairment (Psychotic " Signs/Symptoms) (Adult)  Goal: Improved Psychomotor Symptoms (Psychotic Signs/Symptoms)  Outcome: Ongoing (interventions implemented as appropriate)  Flowsheets (Taken 5/19/2020 1448)  Improved Psychomotor Symptoms Action Step (STG) Outcome: making progress toward outcome     Problem: Mental State/Mood Impairment (Psychotic Signs/Symptoms) (Adult)  Goal: Improved Mental State/Mood (Psychotic Signs/Symptoms)  Outcome: Ongoing (interventions implemented as appropriate)  Flowsheets (Taken 5/19/2020 1448)  Improved Mental State/Mood Action Step (STG) Outcome: making progress toward outcome     Problem: Skin Injury Risk (Adult)  Goal: Skin Health and Integrity  Outcome: Ongoing (interventions implemented as appropriate)  Flowsheets (Taken 5/19/2020 1448)  Skin Health and Integrity: making progress toward outcome     Problem: Fall Risk (Adult)  Goal: Absence of Fall  Outcome: Ongoing (interventions implemented as appropriate)  Flowsheets (Taken 5/19/2020 1448)  Absence of Fall: making progress toward outcome

## 2020-05-20 VITALS
BODY MASS INDEX: 36.9 KG/M2 | HEART RATE: 74 BPM | DIASTOLIC BLOOD PRESSURE: 76 MMHG | OXYGEN SATURATION: 92 % | RESPIRATION RATE: 18 BRPM | HEIGHT: 67 IN | SYSTOLIC BLOOD PRESSURE: 129 MMHG | WEIGHT: 235.1 LBS | TEMPERATURE: 97.5 F

## 2020-05-20 RX ORDER — METHYLPHENIDATE HYDROCHLORIDE 5 MG/1
5 TABLET ORAL
Qty: 60 TABLET | Refills: 0 | Status: ON HOLD | OUTPATIENT
Start: 2020-05-20 | End: 2020-09-17

## 2020-05-20 RX ORDER — BUMETANIDE 0.5 MG/1
0.5 TABLET ORAL DAILY PRN
Qty: 30 TABLET | Refills: 0 | Status: SHIPPED | OUTPATIENT
Start: 2020-05-20

## 2020-05-20 RX ORDER — BISOPROLOL FUMARATE 5 MG/1
5 TABLET, FILM COATED ORAL
Qty: 30 TABLET | Refills: 5 | Status: SHIPPED | OUTPATIENT
Start: 2020-05-20 | End: 2020-09-29 | Stop reason: HOSPADM

## 2020-05-20 RX ORDER — QUETIAPINE 200 MG/1
200 TABLET, FILM COATED, EXTENDED RELEASE ORAL NIGHTLY
Qty: 90 EACH | Refills: 0 | Status: SHIPPED | OUTPATIENT
Start: 2020-05-20 | End: 2020-09-29 | Stop reason: HOSPADM

## 2020-05-20 RX ORDER — LORAZEPAM 0.5 MG/1
0.5 TABLET ORAL 3 TIMES DAILY
Qty: 90 TABLET | Refills: 0 | Status: SHIPPED | OUTPATIENT
Start: 2020-05-20 | End: 2020-05-25

## 2020-05-20 RX ORDER — POTASSIUM CHLORIDE 750 MG/1
20 CAPSULE, EXTENDED RELEASE ORAL DAILY PRN
Qty: 30 CAPSULE | Refills: 0 | Status: SHIPPED | OUTPATIENT
Start: 2020-05-20

## 2020-05-20 RX ADMIN — METHYLPHENIDATE HYDROCHLORIDE 5 MG: 10 TABLET ORAL at 12:13

## 2020-05-20 RX ADMIN — FAMOTIDINE 40 MG: 40 TABLET, FILM COATED ORAL at 08:22

## 2020-05-20 RX ADMIN — BUMETANIDE 0.5 MG: 0.5 TABLET ORAL at 08:22

## 2020-05-20 RX ADMIN — POTASSIUM CHLORIDE 20 MEQ: 10 CAPSULE, COATED, EXTENDED RELEASE ORAL at 08:22

## 2020-05-20 RX ADMIN — DOCUSATE SODIUM 100 MG: 100 CAPSULE, LIQUID FILLED ORAL at 08:22

## 2020-05-20 RX ADMIN — LORAZEPAM 0.5 MG: 0.5 TABLET ORAL at 08:22

## 2020-05-20 RX ADMIN — BISOPROLOL FUMARATE 5 MG: 5 TABLET ORAL at 08:22

## 2020-05-20 RX ADMIN — METHYLPHENIDATE HYDROCHLORIDE 5 MG: 10 TABLET ORAL at 08:22

## 2020-05-20 NOTE — PROGRESS NOTES
Continued Stay Note  [facility]     Patient Name: Francheska Sherman  MRN: [id number]  Today's Date: 5/20/2020    Admit Date: 5/1/2020    Discharge Plan     Row Name 05/20/20 1134       Plan    Plan Comments  On 5/19, BOBBY contacted pt's spouse to discuss pt's d/c.  Pt's spouse expressed concern regarding pt's outpt Psychiatrists and discussed the possibility of exploring seeing someone else recommended in that office.  SW informed pt's spouse that our Doctor did not have any availability to accept pts and recommended that pt continue with her outpt psychiatrist.  SW also informed pt's spouse that home health svcs would be ordered thru [facility] in order to obtain a psych nurse.  SW also discussed pt attending [facility] Psych IOP. BOBBY later contacted pt's spouse to inform him that pt would not be d/c'd today.        Discharge Codes    No documentation.       Expected Discharge Date and Time     Expected Discharge Date Expected Discharge Time    May 20, 2020             ELENI Rivera

## 2020-05-20 NOTE — PLAN OF CARE
Problem: Patient Care Overview  Goal: Plan of Care Review  Outcome: Ongoing (interventions implemented as appropriate)  Flowsheets (Taken 5/20/2020 0341)  Progress: improving  Plan of Care Reviewed With: patient  Patient Agreement with Plan of Care: agrees  Note:   Pt pleasant and cooperative with care. Pt compliant with medications and looking forward to discharge tomorrow. Will continue to monitor and provide a safe environment.  Goal: Individualization and Mutuality  Outcome: Ongoing (interventions implemented as appropriate)  Goal: Discharge Needs Assessment  Outcome: Ongoing (interventions implemented as appropriate)  Goal: Interprofessional Rounds/Family Conf  Outcome: Ongoing (interventions implemented as appropriate)     Problem: Overarching Goals (Adult)  Goal: Adheres to Safety Considerations for Self and Others  Outcome: Ongoing (interventions implemented as appropriate)  Flowsheets (Taken 5/20/2020 0341)  Adheres to Safety Considerations for Self and Others: making progress toward outcome  Goal: Optimized Coping Skills in Response to Life Stressors  Outcome: Ongoing (interventions implemented as appropriate)  Flowsheets (Taken 5/20/2020 0341)  Optimized Coping Skills in Response to Life Stressors: making progress toward outcome  Goal: Develops/Participates in Therapeutic [place] to Support Successful Transition  Outcome: Ongoing (interventions implemented as appropriate)  Flowsheets (Taken 5/20/2020 0341)  Develops/Participates in Therapeutic [place] to Support Successful Transition: making progress toward outcome     Problem: Cognitive Impairment (Psychotic Signs/Symptoms) (Adult)  Goal: Improved Thought Clarity/Organization (Psychotic Signs/Symptoms)  Outcome: Ongoing (interventions implemented as appropriate)  Flowsheets (Taken 5/20/2020 0341)  Improved Thought Clarity/Organization Action Step (STG) Outcome: making progress toward outcome     Problem: Psychomotor Movement Impairment (Psychotic  Signs/Symptoms) (Adult)  Goal: Improved Psychomotor Symptoms (Psychotic Signs/Symptoms)  Outcome: Ongoing (interventions implemented as appropriate)  Flowsheets (Taken 5/20/2020 0341)  Improved Psychomotor Symptoms Action Step (STG) Outcome: making progress toward outcome     Problem: Mental State/Mood Impairment (Psychotic Signs/Symptoms) (Adult)  Goal: Improved Mental State/Mood (Psychotic Signs/Symptoms)  Outcome: Ongoing (interventions implemented as appropriate)  Flowsheets (Taken 5/20/2020 0341)  Improved Mental State/Mood Action Step (STG) Outcome: making progress toward outcome     Problem: Skin Injury Risk (Adult)  Goal: Skin Health and Integrity  Outcome: Ongoing (interventions implemented as appropriate)  Flowsheets (Taken 5/20/2020 0341)  Skin Health and Integrity: making progress toward outcome     Problem: Fall Risk (Adult)  Goal: Absence of Fall  Outcome: Ongoing (interventions implemented as appropriate)  Flowsheets (Taken 5/20/2020 0341)  Absence of Fall: making progress toward outcome

## 2020-05-20 NOTE — DISCHARGE SUMMARY
DATES OF ADMISSION: 5/1/2020-5/20/2020    REASON FOR ADMISSION: The patient is a 65-year-old white female with a history of bipolar disorder admitted in a state of psychotic depression related to this illness.  She had been medically stabilized in the main hospital after a period of near catatonia and reduced p.o. intake.    LABS: See chart    HOSPITAL COURSE: The patient was admitted to the crisis management unit and restarted on Seroquel medication which had previously been effective for her.  Because of her near catatonic presentation, she was initially begun on Ativan and then Ritalin in hopes of addressing her symptoms.  There was concern on the part of this physician that electroconvulsive therapy could potentially cycle the patient into sampson, so this treatment modality was ruled out.  The patient showed slow but steady improvement with reinitiation of these medications.  She became more active within the therapeutic milieu and her p.o. intake improved considerably.  A family therapy session took place on 5/19/2020, and it went well.  By 520 the patient was felt to be at or near her psych to baseline and discharge was ordered.  The importance of consistent medication compliance was stressed to her at the time of her discharge.    FINAL DIAGNOSIS: Bipolar disorder most recent episode depressed severe with psychotic features    DISPOSITION ON DISCHARGE: Of listing the patient's discharge medications is provided below.  Follow-up will take place with [facility].    PROGNOSIS: Fair    Current Facility-Administered Medications   Medication Dose Route Frequency Provider Last Rate Last Dose   • aluminum-magnesium hydroxide-simethicone (MAALOX MAX) 400-400-40 MG/5ML suspension 15 mL  15 mL Oral Q6H PRN Oscar Cruz III, MD       • bisoprolol (ZEBeta) tablet 5 mg  5 mg Oral Q24H Toñito Loja MD   5 mg at 05/20/20 0822   • bumetanide (BUMEX) tablet 0.5 mg  0.5 mg Oral Daily  Toñito Loja MD   0.5 mg at 05/20/20 0822   • docusate sodium (COLACE) capsule 100 mg  100 mg Oral BID Oscar Cruz III, MD   100 mg at 05/20/20 0822   • famotidine (PEPCID) tablet 40 mg  40 mg Oral Daily Oscar Cruz III, MD   40 mg at 05/20/20 0822   • loperamide (IMODIUM) capsule 2 mg  2 mg Oral Q2H PRN Oscar Cruz III, MD       • LORazepam (ATIVAN) tablet 0.5 mg  0.5 mg Oral TID Oscar Cruz III, MD   0.5 mg at 05/20/20 0822   • magnesium hydroxide (MILK OF MAGNESIA) suspension 2400 mg/10mL 10 mL  10 mL Oral Daily PRN Oscar Cruz III, MD       • melatonin tablet 5 mg  5 mg Oral Nightly PRN Oscar Cruz III, MD   5 mg at 05/19/20 2129   • methylphenidate (RITALIN) tablet 5 mg  5 mg Oral Daily With Breakfast & Lunch Oscar Cruz III, MD   5 mg at 05/20/20 0822   • potassium chloride (MICRO-K) CR capsule 20 mEq  20 mEq Oral Daily Toñito Loja MD   20 mEq at 05/20/20 0822   • QUEtiapine fumarate ER (SEROquel XR) tablet 200 mg  200 mg Oral Nightly DantesOlegario MD   200 mg at 05/19/20 2129   • vitamin D (ERGOCALCIFEROL) capsule 50,000 Units  50,000 Units Oral Q7 Days Oscar Cruz III, MD   50,000 Units at 05/17/20 1059

## 2020-05-20 NOTE — PROGRESS NOTES
Continued Stay Note  [facility]     Patient Name: Francheska Sherman  MRN: [id number]  Today's Date: 5/20/2020    Admit Date: 5/1/2020    Discharge Plan     Row Name 05/20/20 1136       Plan    Plan Comments  SW contacted pt's spouse & met w/pt to discuss d/c today.  BOBBY adv pt's spouse to contact her outside psychiatrist and ask for the  if they wish to change provider as well as to ask for a therapist.  SW also adv that pt would attend [facility] Psych IOP beg tomorrow, 5/20.  SW informed pt's spouse that home health svcs was scheduled thru [facility] to include a psych nurse.  SW also adv that Dr. Loja recommended they get medications from [place] [facility] in order to get pre-packaged medications.  Pt's spouse confirmed that he would be transporting pt home around 1:30 pm.    Row Name 05/20/20 5245       Plan    Plan Comments  On 5/19, BOBBY contacted pt's spouse to discuss pt's d/c.  Pt's spouse expressed concern regarding pt's outpt Psychiatrists and discussed the possibility of exploring seeing someone else recommended in that office.  BOBBY informed pt's spouse that our Doctor did not have any availability to accept pts and recommended that pt continue with her outpt psychiatrist.  BOBBY also informed pt's spouse that home health svcs would be ordered thru [facility] in order to obtain a psych nurse.  BOBBY also discussed pt attending [facility] Psych IOP. BOBBY later contacted pt's spouse to inform him that pt would not be d/c'd today.        Discharge Codes    No documentation.       Expected Discharge Date and Time     Expected Discharge Date Expected Discharge Time    May 20, 2020             ELENI Rivera

## 2020-05-20 NOTE — PROGRESS NOTES
Continued Stay Note  [facility]     Patient Name: Francheska Sherman  MRN: [id number]  Today's Date: 5/20/2020    Admit Date: 5/1/2020    Discharge Plan     Row Name 05/20/20 1139       Plan    Final Discharge Disposition Code  01 - home or self-care    Final Note  Pt will be discharged per Dr. Cruz's orders.  BOBBY met w/pt & contacted pt's spouse to discuss d/c.  Pt will begin [facility] Psych IOP on Thursday, 5/20@8:15 am.  Pt will be transported home by her spouse.    Row Name 05/20/20 1136       Plan    Plan Comments  SW contacted pt's spouse & met w/pt to discuss d/c today.  SW adv pt's spouse to contact her outside psychiatrist and ask for the  if they wish to change provider as well as to ask for a therapist.  SW also adv that pt would attend [facility] Psych IOP beg tomorrow, 5/20.  BOBBY informed pt's spouse that home health svcs was scheduled thru [facility] to include a psych nurse.  BOBBY also adv that Dr. Loja recommended they get medications from [place] [facility] in order to get pre-packaged medications.  Pt's spouse confirmed that he would be transporting pt home around 1:30 pm.    Row Name 05/20/20 1130       Plan    Plan Comments  On 5/19, BOBBY contacted pt's spouse to discuss pt's d/c.  Pt's spouse expressed concern regarding pt's outpt Psychiatrists and discussed the possibility of exploring seeing someone else recommended in that office.  BOBBY informed pt's spouse that our Doctor did not have any availability to accept pts and recommended that pt continue with her outpt psychiatrist.  BOBBY also informed pt's spouse that home health svcs would be ordered thru [facility] in order to obtain a psych nurse.  BOBBY also discussed pt attending [facility] Psych IOP. BOBBY later contacted pt's spouse to inform him that pt would not be d/c'd today.        Discharge Codes    No documentation.       Expected Discharge Date and Time     Expected Discharge Date Expected Discharge Time    May 20, 2020             Sharri Ruby  CSW

## 2020-05-26 ENCOUNTER — OFFICE VISIT (OUTPATIENT)
Dept: PSYCHIATRY | Facility: HOSPITAL | Age: 65
End: 2020-05-26

## 2020-05-26 ENCOUNTER — TELEPHONE (OUTPATIENT)
Dept: PSYCHIATRY | Facility: HOSPITAL | Age: 65
End: 2020-05-26

## 2020-05-26 DIAGNOSIS — F31.75 BIPOLAR DISORDER, IN PARTIAL REMISSION, MOST RECENT EPISODE DEPRESSED (HCC): Primary | ICD-10-CM

## 2020-05-26 PROCEDURE — S9480 INTENSIVE OUTPATIENT PSYCHIA: HCPCS | Performed by: COUNSELOR

## 2020-05-26 NOTE — PROGRESS NOTES
Wrap-up  Day 1 of IOP from [facility]  Mood at 3 with 10 being the worse and found the group sharing to be most helpful today.    Fatigue  No SI  Has positive goals for later today.
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
## 2020-05-26 NOTE — PROGRESS NOTES
Other     Information/activity     [id number] Art Therapy - Therapeutic story The Stream that Grew read aloud, creation on image in [employer] on paper, then processing    Instructor Oscar Cline LPAT     15:00     Patient Response Good participation

## 2020-05-27 ENCOUNTER — TELEPHONE (OUTPATIENT)
Dept: PSYCHIATRY | Facility: HOSPITAL | Age: 65
End: 2020-05-27

## 2020-05-27 ENCOUNTER — DOCUMENTATION (OUTPATIENT)
Dept: PSYCHIATRY | Facility: HOSPITAL | Age: 65
End: 2020-05-27

## 2020-05-27 NOTE — PROGRESS NOTES
"Discharge Summary    Francheska attended  1 IOP Sessions         Registration Date 5/26/20  Discharge Date  5/27/2020       Summary of Treatment and Progress towards goals:   Pt only attended one day of IOP after d/c from [facility] dealing with a psychotic depression.   Has had a bipolar disorder dx for several years.  Pt did well both giving and receiving feedback.  No current psychosis, no SI and only reporting fatigue and lack of mobility.  Requested d/c today \"d/t mobility problems and the covid virus\"    Diagnostic Impression:   Bipolar d/o, most recent depressive with psychotic features.        Current Medications:  Current Outpatient Medications   Medication Sig Dispense Refill   • acetaminophen (TYLENOL) 325 MG tablet Take 2 tablets by mouth Every 4 (Four) Hours As Needed for Mild Pain .     • amLODIPine (NORVASC) 5 MG tablet TK 1 T PO  PRF IF SYSTOLIC IS GREATER THAN 140  0   • bisoprolol (ZEBeta) 5 MG tablet Take 0.5 tablets by mouth Daily.     • bisoprolol (ZEBeta) 5 MG tablet Take 1 tablet by mouth Daily. Indications: High Blood Pressure Disorder 30 tablet 5   • bumetanide (BUMEX) 0.5 MG tablet Take 1 tablet by mouth Daily As Needed (Lower extremity fluid). Indications: Edema 30 tablet 0   • clobetasol (TEMOVATE) 0.05 % cream Apply  topically to the appropriate area as directed Every 12 (Twelve) Hours.     • docusate sodium 100 MG capsule Take 100 mg by mouth 2 (Two) Times a Day As Needed for Constipation.     • famotidine (PEPCID) 40 MG tablet Take 1 tablet by mouth Daily.     • hydroCHLOROthiazide (HYDRODIURIL) 12.5 MG tablet Take 0.5 tablets by mouth Daily.     • melatonin 5 MG tablet tablet Take 1 tablet by mouth At Night As Needed (insomnia).     • methylphenidate (RITALIN) 5 MG tablet Take 1 tablet by mouth Daily With Breakfast & Lunch. Indications: rit 60 tablet 0   • oxybutynin XL (DITROPAN-XL) 5 MG 24 hr tablet Take 1 tablet by mouth Daily.     • potassium chloride (MICRO-K) 10 MEQ CR capsule Take 2 " capsules by mouth Daily As Needed (take on same days as Bumex). Indications: Low Amount of Potassium in the Blood 30 capsule 0   • QUEtiapine fumarate ER (SEROquel XR) 200 MG 24 hr tablet Take 1 tablet by mouth Every Night. Indications: Manic-Depression 90 each 0   • vitamin D (ERGOCALCIFEROL) 1.25 MG (83052 UT) capsule capsule Take 1 capsule by mouth Every 7 (Seven) Days. 5 capsule      No current facility-administered medications for this visit.        Referrals/ Appointments  Dr Hernesto Ernst MD    6/9/20  Has a list of referrals for therapist          Lovely Son [facility]

## 2020-05-27 NOTE — TELEPHONE ENCOUNTER
"Patient called and reports she is doing \"okay.\"  She states she has her medications and understands her discharge instructions now.  No further assistance requested at this time.  "

## 2020-05-28 ENCOUNTER — APPOINTMENT (OUTPATIENT)
Dept: PSYCHIATRY | Facility: HOSPITAL | Age: 65
End: 2020-05-28

## 2020-05-29 ENCOUNTER — APPOINTMENT (OUTPATIENT)
Dept: PSYCHIATRY | Facility: HOSPITAL | Age: 65
End: 2020-05-29

## 2020-06-01 ENCOUNTER — APPOINTMENT (OUTPATIENT)
Dept: PSYCHIATRY | Facility: HOSPITAL | Age: 65
End: 2020-06-01

## 2020-06-02 ENCOUNTER — APPOINTMENT (OUTPATIENT)
Dept: PSYCHIATRY | Facility: HOSPITAL | Age: 65
End: 2020-06-02

## 2020-06-03 ENCOUNTER — APPOINTMENT (OUTPATIENT)
Dept: PSYCHIATRY | Facility: HOSPITAL | Age: 65
End: 2020-06-03

## 2020-06-04 ENCOUNTER — APPOINTMENT (OUTPATIENT)
Dept: PSYCHIATRY | Facility: HOSPITAL | Age: 65
End: 2020-06-04

## 2020-06-05 ENCOUNTER — APPOINTMENT (OUTPATIENT)
Dept: PSYCHIATRY | Facility: HOSPITAL | Age: 65
End: 2020-06-05

## 2020-06-08 ENCOUNTER — APPOINTMENT (OUTPATIENT)
Dept: PSYCHIATRY | Facility: HOSPITAL | Age: 65
End: 2020-06-08

## 2020-06-09 ENCOUNTER — APPOINTMENT (OUTPATIENT)
Dept: PSYCHIATRY | Facility: HOSPITAL | Age: 65
End: 2020-06-09

## 2020-06-10 ENCOUNTER — APPOINTMENT (OUTPATIENT)
Dept: PSYCHIATRY | Facility: HOSPITAL | Age: 65
End: 2020-06-10

## 2020-06-11 ENCOUNTER — APPOINTMENT (OUTPATIENT)
Dept: PSYCHIATRY | Facility: HOSPITAL | Age: 65
End: 2020-06-11

## 2020-09-17 ENCOUNTER — HOSPITAL ENCOUNTER (INPATIENT)
Facility: HOSPITAL | Age: 65
LOS: 12 days | Discharge: HOME | End: 2020-09-29
Attending: INTERNAL MEDICINE | Admitting: INTERNAL MEDICINE

## 2020-09-17 ENCOUNTER — APPOINTMENT (OUTPATIENT)
Dept: GENERAL RADIOLOGY | Facility: HOSPITAL | Age: 65
End: 2020-09-17

## 2020-09-17 DIAGNOSIS — R23.0 CYANOSIS OF FINGERTIP: ICD-10-CM

## 2020-09-17 DIAGNOSIS — R53.1 GENERALIZED WEAKNESS: ICD-10-CM

## 2020-09-17 DIAGNOSIS — J84.10 PULMONARY INTERSTITIAL FIBROSIS (HCC): ICD-10-CM

## 2020-09-17 DIAGNOSIS — I51.7 CARDIOMEGALY: ICD-10-CM

## 2020-09-17 DIAGNOSIS — I27.20 PULMONARY HYPERTENSION (HCC): ICD-10-CM

## 2020-09-17 DIAGNOSIS — F31.62 BIPOLAR DISORDER, CURRENT EPISODE MIXED, MODERATE (HCC): ICD-10-CM

## 2020-09-17 DIAGNOSIS — J96.20 ACUTE AND CHRONIC RESPIRATORY FAILURE (ACUTE-ON-CHRONIC) (HCC): ICD-10-CM

## 2020-09-17 DIAGNOSIS — I47.29 NONSUSTAINED VENTRICULAR TACHYCARDIA (HCC): ICD-10-CM

## 2020-09-17 DIAGNOSIS — J96.21 ACUTE ON CHRONIC RESPIRATORY FAILURE WITH HYPOXIA AND HYPERCAPNIA (HCC): ICD-10-CM

## 2020-09-17 DIAGNOSIS — K58.2 IRRITABLE BOWEL SYNDROME WITH BOTH CONSTIPATION AND DIARRHEA: ICD-10-CM

## 2020-09-17 DIAGNOSIS — J96.22 ACUTE ON CHRONIC RESPIRATORY FAILURE WITH HYPOXIA AND HYPERCAPNIA (HCC): ICD-10-CM

## 2020-09-17 DIAGNOSIS — Z74.09 DECREASED MOBILITY AND ENDURANCE: Primary | ICD-10-CM

## 2020-09-17 DIAGNOSIS — J84.10 PULMONARY FIBROSIS (HCC): ICD-10-CM

## 2020-09-17 PROBLEM — J96.01 ACUTE RESPIRATORY FAILURE WITH HYPOXIA: Status: ACTIVE | Noted: 2020-09-17

## 2020-09-17 LAB
ALBUMIN SERPL-MCNC: 3.7 G/DL (ref 3.5–5.2)
ALBUMIN/GLOB SERPL: 1.1 G/DL
ALP SERPL-CCNC: 54 U/L (ref 39–117)
ALT SERPL W P-5'-P-CCNC: 14 U/L (ref 1–33)
AMPHET+METHAMPHET UR QL: NEGATIVE
AMYLASE SERPL-CCNC: 76 U/L (ref 28–100)
ANION GAP SERPL CALCULATED.3IONS-SCNC: 4.4 MMOL/L (ref 5–15)
ARTERIAL PATENCY WRIST A: POSITIVE
AST SERPL-CCNC: 16 U/L (ref 1–32)
ATMOSPHERIC PRESS: 750 MMHG
B PARAPERT DNA SPEC QL NAA+PROBE: NOT DETECTED
B PERT DNA SPEC QL NAA+PROBE: NOT DETECTED
BACTERIA UR QL AUTO: ABNORMAL /HPF
BARBITURATES UR QL SCN: NEGATIVE
BASE EXCESS BLDA CALC-SCNC: 6.4 MMOL/L (ref 0–2)
BDY SITE: ABNORMAL
BENZODIAZ UR QL SCN: NEGATIVE
BILIRUB SERPL-MCNC: 0.3 MG/DL (ref 0–1.2)
BILIRUB UR QL STRIP: NEGATIVE
BUN SERPL-MCNC: 20 MG/DL (ref 8–23)
BUN/CREAT SERPL: 30.3 (ref 7–25)
C PNEUM DNA NPH QL NAA+NON-PROBE: NOT DETECTED
CALCIUM SPEC-SCNC: 9.4 MG/DL (ref 8.6–10.5)
CANNABINOIDS SERPL QL: NEGATIVE
CHLORIDE SERPL-SCNC: 102 MMOL/L (ref 98–107)
CHOLEST SERPL-MCNC: 166 MG/DL (ref 0–200)
CK SERPL-CCNC: 39 U/L (ref 20–180)
CLARITY UR: CLEAR
CO2 SERPL-SCNC: 35.6 MMOL/L (ref 22–29)
COCAINE UR QL: NEGATIVE
COLOR UR: YELLOW
CREAT SERPL-MCNC: 0.66 MG/DL (ref 0.57–1)
D DIMER PPP FEU-MCNC: 1.04 MCGFEU/ML (ref 0–0.49)
D-LACTATE SERPL-SCNC: 1.2 MMOL/L (ref 0.5–2)
DEPRECATED RDW RBC AUTO: 47.8 FL (ref 37–54)
EOSINOPHIL # BLD MANUAL: 0.48 10*3/MM3 (ref 0–0.4)
EOSINOPHIL NFR BLD MANUAL: 6.1 % (ref 0.3–6.2)
ERYTHROCYTE [DISTWIDTH] IN BLOOD BY AUTOMATED COUNT: 14.7 % (ref 12.3–15.4)
FLUAV H1 2009 PAND RNA NPH QL NAA+PROBE: NOT DETECTED
FLUAV H1 HA GENE NPH QL NAA+PROBE: NOT DETECTED
FLUAV H3 RNA NPH QL NAA+PROBE: NOT DETECTED
FLUAV SUBTYP SPEC NAA+PROBE: NOT DETECTED
FLUBV RNA ISLT QL NAA+PROBE: NOT DETECTED
GAS FLOW AIRWAY: 3.5 LPM
GFR SERPL CREATININE-BSD FRML MDRD: 90 ML/MIN/1.73
GLOBULIN UR ELPH-MCNC: 3.4 GM/DL
GLUCOSE SERPL-MCNC: 114 MG/DL (ref 65–99)
GLUCOSE UR STRIP-MCNC: NEGATIVE MG/DL
HADV DNA SPEC NAA+PROBE: NOT DETECTED
HCO3 BLDA-SCNC: 36.7 MMOL/L (ref 22–28)
HCOV 229E RNA SPEC QL NAA+PROBE: NOT DETECTED
HCOV HKU1 RNA SPEC QL NAA+PROBE: NOT DETECTED
HCOV NL63 RNA SPEC QL NAA+PROBE: NOT DETECTED
HCOV OC43 RNA SPEC QL NAA+PROBE: NOT DETECTED
HCT VFR BLD AUTO: 46 % (ref 34–46.6)
HDLC SERPL-MCNC: 41 MG/DL (ref 40–60)
HGB BLD-MCNC: 14.9 G/DL (ref 12–15.9)
HGB UR QL STRIP.AUTO: NEGATIVE
HMPV RNA NPH QL NAA+NON-PROBE: NOT DETECTED
HPIV1 RNA SPEC QL NAA+PROBE: NOT DETECTED
HPIV2 RNA SPEC QL NAA+PROBE: NOT DETECTED
HPIV3 RNA NPH QL NAA+PROBE: NOT DETECTED
HPIV4 P GENE NPH QL NAA+PROBE: NOT DETECTED
HYALINE CASTS UR QL AUTO: ABNORMAL /LPF
INR PPP: 1.07 (ref 0.9–1.1)
KETONES UR QL STRIP: NEGATIVE
LDLC SERPL CALC-MCNC: 104 MG/DL (ref 0–100)
LDLC/HDLC SERPL: 2.53 {RATIO}
LEUKOCYTE ESTERASE UR QL STRIP.AUTO: NEGATIVE
LIPASE SERPL-CCNC: 39 U/L (ref 13–60)
LYMPHOCYTES # BLD MANUAL: 1.28 10*3/MM3 (ref 0.7–3.1)
LYMPHOCYTES NFR BLD MANUAL: 10.2 % (ref 5–12)
LYMPHOCYTES NFR BLD MANUAL: 16.3 % (ref 19.6–45.3)
M PNEUMO IGG SER IA-ACNC: NOT DETECTED
MAGNESIUM SERPL-MCNC: 2.1 MG/DL (ref 1.6–2.4)
MCH RBC QN AUTO: 28.8 PG (ref 26.6–33)
MCHC RBC AUTO-ENTMCNC: 32.4 G/DL (ref 31.5–35.7)
MCV RBC AUTO: 88.8 FL (ref 79–97)
METHADONE UR QL SCN: NEGATIVE
MODALITY: ABNORMAL
MONOCYTES # BLD AUTO: 0.8 10*3/MM3 (ref 0.1–0.9)
NEUTROPHILS # BLD AUTO: 5.28 10*3/MM3 (ref 1.7–7)
NEUTROPHILS NFR BLD MANUAL: 67.3 % (ref 42.7–76)
NITRITE UR QL STRIP: NEGATIVE
OPIATES UR QL: NEGATIVE
OXYCODONE UR QL SCN: NEGATIVE
PCO2 BLDA: 75 MM HG (ref 35–45)
PH BLDA: 7.3 PH UNITS (ref 7.35–7.45)
PH UR STRIP.AUTO: 5.5 [PH] (ref 5–8)
PHOSPHATE SERPL-MCNC: 5.1 MG/DL (ref 2.5–4.5)
PLAT MORPH BLD: NORMAL
PLATELET # BLD AUTO: 242 10*3/MM3 (ref 140–450)
PMV BLD AUTO: 10.9 FL (ref 6–12)
PO2 BLDA: 89.6 MM HG (ref 80–100)
POLYCHROMASIA BLD QL SMEAR: ABNORMAL
POTASSIUM SERPL-SCNC: 4.8 MMOL/L (ref 3.5–5.2)
PROCALCITONIN SERPL-MCNC: 0.03 NG/ML (ref 0–0.25)
PROT SERPL-MCNC: 7.1 G/DL (ref 6–8.5)
PROT UR QL STRIP: ABNORMAL
PROTHROMBIN TIME: 13.8 SECONDS (ref 11.7–14.2)
RBC # BLD AUTO: 5.18 10*6/MM3 (ref 3.77–5.28)
RBC # UR: ABNORMAL /HPF
REF LAB TEST METHOD: ABNORMAL
RHINOVIRUS RNA SPEC NAA+PROBE: NOT DETECTED
RSV RNA NPH QL NAA+NON-PROBE: NOT DETECTED
SAO2 % BLDCOA: 95.3 % (ref 92–99)
SARS-COV-2 RNA NPH QL NAA+NON-PROBE: NOT DETECTED
SODIUM SERPL-SCNC: 142 MMOL/L (ref 136–145)
SP GR UR STRIP: 1.02 (ref 1–1.03)
SQUAMOUS #/AREA URNS HPF: ABNORMAL /HPF
TOTAL RATE: 18 BREATHS/MINUTE
TRIGL SERPL-MCNC: 107 MG/DL (ref 0–150)
TROPONIN T SERPL-MCNC: <0.01 NG/ML (ref 0–0.03)
TSH SERPL DL<=0.05 MIU/L-ACNC: 2.91 UIU/ML (ref 0.27–4.2)
UROBILINOGEN UR QL STRIP: ABNORMAL
VLDLC SERPL-MCNC: 21.4 MG/DL (ref 5–40)
WBC # BLD AUTO: 7.84 10*3/MM3 (ref 3.4–10.8)
WBC MORPH BLD: NORMAL
WBC UR QL AUTO: ABNORMAL /HPF

## 2020-09-17 PROCEDURE — 84484 ASSAY OF TROPONIN QUANT: CPT | Performed by: INTERNAL MEDICINE

## 2020-09-17 PROCEDURE — 80307 DRUG TEST PRSMV CHEM ANLYZR: CPT | Performed by: INTERNAL MEDICINE

## 2020-09-17 PROCEDURE — 84443 ASSAY THYROID STIM HORMONE: CPT | Performed by: INTERNAL MEDICINE

## 2020-09-17 PROCEDURE — 83690 ASSAY OF LIPASE: CPT | Performed by: INTERNAL MEDICINE

## 2020-09-17 PROCEDURE — 80061 LIPID PANEL: CPT | Performed by: INTERNAL MEDICINE

## 2020-09-17 PROCEDURE — 71045 X-RAY EXAM CHEST 1 VIEW: CPT

## 2020-09-17 PROCEDURE — 83735 ASSAY OF MAGNESIUM: CPT | Performed by: INTERNAL MEDICINE

## 2020-09-17 PROCEDURE — 84100 ASSAY OF PHOSPHORUS: CPT | Performed by: INTERNAL MEDICINE

## 2020-09-17 PROCEDURE — 83605 ASSAY OF LACTIC ACID: CPT | Performed by: INTERNAL MEDICINE

## 2020-09-17 PROCEDURE — 85007 BL SMEAR W/DIFF WBC COUNT: CPT | Performed by: INTERNAL MEDICINE

## 2020-09-17 PROCEDURE — 85027 COMPLETE CBC AUTOMATED: CPT | Performed by: INTERNAL MEDICINE

## 2020-09-17 PROCEDURE — 81001 URINALYSIS AUTO W/SCOPE: CPT | Performed by: INTERNAL MEDICINE

## 2020-09-17 PROCEDURE — 82550 ASSAY OF CK (CPK): CPT | Performed by: INTERNAL MEDICINE

## 2020-09-17 PROCEDURE — 86431 RHEUMATOID FACTOR QUANT: CPT | Performed by: INTERNAL MEDICINE

## 2020-09-17 PROCEDURE — 84145 PROCALCITONIN (PCT): CPT | Performed by: INTERNAL MEDICINE

## 2020-09-17 PROCEDURE — 36600 WITHDRAWAL OF ARTERIAL BLOOD: CPT

## 2020-09-17 PROCEDURE — 82803 BLOOD GASES ANY COMBINATION: CPT

## 2020-09-17 PROCEDURE — 85379 FIBRIN DEGRADATION QUANT: CPT | Performed by: INTERNAL MEDICINE

## 2020-09-17 PROCEDURE — 93010 ELECTROCARDIOGRAM REPORT: CPT | Performed by: INTERNAL MEDICINE

## 2020-09-17 PROCEDURE — 0202U NFCT DS 22 TRGT SARS-COV-2: CPT | Performed by: INTERNAL MEDICINE

## 2020-09-17 PROCEDURE — 25010000002 ENOXAPARIN PER 10 MG: Performed by: INTERNAL MEDICINE

## 2020-09-17 PROCEDURE — 93005 ELECTROCARDIOGRAM TRACING: CPT | Performed by: INTERNAL MEDICINE

## 2020-09-17 PROCEDURE — 85610 PROTHROMBIN TIME: CPT | Performed by: INTERNAL MEDICINE

## 2020-09-17 PROCEDURE — 80053 COMPREHEN METABOLIC PANEL: CPT | Performed by: INTERNAL MEDICINE

## 2020-09-17 PROCEDURE — 82150 ASSAY OF AMYLASE: CPT | Performed by: INTERNAL MEDICINE

## 2020-09-17 RX ORDER — ONDANSETRON 2 MG/ML
4 INJECTION INTRAMUSCULAR; INTRAVENOUS EVERY 6 HOURS PRN
Status: DISCONTINUED | OUTPATIENT
Start: 2020-09-17 | End: 2020-09-29 | Stop reason: HOSPADM

## 2020-09-17 RX ORDER — CLOBETASOL PROPIONATE 0.5 MG/G
CREAM TOPICAL EVERY 12 HOURS SCHEDULED
Status: DISCONTINUED | OUTPATIENT
Start: 2020-09-17 | End: 2020-09-19

## 2020-09-17 RX ORDER — FAMOTIDINE 20 MG/1
40 TABLET, FILM COATED ORAL DAILY
Status: DISCONTINUED | OUTPATIENT
Start: 2020-09-17 | End: 2020-09-29 | Stop reason: HOSPADM

## 2020-09-17 RX ORDER — CALCIUM CARBONATE 200(500)MG
2 TABLET,CHEWABLE ORAL 3 TIMES DAILY PRN
Status: DISCONTINUED | OUTPATIENT
Start: 2020-09-17 | End: 2020-09-29 | Stop reason: HOSPADM

## 2020-09-17 RX ORDER — DOCUSATE SODIUM 100 MG/1
100 CAPSULE, LIQUID FILLED ORAL 2 TIMES DAILY PRN
Status: DISCONTINUED | OUTPATIENT
Start: 2020-09-17 | End: 2020-09-29 | Stop reason: HOSPADM

## 2020-09-17 RX ORDER — LORAZEPAM 0.5 MG/1
0.5 TABLET ORAL EVERY 8 HOURS PRN
Status: DISCONTINUED | OUTPATIENT
Start: 2020-09-17 | End: 2020-09-19

## 2020-09-17 RX ORDER — ACETAMINOPHEN 325 MG/1
650 TABLET ORAL EVERY 4 HOURS PRN
Status: DISCONTINUED | OUTPATIENT
Start: 2020-09-17 | End: 2020-09-29 | Stop reason: HOSPADM

## 2020-09-17 RX ORDER — DOCUSATE SODIUM 100 MG/1
100 CAPSULE, LIQUID FILLED ORAL 2 TIMES DAILY PRN
Status: DISCONTINUED | OUTPATIENT
Start: 2020-09-17 | End: 2020-09-17 | Stop reason: SDUPTHER

## 2020-09-17 RX ORDER — DIPHENHYDRAMINE HCL 25 MG
25 CAPSULE ORAL NIGHTLY PRN
Status: DISCONTINUED | OUTPATIENT
Start: 2020-09-17 | End: 2020-09-29 | Stop reason: HOSPADM

## 2020-09-17 RX ORDER — BISOPROLOL FUMARATE 5 MG/1
5 TABLET, FILM COATED ORAL
Status: DISCONTINUED | OUTPATIENT
Start: 2020-09-17 | End: 2020-09-23

## 2020-09-17 RX ORDER — BUMETANIDE 0.5 MG/1
0.5 TABLET ORAL DAILY PRN
Status: DISCONTINUED | OUTPATIENT
Start: 2020-09-17 | End: 2020-09-29 | Stop reason: HOSPADM

## 2020-09-17 RX ORDER — SODIUM CHLORIDE 0.9 % (FLUSH) 0.9 %
10 SYRINGE (ML) INJECTION EVERY 12 HOURS SCHEDULED
Status: DISCONTINUED | OUTPATIENT
Start: 2020-09-17 | End: 2020-09-29 | Stop reason: HOSPADM

## 2020-09-17 RX ORDER — QUETIAPINE FUMARATE 50 MG/1
200 TABLET, EXTENDED RELEASE ORAL NIGHTLY
Status: DISCONTINUED | OUTPATIENT
Start: 2020-09-17 | End: 2020-09-26

## 2020-09-17 RX ORDER — LORAZEPAM 0.5 MG/1
0.5 TABLET ORAL EVERY 8 HOURS PRN
COMMUNITY
End: 2021-07-01

## 2020-09-17 RX ORDER — AMLODIPINE BESYLATE 5 MG/1
5 TABLET ORAL DAILY PRN
Status: DISCONTINUED | OUTPATIENT
Start: 2020-09-17 | End: 2020-09-29 | Stop reason: HOSPADM

## 2020-09-17 RX ORDER — NITROGLYCERIN 0.4 MG/1
0.4 TABLET SUBLINGUAL
Status: DISCONTINUED | OUTPATIENT
Start: 2020-09-17 | End: 2020-09-29 | Stop reason: HOSPADM

## 2020-09-17 RX ORDER — POTASSIUM CHLORIDE 750 MG/1
20 CAPSULE, EXTENDED RELEASE ORAL DAILY PRN
Status: DISCONTINUED | OUTPATIENT
Start: 2020-09-17 | End: 2020-09-29 | Stop reason: HOSPADM

## 2020-09-17 RX ORDER — SODIUM CHLORIDE 0.9 % (FLUSH) 0.9 %
10 SYRINGE (ML) INJECTION AS NEEDED
Status: DISCONTINUED | OUTPATIENT
Start: 2020-09-17 | End: 2020-09-29 | Stop reason: HOSPADM

## 2020-09-17 RX ADMIN — FAMOTIDINE 40 MG: 20 TABLET, FILM COATED ORAL at 21:28

## 2020-09-17 RX ADMIN — QUETIAPINE FUMARATE 200 MG: 50 TABLET, EXTENDED RELEASE ORAL at 21:29

## 2020-09-17 RX ADMIN — SODIUM CHLORIDE, PRESERVATIVE FREE 10 ML: 5 INJECTION INTRAVENOUS at 21:29

## 2020-09-17 RX ADMIN — ENOXAPARIN SODIUM 40 MG: 40 INJECTION SUBCUTANEOUS at 21:28

## 2020-09-18 ENCOUNTER — APPOINTMENT (OUTPATIENT)
Dept: CARDIOLOGY | Facility: HOSPITAL | Age: 65
End: 2020-09-18

## 2020-09-18 ENCOUNTER — APPOINTMENT (OUTPATIENT)
Dept: CT IMAGING | Facility: HOSPITAL | Age: 65
End: 2020-09-18

## 2020-09-18 PROBLEM — J96.22 ACUTE ON CHRONIC RESPIRATORY FAILURE WITH HYPOXIA AND HYPERCAPNIA (HCC): Status: ACTIVE | Noted: 2020-09-17

## 2020-09-18 PROBLEM — R23.0 CYANOSIS OF FINGERTIP: Status: ACTIVE | Noted: 2020-09-18

## 2020-09-18 PROBLEM — J84.10 PULMONARY FIBROSIS (HCC): Status: ACTIVE | Noted: 2020-09-18

## 2020-09-18 PROBLEM — J96.21 ACUTE ON CHRONIC RESPIRATORY FAILURE WITH HYPOXIA AND HYPERCAPNIA: Status: ACTIVE | Noted: 2020-09-17

## 2020-09-18 LAB
25(OH)D3 SERPL-MCNC: 22.7 NG/ML (ref 30–100)
ANION GAP SERPL CALCULATED.3IONS-SCNC: 7 MMOL/L (ref 5–15)
ARTERIAL PATENCY WRIST A: POSITIVE
ARTERIAL PATENCY WRIST A: POSITIVE
ATMOSPHERIC PRESS: 749.7 MMHG
ATMOSPHERIC PRESS: 752.9 MMHG
BASE EXCESS BLDA CALC-SCNC: 3.1 MMOL/L (ref 0–2)
BASE EXCESS BLDA CALC-SCNC: 4.6 MMOL/L (ref 0–2)
BASOPHILS # BLD AUTO: 0.06 10*3/MM3 (ref 0–0.2)
BASOPHILS NFR BLD AUTO: 0.7 % (ref 0–1.5)
BDY SITE: ABNORMAL
BDY SITE: ABNORMAL
BUN SERPL-MCNC: 15 MG/DL (ref 8–23)
BUN/CREAT SERPL: 26.8 (ref 7–25)
CALCIUM SPEC-SCNC: 8.9 MG/DL (ref 8.6–10.5)
CHLORIDE SERPL-SCNC: 104 MMOL/L (ref 98–107)
CHROMATIN AB SERPL-ACNC: 12.3 IU/ML (ref 0–14)
CO2 SERPL-SCNC: 30 MMOL/L (ref 22–29)
CREAT SERPL-MCNC: 0.56 MG/DL (ref 0.57–1)
CRP SERPL-MCNC: 0.27 MG/DL (ref 0–0.5)
DEPRECATED RDW RBC AUTO: 50.2 FL (ref 37–54)
EOSINOPHIL # BLD AUTO: 0.43 10*3/MM3 (ref 0–0.4)
EOSINOPHIL NFR BLD AUTO: 5.2 % (ref 0.3–6.2)
ERYTHROCYTE [DISTWIDTH] IN BLOOD BY AUTOMATED COUNT: 14.6 % (ref 12.3–15.4)
ERYTHROCYTE [SEDIMENTATION RATE] IN BLOOD: 1 MM/HR (ref 0–30)
GAS FLOW AIRWAY: 2 LPM
GAS FLOW AIRWAY: 5 LPM
GFR SERPL CREATININE-BSD FRML MDRD: 109 ML/MIN/1.73
GLUCOSE SERPL-MCNC: 102 MG/DL (ref 65–99)
HCO3 BLDA-SCNC: 33.4 MMOL/L (ref 22–28)
HCO3 BLDA-SCNC: 34.1 MMOL/L (ref 22–28)
HCT VFR BLD AUTO: 46.5 % (ref 34–46.6)
HGB BLD-MCNC: 14.2 G/DL (ref 12–15.9)
IMM GRANULOCYTES # BLD AUTO: 0.05 10*3/MM3 (ref 0–0.05)
IMM GRANULOCYTES NFR BLD AUTO: 0.6 % (ref 0–0.5)
LYMPHOCYTES # BLD AUTO: 2.39 10*3/MM3 (ref 0.7–3.1)
LYMPHOCYTES NFR BLD AUTO: 28.9 % (ref 19.6–45.3)
MCH RBC QN AUTO: 28.2 PG (ref 26.6–33)
MCHC RBC AUTO-ENTMCNC: 30.5 G/DL (ref 31.5–35.7)
MCV RBC AUTO: 92.4 FL (ref 79–97)
MODALITY: ABNORMAL
MODALITY: ABNORMAL
MONOCYTES # BLD AUTO: 0.66 10*3/MM3 (ref 0.1–0.9)
MONOCYTES NFR BLD AUTO: 8 % (ref 5–12)
NEUTROPHILS NFR BLD AUTO: 4.69 10*3/MM3 (ref 1.7–7)
NEUTROPHILS NFR BLD AUTO: 56.6 % (ref 42.7–76)
NRBC BLD AUTO-RTO: 0 /100 WBC (ref 0–0.2)
NT-PROBNP SERPL-MCNC: 869.9 PG/ML (ref 0–900)
PCO2 BLDA: 70.5 MM HG (ref 35–45)
PCO2 BLDA: 77.5 MM HG (ref 35–45)
PH BLDA: 7.24 PH UNITS (ref 7.35–7.45)
PH BLDA: 7.29 PH UNITS (ref 7.35–7.45)
PLATELET # BLD AUTO: 220 10*3/MM3 (ref 140–450)
PMV BLD AUTO: 10.9 FL (ref 6–12)
PO2 BLDA: 73.8 MM HG (ref 80–100)
PO2 BLDA: 76.5 MM HG (ref 80–100)
POTASSIUM SERPL-SCNC: 4.7 MMOL/L (ref 3.5–5.2)
RBC # BLD AUTO: 5.03 10*6/MM3 (ref 3.77–5.28)
SAO2 % BLDCOA: 91.6 % (ref 92–99)
SAO2 % BLDCOA: 92 % (ref 92–99)
SODIUM SERPL-SCNC: 141 MMOL/L (ref 136–145)
TOTAL RATE: 18 BREATHS/MINUTE
TOTAL RATE: 20 BREATHS/MINUTE
WBC # BLD AUTO: 8.28 10*3/MM3 (ref 3.4–10.8)

## 2020-09-18 PROCEDURE — 86200 CCP ANTIBODY: CPT | Performed by: INTERNAL MEDICINE

## 2020-09-18 PROCEDURE — 93306 TTE W/DOPPLER COMPLETE: CPT

## 2020-09-18 PROCEDURE — G0008 ADMIN INFLUENZA VIRUS VAC: HCPCS | Performed by: INTERNAL MEDICINE

## 2020-09-18 PROCEDURE — 94660 CPAP INITIATION&MGMT: CPT

## 2020-09-18 PROCEDURE — 25010000002 PERFLUTREN (DEFINITY) 8.476 MG IN SODIUM CHLORIDE 0.9 % 10 ML INJECTION: Performed by: INTERNAL MEDICINE

## 2020-09-18 PROCEDURE — 85025 COMPLETE CBC W/AUTO DIFF WBC: CPT | Performed by: INTERNAL MEDICINE

## 2020-09-18 PROCEDURE — 83880 ASSAY OF NATRIURETIC PEPTIDE: CPT | Performed by: INTERNAL MEDICINE

## 2020-09-18 PROCEDURE — 25010000002 ENOXAPARIN PER 10 MG: Performed by: INTERNAL MEDICINE

## 2020-09-18 PROCEDURE — 82306 VITAMIN D 25 HYDROXY: CPT | Performed by: INTERNAL MEDICINE

## 2020-09-18 PROCEDURE — 86225 DNA ANTIBODY NATIVE: CPT | Performed by: INTERNAL MEDICINE

## 2020-09-18 PROCEDURE — 86235 NUCLEAR ANTIGEN ANTIBODY: CPT | Performed by: INTERNAL MEDICINE

## 2020-09-18 PROCEDURE — 71275 CT ANGIOGRAPHY CHEST: CPT

## 2020-09-18 PROCEDURE — 94799 UNLISTED PULMONARY SVC/PX: CPT

## 2020-09-18 PROCEDURE — 85652 RBC SED RATE AUTOMATED: CPT | Performed by: INTERNAL MEDICINE

## 2020-09-18 PROCEDURE — 80048 BASIC METABOLIC PNL TOTAL CA: CPT | Performed by: INTERNAL MEDICINE

## 2020-09-18 PROCEDURE — 93306 TTE W/DOPPLER COMPLETE: CPT | Performed by: INTERNAL MEDICINE

## 2020-09-18 PROCEDURE — 86140 C-REACTIVE PROTEIN: CPT | Performed by: INTERNAL MEDICINE

## 2020-09-18 PROCEDURE — 25010000002 INFLUENZA VAC SPLIT QUAD 0.5 ML SUSPENSION PREFILLED SYRINGE: Performed by: INTERNAL MEDICINE

## 2020-09-18 PROCEDURE — 36600 WITHDRAWAL OF ARTERIAL BLOOD: CPT

## 2020-09-18 PROCEDURE — 25010000002 AZITHROMYCIN PER 500 MG: Performed by: INTERNAL MEDICINE

## 2020-09-18 PROCEDURE — 0 IOPAMIDOL PER 1 ML: Performed by: INTERNAL MEDICINE

## 2020-09-18 PROCEDURE — 82803 BLOOD GASES ANY COMBINATION: CPT

## 2020-09-18 PROCEDURE — 25010000002 FUROSEMIDE PER 20 MG: Performed by: INTERNAL MEDICINE

## 2020-09-18 PROCEDURE — 99253 IP/OBS CNSLTJ NEW/EST LOW 45: CPT | Performed by: INTERNAL MEDICINE

## 2020-09-18 PROCEDURE — 90686 IIV4 VACC NO PRSV 0.5 ML IM: CPT | Performed by: INTERNAL MEDICINE

## 2020-09-18 RX ORDER — FUROSEMIDE 10 MG/ML
40 INJECTION INTRAMUSCULAR; INTRAVENOUS ONCE
Status: COMPLETED | OUTPATIENT
Start: 2020-09-18 | End: 2020-09-18

## 2020-09-18 RX ORDER — DIGOXIN 0.25 MG/ML
125 INJECTION INTRAMUSCULAR; INTRAVENOUS
Status: DISCONTINUED | OUTPATIENT
Start: 2020-09-18 | End: 2020-09-18

## 2020-09-18 RX ORDER — ALBUTEROL SULFATE 2.5 MG/3ML
2.5 SOLUTION RESPIRATORY (INHALATION) EVERY 6 HOURS PRN
Status: DISCONTINUED | OUTPATIENT
Start: 2020-09-18 | End: 2020-09-29 | Stop reason: HOSPADM

## 2020-09-18 RX ORDER — AZITHROMYCIN 250 MG/1
500 TABLET, FILM COATED ORAL
Status: COMPLETED | OUTPATIENT
Start: 2020-09-19 | End: 2020-09-22

## 2020-09-18 RX ADMIN — CLOBETASOL PROPIONATE: 0.5 CREAM TOPICAL at 08:35

## 2020-09-18 RX ADMIN — IOPAMIDOL 95 ML: 755 INJECTION, SOLUTION INTRAVENOUS at 06:15

## 2020-09-18 RX ADMIN — SODIUM CHLORIDE, PRESERVATIVE FREE 10 ML: 5 INJECTION INTRAVENOUS at 08:36

## 2020-09-18 RX ADMIN — ACETAMINOPHEN 650 MG: 325 TABLET, FILM COATED ORAL at 05:36

## 2020-09-18 RX ADMIN — AMLODIPINE BESYLATE 5 MG: 5 TABLET ORAL at 21:55

## 2020-09-18 RX ADMIN — BISOPROLOL FUMARATE 5 MG: 5 TABLET, FILM COATED ORAL at 08:35

## 2020-09-18 RX ADMIN — ENOXAPARIN SODIUM 40 MG: 40 INJECTION SUBCUTANEOUS at 08:35

## 2020-09-18 RX ADMIN — INFLUENZA VIRUS VACCINE 0.5 ML: 15; 15; 15; 15 SUSPENSION INTRAMUSCULAR at 12:09

## 2020-09-18 RX ADMIN — CLOBETASOL PROPIONATE: 0.5 CREAM TOPICAL at 21:53

## 2020-09-18 RX ADMIN — QUETIAPINE FUMARATE 200 MG: 50 TABLET, EXTENDED RELEASE ORAL at 21:53

## 2020-09-18 RX ADMIN — SODIUM CHLORIDE, PRESERVATIVE FREE 10 ML: 5 INJECTION INTRAVENOUS at 21:53

## 2020-09-18 RX ADMIN — FAMOTIDINE 40 MG: 20 TABLET, FILM COATED ORAL at 08:35

## 2020-09-18 RX ADMIN — FUROSEMIDE 40 MG: 10 INJECTION, SOLUTION INTRAMUSCULAR; INTRAVENOUS at 08:35

## 2020-09-18 RX ADMIN — ENOXAPARIN SODIUM 40 MG: 40 INJECTION SUBCUTANEOUS at 21:51

## 2020-09-18 RX ADMIN — PERFLUTREN 2 ML: 6.52 INJECTION, SUSPENSION INTRAVENOUS at 15:30

## 2020-09-18 RX ADMIN — AZITHROMYCIN 500 MG: 500 INJECTION, POWDER, LYOPHILIZED, FOR SOLUTION INTRAVENOUS at 12:10

## 2020-09-19 PROBLEM — R91.1 LUNG NODULE SEEN ON IMAGING STUDY: Status: ACTIVE | Noted: 2020-09-19

## 2020-09-19 LAB
ANION GAP SERPL CALCULATED.3IONS-SCNC: 10.8 MMOL/L (ref 5–15)
AORTIC DIMENSIONLESS INDEX: 0.7 (DI)
BASOPHILS # BLD AUTO: 0.03 10*3/MM3 (ref 0–0.2)
BASOPHILS NFR BLD AUTO: 0.3 % (ref 0–1.5)
BH CV ECHO MEAS - ACS: 1.7 CM
BH CV ECHO MEAS - AO ARCH DIAM (PROXIMAL TRANS.): 3.6 CM
BH CV ECHO MEAS - AO MAX PG (FULL): 8 MMHG
BH CV ECHO MEAS - AO MAX PG: 14.6 MMHG
BH CV ECHO MEAS - AO MEAN PG (FULL): 5 MMHG
BH CV ECHO MEAS - AO MEAN PG: 8 MMHG
BH CV ECHO MEAS - AO ROOT AREA (BSA CORRECTED): 1.2
BH CV ECHO MEAS - AO ROOT AREA: 5.7 CM^2
BH CV ECHO MEAS - AO ROOT DIAM: 2.7 CM
BH CV ECHO MEAS - AO V2 MAX: 191 CM/SEC
BH CV ECHO MEAS - AO V2 MEAN: 136 CM/SEC
BH CV ECHO MEAS - AO V2 VTI: 34.7 CM
BH CV ECHO MEAS - ASC AORTA: 3.3 CM
BH CV ECHO MEAS - AVA(I,A): 1.9 CM^2
BH CV ECHO MEAS - AVA(I,D): 1.9 CM^2
BH CV ECHO MEAS - AVA(V,A): 1.9 CM^2
BH CV ECHO MEAS - AVA(V,D): 1.9 CM^2
BH CV ECHO MEAS - BSA(HAYCOCK): 2.4 M^2
BH CV ECHO MEAS - BSA: 2.3 M^2
BH CV ECHO MEAS - BZI_BMI: 41.2 KILOGRAMS/M^2
BH CV ECHO MEAS - BZI_METRIC_HEIGHT: 170.2 CM
BH CV ECHO MEAS - BZI_METRIC_WEIGHT: 119.3 KG
BH CV ECHO MEAS - CONTRAST EF 4CH: 59 CM2
BH CV ECHO MEAS - EDV(CUBED): 85.2 ML
BH CV ECHO MEAS - EDV(MOD-SP2): 74 ML
BH CV ECHO MEAS - EDV(MOD-SP4): 81 ML
BH CV ECHO MEAS - EDV(TEICH): 87.7 ML
BH CV ECHO MEAS - EF(CUBED): 71.4 %
BH CV ECHO MEAS - EF(MOD-BP): 59.1 %
BH CV ECHO MEAS - EF(MOD-SP2): 56.8 %
BH CV ECHO MEAS - EF(MOD-SP4): 58 %
BH CV ECHO MEAS - EF(TEICH): 63.3 %
BH CV ECHO MEAS - ESV(CUBED): 24.4 ML
BH CV ECHO MEAS - ESV(MOD-SP2): 32 ML
BH CV ECHO MEAS - ESV(MOD-SP4): 34 ML
BH CV ECHO MEAS - ESV(TEICH): 32.2 ML
BH CV ECHO MEAS - FS: 34.1 %
BH CV ECHO MEAS - IVS/LVPW: 1.2
BH CV ECHO MEAS - IVSD: 1.3 CM
BH CV ECHO MEAS - LAT PEAK E' VEL: 11.6 CM/SEC
BH CV ECHO MEAS - LV DIASTOLIC VOL/BSA (35-75): 35.7 ML/M^2
BH CV ECHO MEAS - LV MASS(C)D: 191.3 GRAMS
BH CV ECHO MEAS - LV MASS(C)DI: 84.2 GRAMS/M^2
BH CV ECHO MEAS - LV MAX PG: 6.6 MMHG
BH CV ECHO MEAS - LV MEAN PG: 3 MMHG
BH CV ECHO MEAS - LV SYSTOLIC VOL/BSA (12-30): 15 ML/M^2
BH CV ECHO MEAS - LV V1 MAX: 128 CM/SEC
BH CV ECHO MEAS - LV V1 MEAN: 81.1 CM/SEC
BH CV ECHO MEAS - LV V1 VTI: 23.4 CM
BH CV ECHO MEAS - LVIDD: 4.4 CM
BH CV ECHO MEAS - LVIDS: 2.9 CM
BH CV ECHO MEAS - LVLD AP2: 7.2 CM
BH CV ECHO MEAS - LVLD AP4: 7.9 CM
BH CV ECHO MEAS - LVLS AP2: 6.4 CM
BH CV ECHO MEAS - LVLS AP4: 6.8 CM
BH CV ECHO MEAS - LVOT AREA (M): 2.8 CM^2
BH CV ECHO MEAS - LVOT AREA: 2.8 CM^2
BH CV ECHO MEAS - LVOT DIAM: 1.9 CM
BH CV ECHO MEAS - LVPWD: 1.1 CM
BH CV ECHO MEAS - MED PEAK E' VEL: 8.8 CM/SEC
BH CV ECHO MEAS - MV A DUR: 0.1 SEC
BH CV ECHO MEAS - MV A MAX VEL: 102 CM/SEC
BH CV ECHO MEAS - MV DEC SLOPE: 596 CM/SEC^2
BH CV ECHO MEAS - MV DEC TIME: 160 SEC
BH CV ECHO MEAS - MV E MAX VEL: 111 CM/SEC
BH CV ECHO MEAS - MV E/A: 1.1
BH CV ECHO MEAS - MV MAX PG: 6.7 MMHG
BH CV ECHO MEAS - MV MEAN PG: 2 MMHG
BH CV ECHO MEAS - MV P1/2T MAX VEL: 134 CM/SEC
BH CV ECHO MEAS - MV P1/2T: 65.9 MSEC
BH CV ECHO MEAS - MV V2 MAX: 129 CM/SEC
BH CV ECHO MEAS - MV V2 MEAN: 69 CM/SEC
BH CV ECHO MEAS - MV V2 VTI: 32.3 CM
BH CV ECHO MEAS - MVA P1/2T LCG: 1.6 CM^2
BH CV ECHO MEAS - MVA(P1/2T): 3.3 CM^2
BH CV ECHO MEAS - MVA(VTI): 2.1 CM^2
BH CV ECHO MEAS - PA ACC TIME: 0.09 SEC
BH CV ECHO MEAS - PA MAX PG (FULL): 4.2 MMHG
BH CV ECHO MEAS - PA MAX PG: 6.2 MMHG
BH CV ECHO MEAS - PA PR(ACCEL): 38.5 MMHG
BH CV ECHO MEAS - PA V2 MAX: 124 CM/SEC
BH CV ECHO MEAS - PULM A REVS DUR: 0.14 SEC
BH CV ECHO MEAS - PULM A REVS VEL: 34.1 CM/SEC
BH CV ECHO MEAS - PULM DIAS VEL: 42.5 CM/SEC
BH CV ECHO MEAS - PULM S/D: 0.89
BH CV ECHO MEAS - PULM SYS VEL: 37.9 CM/SEC
BH CV ECHO MEAS - PVA(V,A): 2.5 CM^2
BH CV ECHO MEAS - PVA(V,D): 2.5 CM^2
BH CV ECHO MEAS - QP/QS: 0.99
BH CV ECHO MEAS - RAP SYSTOLE: 3 MMHG
BH CV ECHO MEAS - RV MAX PG: 1.9 MMHG
BH CV ECHO MEAS - RV MEAN PG: 1 MMHG
BH CV ECHO MEAS - RV V1 MAX: 69.8 CM/SEC
BH CV ECHO MEAS - RV V1 MEAN: 52.7 CM/SEC
BH CV ECHO MEAS - RV V1 VTI: 14.5 CM
BH CV ECHO MEAS - RVOT AREA: 4.5 CM^2
BH CV ECHO MEAS - RVOT DIAM: 2.4 CM
BH CV ECHO MEAS - RVSP: 55.4 MMHG
BH CV ECHO MEAS - SI(AO): 87.5 ML/M^2
BH CV ECHO MEAS - SI(CUBED): 26.8 ML/M^2
BH CV ECHO MEAS - SI(LVOT): 29.2 ML/M^2
BH CV ECHO MEAS - SI(MOD-SP2): 18.5 ML/M^2
BH CV ECHO MEAS - SI(MOD-SP4): 20.7 ML/M^2
BH CV ECHO MEAS - SI(TEICH): 24.4 ML/M^2
BH CV ECHO MEAS - SV(AO): 198.7 ML
BH CV ECHO MEAS - SV(CUBED): 60.8 ML
BH CV ECHO MEAS - SV(LVOT): 66.3 ML
BH CV ECHO MEAS - SV(MOD-SP2): 42 ML
BH CV ECHO MEAS - SV(MOD-SP4): 47 ML
BH CV ECHO MEAS - SV(RVOT): 65.6 ML
BH CV ECHO MEAS - SV(TEICH): 55.5 ML
BH CV ECHO MEAS - TAPSE (>1.6): 2.7 CM
BH CV ECHO MEAS - TR MAX VEL: 362 CM/SEC
BH CV ECHO MEASUREMENTS AVERAGE E/E' RATIO: 10.88
BH CV VAS BP RIGHT ARM: NORMAL MMHG
BH CV XLRA - RV BASE: 4.1 CM
BH CV XLRA - RV LENGTH: 8 CM
BH CV XLRA - RV MID: 3.2 CM
BH CV XLRA - TDI S': 15.1 CM/SEC
BUN SERPL-MCNC: 19 MG/DL (ref 8–23)
BUN/CREAT SERPL: 21.8 (ref 7–25)
CALCIUM SPEC-SCNC: 9.1 MG/DL (ref 8.6–10.5)
CHLORIDE SERPL-SCNC: 97 MMOL/L (ref 98–107)
CO2 SERPL-SCNC: 29.2 MMOL/L (ref 22–29)
CREAT SERPL-MCNC: 0.87 MG/DL (ref 0.57–1)
DEPRECATED RDW RBC AUTO: 48 FL (ref 37–54)
EOSINOPHIL # BLD AUTO: 0.53 10*3/MM3 (ref 0–0.4)
EOSINOPHIL NFR BLD AUTO: 6 % (ref 0.3–6.2)
ERYTHROCYTE [DISTWIDTH] IN BLOOD BY AUTOMATED COUNT: 14.6 % (ref 12.3–15.4)
GFR SERPL CREATININE-BSD FRML MDRD: 66 ML/MIN/1.73
GLUCOSE SERPL-MCNC: 139 MG/DL (ref 65–99)
HCT VFR BLD AUTO: 43.5 % (ref 34–46.6)
HGB BLD-MCNC: 13.8 G/DL (ref 12–15.9)
IMM GRANULOCYTES # BLD AUTO: 0.08 10*3/MM3 (ref 0–0.05)
IMM GRANULOCYTES NFR BLD AUTO: 0.9 % (ref 0–0.5)
LEFT ATRIUM VOLUME INDEX: 29 ML/M2
LYMPHOCYTES # BLD AUTO: 1.95 10*3/MM3 (ref 0.7–3.1)
LYMPHOCYTES NFR BLD AUTO: 21.9 % (ref 19.6–45.3)
MAGNESIUM SERPL-MCNC: 2.1 MG/DL (ref 1.6–2.4)
MAXIMAL PREDICTED HEART RATE: 157 BPM
MCH RBC QN AUTO: 28.6 PG (ref 26.6–33)
MCHC RBC AUTO-ENTMCNC: 31.7 G/DL (ref 31.5–35.7)
MCV RBC AUTO: 90.1 FL (ref 79–97)
MONOCYTES # BLD AUTO: 0.76 10*3/MM3 (ref 0.1–0.9)
MONOCYTES NFR BLD AUTO: 8.5 % (ref 5–12)
NEUTROPHILS NFR BLD AUTO: 5.55 10*3/MM3 (ref 1.7–7)
NEUTROPHILS NFR BLD AUTO: 62.4 % (ref 42.7–76)
NRBC BLD AUTO-RTO: 0 /100 WBC (ref 0–0.2)
NT-PROBNP SERPL-MCNC: 255 PG/ML (ref 0–900)
PLATELET # BLD AUTO: 239 10*3/MM3 (ref 140–450)
PMV BLD AUTO: 10.7 FL (ref 6–12)
POTASSIUM SERPL-SCNC: 4 MMOL/L (ref 3.5–5.2)
RBC # BLD AUTO: 4.83 10*6/MM3 (ref 3.77–5.28)
SODIUM SERPL-SCNC: 137 MMOL/L (ref 136–145)
STRESS TARGET HR: 133 BPM
TROPONIN T SERPL-MCNC: <0.01 NG/ML (ref 0–0.03)
WBC # BLD AUTO: 8.9 10*3/MM3 (ref 3.4–10.8)

## 2020-09-19 PROCEDURE — 85025 COMPLETE CBC W/AUTO DIFF WBC: CPT | Performed by: INTERNAL MEDICINE

## 2020-09-19 PROCEDURE — 93005 ELECTROCARDIOGRAM TRACING: CPT | Performed by: NURSE PRACTITIONER

## 2020-09-19 PROCEDURE — 99231 SBSQ HOSP IP/OBS SF/LOW 25: CPT | Performed by: NURSE PRACTITIONER

## 2020-09-19 PROCEDURE — 83880 ASSAY OF NATRIURETIC PEPTIDE: CPT | Performed by: NURSE PRACTITIONER

## 2020-09-19 PROCEDURE — 97161 PT EVAL LOW COMPLEX 20 MIN: CPT

## 2020-09-19 PROCEDURE — 97530 THERAPEUTIC ACTIVITIES: CPT

## 2020-09-19 PROCEDURE — 97166 OT EVAL MOD COMPLEX 45 MIN: CPT

## 2020-09-19 PROCEDURE — 84484 ASSAY OF TROPONIN QUANT: CPT | Performed by: NURSE PRACTITIONER

## 2020-09-19 PROCEDURE — 25010000002 ENOXAPARIN PER 10 MG: Performed by: INTERNAL MEDICINE

## 2020-09-19 PROCEDURE — 94799 UNLISTED PULMONARY SVC/PX: CPT

## 2020-09-19 PROCEDURE — 97535 SELF CARE MNGMENT TRAINING: CPT

## 2020-09-19 PROCEDURE — 83735 ASSAY OF MAGNESIUM: CPT | Performed by: NURSE PRACTITIONER

## 2020-09-19 PROCEDURE — 97116 GAIT TRAINING THERAPY: CPT

## 2020-09-19 PROCEDURE — 80048 BASIC METABOLIC PNL TOTAL CA: CPT | Performed by: INTERNAL MEDICINE

## 2020-09-19 RX ORDER — LORAZEPAM 0.5 MG/1
0.5 TABLET ORAL EVERY 8 HOURS
Status: DISCONTINUED | OUTPATIENT
Start: 2020-09-19 | End: 2020-09-26

## 2020-09-19 RX ORDER — ERGOCALCIFEROL 1.25 MG/1
50000 CAPSULE ORAL
Status: DISCONTINUED | OUTPATIENT
Start: 2020-09-19 | End: 2020-09-29 | Stop reason: HOSPADM

## 2020-09-19 RX ADMIN — SODIUM CHLORIDE, PRESERVATIVE FREE 10 ML: 5 INJECTION INTRAVENOUS at 09:03

## 2020-09-19 RX ADMIN — QUETIAPINE FUMARATE 200 MG: 50 TABLET, EXTENDED RELEASE ORAL at 22:52

## 2020-09-19 RX ADMIN — ENOXAPARIN SODIUM 40 MG: 40 INJECTION SUBCUTANEOUS at 20:54

## 2020-09-19 RX ADMIN — ENOXAPARIN SODIUM 40 MG: 40 INJECTION SUBCUTANEOUS at 09:02

## 2020-09-19 RX ADMIN — AZITHROMYCIN DIHYDRATE 500 MG: 250 TABLET, FILM COATED ORAL at 09:03

## 2020-09-19 RX ADMIN — SODIUM CHLORIDE, PRESERVATIVE FREE 10 ML: 5 INJECTION INTRAVENOUS at 20:54

## 2020-09-19 RX ADMIN — LORAZEPAM 0.5 MG: 0.5 TABLET ORAL at 22:52

## 2020-09-19 RX ADMIN — BISOPROLOL FUMARATE 5 MG: 5 TABLET, FILM COATED ORAL at 09:03

## 2020-09-19 RX ADMIN — ERGOCALCIFEROL 50000 UNITS: 1.25 CAPSULE ORAL at 09:03

## 2020-09-19 RX ADMIN — LORAZEPAM 0.5 MG: 0.5 TABLET ORAL at 13:48

## 2020-09-19 RX ADMIN — CLOBETASOL PROPIONATE: 0.5 CREAM TOPICAL at 09:04

## 2020-09-19 RX ADMIN — FAMOTIDINE 40 MG: 20 TABLET, FILM COATED ORAL at 09:03

## 2020-09-20 LAB
ANION GAP SERPL CALCULATED.3IONS-SCNC: 8.2 MMOL/L (ref 5–15)
ARTERIAL PATENCY WRIST A: POSITIVE
ATMOSPHERIC PRESS: 756.4 MMHG
BASE EXCESS BLDA CALC-SCNC: 6.9 MMOL/L (ref 0–2)
BASOPHILS # BLD AUTO: 0.05 10*3/MM3 (ref 0–0.2)
BASOPHILS NFR BLD AUTO: 0.6 % (ref 0–1.5)
BDY SITE: ABNORMAL
BUN SERPL-MCNC: 16 MG/DL (ref 8–23)
BUN/CREAT SERPL: 23.9 (ref 7–25)
CALCIUM SPEC-SCNC: 8.8 MG/DL (ref 8.6–10.5)
CCP IGA+IGG SERPL IA-ACNC: 8 UNITS (ref 0–19)
CHLORIDE SERPL-SCNC: 98 MMOL/L (ref 98–107)
CO2 SERPL-SCNC: 32.8 MMOL/L (ref 22–29)
CREAT SERPL-MCNC: 0.67 MG/DL (ref 0.57–1)
DEPRECATED RDW RBC AUTO: 45.5 FL (ref 37–54)
EOSINOPHIL # BLD AUTO: 0.61 10*3/MM3 (ref 0–0.4)
EOSINOPHIL NFR BLD AUTO: 7.1 % (ref 0.3–6.2)
ERYTHROCYTE [DISTWIDTH] IN BLOOD BY AUTOMATED COUNT: 14.2 % (ref 12.3–15.4)
GFR SERPL CREATININE-BSD FRML MDRD: 89 ML/MIN/1.73
GLUCOSE SERPL-MCNC: 96 MG/DL (ref 65–99)
HCO3 BLDA-SCNC: 35.8 MMOL/L (ref 22–28)
HCT VFR BLD AUTO: 42.6 % (ref 34–46.6)
HGB BLD-MCNC: 13.5 G/DL (ref 12–15.9)
IMM GRANULOCYTES # BLD AUTO: 0.06 10*3/MM3 (ref 0–0.05)
IMM GRANULOCYTES NFR BLD AUTO: 0.7 % (ref 0–0.5)
INHALED O2 CONCENTRATION: 35 %
LYMPHOCYTES # BLD AUTO: 2.25 10*3/MM3 (ref 0.7–3.1)
LYMPHOCYTES NFR BLD AUTO: 26 % (ref 19.6–45.3)
MCH RBC QN AUTO: 27.8 PG (ref 26.6–33)
MCHC RBC AUTO-ENTMCNC: 31.7 G/DL (ref 31.5–35.7)
MCV RBC AUTO: 87.8 FL (ref 79–97)
MODALITY: ABNORMAL
MONOCYTES # BLD AUTO: 0.88 10*3/MM3 (ref 0.1–0.9)
MONOCYTES NFR BLD AUTO: 10.2 % (ref 5–12)
NEUTROPHILS NFR BLD AUTO: 4.79 10*3/MM3 (ref 1.7–7)
NEUTROPHILS NFR BLD AUTO: 55.4 % (ref 42.7–76)
NRBC BLD AUTO-RTO: 0 /100 WBC (ref 0–0.2)
O2 A-A PPRESDIFF RESPIRATORY: 0.4 MMHG
PCO2 BLDA: 67 MM HG (ref 35–45)
PEEP RESPIRATORY: 10 CM[H2O]
PH BLDA: 7.33 PH UNITS (ref 7.35–7.45)
PLATELET # BLD AUTO: 217 10*3/MM3 (ref 140–450)
PMV BLD AUTO: 10.7 FL (ref 6–12)
PO2 BLDA: 79.6 MM HG (ref 80–100)
POTASSIUM SERPL-SCNC: 4.2 MMOL/L (ref 3.5–5.2)
PSV: 8 CMH2O
RBC # BLD AUTO: 4.85 10*6/MM3 (ref 3.77–5.28)
SAO2 % BLDCOA: 94.2 % (ref 92–99)
SET MECH RESP RATE: 12
SODIUM SERPL-SCNC: 139 MMOL/L (ref 136–145)
TOTAL RATE: 18 BREATHS/MINUTE
VENTILATOR MODE: ABNORMAL
VT ON VENT VENT: 500 ML
WBC # BLD AUTO: 8.64 10*3/MM3 (ref 3.4–10.8)

## 2020-09-20 PROCEDURE — 94660 CPAP INITIATION&MGMT: CPT

## 2020-09-20 PROCEDURE — 82803 BLOOD GASES ANY COMBINATION: CPT

## 2020-09-20 PROCEDURE — 85025 COMPLETE CBC W/AUTO DIFF WBC: CPT | Performed by: INTERNAL MEDICINE

## 2020-09-20 PROCEDURE — 36600 WITHDRAWAL OF ARTERIAL BLOOD: CPT

## 2020-09-20 PROCEDURE — 25010000002 ENOXAPARIN PER 10 MG: Performed by: INTERNAL MEDICINE

## 2020-09-20 PROCEDURE — 99232 SBSQ HOSP IP/OBS MODERATE 35: CPT | Performed by: NURSE PRACTITIONER

## 2020-09-20 PROCEDURE — 94799 UNLISTED PULMONARY SVC/PX: CPT

## 2020-09-20 PROCEDURE — 80048 BASIC METABOLIC PNL TOTAL CA: CPT | Performed by: INTERNAL MEDICINE

## 2020-09-20 RX ADMIN — SODIUM CHLORIDE, PRESERVATIVE FREE 10 ML: 5 INJECTION INTRAVENOUS at 10:47

## 2020-09-20 RX ADMIN — QUETIAPINE FUMARATE 200 MG: 50 TABLET, EXTENDED RELEASE ORAL at 22:37

## 2020-09-20 RX ADMIN — LORAZEPAM 0.5 MG: 0.5 TABLET ORAL at 09:09

## 2020-09-20 RX ADMIN — ENOXAPARIN SODIUM 40 MG: 40 INJECTION SUBCUTANEOUS at 09:08

## 2020-09-20 RX ADMIN — ENOXAPARIN SODIUM 40 MG: 40 INJECTION SUBCUTANEOUS at 22:37

## 2020-09-20 RX ADMIN — FAMOTIDINE 40 MG: 20 TABLET, FILM COATED ORAL at 09:09

## 2020-09-20 RX ADMIN — BISOPROLOL FUMARATE 5 MG: 5 TABLET, FILM COATED ORAL at 10:47

## 2020-09-20 RX ADMIN — AZITHROMYCIN DIHYDRATE 500 MG: 250 TABLET, FILM COATED ORAL at 10:47

## 2020-09-20 RX ADMIN — LORAZEPAM 0.5 MG: 0.5 TABLET ORAL at 22:37

## 2020-09-20 RX ADMIN — SODIUM CHLORIDE, PRESERVATIVE FREE 10 ML: 5 INJECTION INTRAVENOUS at 22:38

## 2020-09-20 RX ADMIN — LORAZEPAM 0.5 MG: 0.5 TABLET ORAL at 13:38

## 2020-09-21 ENCOUNTER — APPOINTMENT (OUTPATIENT)
Dept: RESPIRATORY THERAPY | Facility: HOSPITAL | Age: 65
End: 2020-09-21

## 2020-09-21 LAB
ANION GAP SERPL CALCULATED.3IONS-SCNC: 7 MMOL/L (ref 5–15)
BASOPHILS # BLD AUTO: 0.04 10*3/MM3 (ref 0–0.2)
BASOPHILS NFR BLD AUTO: 0.7 % (ref 0–1.5)
BUN SERPL-MCNC: 15 MG/DL (ref 8–23)
BUN/CREAT SERPL: 26.8 (ref 7–25)
CALCIUM SPEC-SCNC: 8.5 MG/DL (ref 8.6–10.5)
CENTROMERE B AB SER-ACNC: <0.2 AI (ref 0–0.9)
CHLORIDE SERPL-SCNC: 101 MMOL/L (ref 98–107)
CHROMATIN AB SERPL-ACNC: <0.2 AI (ref 0–0.9)
CO2 SERPL-SCNC: 31 MMOL/L (ref 22–29)
CREAT SERPL-MCNC: 0.56 MG/DL (ref 0.57–1)
DEPRECATED RDW RBC AUTO: 47 FL (ref 37–54)
DSDNA AB SER-ACNC: <1 IU/ML (ref 0–9)
ENA JO1 AB SER-ACNC: <0.2 AI (ref 0–0.9)
ENA RNP AB SER-ACNC: <0.2 AI (ref 0–0.9)
ENA SCL70 AB SER-ACNC: <0.2 AI (ref 0–0.9)
ENA SM AB SER-ACNC: <0.2 AI (ref 0–0.9)
ENA SS-A AB SER-ACNC: <0.2 AI (ref 0–0.9)
ENA SS-B AB SER-ACNC: <0.2 AI (ref 0–0.9)
EOSINOPHIL # BLD AUTO: 0.35 10*3/MM3 (ref 0–0.4)
EOSINOPHIL NFR BLD AUTO: 5.8 % (ref 0.3–6.2)
ERYTHROCYTE [DISTWIDTH] IN BLOOD BY AUTOMATED COUNT: 14.3 % (ref 12.3–15.4)
GFR SERPL CREATININE-BSD FRML MDRD: 109 ML/MIN/1.73
GLUCOSE SERPL-MCNC: 88 MG/DL (ref 65–99)
HCT VFR BLD AUTO: 42.5 % (ref 34–46.6)
HGB BLD-MCNC: 13.2 G/DL (ref 12–15.9)
IMM GRANULOCYTES # BLD AUTO: 0.04 10*3/MM3 (ref 0–0.05)
IMM GRANULOCYTES NFR BLD AUTO: 0.7 % (ref 0–0.5)
LYMPHOCYTES # BLD AUTO: 1.37 10*3/MM3 (ref 0.7–3.1)
LYMPHOCYTES NFR BLD AUTO: 22.6 % (ref 19.6–45.3)
Lab: NORMAL
MCH RBC QN AUTO: 28 PG (ref 26.6–33)
MCHC RBC AUTO-ENTMCNC: 31.1 G/DL (ref 31.5–35.7)
MCV RBC AUTO: 90 FL (ref 79–97)
MONOCYTES # BLD AUTO: 0.62 10*3/MM3 (ref 0.1–0.9)
MONOCYTES NFR BLD AUTO: 10.2 % (ref 5–12)
NEUTROPHILS NFR BLD AUTO: 3.64 10*3/MM3 (ref 1.7–7)
NEUTROPHILS NFR BLD AUTO: 60 % (ref 42.7–76)
NRBC BLD AUTO-RTO: 0 /100 WBC (ref 0–0.2)
PLATELET # BLD AUTO: 198 10*3/MM3 (ref 140–450)
PMV BLD AUTO: 10.9 FL (ref 6–12)
POTASSIUM SERPL-SCNC: 4 MMOL/L (ref 3.5–5.2)
RBC # BLD AUTO: 4.72 10*6/MM3 (ref 3.77–5.28)
SODIUM SERPL-SCNC: 139 MMOL/L (ref 136–145)
WBC # BLD AUTO: 6.06 10*3/MM3 (ref 3.4–10.8)

## 2020-09-21 PROCEDURE — 25010000002 ENOXAPARIN PER 10 MG: Performed by: INTERNAL MEDICINE

## 2020-09-21 PROCEDURE — 94640 AIRWAY INHALATION TREATMENT: CPT

## 2020-09-21 PROCEDURE — 80048 BASIC METABOLIC PNL TOTAL CA: CPT | Performed by: INTERNAL MEDICINE

## 2020-09-21 PROCEDURE — 94060 EVALUATION OF WHEEZING: CPT

## 2020-09-21 PROCEDURE — 94799 UNLISTED PULMONARY SVC/PX: CPT

## 2020-09-21 PROCEDURE — 99232 SBSQ HOSP IP/OBS MODERATE 35: CPT | Performed by: INTERNAL MEDICINE

## 2020-09-21 PROCEDURE — 97116 GAIT TRAINING THERAPY: CPT

## 2020-09-21 PROCEDURE — 85025 COMPLETE CBC W/AUTO DIFF WBC: CPT | Performed by: INTERNAL MEDICINE

## 2020-09-21 PROCEDURE — 97110 THERAPEUTIC EXERCISES: CPT

## 2020-09-21 PROCEDURE — 97535 SELF CARE MNGMENT TRAINING: CPT

## 2020-09-21 RX ADMIN — SODIUM CHLORIDE, PRESERVATIVE FREE 10 ML: 5 INJECTION INTRAVENOUS at 09:01

## 2020-09-21 RX ADMIN — BISOPROLOL FUMARATE 5 MG: 5 TABLET, FILM COATED ORAL at 09:00

## 2020-09-21 RX ADMIN — QUETIAPINE FUMARATE 200 MG: 50 TABLET, EXTENDED RELEASE ORAL at 21:05

## 2020-09-21 RX ADMIN — LORAZEPAM 0.5 MG: 0.5 TABLET ORAL at 09:01

## 2020-09-21 RX ADMIN — LORAZEPAM 0.5 MG: 0.5 TABLET ORAL at 13:04

## 2020-09-21 RX ADMIN — ENOXAPARIN SODIUM 40 MG: 40 INJECTION SUBCUTANEOUS at 21:05

## 2020-09-21 RX ADMIN — AZITHROMYCIN DIHYDRATE 500 MG: 250 TABLET, FILM COATED ORAL at 09:00

## 2020-09-21 RX ADMIN — ENOXAPARIN SODIUM 40 MG: 40 INJECTION SUBCUTANEOUS at 09:00

## 2020-09-21 RX ADMIN — ALBUTEROL SULFATE 2.5 MG: 2.5 SOLUTION RESPIRATORY (INHALATION) at 16:18

## 2020-09-21 RX ADMIN — FAMOTIDINE 40 MG: 20 TABLET, FILM COATED ORAL at 08:59

## 2020-09-21 RX ADMIN — SODIUM CHLORIDE, PRESERVATIVE FREE 10 ML: 5 INJECTION INTRAVENOUS at 21:05

## 2020-09-21 RX ADMIN — LORAZEPAM 0.5 MG: 0.5 TABLET ORAL at 21:05

## 2020-09-22 ENCOUNTER — APPOINTMENT (OUTPATIENT)
Dept: GENERAL RADIOLOGY | Facility: HOSPITAL | Age: 65
End: 2020-09-22

## 2020-09-22 LAB
ANION GAP SERPL CALCULATED.3IONS-SCNC: 10.6 MMOL/L (ref 5–15)
ARTERIAL PATENCY WRIST A: ABNORMAL
ATMOSPHERIC PRESS: 757.4 MMHG
BASE EXCESS BLDA CALC-SCNC: 3.2 MMOL/L (ref 0–2)
BASOPHILS # BLD AUTO: 0.04 10*3/MM3 (ref 0–0.2)
BASOPHILS NFR BLD AUTO: 0.8 % (ref 0–1.5)
BDY SITE: ABNORMAL
BUN SERPL-MCNC: 14 MG/DL (ref 8–23)
BUN/CREAT SERPL: 25 (ref 7–25)
CALCIUM SPEC-SCNC: 8.6 MG/DL (ref 8.6–10.5)
CHLORIDE SERPL-SCNC: 102 MMOL/L (ref 98–107)
CO2 SERPL-SCNC: 28.4 MMOL/L (ref 22–29)
CREAT SERPL-MCNC: 0.56 MG/DL (ref 0.57–1)
DEPRECATED RDW RBC AUTO: 46.2 FL (ref 37–54)
EOSINOPHIL # BLD AUTO: 0.35 10*3/MM3 (ref 0–0.4)
EOSINOPHIL NFR BLD AUTO: 6.6 % (ref 0.3–6.2)
ERYTHROCYTE [DISTWIDTH] IN BLOOD BY AUTOMATED COUNT: 14.4 % (ref 12.3–15.4)
GAS FLOW AIRWAY: 2 LPM
GFR SERPL CREATININE-BSD FRML MDRD: 109 ML/MIN/1.73
GLUCOSE SERPL-MCNC: 96 MG/DL (ref 65–99)
HCO3 BLDA-SCNC: 30.8 MMOL/L (ref 22–28)
HCT VFR BLD AUTO: 41.9 % (ref 34–46.6)
HGB BLD-MCNC: 13.4 G/DL (ref 12–15.9)
IMM GRANULOCYTES # BLD AUTO: 0.03 10*3/MM3 (ref 0–0.05)
IMM GRANULOCYTES NFR BLD AUTO: 0.6 % (ref 0–0.5)
LYMPHOCYTES # BLD AUTO: 1.66 10*3/MM3 (ref 0.7–3.1)
LYMPHOCYTES NFR BLD AUTO: 31.4 % (ref 19.6–45.3)
MCH RBC QN AUTO: 28.1 PG (ref 26.6–33)
MCHC RBC AUTO-ENTMCNC: 32 G/DL (ref 31.5–35.7)
MCV RBC AUTO: 87.8 FL (ref 79–97)
MODALITY: ABNORMAL
MONOCYTES # BLD AUTO: 0.57 10*3/MM3 (ref 0.1–0.9)
MONOCYTES NFR BLD AUTO: 10.8 % (ref 5–12)
NEUTROPHILS NFR BLD AUTO: 2.64 10*3/MM3 (ref 1.7–7)
NEUTROPHILS NFR BLD AUTO: 49.8 % (ref 42.7–76)
NRBC BLD AUTO-RTO: 0 /100 WBC (ref 0–0.2)
PCO2 BLDA: 57.7 MM HG (ref 35–45)
PH BLDA: 7.33 PH UNITS (ref 7.35–7.45)
PLATELET # BLD AUTO: 203 10*3/MM3 (ref 140–450)
PMV BLD AUTO: 11.1 FL (ref 6–12)
PO2 BLDA: 68.9 MM HG (ref 80–100)
POTASSIUM SERPL-SCNC: 4 MMOL/L (ref 3.5–5.2)
RBC # BLD AUTO: 4.77 10*6/MM3 (ref 3.77–5.28)
SAO2 % BLDCOA: 91.8 % (ref 92–99)
SODIUM SERPL-SCNC: 141 MMOL/L (ref 136–145)
TOTAL RATE: 18 BREATHS/MINUTE
WBC # BLD AUTO: 5.29 10*3/MM3 (ref 3.4–10.8)

## 2020-09-22 PROCEDURE — 82803 BLOOD GASES ANY COMBINATION: CPT

## 2020-09-22 PROCEDURE — 36600 WITHDRAWAL OF ARTERIAL BLOOD: CPT

## 2020-09-22 PROCEDURE — 25010000002 ENOXAPARIN PER 10 MG: Performed by: INTERNAL MEDICINE

## 2020-09-22 PROCEDURE — 99231 SBSQ HOSP IP/OBS SF/LOW 25: CPT | Performed by: INTERNAL MEDICINE

## 2020-09-22 PROCEDURE — 94799 UNLISTED PULMONARY SVC/PX: CPT

## 2020-09-22 PROCEDURE — 71045 X-RAY EXAM CHEST 1 VIEW: CPT

## 2020-09-22 PROCEDURE — 94640 AIRWAY INHALATION TREATMENT: CPT

## 2020-09-22 PROCEDURE — 85025 COMPLETE CBC W/AUTO DIFF WBC: CPT | Performed by: INTERNAL MEDICINE

## 2020-09-22 PROCEDURE — 80048 BASIC METABOLIC PNL TOTAL CA: CPT | Performed by: INTERNAL MEDICINE

## 2020-09-22 RX ADMIN — QUETIAPINE FUMARATE 200 MG: 50 TABLET, EXTENDED RELEASE ORAL at 21:28

## 2020-09-22 RX ADMIN — SODIUM CHLORIDE, PRESERVATIVE FREE 10 ML: 5 INJECTION INTRAVENOUS at 21:28

## 2020-09-22 RX ADMIN — ALBUTEROL SULFATE 2.5 MG: 2.5 SOLUTION RESPIRATORY (INHALATION) at 17:53

## 2020-09-22 RX ADMIN — BISOPROLOL FUMARATE 5 MG: 5 TABLET, FILM COATED ORAL at 08:39

## 2020-09-22 RX ADMIN — LORAZEPAM 0.5 MG: 0.5 TABLET ORAL at 14:47

## 2020-09-22 RX ADMIN — FAMOTIDINE 40 MG: 20 TABLET, FILM COATED ORAL at 08:39

## 2020-09-22 RX ADMIN — AZITHROMYCIN DIHYDRATE 500 MG: 250 TABLET, FILM COATED ORAL at 08:39

## 2020-09-22 RX ADMIN — ENOXAPARIN SODIUM 40 MG: 40 INJECTION SUBCUTANEOUS at 08:39

## 2020-09-22 RX ADMIN — LORAZEPAM 0.5 MG: 0.5 TABLET ORAL at 08:39

## 2020-09-22 RX ADMIN — SODIUM CHLORIDE, PRESERVATIVE FREE 10 ML: 5 INJECTION INTRAVENOUS at 08:39

## 2020-09-22 RX ADMIN — ENOXAPARIN SODIUM 40 MG: 40 INJECTION SUBCUTANEOUS at 21:28

## 2020-09-22 RX ADMIN — LORAZEPAM 0.5 MG: 0.5 TABLET ORAL at 21:28

## 2020-09-23 PROBLEM — I27.20 PULMONARY HYPERTENSION (HCC): Status: ACTIVE | Noted: 2020-09-23

## 2020-09-23 LAB
ANION GAP SERPL CALCULATED.3IONS-SCNC: 5.9 MMOL/L (ref 5–15)
BASOPHILS # BLD AUTO: 0.03 10*3/MM3 (ref 0–0.2)
BASOPHILS NFR BLD AUTO: 0.5 % (ref 0–1.5)
BUN SERPL-MCNC: 11 MG/DL (ref 8–23)
BUN/CREAT SERPL: 22.4 (ref 7–25)
CALCIUM SPEC-SCNC: 8.8 MG/DL (ref 8.6–10.5)
CHLORIDE SERPL-SCNC: 104 MMOL/L (ref 98–107)
CO2 SERPL-SCNC: 30.1 MMOL/L (ref 22–29)
CREAT SERPL-MCNC: 0.49 MG/DL (ref 0.57–1)
DEPRECATED RDW RBC AUTO: 49.7 FL (ref 37–54)
EOSINOPHIL # BLD AUTO: 0.34 10*3/MM3 (ref 0–0.4)
EOSINOPHIL NFR BLD AUTO: 6.1 % (ref 0.3–6.2)
ERYTHROCYTE [DISTWIDTH] IN BLOOD BY AUTOMATED COUNT: 14.6 % (ref 12.3–15.4)
GFR SERPL CREATININE-BSD FRML MDRD: 128 ML/MIN/1.73
GLUCOSE SERPL-MCNC: 88 MG/DL (ref 65–99)
HCT VFR BLD AUTO: 43.1 % (ref 34–46.6)
HGB BLD-MCNC: 13.2 G/DL (ref 12–15.9)
IMM GRANULOCYTES # BLD AUTO: 0.02 10*3/MM3 (ref 0–0.05)
IMM GRANULOCYTES NFR BLD AUTO: 0.4 % (ref 0–0.5)
LYMPHOCYTES # BLD AUTO: 1.84 10*3/MM3 (ref 0.7–3.1)
LYMPHOCYTES NFR BLD AUTO: 32.9 % (ref 19.6–45.3)
MAGNESIUM SERPL-MCNC: 2.2 MG/DL (ref 1.6–2.4)
MCH RBC QN AUTO: 28.3 PG (ref 26.6–33)
MCHC RBC AUTO-ENTMCNC: 30.6 G/DL (ref 31.5–35.7)
MCV RBC AUTO: 92.5 FL (ref 79–97)
MONOCYTES # BLD AUTO: 0.51 10*3/MM3 (ref 0.1–0.9)
MONOCYTES NFR BLD AUTO: 9.1 % (ref 5–12)
NEUTROPHILS NFR BLD AUTO: 2.86 10*3/MM3 (ref 1.7–7)
NEUTROPHILS NFR BLD AUTO: 51 % (ref 42.7–76)
NRBC BLD AUTO-RTO: 0 /100 WBC (ref 0–0.2)
PLATELET # BLD AUTO: 204 10*3/MM3 (ref 140–450)
PMV BLD AUTO: 11 FL (ref 6–12)
POTASSIUM SERPL-SCNC: 4.3 MMOL/L (ref 3.5–5.2)
RBC # BLD AUTO: 4.66 10*6/MM3 (ref 3.77–5.28)
SODIUM SERPL-SCNC: 140 MMOL/L (ref 136–145)
WBC # BLD AUTO: 5.6 10*3/MM3 (ref 3.4–10.8)

## 2020-09-23 PROCEDURE — 80048 BASIC METABOLIC PNL TOTAL CA: CPT | Performed by: INTERNAL MEDICINE

## 2020-09-23 PROCEDURE — 85025 COMPLETE CBC W/AUTO DIFF WBC: CPT | Performed by: INTERNAL MEDICINE

## 2020-09-23 PROCEDURE — 94799 UNLISTED PULMONARY SVC/PX: CPT

## 2020-09-23 PROCEDURE — 99232 SBSQ HOSP IP/OBS MODERATE 35: CPT | Performed by: INTERNAL MEDICINE

## 2020-09-23 PROCEDURE — 83735 ASSAY OF MAGNESIUM: CPT | Performed by: INTERNAL MEDICINE

## 2020-09-23 PROCEDURE — 25010000002 ENOXAPARIN PER 10 MG: Performed by: INTERNAL MEDICINE

## 2020-09-23 PROCEDURE — 97110 THERAPEUTIC EXERCISES: CPT

## 2020-09-23 RX ORDER — BISOPROLOL FUMARATE 5 MG/1
7.5 TABLET, FILM COATED ORAL
Status: DISCONTINUED | OUTPATIENT
Start: 2020-09-24 | End: 2020-09-29 | Stop reason: HOSPADM

## 2020-09-23 RX ORDER — BISOPROLOL FUMARATE 5 MG/1
2.5 TABLET, FILM COATED ORAL ONCE
Status: DISCONTINUED | OUTPATIENT
Start: 2020-09-23 | End: 2020-09-23

## 2020-09-23 RX ORDER — BISOPROLOL FUMARATE 5 MG/1
2.5 TABLET, FILM COATED ORAL ONCE
Status: COMPLETED | OUTPATIENT
Start: 2020-09-23 | End: 2020-09-23

## 2020-09-23 RX ORDER — BISOPROLOL FUMARATE 5 MG/1
10 TABLET, FILM COATED ORAL
Status: DISCONTINUED | OUTPATIENT
Start: 2020-09-24 | End: 2020-09-23

## 2020-09-23 RX ADMIN — BISOPROLOL FUMARATE 5 MG: 5 TABLET, FILM COATED ORAL at 09:02

## 2020-09-23 RX ADMIN — LORAZEPAM 0.5 MG: 0.5 TABLET ORAL at 21:58

## 2020-09-23 RX ADMIN — BISOPROLOL FUMARATE 2.5 MG: 5 TABLET, FILM COATED ORAL at 18:22

## 2020-09-23 RX ADMIN — ENOXAPARIN SODIUM 40 MG: 40 INJECTION SUBCUTANEOUS at 09:02

## 2020-09-23 RX ADMIN — SODIUM CHLORIDE, PRESERVATIVE FREE 10 ML: 5 INJECTION INTRAVENOUS at 09:03

## 2020-09-23 RX ADMIN — LORAZEPAM 0.5 MG: 0.5 TABLET ORAL at 09:03

## 2020-09-23 RX ADMIN — FAMOTIDINE 40 MG: 20 TABLET, FILM COATED ORAL at 09:03

## 2020-09-23 RX ADMIN — ENOXAPARIN SODIUM 40 MG: 40 INJECTION SUBCUTANEOUS at 21:58

## 2020-09-23 RX ADMIN — QUETIAPINE FUMARATE 200 MG: 50 TABLET, EXTENDED RELEASE ORAL at 21:58

## 2020-09-23 RX ADMIN — LORAZEPAM 0.5 MG: 0.5 TABLET ORAL at 13:40

## 2020-09-23 RX ADMIN — SODIUM CHLORIDE, PRESERVATIVE FREE 10 ML: 5 INJECTION INTRAVENOUS at 21:58

## 2020-09-24 ENCOUNTER — APPOINTMENT (OUTPATIENT)
Dept: NUCLEAR MEDICINE | Facility: HOSPITAL | Age: 65
End: 2020-09-24

## 2020-09-24 LAB
ANION GAP SERPL CALCULATED.3IONS-SCNC: 6.8 MMOL/L (ref 5–15)
BASOPHILS # BLD AUTO: 0.03 10*3/MM3 (ref 0–0.2)
BASOPHILS # BLD AUTO: 0.05 10*3/MM3 (ref 0–0.2)
BASOPHILS NFR BLD AUTO: 0.5 % (ref 0–1.5)
BASOPHILS NFR BLD AUTO: 0.8 % (ref 0–1.5)
BUN SERPL-MCNC: 12 MG/DL (ref 8–23)
BUN/CREAT SERPL: 21.4 (ref 7–25)
CALCIUM SPEC-SCNC: 8.8 MG/DL (ref 8.6–10.5)
CHLORIDE SERPL-SCNC: 105 MMOL/L (ref 98–107)
CO2 SERPL-SCNC: 29.2 MMOL/L (ref 22–29)
CREAT SERPL-MCNC: 0.56 MG/DL (ref 0.57–1)
DEPRECATED RDW RBC AUTO: 46 FL (ref 37–54)
DEPRECATED RDW RBC AUTO: 46.5 FL (ref 37–54)
EOSINOPHIL # BLD AUTO: 0.35 10*3/MM3 (ref 0–0.4)
EOSINOPHIL # BLD AUTO: 0.42 10*3/MM3 (ref 0–0.4)
EOSINOPHIL NFR BLD AUTO: 5.5 % (ref 0.3–6.2)
EOSINOPHIL NFR BLD AUTO: 6.9 % (ref 0.3–6.2)
ERYTHROCYTE [DISTWIDTH] IN BLOOD BY AUTOMATED COUNT: 14.4 % (ref 12.3–15.4)
ERYTHROCYTE [DISTWIDTH] IN BLOOD BY AUTOMATED COUNT: 14.5 % (ref 12.3–15.4)
GFR SERPL CREATININE-BSD FRML MDRD: 109 ML/MIN/1.73
GLUCOSE SERPL-MCNC: 85 MG/DL (ref 65–99)
HCT VFR BLD AUTO: 41.7 % (ref 34–46.6)
HCT VFR BLD AUTO: 44.6 % (ref 34–46.6)
HGB BLD-MCNC: 13.1 G/DL (ref 12–15.9)
HGB BLD-MCNC: 14.1 G/DL (ref 12–15.9)
IMM GRANULOCYTES # BLD AUTO: 0.02 10*3/MM3 (ref 0–0.05)
IMM GRANULOCYTES # BLD AUTO: 0.04 10*3/MM3 (ref 0–0.05)
IMM GRANULOCYTES NFR BLD AUTO: 0.3 % (ref 0–0.5)
IMM GRANULOCYTES NFR BLD AUTO: 0.6 % (ref 0–0.5)
LYMPHOCYTES # BLD AUTO: 1.54 10*3/MM3 (ref 0.7–3.1)
LYMPHOCYTES # BLD AUTO: 1.82 10*3/MM3 (ref 0.7–3.1)
LYMPHOCYTES NFR BLD AUTO: 24.4 % (ref 19.6–45.3)
LYMPHOCYTES NFR BLD AUTO: 29.8 % (ref 19.6–45.3)
MAGNESIUM SERPL-MCNC: 2.2 MG/DL (ref 1.6–2.4)
MCH RBC QN AUTO: 27.9 PG (ref 26.6–33)
MCH RBC QN AUTO: 28 PG (ref 26.6–33)
MCHC RBC AUTO-ENTMCNC: 31.4 G/DL (ref 31.5–35.7)
MCHC RBC AUTO-ENTMCNC: 31.6 G/DL (ref 31.5–35.7)
MCV RBC AUTO: 88.5 FL (ref 79–97)
MCV RBC AUTO: 88.7 FL (ref 79–97)
MONOCYTES # BLD AUTO: 0.52 10*3/MM3 (ref 0.1–0.9)
MONOCYTES # BLD AUTO: 0.62 10*3/MM3 (ref 0.1–0.9)
MONOCYTES NFR BLD AUTO: 8.5 % (ref 5–12)
MONOCYTES NFR BLD AUTO: 9.8 % (ref 5–12)
NEUTROPHILS NFR BLD AUTO: 3.29 10*3/MM3 (ref 1.7–7)
NEUTROPHILS NFR BLD AUTO: 3.72 10*3/MM3 (ref 1.7–7)
NEUTROPHILS NFR BLD AUTO: 54 % (ref 42.7–76)
NEUTROPHILS NFR BLD AUTO: 58.9 % (ref 42.7–76)
NRBC BLD AUTO-RTO: 0 /100 WBC (ref 0–0.2)
NRBC BLD AUTO-RTO: 0 /100 WBC (ref 0–0.2)
PLATELET # BLD AUTO: 218 10*3/MM3 (ref 140–450)
PLATELET # BLD AUTO: 229 10*3/MM3 (ref 140–450)
PMV BLD AUTO: 10.9 FL (ref 6–12)
PMV BLD AUTO: 11 FL (ref 6–12)
POTASSIUM SERPL-SCNC: 4.5 MMOL/L (ref 3.5–5.2)
RBC # BLD AUTO: 4.7 10*6/MM3 (ref 3.77–5.28)
RBC # BLD AUTO: 5.04 10*6/MM3 (ref 3.77–5.28)
SODIUM SERPL-SCNC: 141 MMOL/L (ref 136–145)
WBC # BLD AUTO: 6.1 10*3/MM3 (ref 3.4–10.8)
WBC # BLD AUTO: 6.32 10*3/MM3 (ref 3.4–10.8)

## 2020-09-24 PROCEDURE — 94799 UNLISTED PULMONARY SVC/PX: CPT

## 2020-09-24 PROCEDURE — A9500 TC99M SESTAMIBI: HCPCS | Performed by: INTERNAL MEDICINE

## 2020-09-24 PROCEDURE — 93017 CV STRESS TEST TRACING ONLY: CPT

## 2020-09-24 PROCEDURE — 85025 COMPLETE CBC W/AUTO DIFF WBC: CPT | Performed by: NURSE PRACTITIONER

## 2020-09-24 PROCEDURE — 85025 COMPLETE CBC W/AUTO DIFF WBC: CPT | Performed by: INTERNAL MEDICINE

## 2020-09-24 PROCEDURE — 78452 HT MUSCLE IMAGE SPECT MULT: CPT | Performed by: INTERNAL MEDICINE

## 2020-09-24 PROCEDURE — 93016 CV STRESS TEST SUPVJ ONLY: CPT | Performed by: INTERNAL MEDICINE

## 2020-09-24 PROCEDURE — 78452 HT MUSCLE IMAGE SPECT MULT: CPT

## 2020-09-24 PROCEDURE — 0 TECHNETIUM SESTAMIBI: Performed by: INTERNAL MEDICINE

## 2020-09-24 PROCEDURE — 94660 CPAP INITIATION&MGMT: CPT

## 2020-09-24 PROCEDURE — 99232 SBSQ HOSP IP/OBS MODERATE 35: CPT | Performed by: INTERNAL MEDICINE

## 2020-09-24 PROCEDURE — 80048 BASIC METABOLIC PNL TOTAL CA: CPT | Performed by: INTERNAL MEDICINE

## 2020-09-24 PROCEDURE — 83735 ASSAY OF MAGNESIUM: CPT | Performed by: NURSE PRACTITIONER

## 2020-09-24 PROCEDURE — 25010000002 ENOXAPARIN PER 10 MG: Performed by: INTERNAL MEDICINE

## 2020-09-24 PROCEDURE — 93018 CV STRESS TEST I&R ONLY: CPT | Performed by: INTERNAL MEDICINE

## 2020-09-24 RX ADMIN — TECHNETIUM TC 99M SESTAMIBI 1 DOSE: 1 INJECTION INTRAVENOUS at 12:15

## 2020-09-24 RX ADMIN — FAMOTIDINE 40 MG: 20 TABLET, FILM COATED ORAL at 08:22

## 2020-09-24 RX ADMIN — ENOXAPARIN SODIUM 40 MG: 40 INJECTION SUBCUTANEOUS at 21:36

## 2020-09-24 RX ADMIN — BISOPROLOL FUMARATE 7.5 MG: 5 TABLET, FILM COATED ORAL at 08:22

## 2020-09-24 RX ADMIN — QUETIAPINE FUMARATE 200 MG: 50 TABLET, EXTENDED RELEASE ORAL at 21:35

## 2020-09-24 RX ADMIN — LORAZEPAM 0.5 MG: 0.5 TABLET ORAL at 21:36

## 2020-09-24 RX ADMIN — LORAZEPAM 0.5 MG: 0.5 TABLET ORAL at 15:11

## 2020-09-24 RX ADMIN — ENOXAPARIN SODIUM 40 MG: 40 INJECTION SUBCUTANEOUS at 08:22

## 2020-09-24 RX ADMIN — SODIUM CHLORIDE, PRESERVATIVE FREE 10 ML: 5 INJECTION INTRAVENOUS at 21:36

## 2020-09-24 RX ADMIN — LORAZEPAM 0.5 MG: 0.5 TABLET ORAL at 06:57

## 2020-09-24 RX ADMIN — SODIUM CHLORIDE, PRESERVATIVE FREE 10 ML: 5 INJECTION INTRAVENOUS at 08:22

## 2020-09-25 ENCOUNTER — TRANSCRIBE ORDERS (OUTPATIENT)
Dept: ADMINISTRATIVE | Facility: HOSPITAL | Age: 65
End: 2020-09-25

## 2020-09-25 DIAGNOSIS — R91.8 MULTIPLE NODULES OF LUNG: Primary | ICD-10-CM

## 2020-09-25 PROBLEM — I47.29 NONSUSTAINED VENTRICULAR TACHYCARDIA: Status: ACTIVE | Noted: 2020-09-25

## 2020-09-25 LAB
ANION GAP SERPL CALCULATED.3IONS-SCNC: 8.5 MMOL/L (ref 5–15)
BASOPHILS # BLD AUTO: 0.04 10*3/MM3 (ref 0–0.2)
BASOPHILS NFR BLD AUTO: 0.6 % (ref 0–1.5)
BH CV STRESS COMMENTS STAGE 1: NORMAL
BH CV STRESS DOSE REGADENOSON STAGE 1: 0.4
BH CV STRESS DURATION MIN STAGE 1: 0
BH CV STRESS DURATION SEC STAGE 1: 10
BH CV STRESS PROTOCOL 1: NORMAL
BH CV STRESS RECOVERY BP: NORMAL MMHG
BH CV STRESS RECOVERY HR: 77 BPM
BH CV STRESS STAGE 1: 1
BUN SERPL-MCNC: 13 MG/DL (ref 8–23)
BUN/CREAT SERPL: 26 (ref 7–25)
CALCIUM SPEC-SCNC: 9.1 MG/DL (ref 8.6–10.5)
CHLORIDE SERPL-SCNC: 103 MMOL/L (ref 98–107)
CO2 SERPL-SCNC: 26.5 MMOL/L (ref 22–29)
CREAT SERPL-MCNC: 0.5 MG/DL (ref 0.57–1)
DEPRECATED RDW RBC AUTO: 49 FL (ref 37–54)
EOSINOPHIL # BLD AUTO: 0.39 10*3/MM3 (ref 0–0.4)
EOSINOPHIL NFR BLD AUTO: 5.7 % (ref 0.3–6.2)
ERYTHROCYTE [DISTWIDTH] IN BLOOD BY AUTOMATED COUNT: 14.6 % (ref 12.3–15.4)
GFR SERPL CREATININE-BSD FRML MDRD: 125 ML/MIN/1.73
GLUCOSE SERPL-MCNC: 81 MG/DL (ref 65–99)
HCT VFR BLD AUTO: 41.9 % (ref 34–46.6)
HGB BLD-MCNC: 13.1 G/DL (ref 12–15.9)
IMM GRANULOCYTES # BLD AUTO: 0.03 10*3/MM3 (ref 0–0.05)
IMM GRANULOCYTES NFR BLD AUTO: 0.4 % (ref 0–0.5)
LV EF NUC BP: 80 %
LYMPHOCYTES # BLD AUTO: 2.14 10*3/MM3 (ref 0.7–3.1)
LYMPHOCYTES NFR BLD AUTO: 31.1 % (ref 19.6–45.3)
MAXIMAL PREDICTED HEART RATE: 157 BPM
MCH RBC QN AUTO: 28.4 PG (ref 26.6–33)
MCHC RBC AUTO-ENTMCNC: 31.3 G/DL (ref 31.5–35.7)
MCV RBC AUTO: 90.9 FL (ref 79–97)
MONOCYTES # BLD AUTO: 0.74 10*3/MM3 (ref 0.1–0.9)
MONOCYTES NFR BLD AUTO: 10.8 % (ref 5–12)
NEUTROPHILS NFR BLD AUTO: 3.53 10*3/MM3 (ref 1.7–7)
NEUTROPHILS NFR BLD AUTO: 51.4 % (ref 42.7–76)
NRBC BLD AUTO-RTO: 0 /100 WBC (ref 0–0.2)
PERCENT MAX PREDICTED HR: 53.5 %
PLATELET # BLD AUTO: 189 10*3/MM3 (ref 140–450)
PMV BLD AUTO: 11.5 FL (ref 6–12)
POTASSIUM SERPL-SCNC: 4.5 MMOL/L (ref 3.5–5.2)
RBC # BLD AUTO: 4.61 10*6/MM3 (ref 3.77–5.28)
SODIUM SERPL-SCNC: 138 MMOL/L (ref 136–145)
STRESS BASELINE BP: NORMAL MMHG
STRESS BASELINE HR: 67 BPM
STRESS PERCENT HR: 63 %
STRESS POST PEAK BP: NORMAL MMHG
STRESS POST PEAK HR: 84 BPM
STRESS TARGET HR: 133 BPM
WBC # BLD AUTO: 6.87 10*3/MM3 (ref 3.4–10.8)

## 2020-09-25 PROCEDURE — 85025 COMPLETE CBC W/AUTO DIFF WBC: CPT | Performed by: INTERNAL MEDICINE

## 2020-09-25 PROCEDURE — 25010000002 REGADENOSON 0.4 MG/5ML SOLUTION: Performed by: INTERNAL MEDICINE

## 2020-09-25 PROCEDURE — A9500 TC99M SESTAMIBI: HCPCS | Performed by: INTERNAL MEDICINE

## 2020-09-25 PROCEDURE — 25010000002 ENOXAPARIN PER 10 MG: Performed by: INTERNAL MEDICINE

## 2020-09-25 PROCEDURE — 94799 UNLISTED PULMONARY SVC/PX: CPT

## 2020-09-25 PROCEDURE — 99232 SBSQ HOSP IP/OBS MODERATE 35: CPT | Performed by: INTERNAL MEDICINE

## 2020-09-25 PROCEDURE — 0 TECHNETIUM SESTAMIBI: Performed by: INTERNAL MEDICINE

## 2020-09-25 PROCEDURE — 80048 BASIC METABOLIC PNL TOTAL CA: CPT | Performed by: INTERNAL MEDICINE

## 2020-09-25 RX ORDER — BISOPROLOL FUMARATE 5 MG/1
7.5 TABLET, FILM COATED ORAL
Qty: 45 TABLET | Refills: 0 | Status: CANCELLED | OUTPATIENT
Start: 2020-09-25

## 2020-09-25 RX ADMIN — LORAZEPAM 0.5 MG: 0.5 TABLET ORAL at 13:50

## 2020-09-25 RX ADMIN — FAMOTIDINE 40 MG: 20 TABLET, FILM COATED ORAL at 09:40

## 2020-09-25 RX ADMIN — ENOXAPARIN SODIUM 40 MG: 40 INJECTION SUBCUTANEOUS at 21:09

## 2020-09-25 RX ADMIN — LORAZEPAM 0.5 MG: 0.5 TABLET ORAL at 06:47

## 2020-09-25 RX ADMIN — TECHNETIUM TC 99M SESTAMIBI 1 DOSE: 1 INJECTION INTRAVENOUS at 12:15

## 2020-09-25 RX ADMIN — SODIUM CHLORIDE, PRESERVATIVE FREE 10 ML: 5 INJECTION INTRAVENOUS at 13:51

## 2020-09-25 RX ADMIN — ENOXAPARIN SODIUM 40 MG: 40 INJECTION SUBCUTANEOUS at 09:40

## 2020-09-25 RX ADMIN — TECHNETIUM TC 99M SESTAMIBI 1 DOSE: 1 INJECTION INTRAVENOUS at 11:15

## 2020-09-25 RX ADMIN — QUETIAPINE FUMARATE 200 MG: 50 TABLET, EXTENDED RELEASE ORAL at 21:09

## 2020-09-25 RX ADMIN — SODIUM CHLORIDE, PRESERVATIVE FREE 10 ML: 5 INJECTION INTRAVENOUS at 22:53

## 2020-09-25 RX ADMIN — LORAZEPAM 0.5 MG: 0.5 TABLET ORAL at 21:09

## 2020-09-25 RX ADMIN — BISOPROLOL FUMARATE 7.5 MG: 5 TABLET, FILM COATED ORAL at 13:50

## 2020-09-25 RX ADMIN — REGADENOSON 0.4 MG: 0.08 INJECTION, SOLUTION INTRAVENOUS at 11:15

## 2020-09-26 LAB
ANION GAP SERPL CALCULATED.3IONS-SCNC: 7.1 MMOL/L (ref 5–15)
BASOPHILS # BLD AUTO: 0.04 10*3/MM3 (ref 0–0.2)
BASOPHILS NFR BLD AUTO: 0.6 % (ref 0–1.5)
BUN SERPL-MCNC: 13 MG/DL (ref 8–23)
BUN/CREAT SERPL: 24.5 (ref 7–25)
CALCIUM SPEC-SCNC: 8.8 MG/DL (ref 8.6–10.5)
CHLORIDE SERPL-SCNC: 105 MMOL/L (ref 98–107)
CO2 SERPL-SCNC: 27.9 MMOL/L (ref 22–29)
CREAT SERPL-MCNC: 0.53 MG/DL (ref 0.57–1)
DEPRECATED RDW RBC AUTO: 46.3 FL (ref 37–54)
EOSINOPHIL # BLD AUTO: 0.44 10*3/MM3 (ref 0–0.4)
EOSINOPHIL NFR BLD AUTO: 7.1 % (ref 0.3–6.2)
ERYTHROCYTE [DISTWIDTH] IN BLOOD BY AUTOMATED COUNT: 14.4 % (ref 12.3–15.4)
GFR SERPL CREATININE-BSD FRML MDRD: 117 ML/MIN/1.73
GLUCOSE SERPL-MCNC: 90 MG/DL (ref 65–99)
HCT VFR BLD AUTO: 40.2 % (ref 34–46.6)
HGB BLD-MCNC: 12.9 G/DL (ref 12–15.9)
IMM GRANULOCYTES # BLD AUTO: 0.01 10*3/MM3 (ref 0–0.05)
IMM GRANULOCYTES NFR BLD AUTO: 0.2 % (ref 0–0.5)
LYMPHOCYTES # BLD AUTO: 1.85 10*3/MM3 (ref 0.7–3.1)
LYMPHOCYTES NFR BLD AUTO: 29.9 % (ref 19.6–45.3)
MCH RBC QN AUTO: 28.5 PG (ref 26.6–33)
MCHC RBC AUTO-ENTMCNC: 32.1 G/DL (ref 31.5–35.7)
MCV RBC AUTO: 88.9 FL (ref 79–97)
MONOCYTES # BLD AUTO: 0.51 10*3/MM3 (ref 0.1–0.9)
MONOCYTES NFR BLD AUTO: 8.2 % (ref 5–12)
NEUTROPHILS NFR BLD AUTO: 3.34 10*3/MM3 (ref 1.7–7)
NEUTROPHILS NFR BLD AUTO: 54 % (ref 42.7–76)
NRBC BLD AUTO-RTO: 0 /100 WBC (ref 0–0.2)
PLATELET # BLD AUTO: 225 10*3/MM3 (ref 140–450)
PMV BLD AUTO: 10.6 FL (ref 6–12)
POTASSIUM SERPL-SCNC: 4.1 MMOL/L (ref 3.5–5.2)
RBC # BLD AUTO: 4.52 10*6/MM3 (ref 3.77–5.28)
SODIUM SERPL-SCNC: 140 MMOL/L (ref 136–145)
WBC # BLD AUTO: 6.19 10*3/MM3 (ref 3.4–10.8)

## 2020-09-26 PROCEDURE — 85025 COMPLETE CBC W/AUTO DIFF WBC: CPT | Performed by: INTERNAL MEDICINE

## 2020-09-26 PROCEDURE — 80048 BASIC METABOLIC PNL TOTAL CA: CPT | Performed by: INTERNAL MEDICINE

## 2020-09-26 PROCEDURE — 99232 SBSQ HOSP IP/OBS MODERATE 35: CPT | Performed by: NURSE PRACTITIONER

## 2020-09-26 PROCEDURE — 25010000002 ENOXAPARIN PER 10 MG: Performed by: INTERNAL MEDICINE

## 2020-09-26 RX ORDER — QUETIAPINE FUMARATE 200 MG/1
200 TABLET, FILM COATED ORAL NIGHTLY
Status: DISCONTINUED | OUTPATIENT
Start: 2020-09-26 | End: 2020-09-28

## 2020-09-26 RX ORDER — LORAZEPAM 0.5 MG/1
0.5 TABLET ORAL EVERY 8 HOURS PRN
Status: DISCONTINUED | OUTPATIENT
Start: 2020-09-26 | End: 2020-09-29 | Stop reason: HOSPADM

## 2020-09-26 RX ADMIN — FAMOTIDINE 40 MG: 20 TABLET, FILM COATED ORAL at 08:32

## 2020-09-26 RX ADMIN — SODIUM CHLORIDE, PRESERVATIVE FREE 10 ML: 5 INJECTION INTRAVENOUS at 20:06

## 2020-09-26 RX ADMIN — LORAZEPAM 0.5 MG: 0.5 TABLET ORAL at 06:24

## 2020-09-26 RX ADMIN — QUETIAPINE FUMARATE 200 MG: 200 TABLET ORAL at 19:57

## 2020-09-26 RX ADMIN — BISOPROLOL FUMARATE 7.5 MG: 5 TABLET, FILM COATED ORAL at 08:32

## 2020-09-26 RX ADMIN — SODIUM CHLORIDE, PRESERVATIVE FREE 10 ML: 5 INJECTION INTRAVENOUS at 08:32

## 2020-09-26 RX ADMIN — ENOXAPARIN SODIUM 40 MG: 40 INJECTION SUBCUTANEOUS at 08:32

## 2020-09-26 RX ADMIN — ENOXAPARIN SODIUM 40 MG: 40 INJECTION SUBCUTANEOUS at 19:58

## 2020-09-26 RX ADMIN — ERGOCALCIFEROL 50000 UNITS: 1.25 CAPSULE ORAL at 08:32

## 2020-09-27 LAB
ANION GAP SERPL CALCULATED.3IONS-SCNC: 10.2 MMOL/L (ref 5–15)
BASOPHILS # BLD AUTO: 0.04 10*3/MM3 (ref 0–0.2)
BASOPHILS NFR BLD AUTO: 0.6 % (ref 0–1.5)
BUN SERPL-MCNC: 11 MG/DL (ref 8–23)
BUN/CREAT SERPL: 19.3 (ref 7–25)
CALCIUM SPEC-SCNC: 9 MG/DL (ref 8.6–10.5)
CHLORIDE SERPL-SCNC: 103 MMOL/L (ref 98–107)
CO2 SERPL-SCNC: 27.8 MMOL/L (ref 22–29)
CREAT SERPL-MCNC: 0.57 MG/DL (ref 0.57–1)
DEPRECATED RDW RBC AUTO: 46 FL (ref 37–54)
EOSINOPHIL # BLD AUTO: 0.43 10*3/MM3 (ref 0–0.4)
EOSINOPHIL NFR BLD AUTO: 6.4 % (ref 0.3–6.2)
ERYTHROCYTE [DISTWIDTH] IN BLOOD BY AUTOMATED COUNT: 14.4 % (ref 12.3–15.4)
GFR SERPL CREATININE-BSD FRML MDRD: 107 ML/MIN/1.73
GLUCOSE SERPL-MCNC: 83 MG/DL (ref 65–99)
HCT VFR BLD AUTO: 41.6 % (ref 34–46.6)
HGB BLD-MCNC: 13 G/DL (ref 12–15.9)
IMM GRANULOCYTES # BLD AUTO: 0.03 10*3/MM3 (ref 0–0.05)
IMM GRANULOCYTES NFR BLD AUTO: 0.4 % (ref 0–0.5)
LYMPHOCYTES # BLD AUTO: 2.04 10*3/MM3 (ref 0.7–3.1)
LYMPHOCYTES NFR BLD AUTO: 30.4 % (ref 19.6–45.3)
MCH RBC QN AUTO: 27.8 PG (ref 26.6–33)
MCHC RBC AUTO-ENTMCNC: 31.3 G/DL (ref 31.5–35.7)
MCV RBC AUTO: 88.9 FL (ref 79–97)
MONOCYTES # BLD AUTO: 0.66 10*3/MM3 (ref 0.1–0.9)
MONOCYTES NFR BLD AUTO: 9.9 % (ref 5–12)
NEUTROPHILS NFR BLD AUTO: 3.5 10*3/MM3 (ref 1.7–7)
NEUTROPHILS NFR BLD AUTO: 52.3 % (ref 42.7–76)
NRBC BLD AUTO-RTO: 0 /100 WBC (ref 0–0.2)
PLATELET # BLD AUTO: 230 10*3/MM3 (ref 140–450)
PMV BLD AUTO: 11.2 FL (ref 6–12)
POTASSIUM SERPL-SCNC: 4 MMOL/L (ref 3.5–5.2)
RBC # BLD AUTO: 4.68 10*6/MM3 (ref 3.77–5.28)
SODIUM SERPL-SCNC: 141 MMOL/L (ref 136–145)
WBC # BLD AUTO: 6.7 10*3/MM3 (ref 3.4–10.8)

## 2020-09-27 PROCEDURE — 94799 UNLISTED PULMONARY SVC/PX: CPT

## 2020-09-27 PROCEDURE — 80048 BASIC METABOLIC PNL TOTAL CA: CPT | Performed by: INTERNAL MEDICINE

## 2020-09-27 PROCEDURE — 99232 SBSQ HOSP IP/OBS MODERATE 35: CPT | Performed by: NURSE PRACTITIONER

## 2020-09-27 PROCEDURE — 85025 COMPLETE CBC W/AUTO DIFF WBC: CPT | Performed by: INTERNAL MEDICINE

## 2020-09-27 PROCEDURE — 86256 FLUORESCENT ANTIBODY TITER: CPT | Performed by: INTERNAL MEDICINE

## 2020-09-27 PROCEDURE — 25010000002 ENOXAPARIN PER 10 MG: Performed by: NURSE PRACTITIONER

## 2020-09-27 PROCEDURE — 83520 IMMUNOASSAY QUANT NOS NONAB: CPT | Performed by: INTERNAL MEDICINE

## 2020-09-27 PROCEDURE — 25010000002 ENOXAPARIN PER 10 MG: Performed by: INTERNAL MEDICINE

## 2020-09-27 RX ADMIN — QUETIAPINE FUMARATE 200 MG: 200 TABLET ORAL at 21:51

## 2020-09-27 RX ADMIN — SODIUM CHLORIDE, PRESERVATIVE FREE 10 ML: 5 INJECTION INTRAVENOUS at 08:37

## 2020-09-27 RX ADMIN — FAMOTIDINE 40 MG: 20 TABLET, FILM COATED ORAL at 08:36

## 2020-09-27 RX ADMIN — BISOPROLOL FUMARATE 7.5 MG: 5 TABLET, FILM COATED ORAL at 08:36

## 2020-09-27 RX ADMIN — SODIUM CHLORIDE, PRESERVATIVE FREE 10 ML: 5 INJECTION INTRAVENOUS at 21:51

## 2020-09-27 RX ADMIN — ENOXAPARIN SODIUM 40 MG: 40 INJECTION SUBCUTANEOUS at 08:37

## 2020-09-27 RX ADMIN — ENOXAPARIN SODIUM 40 MG: 40 INJECTION SUBCUTANEOUS at 21:51

## 2020-09-28 ENCOUNTER — APPOINTMENT (OUTPATIENT)
Dept: GENERAL RADIOLOGY | Facility: HOSPITAL | Age: 65
End: 2020-09-28

## 2020-09-28 LAB
ANION GAP SERPL CALCULATED.3IONS-SCNC: 10 MMOL/L (ref 5–15)
BASOPHILS # BLD AUTO: 0.03 10*3/MM3 (ref 0–0.2)
BASOPHILS NFR BLD AUTO: 0.4 % (ref 0–1.5)
BUN SERPL-MCNC: 10 MG/DL (ref 8–23)
BUN/CREAT SERPL: 19.2 (ref 7–25)
CALCIUM SPEC-SCNC: 8.9 MG/DL (ref 8.6–10.5)
CHLORIDE SERPL-SCNC: 104 MMOL/L (ref 98–107)
CO2 SERPL-SCNC: 29 MMOL/L (ref 22–29)
CREAT SERPL-MCNC: 0.52 MG/DL (ref 0.57–1)
DEPRECATED RDW RBC AUTO: 45.5 FL (ref 37–54)
EOSINOPHIL # BLD AUTO: 0.39 10*3/MM3 (ref 0–0.4)
EOSINOPHIL NFR BLD AUTO: 5.8 % (ref 0.3–6.2)
ERYTHROCYTE [DISTWIDTH] IN BLOOD BY AUTOMATED COUNT: 14.3 % (ref 12.3–15.4)
GFR SERPL CREATININE-BSD FRML MDRD: 119 ML/MIN/1.73
GLUCOSE SERPL-MCNC: 91 MG/DL (ref 65–99)
HCT VFR BLD AUTO: 41.6 % (ref 34–46.6)
HGB BLD-MCNC: 13.2 G/DL (ref 12–15.9)
IMM GRANULOCYTES # BLD AUTO: 0.04 10*3/MM3 (ref 0–0.05)
IMM GRANULOCYTES NFR BLD AUTO: 0.6 % (ref 0–0.5)
INR PPP: 1.04 (ref 0.9–1.1)
LYMPHOCYTES # BLD AUTO: 1.74 10*3/MM3 (ref 0.7–3.1)
LYMPHOCYTES NFR BLD AUTO: 25.7 % (ref 19.6–45.3)
MCH RBC QN AUTO: 27.8 PG (ref 26.6–33)
MCHC RBC AUTO-ENTMCNC: 31.7 G/DL (ref 31.5–35.7)
MCV RBC AUTO: 87.6 FL (ref 79–97)
MONOCYTES # BLD AUTO: 0.63 10*3/MM3 (ref 0.1–0.9)
MONOCYTES NFR BLD AUTO: 9.3 % (ref 5–12)
NEUTROPHILS NFR BLD AUTO: 3.93 10*3/MM3 (ref 1.7–7)
NEUTROPHILS NFR BLD AUTO: 58.2 % (ref 42.7–76)
NRBC BLD AUTO-RTO: 0 /100 WBC (ref 0–0.2)
PLATELET # BLD AUTO: 225 10*3/MM3 (ref 140–450)
PMV BLD AUTO: 11 FL (ref 6–12)
POTASSIUM SERPL-SCNC: 4.1 MMOL/L (ref 3.5–5.2)
PROTHROMBIN TIME: 13.5 SECONDS (ref 11.7–14.2)
RBC # BLD AUTO: 4.75 10*6/MM3 (ref 3.77–5.28)
SODIUM SERPL-SCNC: 143 MMOL/L (ref 136–145)
WBC # BLD AUTO: 6.76 10*3/MM3 (ref 3.4–10.8)

## 2020-09-28 PROCEDURE — 25010000002 HEPARIN (PORCINE) PER 1000 UNITS: Performed by: INTERNAL MEDICINE

## 2020-09-28 PROCEDURE — 4A023N7 MEASUREMENT OF CARDIAC SAMPLING AND PRESSURE, LEFT HEART, PERCUTANEOUS APPROACH: ICD-10-PCS | Performed by: INTERNAL MEDICINE

## 2020-09-28 PROCEDURE — 25010000002 FENTANYL CITRATE (PF) 100 MCG/2ML SOLUTION: Performed by: INTERNAL MEDICINE

## 2020-09-28 PROCEDURE — 94799 UNLISTED PULMONARY SVC/PX: CPT

## 2020-09-28 PROCEDURE — B2151ZZ FLUOROSCOPY OF LEFT HEART USING LOW OSMOLAR CONTRAST: ICD-10-PCS | Performed by: INTERNAL MEDICINE

## 2020-09-28 PROCEDURE — 25010000002 MIDAZOLAM PER 1 MG: Performed by: INTERNAL MEDICINE

## 2020-09-28 PROCEDURE — 85610 PROTHROMBIN TIME: CPT | Performed by: NURSE PRACTITIONER

## 2020-09-28 PROCEDURE — 71045 X-RAY EXAM CHEST 1 VIEW: CPT

## 2020-09-28 PROCEDURE — 93458 L HRT ARTERY/VENTRICLE ANGIO: CPT | Performed by: INTERNAL MEDICINE

## 2020-09-28 PROCEDURE — 99152 MOD SED SAME PHYS/QHP 5/>YRS: CPT | Performed by: INTERNAL MEDICINE

## 2020-09-28 PROCEDURE — 0 IOPAMIDOL PER 1 ML: Performed by: INTERNAL MEDICINE

## 2020-09-28 PROCEDURE — C1894 INTRO/SHEATH, NON-LASER: HCPCS | Performed by: INTERNAL MEDICINE

## 2020-09-28 PROCEDURE — C1769 GUIDE WIRE: HCPCS | Performed by: INTERNAL MEDICINE

## 2020-09-28 PROCEDURE — B2111ZZ FLUOROSCOPY OF MULTIPLE CORONARY ARTERIES USING LOW OSMOLAR CONTRAST: ICD-10-PCS | Performed by: INTERNAL MEDICINE

## 2020-09-28 PROCEDURE — 80048 BASIC METABOLIC PNL TOTAL CA: CPT | Performed by: INTERNAL MEDICINE

## 2020-09-28 PROCEDURE — 85025 COMPLETE CBC W/AUTO DIFF WBC: CPT | Performed by: INTERNAL MEDICINE

## 2020-09-28 PROCEDURE — 94660 CPAP INITIATION&MGMT: CPT

## 2020-09-28 RX ORDER — SODIUM CHLORIDE 9 MG/ML
INJECTION, SOLUTION INTRAVENOUS CONTINUOUS PRN
Status: COMPLETED | OUTPATIENT
Start: 2020-09-28 | End: 2020-09-28

## 2020-09-28 RX ORDER — QUETIAPINE FUMARATE 100 MG/1
100 TABLET, FILM COATED ORAL EVERY 12 HOURS SCHEDULED
Status: DISCONTINUED | OUTPATIENT
Start: 2020-09-28 | End: 2020-09-29 | Stop reason: HOSPADM

## 2020-09-28 RX ORDER — FENTANYL CITRATE 50 UG/ML
INJECTION, SOLUTION INTRAMUSCULAR; INTRAVENOUS AS NEEDED
Status: DISCONTINUED | OUTPATIENT
Start: 2020-09-28 | End: 2020-09-28 | Stop reason: HOSPADM

## 2020-09-28 RX ORDER — LIDOCAINE HYDROCHLORIDE 20 MG/ML
INJECTION, SOLUTION INFILTRATION; PERINEURAL AS NEEDED
Status: DISCONTINUED | OUTPATIENT
Start: 2020-09-28 | End: 2020-09-28 | Stop reason: HOSPADM

## 2020-09-28 RX ORDER — MIDAZOLAM HYDROCHLORIDE 1 MG/ML
INJECTION INTRAMUSCULAR; INTRAVENOUS AS NEEDED
Status: DISCONTINUED | OUTPATIENT
Start: 2020-09-28 | End: 2020-09-28 | Stop reason: HOSPADM

## 2020-09-28 RX ORDER — ACETAMINOPHEN 325 MG/1
650 TABLET ORAL EVERY 4 HOURS PRN
Status: DISCONTINUED | OUTPATIENT
Start: 2020-09-28 | End: 2020-09-29 | Stop reason: HOSPADM

## 2020-09-28 RX ADMIN — SODIUM CHLORIDE, PRESERVATIVE FREE 10 ML: 5 INJECTION INTRAVENOUS at 08:47

## 2020-09-28 RX ADMIN — MICONAZOLE NITRATE: 2 CREAM TOPICAL at 21:42

## 2020-09-28 RX ADMIN — QUETIAPINE FUMARATE 100 MG: 100 TABLET ORAL at 21:42

## 2020-09-28 RX ADMIN — BISOPROLOL FUMARATE 7.5 MG: 5 TABLET, FILM COATED ORAL at 08:39

## 2020-09-28 RX ADMIN — SODIUM CHLORIDE, PRESERVATIVE FREE 10 ML: 5 INJECTION INTRAVENOUS at 21:42

## 2020-09-29 VITALS
RESPIRATION RATE: 18 BRPM | OXYGEN SATURATION: 92 % | HEIGHT: 67 IN | HEART RATE: 61 BPM | WEIGHT: 263.7 LBS | DIASTOLIC BLOOD PRESSURE: 88 MMHG | TEMPERATURE: 97.5 F | SYSTOLIC BLOOD PRESSURE: 143 MMHG | BODY MASS INDEX: 41.39 KG/M2

## 2020-09-29 LAB
BASOPHILS # BLD AUTO: 0.03 10*3/MM3 (ref 0–0.2)
BASOPHILS NFR BLD AUTO: 0.5 % (ref 0–1.5)
DEPRECATED RDW RBC AUTO: 46.4 FL (ref 37–54)
EOSINOPHIL # BLD AUTO: 0.36 10*3/MM3 (ref 0–0.4)
EOSINOPHIL NFR BLD AUTO: 5.8 % (ref 0.3–6.2)
ERYTHROCYTE [DISTWIDTH] IN BLOOD BY AUTOMATED COUNT: 14.7 % (ref 12.3–15.4)
HCT VFR BLD AUTO: 44.4 % (ref 34–46.6)
HGB BLD-MCNC: 14.4 G/DL (ref 12–15.9)
IMM GRANULOCYTES # BLD AUTO: 0.04 10*3/MM3 (ref 0–0.05)
IMM GRANULOCYTES NFR BLD AUTO: 0.6 % (ref 0–0.5)
LYMPHOCYTES # BLD AUTO: 1.57 10*3/MM3 (ref 0.7–3.1)
LYMPHOCYTES NFR BLD AUTO: 25.2 % (ref 19.6–45.3)
MCH RBC QN AUTO: 28.4 PG (ref 26.6–33)
MCHC RBC AUTO-ENTMCNC: 32.4 G/DL (ref 31.5–35.7)
MCV RBC AUTO: 87.6 FL (ref 79–97)
MONOCYTES # BLD AUTO: 0.5 10*3/MM3 (ref 0.1–0.9)
MONOCYTES NFR BLD AUTO: 8 % (ref 5–12)
NEUTROPHILS NFR BLD AUTO: 3.72 10*3/MM3 (ref 1.7–7)
NEUTROPHILS NFR BLD AUTO: 59.9 % (ref 42.7–76)
NRBC BLD AUTO-RTO: 0 /100 WBC (ref 0–0.2)
PLATELET # BLD AUTO: 255 10*3/MM3 (ref 140–450)
PMV BLD AUTO: 11.5 FL (ref 6–12)
RBC # BLD AUTO: 5.07 10*6/MM3 (ref 3.77–5.28)
WBC # BLD AUTO: 6.22 10*3/MM3 (ref 3.4–10.8)

## 2020-09-29 PROCEDURE — 85025 COMPLETE CBC W/AUTO DIFF WBC: CPT | Performed by: INTERNAL MEDICINE

## 2020-09-29 PROCEDURE — 99232 SBSQ HOSP IP/OBS MODERATE 35: CPT | Performed by: INTERNAL MEDICINE

## 2020-09-29 RX ORDER — BISOPROLOL FUMARATE 5 MG/1
7.5 TABLET, FILM COATED ORAL
Qty: 45 TABLET | Refills: 2 | Status: SHIPPED | OUTPATIENT
Start: 2020-09-29

## 2020-09-29 RX ORDER — ERGOCALCIFEROL 1.25 MG/1
50000 CAPSULE ORAL
Qty: 5 CAPSULE | Refills: 2 | Status: SHIPPED | OUTPATIENT
Start: 2020-10-03 | End: 2021-07-28

## 2020-09-29 RX ORDER — QUETIAPINE FUMARATE 100 MG/1
100 TABLET, FILM COATED ORAL EVERY 12 HOURS SCHEDULED
Qty: 60 TABLET | Refills: 0 | Status: SHIPPED | OUTPATIENT
Start: 2020-09-29 | End: 2021-07-28

## 2020-09-29 RX ADMIN — SODIUM CHLORIDE, PRESERVATIVE FREE 10 ML: 5 INJECTION INTRAVENOUS at 09:44

## 2020-09-29 RX ADMIN — BISOPROLOL FUMARATE 7.5 MG: 5 TABLET, FILM COATED ORAL at 09:44

## 2020-09-29 RX ADMIN — MICONAZOLE NITRATE: 2 CREAM TOPICAL at 10:46

## 2020-09-29 RX ADMIN — FAMOTIDINE 40 MG: 20 TABLET, FILM COATED ORAL at 09:44

## 2020-09-29 RX ADMIN — QUETIAPINE FUMARATE 100 MG: 100 TABLET ORAL at 09:44

## 2020-09-30 ENCOUNTER — READMISSION MANAGEMENT (OUTPATIENT)
Dept: CALL CENTER | Facility: HOSPITAL | Age: 65
End: 2020-09-30

## 2020-09-30 LAB
C-ANCA TITR SER IF: ABNORMAL TITER
MYELOPEROXIDASE AB SER IA-ACNC: 15.4 U/ML (ref 0–9)
P-ANCA ATYPICAL TITR SER IF: ABNORMAL TITER
P-ANCA TITR SER IF: ABNORMAL TITER
PROTEINASE3 AB SER IA-ACNC: 4.3 U/ML (ref 0–3.5)

## 2020-09-30 NOTE — OUTREACH NOTE
Prep Survey      Responses   [facility] facility patient discharged from?  [place]   Is LACE score < 7 ?  No   Eligibility  Readm Mgmt   Discharge diagnosis   Acute on chronic respiratory failure with hypoxia and hypercapnia    Does the patient have one of the following disease processes/diagnoses(primary or secondary)?  Other   Does the patient have Home health ordered?  No   Is there a DME ordered?  Yes   What DME was ordered?  [facility] and 02 per [facility]    General alerts for this patient  Heart Cath    Prep survey completed?  Yes          Kasey Lucero RN

## 2020-10-02 ENCOUNTER — READMISSION MANAGEMENT (OUTPATIENT)
Dept: CALL CENTER | Facility: HOSPITAL | Age: 65
End: 2020-10-02

## 2020-10-02 NOTE — OUTREACH NOTE
Medical Week 1 Survey      Responses   [facility] patient discharged from?  [place]   Does the patient have one of the following disease processes/diagnoses(primary or secondary)?  Other   Week 1 attempt successful?  Yes   Call start time  1449   Call end time  1459   Is patient permission given to speak with other caregiver?  Yes   List who call center can speak with  spouse   Meds reviewed with patient/caregiver?  Yes   Is the patient having any side effects they believe may be caused by any medication additions or changes?  No   Does the patient have all medications ordered at discharge?  Yes   Is the patient taking all medications as directed (includes completed medication regime)?  Yes   Comments regarding appointments  Pt to see pcp on Monday.   Does the patient have a primary care provider?   Yes   Does the patient have an appointment with their PCP within 7 days of discharge?  Yes   Has the patient kept scheduled appointments due by today?  N/A   What is the Home health agency?   PCP has ordered HH they will be in touch today.   What DME was ordered?  [facility] and 02 per [facility]    Has all DME been delivered?  Yes   Psychosocial issues?  No   Did the patient receive a copy of their discharge instructions?  Yes   Nursing interventions  Reviewed instructions with patient   What is the patient's perception of their health status since discharge?  Same   Is the patient/caregiver able to teach back signs and symptoms related to disease process for when to call PCP?  Yes   Is the patient/caregiver able to teach back signs and symptoms related to disease process for when to call 911?  Yes   Is the patient/caregiver able to teach back the hierarchy of who to call/visit for symptoms/problems? PCP, Specialist, Home health nurse, Urgent Care, ED, 911  Yes   Additional teach back comments  Pt is adjusting to being on 02 at all times. She is feeling very tired.   Week 1 call completed?  Yes          Paige ELMORE  HELENA Ortega

## 2020-10-08 ENCOUNTER — TRANSCRIBE ORDERS (OUTPATIENT)
Dept: ADMINISTRATIVE | Facility: HOSPITAL | Age: 65
End: 2020-10-08

## 2020-10-08 ENCOUNTER — READMISSION MANAGEMENT (OUTPATIENT)
Dept: CALL CENTER | Facility: HOSPITAL | Age: 65
End: 2020-10-08

## 2020-10-08 DIAGNOSIS — Z12.31 VISIT FOR SCREENING MAMMOGRAM: Primary | ICD-10-CM

## 2020-10-08 NOTE — OUTREACH NOTE
Medical Week 2 Survey      Responses   [facility] patient discharged from?  [place]   Does the patient have one of the following disease processes/diagnoses(primary or secondary)?  Other   Week 2 attempt successful?  No   Unsuccessful attempts  Attempt 1          Maynor Navarro RN

## 2020-10-08 NOTE — OUTREACH NOTE
Medical Week 2 Survey      Responses   [facility] patient discharged from?  [place]   Does the patient have one of the following disease processes/diagnoses(primary or secondary)?  Other   Week 2 attempt successful?  No   Unsuccessful attempts  Attempt 1          Maynor Navarro RN

## 2020-10-13 ENCOUNTER — READMISSION MANAGEMENT (OUTPATIENT)
Dept: CALL CENTER | Facility: HOSPITAL | Age: 65
End: 2020-10-13

## 2020-10-13 NOTE — OUTREACH NOTE
Medical Week 2 Survey      Responses   [facility] patient discharged from?  [place]   Does the patient have one of the following disease processes/diagnoses(primary or secondary)?  Other   Week 2 attempt successful?  No   Unsuccessful attempts  Attempt 2          Shante Terry RN

## 2020-10-27 ENCOUNTER — TRANSCRIBE ORDERS (OUTPATIENT)
Dept: ADMINISTRATIVE | Facility: HOSPITAL | Age: 65
End: 2020-10-27

## 2020-10-27 DIAGNOSIS — J84.10 PULMONARY FIBROSIS (HCC): Primary | ICD-10-CM

## 2020-11-11 ENCOUNTER — HOSPITAL ENCOUNTER (OUTPATIENT)
Dept: CT IMAGING | Facility: HOSPITAL | Age: 65
Discharge: HOME | End: 2020-11-11
Admitting: INTERNAL MEDICINE

## 2020-11-11 DIAGNOSIS — J84.10 PULMONARY FIBROSIS (HCC): ICD-10-CM

## 2020-11-11 PROCEDURE — 71250 CT THORAX DX C-: CPT

## 2020-11-16 ENCOUNTER — TRANSCRIBE ORDERS (OUTPATIENT)
Dept: ADMINISTRATIVE | Facility: HOSPITAL | Age: 65
End: 2020-11-16

## 2020-11-16 DIAGNOSIS — J43.9 PULMONARY EMPHYSEMA, UNSPECIFIED EMPHYSEMA TYPE (HCC): Primary | ICD-10-CM

## 2020-11-16 DIAGNOSIS — Z01.818 OTHER SPECIFIED PRE-OPERATIVE EXAMINATION: Primary | ICD-10-CM

## 2020-11-16 DIAGNOSIS — R94.2 NONSPECIFIC ABNORMAL RESULTS OF PULMONARY SYSTEM FUNCTION STUDY: ICD-10-CM

## 2020-11-20 ENCOUNTER — LAB (OUTPATIENT)
Dept: LAB | Facility: HOSPITAL | Age: 65
End: 2020-11-20

## 2020-11-20 DIAGNOSIS — Z01.818 OTHER SPECIFIED PRE-OPERATIVE EXAMINATION: ICD-10-CM

## 2020-11-20 PROCEDURE — U0004 COV-19 TEST NON-CDC HGH THRU: HCPCS

## 2020-11-20 PROCEDURE — C9803 HOPD COVID-19 SPEC COLLECT: HCPCS

## 2020-11-21 LAB — SARS-COV-2 RNA RESP QL NAA+PROBE: NOT DETECTED

## 2020-11-23 ENCOUNTER — HOSPITAL ENCOUNTER (OUTPATIENT)
Dept: RESPIRATORY THERAPY | Facility: HOSPITAL | Age: 65
Discharge: HOME | End: 2020-11-23
Admitting: INTERNAL MEDICINE

## 2020-11-23 DIAGNOSIS — J43.9 PULMONARY EMPHYSEMA, UNSPECIFIED EMPHYSEMA TYPE (HCC): ICD-10-CM

## 2020-11-23 DIAGNOSIS — R94.2 NONSPECIFIC ABNORMAL RESULTS OF PULMONARY SYSTEM FUNCTION STUDY: ICD-10-CM

## 2020-11-23 LAB
BDY SITE: NORMAL
HGB BLDA-MCNC: 13.7 G/DL (ref 12–18)

## 2020-11-23 PROCEDURE — 94060 EVALUATION OF WHEEZING: CPT

## 2020-11-23 PROCEDURE — 94640 AIRWAY INHALATION TREATMENT: CPT

## 2020-11-23 PROCEDURE — 82820 HEMOGLOBIN-OXYGEN AFFINITY: CPT | Performed by: INTERNAL MEDICINE

## 2020-11-23 PROCEDURE — 94726 PLETHYSMOGRAPHY LUNG VOLUMES: CPT

## 2020-11-23 PROCEDURE — 94729 DIFFUSING CAPACITY: CPT

## 2020-11-23 RX ORDER — ALBUTEROL SULFATE 2.5 MG/3ML
2.5 SOLUTION RESPIRATORY (INHALATION) ONCE
Status: COMPLETED | OUTPATIENT
Start: 2020-11-23 | End: 2020-11-23

## 2020-11-23 RX ADMIN — ALBUTEROL SULFATE 2.5 MG: 2.5 SOLUTION RESPIRATORY (INHALATION) at 16:15

## 2021-01-06 ENCOUNTER — APPOINTMENT (OUTPATIENT)
Dept: MAMMOGRAPHY | Facility: HOSPITAL | Age: 66
End: 2021-01-06

## 2021-02-26 ENCOUNTER — TRANSCRIBE ORDERS (OUTPATIENT)
Dept: ADMINISTRATIVE | Facility: HOSPITAL | Age: 66
End: 2021-02-26

## 2021-02-26 DIAGNOSIS — R59.1 LYMPHADENOPATHY: Primary | ICD-10-CM

## 2021-02-26 DIAGNOSIS — R91.8 PULMONARY NODULES: ICD-10-CM

## 2021-03-03 ENCOUNTER — IMMUNIZATION (OUTPATIENT)
Dept: VACCINE CLINIC | Facility: HOSPITAL | Age: 66
End: 2021-03-03

## 2021-03-03 PROCEDURE — 0001A: CPT | Performed by: INTERNAL MEDICINE

## 2021-03-03 PROCEDURE — 91300 HC SARSCOV02 VAC 30MCG/0.3ML IM: CPT | Performed by: INTERNAL MEDICINE

## 2021-03-11 ENCOUNTER — TRANSCRIBE ORDERS (OUTPATIENT)
Dept: ADMINISTRATIVE | Facility: HOSPITAL | Age: 66
End: 2021-03-11

## 2021-03-11 DIAGNOSIS — J34.2 DEVIATED NASAL SEPTUM: Primary | ICD-10-CM

## 2021-03-11 DIAGNOSIS — Z87.828 HX OF INJURY: ICD-10-CM

## 2021-03-11 DIAGNOSIS — G44.309 POST-TRAUMATIC HEADACHE, NOT INTRACTABLE, UNSPECIFIED CHRONICITY PATTERN: ICD-10-CM

## 2021-03-24 ENCOUNTER — IMMUNIZATION (OUTPATIENT)
Dept: VACCINE CLINIC | Facility: HOSPITAL | Age: 66
End: 2021-03-24

## 2021-03-24 PROCEDURE — 91300 HC SARSCOV02 VAC 30MCG/0.3ML IM: CPT | Performed by: INTERNAL MEDICINE

## 2021-03-24 PROCEDURE — 0002A: CPT | Performed by: INTERNAL MEDICINE

## 2021-03-29 ENCOUNTER — APPOINTMENT (OUTPATIENT)
Dept: CT IMAGING | Facility: HOSPITAL | Age: 66
End: 2021-03-29

## 2021-04-07 ENCOUNTER — TELEHEALTH PROVIDED OTHER THAN IN PATIENT'S HOME (OUTPATIENT)
Dept: URBAN - METROPOLITAN AREA TELEHEALTH 5 | Facility: TELEHEALTH | Age: 66
End: 2021-04-07

## 2021-04-07 VITALS — HEIGHT: 67 IN

## 2021-04-07 DIAGNOSIS — K91.5 POSTCHOLECYSTECTOMY SYNDROME: ICD-10-CM

## 2021-04-07 PROCEDURE — 99214 OFFICE O/P EST MOD 30 MIN: CPT | Mod: 95 | Performed by: NURSE PRACTITIONER

## 2021-04-07 RX ORDER — COLESTIPOL HYDROCHLORIDE 1 G/1
1 TABLET, FILM COATED ORAL
Qty: 30 | Refills: 11 | Status: COMPLETED
Start: 2021-04-07 | End: 2021-04-22

## 2021-04-09 ENCOUNTER — APPOINTMENT (OUTPATIENT)
Dept: MAMMOGRAPHY | Facility: HOSPITAL | Age: 66
End: 2021-04-09

## 2021-04-16 ENCOUNTER — HOSPITAL ENCOUNTER (OUTPATIENT)
Dept: CT IMAGING | Facility: HOSPITAL | Age: 66
Discharge: HOME | End: 2021-04-16
Admitting: INTERNAL MEDICINE

## 2021-04-16 DIAGNOSIS — J34.2 DEVIATED NASAL SEPTUM: ICD-10-CM

## 2021-04-16 DIAGNOSIS — Z87.828 HX OF INJURY: ICD-10-CM

## 2021-04-16 DIAGNOSIS — G44.309 POST-TRAUMATIC HEADACHE, NOT INTRACTABLE, UNSPECIFIED CHRONICITY PATTERN: ICD-10-CM

## 2021-04-16 PROCEDURE — 70450 CT HEAD/BRAIN W/O DYE: CPT

## 2021-05-17 ENCOUNTER — HOSPITAL ENCOUNTER (OUTPATIENT)
Dept: CT IMAGING | Facility: HOSPITAL | Age: 66
Discharge: HOME | End: 2021-05-17
Admitting: INTERNAL MEDICINE

## 2021-05-17 DIAGNOSIS — R59.1 LYMPHADENOPATHY: ICD-10-CM

## 2021-05-17 DIAGNOSIS — R91.8 PULMONARY NODULES: ICD-10-CM

## 2021-05-17 LAB — CREAT BLDA-MCNC: 0.6 MG/DL (ref 0.6–1.3)

## 2021-05-17 PROCEDURE — 71260 CT THORAX DX C+: CPT

## 2021-05-17 PROCEDURE — 82565 ASSAY OF CREATININE: CPT

## 2021-05-17 PROCEDURE — 25010000002 IOPAMIDOL 61 % SOLUTION: Performed by: INTERNAL MEDICINE

## 2021-05-17 RX ADMIN — IOPAMIDOL 75 ML: 612 INJECTION, SOLUTION INTRAVENOUS at 09:29

## 2021-05-19 ENCOUNTER — HOSPITAL ENCOUNTER (OUTPATIENT)
Dept: MAMMOGRAPHY | Facility: HOSPITAL | Age: 66
Discharge: HOME | End: 2021-05-19
Admitting: INTERNAL MEDICINE

## 2021-05-19 DIAGNOSIS — Z12.31 VISIT FOR SCREENING MAMMOGRAM: ICD-10-CM

## 2021-05-19 PROCEDURE — 77067 SCR MAMMO BI INCL CAD: CPT

## 2021-05-19 PROCEDURE — 77063 BREAST TOMOSYNTHESIS BI: CPT

## 2021-06-04 ENCOUNTER — OFFICE (OUTPATIENT)
Dept: URBAN - METROPOLITAN AREA CLINIC 65 | Facility: CLINIC | Age: 66
End: 2021-06-04

## 2021-06-04 VITALS
HEART RATE: 61 BPM | HEIGHT: 67 IN | SYSTOLIC BLOOD PRESSURE: 118 MMHG | WEIGHT: 257 LBS | TEMPERATURE: 98 F | DIASTOLIC BLOOD PRESSURE: 74 MMHG

## 2021-06-04 DIAGNOSIS — K91.5 POSTCHOLECYSTECTOMY SYNDROME: ICD-10-CM

## 2021-06-04 DIAGNOSIS — K21.9 GASTRO-ESOPHAGEAL REFLUX DISEASE WITHOUT ESOPHAGITIS: ICD-10-CM

## 2021-06-04 DIAGNOSIS — K76.0 FATTY (CHANGE OF) LIVER, NOT ELSEWHERE CLASSIFIED: ICD-10-CM

## 2021-06-04 PROCEDURE — 99213 OFFICE O/P EST LOW 20 MIN: CPT | Performed by: INTERNAL MEDICINE

## 2021-06-04 RX ORDER — COLESTIPOL HYDROCHLORIDE 1 G/1
1 TABLET, FILM COATED ORAL
Qty: 30 | Refills: 11 | Status: ACTIVE
Start: 2021-04-22

## 2021-06-18 ENCOUNTER — HOSPITAL ENCOUNTER (OUTPATIENT)
Dept: ULTRASOUND IMAGING | Facility: HOSPITAL | Age: 66
Discharge: HOME | End: 2021-06-18

## 2021-06-18 ENCOUNTER — HOSPITAL ENCOUNTER (OUTPATIENT)
Dept: MAMMOGRAPHY | Facility: HOSPITAL | Age: 66
Discharge: HOME | End: 2021-06-18

## 2021-06-18 DIAGNOSIS — R92.8 ABNORMAL MAMMOGRAM: ICD-10-CM

## 2021-06-18 PROCEDURE — G0279 TOMOSYNTHESIS, MAMMO: HCPCS

## 2021-06-18 PROCEDURE — 77065 DX MAMMO INCL CAD UNI: CPT

## 2021-06-18 PROCEDURE — 76642 ULTRASOUND BREAST LIMITED: CPT

## 2021-06-21 ENCOUNTER — TELEPHONE (OUTPATIENT)
Dept: MAMMOGRAPHY | Facility: HOSPITAL | Age: 66
End: 2021-06-21

## 2021-06-22 ENCOUNTER — TELEPHONE (OUTPATIENT)
Dept: MAMMOGRAPHY | Facility: HOSPITAL | Age: 66
End: 2021-06-22

## 2021-06-22 NOTE — TELEPHONE ENCOUNTER
Ms. cruz. for biopsy. Arrival/appt. times & location confirmed. Medical history & medications reviewed. Meds. restrictions. expl. Adv. to wear a soft bra. Okay to eat/drink prior to arrival. Questions addressed. Adv. visitors with outpt. not allowed.

## 2021-07-01 ENCOUNTER — HOSPITAL ENCOUNTER (OUTPATIENT)
Dept: MAMMOGRAPHY | Facility: HOSPITAL | Age: 66
Discharge: HOME | End: 2021-07-01

## 2021-07-01 ENCOUNTER — HOSPITAL ENCOUNTER (OUTPATIENT)
Dept: ULTRASOUND IMAGING | Facility: HOSPITAL | Age: 66
Discharge: HOME | End: 2021-07-01

## 2021-07-01 VITALS
DIASTOLIC BLOOD PRESSURE: 80 MMHG | SYSTOLIC BLOOD PRESSURE: 128 MMHG | HEART RATE: 65 BPM | HEIGHT: 67 IN | BODY MASS INDEX: 40.18 KG/M2 | TEMPERATURE: 97.5 F | RESPIRATION RATE: 20 BRPM | WEIGHT: 256 LBS | OXYGEN SATURATION: 100 %

## 2021-07-01 DIAGNOSIS — N63.10 MASS OF BREAST, RIGHT: ICD-10-CM

## 2021-07-01 DIAGNOSIS — Z98.890 STATUS POST BIOPSY: ICD-10-CM

## 2021-07-01 PROCEDURE — 88305 TISSUE EXAM BY PATHOLOGIST: CPT | Performed by: INTERNAL MEDICINE

## 2021-07-01 PROCEDURE — 88360 TUMOR IMMUNOHISTOCHEM/MANUAL: CPT | Performed by: INTERNAL MEDICINE

## 2021-07-01 PROCEDURE — A4648 IMPLANTABLE TISSUE MARKER: HCPCS

## 2021-07-01 PROCEDURE — 88341 IMHCHEM/IMCYTCHM EA ADD ANTB: CPT | Performed by: INTERNAL MEDICINE

## 2021-07-01 PROCEDURE — 77065 DX MAMMO INCL CAD UNI: CPT

## 2021-07-01 PROCEDURE — 25010000003 LIDOCAINE 1 % SOLUTION: Performed by: RADIOLOGY

## 2021-07-01 PROCEDURE — 88342 IMHCHEM/IMCYTCHM 1ST ANTB: CPT | Performed by: INTERNAL MEDICINE

## 2021-07-01 RX ORDER — LIDOCAINE HYDROCHLORIDE AND EPINEPHRINE 10; 10 MG/ML; UG/ML
10 INJECTION, SOLUTION INFILTRATION; PERINEURAL ONCE
Status: COMPLETED | OUTPATIENT
Start: 2021-07-01 | End: 2021-07-01

## 2021-07-01 RX ORDER — LIDOCAINE HYDROCHLORIDE 10 MG/ML
3 INJECTION, SOLUTION INFILTRATION; PERINEURAL ONCE
Status: COMPLETED | OUTPATIENT
Start: 2021-07-01 | End: 2021-07-01

## 2021-07-01 RX ADMIN — LIDOCAINE HYDROCHLORIDE 3 ML: 10 INJECTION, SOLUTION INFILTRATION; PERINEURAL at 15:20

## 2021-07-01 RX ADMIN — LIDOCAINE HYDROCHLORIDE,EPINEPHRINE BITARTRATE 10 ML: 10; .01 INJECTION, SOLUTION INFILTRATION; PERINEURAL at 15:20
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
## 2021-07-01 NOTE — NURSING NOTE
Biopsy site to right lateral breast clear with Skin Affix dry and intact. No firmness or swelling noted at or around biopsy site. Denies pain. Ice pack with protective covering applied to biopsy site. Discharge instructions discussed with understanding voiced by patient. Copies provided to patient. No distress noted. Post biopsy mammogram completed with patient ready to leave per Dr. Emerson. To home via private vehicle.

## 2021-07-06 LAB
LAB AP CASE REPORT: NORMAL
LAB AP CLINICAL INFORMATION: NORMAL
LAB AP DIAGNOSIS COMMENT: NORMAL
LAB AP SPECIAL STAINS: NORMAL
LAB AP SYNOPTIC CHECKLIST: NORMAL
PATH REPORT.FINAL DX SPEC: NORMAL
PATH REPORT.GROSS SPEC: NORMAL

## 2021-07-11 NOTE — PROGRESS NOTES
General Surgery History and Physical Exam     Summary:    66 y.o. lady with a new diagnosis of right breast DCIS: Grade low,  ER positive/OH positive; TisN0, Stage 0.      A multidisciplinary plan has been formulated for the patient:    (1) Breast Surgical Oncology:  -Unable to tolerate laying flat for an MRI.   -Have reached out to Dr. Hobbs with pulmonology and Dr. Barclay with cardiology for clearance prior to OR.  -Nurse navigator consult.   -Surgical plan: right breast wire-localized lumpectomy.    (2) Medical Oncology:  -Will refer postoperatively for evaluation for endocrine therapyy.    (3) Radiation Oncology:  -Will refer postoperatively for evaluation for radiation therapy.    Referring Provider: No ref. provider found    Chief Complaint: abnormal breast imaging    History of Present Illness: Ms. Francheska Sherman is a 66 y.o. year old lady, seen at the request of No ref. provider found for a new diagnosis of right breast cancer.      This was initially detected as an imaging abnormality. She has not annual mammograms each year; her last was in 2012. She denies any prior history of abnormal mammograms or breast biopsies. Her work-up is detailed in the oncologic history below.     She denies any breast lumps, pain, skin changes, or nipple discharge. She denies any family history of breast or ovarian cancer.     Workup of Current Diagnosis:    5/19/2021 Bilateral Screening Mammogram:  Impression: There is an area of focal asymmetry in the posterior one third of the right breast slightly lateral to the nipple.  Further evaluation is recommended.  BIRADS 0: Incomplete    6/18/2021 diagnostic Mammogram with Ultrasound:   Persistent 0.9 cm oval mass in upper outer posterior right breast.  On ultrasound, from 10-11 o'clock 6 cm from the nipple there is a 0.6 x 0.6 x 0.8 cm oval hypoechoic mass with indistinct margins which is suspicious.  Impression: Suspicious 0.8 cm mass at 10:00 in the right breast.   "Recommend further evaluation with ultrasound-guided core needle biopsy.  BIRADS 4: Suspicious    2021 ultrasound-guided breast biopsy biopsy:   The mass at 10:00 6 cm from the nipple in the right breast was targeted.  5 core specimens were obtained.  A bowtie clip was deployed.  Post procedure mammogram shows bowtie clip in expected position.  Impression: Ultrasound-guided core needle biopsy of 0.8 cm mass at 10:00 6 cm from the nipple in the right breast marked with a bowtie clip.  Pathology is malignant and concordant.  Surgical consult is recommended.    2021 pathology:   Right breast mass, 10:00 6 cm from the nipple:  Low-grade papillary and cribriform DCIS; ER positive, FL positive      Gynecologic History:   . P: 1. AB: 0.  Age at first childbirth: 23  Lactation/How lon month  Age at menarche: 14  Age at menopause: 39  Total years of oral contraceptive use: 2 to 3 years  Total years of hormone replacement therapy: 6 to 7 years    Past Medical History:   • COPD on 3 L home oxygen and BiPAP at night  • GERD  • Hypertension    Past Surgical History:    • Appendectomy  •   • Cholecystectomy  • Hysterectomy and oophorectomy    Family History:    • No family history of breast or ovarian cancer    Social History:   • Denies tobacco use  • Denies alcohol use    Allergies:   Allergies   Allergen Reactions   • Codeine Other (See Comments)     \"Made me feel like my head was really heavy and going to explode\"   • Morphine Delirium   • Sulfa Antibiotics Rash       Medications:     Current Outpatient Medications:   •  acetaminophen (TYLENOL) 325 MG tablet, Take 2 tablets by mouth Every 4 (Four) Hours As Needed for Mild Pain ., Disp: , Rfl:   •  amLODIPine (NORVASC) 5 MG tablet, TK 1 T PO  PRF IF SYSTOLIC IS GREATER THAN 140, Disp: , Rfl: 0  •  bisoprolol (ZEBeta) 5 MG tablet, Take 1.5 tablets by mouth Daily. Indications: High Blood Pressure Disorder, Disp: 45 tablet, Rfl: 2  •  bumetanide (BUMEX) " 0.5 MG tablet, Take 1 tablet by mouth Daily As Needed (Lower extremity fluid). Indications: Edema, Disp: 30 tablet, Rfl: 0  •  COLESTIPOL HCL PO, Take  by mouth Daily., Disp: , Rfl:   •  potassium chloride (MICRO-K) 10 MEQ CR capsule, Take 2 capsules by mouth Daily As Needed (take on same days as Bumex). Indications: Low Amount of Potassium in the Blood, Disp: 30 capsule, Rfl: 0  •  QUEtiapine (SEROquel) 100 MG tablet, Take 1 tablet by mouth Every 12 (Twelve) Hours for 30 days. (Patient taking differently: Take 100 mg by mouth Every Night.), Disp: 60 tablet, Rfl: 0  •  vitamin D (ERGOCALCIFEROL) 1.25 MG (56721 UT) capsule capsule, Take 1 capsule by mouth Every 7 (Seven) Days., Disp: 5 capsule, Rfl: 2    Laboratory Values:    Labs from 2020 reviewed    Review of Systems:   Influenza-like illness: no fever, no  cough, no  sore throat, no  body aches, no loss of sense of taste or smell, no known exposure to person with Covid-19.  Constitutional: Negative for fevers or chills  HENT: Negative for hearing loss or runny nose  Eyes: Negative for vision changes or scleral icterus  Respiratory: Negative for cough or shortness of breath  Cardiovascular: Negative for chest pain or heart palpitations  Gastrointestinal: Negative for abdominal pain, nausea, vomiting, constipation, melena, or hematochezia  Genitourinary: Negative for hematuria or dysuria  Musculoskeletal: Negative for joint swelling or gait instability  Neurologic: Negative for tremors or seizures  Psychiatric: Negative for suicidal ideations or depression  All other systems reviewed and negative    Physical Exam:   • ECO - Symptomatic, <50% confined to bed  • Constitutional: Well-developed well-nourished, no acute distress  • Eyes: Conjunctiva normal, sclera nonicteric  • ENMT: Hearing grossly normal, oral mucosa moist  • Neck: Supple, no palpable mass, trachea midline  • Respiratory: Clear to auscultation, normal inspiratory effort  • Cardiovascular:  Regular rate, no peripheral edema, no jugular venous distention  • Breast: symmetric  o Right: No visible abnormalities on inspection while seated, with arms raised or hands on hips. No masses, skin changes, or nipple abnormalities.  o Left: No visible abnormalities on inspection while seated, with arms raised or hands on hips. No masses, skin changes, or nipple abnormalities.  o Biopsy site appreciated in right outer breast, otherwise no skin changes.   o No clinical chest wall involvement.  • Gastrointestinal: Soft, nontender  • Lymphatics (palpable nodes): No cervical, supraclavicular or axillary lymphadenopathy  • Skin:  Warm, dry, no rash on visualized skin surfaces  • Musculoskeletal: Symmetric strength, normal gait  • Psychiatric: Alert and oriented ×3, normal affect     Discussion:  I had an extensive discussion with the patient and her family about the nature of her breast cancer diagnosis. We reviewed the components of breast tissue including ducts and lobules. We reviewed her pathology report in detail. We reviewed breast cancer histology, including stage, grade, ER/TX receptors, HER2 receptors and how this applies to her diagnosis. We reviewed the basics of systemic and local/regional management of breast cancer.     We discussed that most breast cancer is not hereditary, that I do not believe this plays a role in her case, and that genetic testing is not warranted.   We reviewed potential surgical treatments to include partial mastectomy, mastectomy, sentinel lymph node biopsy and axillary node dissection and discussed the rationale associated with each approach. Regarding radiation therapy, we discussed that radiation is indicated in all cases of breast conservation and in only limited circumstances following mastectomy. We discussed that the primary goal of adjuvant radiation is to decrease the likelihood of local recurrence.     In her case, she is a good candidate for lumpectomy and she would like  to proceed with breast conservation. We discussed that lumpectomy would require preoperative wire-localization. We also discussed the risk of positive margins and that she must have negative margins for lumpectomy to be an appropriate oncologic procedure. I will make every effort to obtain negative margins at her initial operation, but there is a 10-15% chance that she will require a second operation for re-excision, or possibly a total mastectomy. We will not know the margin status until after her final pathology has returned.     I described additional risks and potential complications associated with surgery, including, but not limited to, bleeding, infection, deformity/poor cosmetic result, chronic pain, numbness, seroma, hematoma, deep venous thrombosis, skin flap necrosis, disease recurrence and the possibility of requiring additional surgery. We also discussed other treatment options including the option of not undergoing any surgical treatment and the risks associated with this including disease progression. She expressed an understanding of these factors and wished to proceed.    We discussed that in her case, systemic treatment would likely involve endocrine therapy/targeted therapy.     ROOSEVELT JHA M.D.  General and Endoscopic Surgery  [facility]    [address], [address]  [place], [place], [address]  P: [phone]  F: [phone]

## 2021-07-13 ENCOUNTER — OFFICE VISIT (OUTPATIENT)
Dept: SURGERY | Facility: CLINIC | Age: 66
End: 2021-07-13

## 2021-07-13 VITALS — BODY MASS INDEX: 41.31 KG/M2 | HEIGHT: 67 IN | WEIGHT: 263.2 LBS

## 2021-07-13 DIAGNOSIS — Z17.0 MALIGNANT NEOPLASM OF RIGHT BREAST IN FEMALE, ESTROGEN RECEPTOR POSITIVE, UNSPECIFIED SITE OF BREAST (HCC): Primary | ICD-10-CM

## 2021-07-13 DIAGNOSIS — C50.911 MALIGNANT NEOPLASM OF RIGHT BREAST IN FEMALE, ESTROGEN RECEPTOR POSITIVE, UNSPECIFIED SITE OF BREAST (HCC): Primary | ICD-10-CM

## 2021-07-13 PROCEDURE — 99204 OFFICE O/P NEW MOD 45 MIN: CPT | Performed by: STUDENT IN AN ORGANIZED HEALTH CARE EDUCATION/TRAINING PROGRAM

## 2021-07-13 RX ORDER — ROSUVASTATIN CALCIUM 5 MG/1
5 TABLET, COATED ORAL DAILY
COMMUNITY
Start: 2021-05-03

## 2021-07-22 ENCOUNTER — TELEPHONE (OUTPATIENT)
Dept: SURGERY | Facility: CLINIC | Age: 66
End: 2021-07-22

## 2021-07-23 DIAGNOSIS — J96.11 CHRONIC RESPIRATORY FAILURE WITH HYPOXIA AND HYPERCAPNIA (HCC): ICD-10-CM

## 2021-07-23 DIAGNOSIS — J96.12 CHRONIC RESPIRATORY FAILURE WITH HYPOXIA AND HYPERCAPNIA (HCC): ICD-10-CM

## 2021-07-23 DIAGNOSIS — J84.9 ILD (INTERSTITIAL LUNG DISEASE) (HCC): Primary | ICD-10-CM

## 2021-07-23 DIAGNOSIS — J84.10 PULMONARY FIBROSIS (HCC): ICD-10-CM

## 2021-07-28 ENCOUNTER — OFFICE VISIT (OUTPATIENT)
Dept: CARDIOLOGY | Age: 66
End: 2021-07-28

## 2021-07-28 VITALS
SYSTOLIC BLOOD PRESSURE: 110 MMHG | HEART RATE: 55 BPM | DIASTOLIC BLOOD PRESSURE: 72 MMHG | HEIGHT: 67 IN | BODY MASS INDEX: 40.97 KG/M2 | WEIGHT: 261 LBS

## 2021-07-28 DIAGNOSIS — E78.5 HYPERLIPIDEMIA, UNSPECIFIED HYPERLIPIDEMIA TYPE: ICD-10-CM

## 2021-07-28 DIAGNOSIS — R06.02 SOB (SHORTNESS OF BREATH): ICD-10-CM

## 2021-07-28 DIAGNOSIS — I10 ESSENTIAL HYPERTENSION: ICD-10-CM

## 2021-07-28 DIAGNOSIS — R94.31 ABNORMAL EKG: Primary | ICD-10-CM

## 2021-07-28 PROCEDURE — 99213 OFFICE O/P EST LOW 20 MIN: CPT | Performed by: INTERNAL MEDICINE

## 2021-07-28 PROCEDURE — 93000 ELECTROCARDIOGRAM COMPLETE: CPT | Performed by: INTERNAL MEDICINE

## 2021-07-28 RX ORDER — QUETIAPINE FUMARATE 100 MG/1
100 TABLET, FILM COATED ORAL NIGHTLY
COMMUNITY
End: 2023-03-29

## 2021-08-04 ENCOUNTER — PREP FOR SURGERY (OUTPATIENT)
Dept: OTHER | Facility: HOSPITAL | Age: 66
End: 2021-08-04

## 2021-08-04 DIAGNOSIS — D05.11 DUCTAL CARCINOMA IN SITU (DCIS) OF RIGHT BREAST: Primary | ICD-10-CM

## 2021-08-04 RX ORDER — CEFAZOLIN SODIUM 2 G/100ML
2 INJECTION, SOLUTION INTRAVENOUS ONCE
Status: CANCELLED | OUTPATIENT
Start: 2021-08-19 | End: 2021-08-04

## 2021-08-04 RX ORDER — DIAZEPAM 5 MG/1
5 TABLET ORAL ONCE
Status: CANCELLED | OUTPATIENT
Start: 2021-08-19

## 2021-08-04 RX ORDER — LIDOCAINE 40 MG/G
CREAM TOPICAL AS NEEDED
Status: CANCELLED | OUTPATIENT
Start: 2021-08-19

## 2021-08-17 ENCOUNTER — PRE-ADMISSION TESTING (OUTPATIENT)
Dept: PREADMISSION TESTING | Facility: HOSPITAL | Age: 66
End: 2021-08-17

## 2021-08-17 VITALS
TEMPERATURE: 98.1 F | WEIGHT: 265.1 LBS | HEIGHT: 67 IN | DIASTOLIC BLOOD PRESSURE: 82 MMHG | HEART RATE: 69 BPM | BODY MASS INDEX: 41.61 KG/M2 | SYSTOLIC BLOOD PRESSURE: 132 MMHG | OXYGEN SATURATION: 97 % | RESPIRATION RATE: 16 BRPM

## 2021-08-17 LAB
ALBUMIN SERPL-MCNC: 4.5 G/DL (ref 3.5–5.2)
ALBUMIN/GLOB SERPL: 1.5 G/DL
ALP SERPL-CCNC: 59 U/L (ref 39–117)
ALT SERPL W P-5'-P-CCNC: 18 U/L (ref 1–33)
ANION GAP SERPL CALCULATED.3IONS-SCNC: 11.7 MMOL/L (ref 5–15)
AST SERPL-CCNC: 18 U/L (ref 1–32)
BILIRUB SERPL-MCNC: 0.2 MG/DL (ref 0–1.2)
BUN SERPL-MCNC: 17 MG/DL (ref 8–23)
BUN/CREAT SERPL: 24.3 (ref 7–25)
CALCIUM SPEC-SCNC: 9.4 MG/DL (ref 8.6–10.5)
CHLORIDE SERPL-SCNC: 105 MMOL/L (ref 98–107)
CO2 SERPL-SCNC: 24.3 MMOL/L (ref 22–29)
CREAT SERPL-MCNC: 0.7 MG/DL (ref 0.57–1)
DEPRECATED RDW RBC AUTO: 41.1 FL (ref 37–54)
ERYTHROCYTE [DISTWIDTH] IN BLOOD BY AUTOMATED COUNT: 12.4 % (ref 12.3–15.4)
GFR SERPL CREATININE-BSD FRML MDRD: 84 ML/MIN/1.73
GLOBULIN UR ELPH-MCNC: 3.1 GM/DL
GLUCOSE SERPL-MCNC: 105 MG/DL (ref 65–99)
HCT VFR BLD AUTO: 44.4 % (ref 34–46.6)
HGB BLD-MCNC: 14.5 G/DL (ref 12–15.9)
MCH RBC QN AUTO: 29.7 PG (ref 26.6–33)
MCHC RBC AUTO-ENTMCNC: 32.7 G/DL (ref 31.5–35.7)
MCV RBC AUTO: 90.8 FL (ref 79–97)
PLATELET # BLD AUTO: 289 10*3/MM3 (ref 140–450)
PMV BLD AUTO: 10.7 FL (ref 6–12)
POTASSIUM SERPL-SCNC: 4.4 MMOL/L (ref 3.5–5.2)
PROT SERPL-MCNC: 7.6 G/DL (ref 6–8.5)
RBC # BLD AUTO: 4.89 10*6/MM3 (ref 3.77–5.28)
SARS-COV-2 ORF1AB RESP QL NAA+PROBE: NOT DETECTED
SODIUM SERPL-SCNC: 141 MMOL/L (ref 136–145)
WBC # BLD AUTO: 9.46 10*3/MM3 (ref 3.4–10.8)

## 2021-08-17 PROCEDURE — U0004 COV-19 TEST NON-CDC HGH THRU: HCPCS

## 2021-08-17 PROCEDURE — C9803 HOPD COVID-19 SPEC COLLECT: HCPCS

## 2021-08-17 PROCEDURE — 36415 COLL VENOUS BLD VENIPUNCTURE: CPT

## 2021-08-17 PROCEDURE — 85027 COMPLETE CBC AUTOMATED: CPT

## 2021-08-17 PROCEDURE — 80053 COMPREHEN METABOLIC PANEL: CPT

## 2021-08-17 NOTE — DISCHARGE INSTRUCTIONS

## 2021-08-19 ENCOUNTER — ANESTHESIA EVENT (OUTPATIENT)
Dept: PERIOP | Facility: HOSPITAL | Age: 66
End: 2021-08-19

## 2021-08-19 ENCOUNTER — APPOINTMENT (OUTPATIENT)
Dept: GENERAL RADIOLOGY | Facility: HOSPITAL | Age: 66
End: 2021-08-19

## 2021-08-19 ENCOUNTER — HOSPITAL ENCOUNTER (OUTPATIENT)
Facility: HOSPITAL | Age: 66
Setting detail: HOSPITAL OUTPATIENT SURGERY
Discharge: HOME | End: 2021-08-19
Attending: STUDENT IN AN ORGANIZED HEALTH CARE EDUCATION/TRAINING PROGRAM | Admitting: STUDENT IN AN ORGANIZED HEALTH CARE EDUCATION/TRAINING PROGRAM

## 2021-08-19 ENCOUNTER — ANESTHESIA (OUTPATIENT)
Dept: PERIOP | Facility: HOSPITAL | Age: 66
End: 2021-08-19

## 2021-08-19 ENCOUNTER — HOSPITAL ENCOUNTER (OUTPATIENT)
Dept: MAMMOGRAPHY | Facility: HOSPITAL | Age: 66
Discharge: HOME | End: 2021-08-19

## 2021-08-19 ENCOUNTER — APPOINTMENT (OUTPATIENT)
Dept: MAMMOGRAPHY | Facility: HOSPITAL | Age: 66
End: 2021-08-19

## 2021-08-19 VITALS
RESPIRATION RATE: 16 BRPM | SYSTOLIC BLOOD PRESSURE: 133 MMHG | DIASTOLIC BLOOD PRESSURE: 70 MMHG | TEMPERATURE: 97.6 F | OXYGEN SATURATION: 95 % | HEART RATE: 62 BPM

## 2021-08-19 DIAGNOSIS — D05.11 DUCTAL CARCINOMA IN SITU (DCIS) OF RIGHT BREAST: Primary | ICD-10-CM

## 2021-08-19 DIAGNOSIS — C80.1 CANCER (HCC): ICD-10-CM

## 2021-08-19 PROCEDURE — 25010000002 ONDANSETRON PER 1 MG: Performed by: NURSE ANESTHETIST, CERTIFIED REGISTERED

## 2021-08-19 PROCEDURE — 25010000003 CEFAZOLIN IN DEXTROSE 2-4 GM/100ML-% SOLUTION: Performed by: STUDENT IN AN ORGANIZED HEALTH CARE EDUCATION/TRAINING PROGRAM

## 2021-08-19 PROCEDURE — 76098 X-RAY EXAM SURGICAL SPECIMEN: CPT

## 2021-08-19 PROCEDURE — 25010000002 DEXAMETHASONE SODIUM PHOSPHATE 10 MG/ML SOLUTION: Performed by: NURSE ANESTHETIST, CERTIFIED REGISTERED

## 2021-08-19 PROCEDURE — 19301 PARTIAL MASTECTOMY: CPT | Performed by: STUDENT IN AN ORGANIZED HEALTH CARE EDUCATION/TRAINING PROGRAM

## 2021-08-19 PROCEDURE — C1819 TISSUE LOCALIZATION-EXCISION: HCPCS

## 2021-08-19 PROCEDURE — 88307 TISSUE EXAM BY PATHOLOGIST: CPT | Performed by: STUDENT IN AN ORGANIZED HEALTH CARE EDUCATION/TRAINING PROGRAM

## 2021-08-19 PROCEDURE — 25010000003 LIDOCAINE 1 % SOLUTION: Performed by: STUDENT IN AN ORGANIZED HEALTH CARE EDUCATION/TRAINING PROGRAM

## 2021-08-19 PROCEDURE — S0260 H&P FOR SURGERY: HCPCS | Performed by: STUDENT IN AN ORGANIZED HEALTH CARE EDUCATION/TRAINING PROGRAM

## 2021-08-19 PROCEDURE — 25010000002 PROPOFOL 10 MG/ML EMULSION: Performed by: NURSE ANESTHETIST, CERTIFIED REGISTERED

## 2021-08-19 PROCEDURE — 25010000002 FENTANYL CITRATE (PF) 50 MCG/ML SOLUTION: Performed by: NURSE ANESTHETIST, CERTIFIED REGISTERED

## 2021-08-19 RX ORDER — MIDAZOLAM HYDROCHLORIDE 1 MG/ML
1 INJECTION INTRAMUSCULAR; INTRAVENOUS
Status: DISCONTINUED | OUTPATIENT
Start: 2021-08-19 | End: 2021-08-19 | Stop reason: HOSPADM

## 2021-08-19 RX ORDER — LIDOCAINE HYDROCHLORIDE 10 MG/ML
0.5 INJECTION, SOLUTION EPIDURAL; INFILTRATION; INTRACAUDAL; PERINEURAL ONCE AS NEEDED
Status: DISCONTINUED | OUTPATIENT
Start: 2021-08-19 | End: 2021-08-19 | Stop reason: HOSPADM

## 2021-08-19 RX ORDER — MAGNESIUM HYDROXIDE 1200 MG/15ML
LIQUID ORAL AS NEEDED
Status: DISCONTINUED | OUTPATIENT
Start: 2021-08-19 | End: 2021-08-19 | Stop reason: HOSPADM

## 2021-08-19 RX ORDER — LABETALOL HYDROCHLORIDE 5 MG/ML
5 INJECTION, SOLUTION INTRAVENOUS
Status: DISCONTINUED | OUTPATIENT
Start: 2021-08-19 | End: 2021-08-19 | Stop reason: HOSPADM

## 2021-08-19 RX ORDER — SODIUM CHLORIDE 0.9 % (FLUSH) 0.9 %
3-10 SYRINGE (ML) INJECTION AS NEEDED
Status: DISCONTINUED | OUTPATIENT
Start: 2021-08-19 | End: 2021-08-19 | Stop reason: HOSPADM

## 2021-08-19 RX ORDER — FLUMAZENIL 0.1 MG/ML
0.2 INJECTION INTRAVENOUS AS NEEDED
Status: DISCONTINUED | OUTPATIENT
Start: 2021-08-19 | End: 2021-08-19 | Stop reason: HOSPADM

## 2021-08-19 RX ORDER — FAMOTIDINE 10 MG/ML
20 INJECTION, SOLUTION INTRAVENOUS ONCE
Status: DISCONTINUED | OUTPATIENT
Start: 2021-08-19 | End: 2021-08-19 | Stop reason: HOSPADM

## 2021-08-19 RX ORDER — ONDANSETRON 2 MG/ML
INJECTION INTRAMUSCULAR; INTRAVENOUS AS NEEDED
Status: DISCONTINUED | OUTPATIENT
Start: 2021-08-19 | End: 2021-08-19 | Stop reason: SURG

## 2021-08-19 RX ORDER — PROMETHAZINE HYDROCHLORIDE 25 MG/1
25 TABLET ORAL ONCE AS NEEDED
Status: DISCONTINUED | OUTPATIENT
Start: 2021-08-19 | End: 2021-08-19 | Stop reason: HOSPADM

## 2021-08-19 RX ORDER — FENTANYL CITRATE 50 UG/ML
50 INJECTION, SOLUTION INTRAMUSCULAR; INTRAVENOUS
Status: DISCONTINUED | OUTPATIENT
Start: 2021-08-19 | End: 2021-08-19 | Stop reason: HOSPADM

## 2021-08-19 RX ORDER — SODIUM CHLORIDE, SODIUM LACTATE, POTASSIUM CHLORIDE, CALCIUM CHLORIDE 600; 310; 30; 20 MG/100ML; MG/100ML; MG/100ML; MG/100ML
9 INJECTION, SOLUTION INTRAVENOUS CONTINUOUS
Status: DISCONTINUED | OUTPATIENT
Start: 2021-08-19 | End: 2021-08-19 | Stop reason: HOSPADM

## 2021-08-19 RX ORDER — ONDANSETRON 2 MG/ML
4 INJECTION INTRAMUSCULAR; INTRAVENOUS ONCE AS NEEDED
Status: DISCONTINUED | OUTPATIENT
Start: 2021-08-19 | End: 2021-08-19 | Stop reason: HOSPADM

## 2021-08-19 RX ORDER — LIDOCAINE AND PRILOCAINE 25; 25 MG/G; MG/G
CREAM TOPICAL ONCE
Status: COMPLETED | OUTPATIENT
Start: 2021-08-19 | End: 2021-08-19

## 2021-08-19 RX ORDER — LIDOCAINE 40 MG/G
CREAM TOPICAL AS NEEDED
Status: DISCONTINUED | OUTPATIENT
Start: 2021-08-19 | End: 2021-08-19 | Stop reason: HOSPADM

## 2021-08-19 RX ORDER — HYDRALAZINE HYDROCHLORIDE 20 MG/ML
5 INJECTION INTRAMUSCULAR; INTRAVENOUS
Status: DISCONTINUED | OUTPATIENT
Start: 2021-08-19 | End: 2021-08-19 | Stop reason: HOSPADM

## 2021-08-19 RX ORDER — NALOXONE HCL 0.4 MG/ML
0.2 VIAL (ML) INJECTION AS NEEDED
Status: DISCONTINUED | OUTPATIENT
Start: 2021-08-19 | End: 2021-08-19 | Stop reason: HOSPADM

## 2021-08-19 RX ORDER — ACETAMINOPHEN 650 MG/1
650 SUPPOSITORY RECTAL ONCE AS NEEDED
Status: DISCONTINUED | OUTPATIENT
Start: 2021-08-19 | End: 2021-08-19 | Stop reason: HOSPADM

## 2021-08-19 RX ORDER — FAMOTIDINE 10 MG/ML
20 INJECTION, SOLUTION INTRAVENOUS ONCE
Status: COMPLETED | OUTPATIENT
Start: 2021-08-19 | End: 2021-08-19

## 2021-08-19 RX ORDER — LIDOCAINE HYDROCHLORIDE 10 MG/ML
3 INJECTION, SOLUTION INFILTRATION; PERINEURAL ONCE
Status: COMPLETED | OUTPATIENT
Start: 2021-08-19 | End: 2021-08-19

## 2021-08-19 RX ORDER — OXYCODONE AND ACETAMINOPHEN 10; 325 MG/1; MG/1
1 TABLET ORAL EVERY 4 HOURS PRN
Status: DISCONTINUED | OUTPATIENT
Start: 2021-08-19 | End: 2021-08-19 | Stop reason: HOSPADM

## 2021-08-19 RX ORDER — DEXAMETHASONE SODIUM PHOSPHATE 10 MG/ML
INJECTION, SOLUTION INTRAMUSCULAR; INTRAVENOUS AS NEEDED
Status: DISCONTINUED | OUTPATIENT
Start: 2021-08-19 | End: 2021-08-19 | Stop reason: SURG

## 2021-08-19 RX ORDER — DIPHENHYDRAMINE HCL 25 MG
25 CAPSULE ORAL
Status: DISCONTINUED | OUTPATIENT
Start: 2021-08-19 | End: 2021-08-19 | Stop reason: HOSPADM

## 2021-08-19 RX ORDER — SODIUM CHLORIDE 0.9 % (FLUSH) 0.9 %
3 SYRINGE (ML) INJECTION EVERY 12 HOURS SCHEDULED
Status: DISCONTINUED | OUTPATIENT
Start: 2021-08-19 | End: 2021-08-19 | Stop reason: HOSPADM

## 2021-08-19 RX ORDER — BUPIVACAINE HYDROCHLORIDE AND EPINEPHRINE 5; 5 MG/ML; UG/ML
INJECTION, SOLUTION EPIDURAL; INTRACAUDAL; PERINEURAL AS NEEDED
Status: DISCONTINUED | OUTPATIENT
Start: 2021-08-19 | End: 2021-08-19 | Stop reason: HOSPADM

## 2021-08-19 RX ORDER — OXYCODONE HYDROCHLORIDE AND ACETAMINOPHEN 5; 325 MG/1; MG/1
1 TABLET ORAL ONCE AS NEEDED
Status: COMPLETED | OUTPATIENT
Start: 2021-08-19 | End: 2021-08-19

## 2021-08-19 RX ORDER — PROMETHAZINE HYDROCHLORIDE 25 MG/1
25 SUPPOSITORY RECTAL ONCE AS NEEDED
Status: DISCONTINUED | OUTPATIENT
Start: 2021-08-19 | End: 2021-08-19 | Stop reason: HOSPADM

## 2021-08-19 RX ORDER — EPHEDRINE SULFATE 50 MG/ML
5 INJECTION, SOLUTION INTRAVENOUS ONCE AS NEEDED
Status: DISCONTINUED | OUTPATIENT
Start: 2021-08-19 | End: 2021-08-19 | Stop reason: HOSPADM

## 2021-08-19 RX ORDER — DIAZEPAM 5 MG/1
5 TABLET ORAL ONCE
Status: COMPLETED | OUTPATIENT
Start: 2021-08-19 | End: 2021-08-19

## 2021-08-19 RX ORDER — CEFAZOLIN SODIUM 2 G/100ML
2 INJECTION, SOLUTION INTRAVENOUS ONCE
Status: COMPLETED | OUTPATIENT
Start: 2021-08-19 | End: 2021-08-19

## 2021-08-19 RX ORDER — PROPOFOL 10 MG/ML
VIAL (ML) INTRAVENOUS AS NEEDED
Status: DISCONTINUED | OUTPATIENT
Start: 2021-08-19 | End: 2021-08-19 | Stop reason: SURG

## 2021-08-19 RX ORDER — FENTANYL CITRATE 50 UG/ML
INJECTION, SOLUTION INTRAMUSCULAR; INTRAVENOUS AS NEEDED
Status: DISCONTINUED | OUTPATIENT
Start: 2021-08-19 | End: 2021-08-19 | Stop reason: SURG

## 2021-08-19 RX ORDER — LIDOCAINE HYDROCHLORIDE 20 MG/ML
INJECTION, SOLUTION INFILTRATION; PERINEURAL AS NEEDED
Status: DISCONTINUED | OUTPATIENT
Start: 2021-08-19 | End: 2021-08-19 | Stop reason: SURG

## 2021-08-19 RX ORDER — ISOSULFAN BLUE 50 MG/5ML
INJECTION, SOLUTION SUBCUTANEOUS
Status: DISCONTINUED
Start: 2021-08-19 | End: 2021-08-19 | Stop reason: HOSPADM

## 2021-08-19 RX ORDER — DIPHENHYDRAMINE HYDROCHLORIDE 50 MG/ML
12.5 INJECTION INTRAMUSCULAR; INTRAVENOUS
Status: DISCONTINUED | OUTPATIENT
Start: 2021-08-19 | End: 2021-08-19 | Stop reason: HOSPADM

## 2021-08-19 RX ORDER — ACETAMINOPHEN 325 MG/1
650 TABLET ORAL ONCE AS NEEDED
Status: DISCONTINUED | OUTPATIENT
Start: 2021-08-19 | End: 2021-08-19 | Stop reason: HOSPADM

## 2021-08-19 RX ORDER — OXYCODONE HYDROCHLORIDE AND ACETAMINOPHEN 5; 325 MG/1; MG/1
1 TABLET ORAL EVERY 6 HOURS PRN
Qty: 10 TABLET | Refills: 0 | Status: SHIPPED | OUTPATIENT
Start: 2021-08-19 | End: 2021-09-07

## 2021-08-19 RX ADMIN — LIDOCAINE HYDROCHLORIDE 3 ML: 10 INJECTION, SOLUTION INFILTRATION; PERINEURAL at 07:57

## 2021-08-19 RX ADMIN — SODIUM CHLORIDE, POTASSIUM CHLORIDE, SODIUM LACTATE AND CALCIUM CHLORIDE 9 ML/HR: 600; 310; 30; 20 INJECTION, SOLUTION INTRAVENOUS at 08:42

## 2021-08-19 RX ADMIN — PROPOFOL 150 MG: 10 INJECTION, EMULSION INTRAVENOUS at 09:10

## 2021-08-19 RX ADMIN — FENTANYL CITRATE 25 MCG: 50 INJECTION INTRAMUSCULAR; INTRAVENOUS at 09:47

## 2021-08-19 RX ADMIN — OXYCODONE HYDROCHLORIDE AND ACETAMINOPHEN 1 TABLET: 5; 325 TABLET ORAL at 10:33

## 2021-08-19 RX ADMIN — LIDOCAINE AND PRILOCAINE: 25; 25 CREAM TOPICAL at 07:12

## 2021-08-19 RX ADMIN — FENTANYL CITRATE 50 MCG: 50 INJECTION, SOLUTION INTRAMUSCULAR; INTRAVENOUS at 11:01

## 2021-08-19 RX ADMIN — FAMOTIDINE 20 MG: 10 INJECTION INTRAVENOUS at 07:22

## 2021-08-19 RX ADMIN — DIAZEPAM 5 MG: 5 TABLET ORAL at 07:12

## 2021-08-19 RX ADMIN — DEXAMETHASONE SODIUM PHOSPHATE 10 MG: 10 INJECTION, SOLUTION INTRAMUSCULAR; INTRAVENOUS at 09:22

## 2021-08-19 RX ADMIN — CEFAZOLIN SODIUM 2 G: 2 INJECTION, SOLUTION INTRAVENOUS at 09:12

## 2021-08-19 RX ADMIN — LIDOCAINE HYDROCHLORIDE 100 MG: 20 INJECTION, SOLUTION INFILTRATION; PERINEURAL at 09:10

## 2021-08-19 RX ADMIN — FENTANYL CITRATE 50 MCG: 50 INJECTION, SOLUTION INTRAMUSCULAR; INTRAVENOUS at 10:46

## 2021-08-19 RX ADMIN — FENTANYL CITRATE 25 MCG: 50 INJECTION INTRAMUSCULAR; INTRAVENOUS at 09:26

## 2021-08-19 RX ADMIN — ONDANSETRON 4 MG: 2 INJECTION INTRAMUSCULAR; INTRAVENOUS at 09:42

## 2021-08-19 RX ADMIN — FENTANYL CITRATE 25 MCG: 50 INJECTION INTRAMUSCULAR; INTRAVENOUS at 09:16

## 2021-08-19 RX ADMIN — SODIUM CHLORIDE, POTASSIUM CHLORIDE, SODIUM LACTATE AND CALCIUM CHLORIDE: 600; 310; 30; 20 INJECTION, SOLUTION INTRAVENOUS at 09:06

## 2021-08-19 NOTE — ANESTHESIA PROCEDURE NOTES
Airway  Urgency: elective    Date/Time: 8/19/2021 9:12 AM    General Information and Staff    Patient location during procedure: OR  Anesthesiologist: Toñito Brady MD  CRNA: Shivani Floyd CRNA    Indications and Patient Condition  Indications for airway management: airway protection    Preoxygenated: yes  Mask difficulty assessment: 1 - vent by mask    Final Airway Details  Final airway type: supraglottic airway      Successful airway: unique  Size 4    Number of attempts at approach: 1  Assessment: lips, teeth, and gum same as pre-op and atraumatic intubation

## 2021-08-19 NOTE — ANESTHESIA PREPROCEDURE EVALUATION
Anesthesia Evaluation     no history of anesthetic complications:  NPO Solid Status: > 8 hours  NPO Liquid Status: > 2 hours           Airway   Mallampati: II  Neck ROM: full  no difficulty expected  Dental - normal exam     Pulmonary - normal exam   (+) a smoker Former, shortness of breath, sleep apnea on CPAP,   (-) COPD, asthma    ROS comment: H/o ARDS  PE comment: nonlabored  Cardiovascular - normal exam    Rhythm: regular  Rate: normal    (+) hypertension 2 medications or greater, dysrhythmias (T-wave abnormlity on EKG-->ischemic workup negative),   (-) valvular problems/murmurs, past MI, CAD, angina      Neuro/Psych  (+) psychiatric history ADD,     (-) seizures, TIA, CVA  GI/Hepatic/Renal/Endo    (+) morbid obesity,  hepatitis (treated) C, liver disease (h/o Hep C-->resolved w/ tx),   (-) GERD, no renal disease, diabetes    Musculoskeletal (-) negative ROS    Abdominal    Substance History      OB/GYN          Other        ROS/Med Hx Other: 24h home O2 (3L) and BiPAP at night      2019 Cath: EF 60%      Dr. Blackwood Cardiac Clearance:    Francheska Sherman seen and examined with no clinical signs of angina or chf, pt is cleared for surgery with non modifiable risk factors.  Francheska Sherman has been advised to take cardiac meds with sip of water on the day of surgery.                  Anesthesia Plan    ASA 4     general       Anesthetic plan, all risks, benefits, and alternatives have been provided, discussed and informed consent has been obtained with: patient.    Plan discussed with CRNA.

## 2021-08-19 NOTE — ANESTHESIA POSTPROCEDURE EVALUATION
Patient: Francheska Sherman    Procedure Summary     Date: 08/19/21 Room / Location:  [facility]  [facility]  [facility]    Anesthesia Start: 0904 Anesthesia Stop: 0959    Procedure: RIGHT BREAST WIRE-LOCALIZED BREAST LUMPECTOMY (Right Breast) Diagnosis:       Ductal carcinoma in situ (DCIS) of right breast      (Ductal carcinoma in situ (DCIS) of right breast [D05.11])    Surgeons: Ngozi Pereyra MD Provider: Toñito Brady MD    Anesthesia Type: general ASA Status: 4          Anesthesia Type: general    Vitals  Vitals Value Taken Time   /72 08/19/21 1131   Temp 36.4 °C (97.6 °F) 08/19/21 1115   Pulse 60 08/19/21 1139   Resp 16 08/19/21 1130   SpO2 94 % 08/19/21 1140   Vitals shown include unvalidated device data.        Post Anesthesia Care and Evaluation    Patient location during evaluation: bedside  Patient participation: complete - patient participated  Level of consciousness: awake and alert  Pain management: adequate  Airway patency: patent  Anesthetic complications: No anesthetic complications    Cardiovascular status: acceptable  Respiratory status: acceptable  Hydration status: acceptable    Comments: /72   Pulse 63   Temp 36.4 °C (97.6 °F) (Temporal)   Resp 16   SpO2 95%

## 2021-08-19 NOTE — OP NOTE
OPERATIVE REPORT    DATE: 10/20/2021    SURGEON: Ngozi Pereyra MD     ASSISTANT: Juan Casey, who was present for necessary suctioning, retracting, suturing throughout the procedure     OPERATION PERFORMED: Right breast wire localized lumpectomy    PREOPERATIVE DIAGNOSIS: Ductal carcinoma in situ of the right breast    POSTOPERATIVE DIAGNOSIS: Same    ANESTHESIA: MAC    SPECIMEN: Right breast lumpectomy (short stitch superior, long lateral, double anterior)    DRAINS: None    BLOOD LOSS: Minimal    INDICATION FOR OPERATION: Mrs. Francheska Sherman is a 66 y.o. year old lady who was seen in the office recently for new diagnosis of right breast ductal carcinoma in situ.  Lumpectomy was recommended. All risks (including bleeding, infection, damage to surrounding structures, need for reexcision, positive margins), benefits and alternatives were explained to the patient who agreed and wished to proceed. Informed consent was signed.     OPERATIVE COURSE: The patient was taken to the operating room, transferred onto the operating room table, and underwent anesthesia without incident. The patient was prepped and draped in sterile fashion. A time out was performed and preoperative antibiotics were given.  The wire was at the 12:00 location in the right breast.  A curvilinear incision was made just inferior to this after injecting half percent Marcaine with epinephrine.  Flaps were made approximately 1 cm in thickness superiorly up to the level of the wire.  The tissue was then transected medially and laterally around the wire to the chest wall.  This was then extended inferiorly just past the point of the wire.  The wire was then brought in through the skin with hemostats into the incision.  The superior margin was then created.  The tissue was then taken off the chest wall with Bovie electrocautery.  Was marked as listed above.  Specimen radiograph showed the wire, clip, and abnormal breast tissue.  It was sent off the  specimen.  The wound was inspected.  Bovie electrocautery was used for any small muscle bleeding.  A few 2-0 interrupted sutures were used to reapproximate the tissue.  Multiple interrupted 3 oh deep dermal sutures were placed.  A running 4-0 Monocryl was used to close skin.  Skin glue was placed over this.  The patient tolerated the procedure well. All needle and lap counts were correct at the end of the case. The patient was then awoken from anesthesia and taken to recovery for further monitoring.       Ngozi Pereyra MD   General and Endoscopic Surgery  [facility]    [address] [place] [address], [address]  [place], [place], [address]  P: [phone]  F: [phone]

## 2021-08-19 NOTE — H&P
No changes from H&P. Plan for R breast wire-localized lumpectomy for DCIS today.    General Surgery History and Physical Exam      Summary:    66 y.o. lady with a new diagnosis of right breast DCIS: Grade low,  ER positive/VA positive; TisN0, Stage 0.       A multidisciplinary plan has been formulated for the patient:    (1) Breast Surgical Oncology:  -Unable to tolerate laying flat for an MRI.   -Have reached out to Dr. Hobbs with pulmonology and Dr. Barclay with cardiology for clearance prior to OR.  -Nurse navigator consult.   -Surgical plan: right breast wire-localized lumpectomy.     (2) Medical Oncology:  -Will refer postoperatively for evaluation for endocrine therapyy.     (3) Radiation Oncology:  -Will refer postoperatively for evaluation for radiation therapy.     Referring Provider: No ref. provider found     Chief Complaint: abnormal breast imaging     History of Present Illness: Ms. Francheska Sherman is a 66 y.o. year old lady, seen at the request of No ref. provider found for a new diagnosis of right breast cancer.       This was initially detected as an imaging abnormality. She has not annual mammograms each year; her last was in 2012. She denies any prior history of abnormal mammograms or breast biopsies. Her work-up is detailed in the oncologic history below.      She denies any breast lumps, pain, skin changes, or nipple discharge. She denies any family history of breast or ovarian cancer.      Workup of Current Diagnosis:    5/19/2021 Bilateral Screening Mammogram:  Impression: There is an area of focal asymmetry in the posterior one third of the right breast slightly lateral to the nipple.  Further evaluation is recommended.  BIRADS 0: Incomplete     6/18/2021 diagnostic Mammogram with Ultrasound:   Persistent 0.9 cm oval mass in upper outer posterior right breast.  On ultrasound, from 10-11 o'clock 6 cm from the nipple there is a 0.6 x 0.6 x 0.8 cm oval hypoechoic mass with indistinct  "margins which is suspicious.  Impression: Suspicious 0.8 cm mass at 10:00 in the right breast.  Recommend further evaluation with ultrasound-guided core needle biopsy.  BIRADS 4: Suspicious     2021 ultrasound-guided breast biopsy biopsy:   The mass at 10:00 6 cm from the nipple in the right breast was targeted.  5 core specimens were obtained.  A bowtie clip was deployed.  Post procedure mammogram shows bowtie clip in expected position.  Impression: Ultrasound-guided core needle biopsy of 0.8 cm mass at 10:00 6 cm from the nipple in the right breast marked with a bowtie clip.  Pathology is malignant and concordant.  Surgical consult is recommended.     2021 pathology:   Right breast mass, 10:00 6 cm from the nipple:  Low-grade papillary and cribriform DCIS; ER positive, KS positive        Gynecologic History:   . P: 1. AB: 0.  Age at first childbirth: 23  Lactation/How lon month  Age at menarche: 14  Age at menopause: 39  Total years of oral contraceptive use: 2 to 3 years  Total years of hormone replacement therapy: 6 to 7 years     Past Medical History:   · COPD on 3 L home oxygen and BiPAP at night  · GERD  · Hypertension     Past Surgical History:    · Appendectomy  ·   · Cholecystectomy  · Hysterectomy and oophorectomy     Family History:    · No family history of breast or ovarian cancer     Social History:   · Denies tobacco use  · Denies alcohol use     Allergies:         Allergies   Allergen Reactions   • Codeine Other (See Comments)       \"Made me feel like my head was really heavy and going to explode\"   • Morphine Delirium   • Sulfa Antibiotics Rash         Medications:      Current Outpatient Medications:   •  acetaminophen (TYLENOL) 325 MG tablet, Take 2 tablets by mouth Every 4 (Four) Hours As Needed for Mild Pain ., Disp: , Rfl:   •  amLODIPine (NORVASC) 5 MG tablet, TK 1 T PO  PRF IF SYSTOLIC IS GREATER THAN 140, Disp: , Rfl: 0  •  bisoprolol (ZEBeta) 5 MG tablet, Take 1.5 " tablets by mouth Daily. Indications: High Blood Pressure Disorder, Disp: 45 tablet, Rfl: 2  •  bumetanide (BUMEX) 0.5 MG tablet, Take 1 tablet by mouth Daily As Needed (Lower extremity fluid). Indications: Edema, Disp: 30 tablet, Rfl: 0  •  COLESTIPOL HCL PO, Take  by mouth Daily., Disp: , Rfl:   •  potassium chloride (MICRO-K) 10 MEQ CR capsule, Take 2 capsules by mouth Daily As Needed (take on same days as Bumex). Indications: Low Amount of Potassium in the Blood, Disp: 30 capsule, Rfl: 0  •  QUEtiapine (SEROquel) 100 MG tablet, Take 1 tablet by mouth Every 12 (Twelve) Hours for 30 days. (Patient taking differently: Take 100 mg by mouth Every Night.), Disp: 60 tablet, Rfl: 0  •  vitamin D (ERGOCALCIFEROL) 1.25 MG (57487 UT) capsule capsule, Take 1 capsule by mouth Every 7 (Seven) Days., Disp: 5 capsule, Rfl: 2     Laboratory Values:    Labs from 2020 reviewed     Review of Systems:   Influenza-like illness: no fever, no  cough, no  sore throat, no  body aches, no loss of sense of taste or smell, no known exposure to person with Covid-19.  Constitutional: Negative for fevers or chills  HENT: Negative for hearing loss or runny nose  Eyes: Negative for vision changes or scleral icterus  Respiratory: Negative for cough or shortness of breath  Cardiovascular: Negative for chest pain or heart palpitations  Gastrointestinal: Negative for abdominal pain, nausea, vomiting, constipation, melena, or hematochezia  Genitourinary: Negative for hematuria or dysuria  Musculoskeletal: Negative for joint swelling or gait instability  Neurologic: Negative for tremors or seizures  Psychiatric: Negative for suicidal ideations or depression  All other systems reviewed and negative     Physical Exam:   · ECO - Symptomatic, <50% confined to bed  · Constitutional: Well-developed well-nourished, no acute distress  · Eyes: Conjunctiva normal, sclera nonicteric  · ENMT: Hearing grossly normal, oral mucosa moist  · Neck: Supple, no  palpable mass, trachea midline  · Respiratory: Clear to auscultation, normal inspiratory effort  · Cardiovascular: Regular rate, no peripheral edema, no jugular venous distention  · Breast: symmetric  ? Right: No visible abnormalities on inspection while seated, with arms raised or hands on hips. No masses, skin changes, or nipple abnormalities.  ? Left: No visible abnormalities on inspection while seated, with arms raised or hands on hips. No masses, skin changes, or nipple abnormalities.  ? Biopsy site appreciated in right outer breast, otherwise no skin changes.   ? No clinical chest wall involvement.  · Gastrointestinal: Soft, nontender  · Lymphatics (palpable nodes): No cervical, supraclavicular or axillary lymphadenopathy  · Skin:  Warm, dry, no rash on visualized skin surfaces  · Musculoskeletal: Symmetric strength, normal gait  · Psychiatric: Alert and oriented ×3, normal affect      Discussion:  I had an extensive discussion with the patient and her family about the nature of her breast cancer diagnosis. We reviewed the components of breast tissue including ducts and lobules. We reviewed her pathology report in detail. We reviewed breast cancer histology, including stage, grade, ER/RI receptors, HER2 receptors and how this applies to her diagnosis. We reviewed the basics of systemic and local/regional management of breast cancer.      We discussed that most breast cancer is not hereditary, that I do not believe this plays a role in her case, and that genetic testing is not warranted.   We reviewed potential surgical treatments to include partial mastectomy, mastectomy, sentinel lymph node biopsy and axillary node dissection and discussed the rationale associated with each approach. Regarding radiation therapy, we discussed that radiation is indicated in all cases of breast conservation and in only limited circumstances following mastectomy. We discussed that the primary goal of adjuvant radiation is to  decrease the likelihood of local recurrence.      In her case, she is a good candidate for lumpectomy and she would like to proceed with breast conservation. We discussed that lumpectomy would require preoperative wire-localization. We also discussed the risk of positive margins and that she must have negative margins for lumpectomy to be an appropriate oncologic procedure. I will make every effort to obtain negative margins at her initial operation, but there is a 10-15% chance that she will require a second operation for re-excision, or possibly a total mastectomy. We will not know the margin status until after her final pathology has returned.      I described additional risks and potential complications associated with surgery, including, but not limited to, bleeding, infection, deformity/poor cosmetic result, chronic pain, numbness, seroma, hematoma, deep venous thrombosis, skin flap necrosis, disease recurrence and the possibility of requiring additional surgery. We also discussed other treatment options including the option of not undergoing any surgical treatment and the risks associated with this including disease progression. She expressed an understanding of these factors and wished to proceed.     We discussed that in her case, systemic treatment would likely involve endocrine therapy/targeted therapy.      ROOSEVELT JHA M.D.  General and Endoscopic Surgery  [facility]     [address] [facility], [address]  [place], [place], [address]  P: [phone]  F: [phone]

## 2021-08-20 ENCOUNTER — TELEPHONE (OUTPATIENT)
Dept: SURGERY | Facility: CLINIC | Age: 66
End: 2021-08-20

## 2021-08-20 DIAGNOSIS — D05.11 DUCTAL CARCINOMA IN SITU (DCIS) OF RIGHT BREAST: Primary | ICD-10-CM

## 2021-08-20 LAB
LAB AP CASE REPORT: NORMAL
LAB AP DIAGNOSIS COMMENT: NORMAL
LAB AP SYNOPTIC CHECKLIST: NORMAL
PATH REPORT.FINAL DX SPEC: NORMAL
PATH REPORT.GROSS SPEC: NORMAL

## 2021-08-20 NOTE — TELEPHONE ENCOUNTER
Called regarding pathology from surgery. PTisNx, ER/OK+. Will place referrals for oncology and radiation oncology.      Final Diagnosis   1.  Breast, Right, Lumpectomy (83 grams):                A.  Ductal carcinoma in situ (DCIS), intermediate nuclear grade, solid, cribriform and papillary         architecture measuring 9 mm maximally.   B.  Background breast parenchyma shows atypical ductal hyperplasia (focal), not at the inked        margin.   C.  Florid usual ductal hyperplasia, duct ectasia, apocrine metaplasia, sclerosing adenosis and 2 incidental         fibroadenomas measuring up to 1.4 mm maximally.  D.  All margins are free of in situ carcinoma with adequate margins (closest margin medial at        9 mm).    E.  See synoptic template for complete tumor details.     Ngozi Pereyra MD

## 2021-09-05 NOTE — PROGRESS NOTES
General Surgery History and Physical Exam     Summary:    66 y.o. lady with right breast DCIS s/p excision, ER/AK+. PTisNx, stage 0.    A multidisciplinary plan has been formulated for the patient:    (1) Breast Surgical Oncology:  -s/p R wire-localized lumpectomy 8/19/2021.  -Repeat mammogram 5/2022  -Follow-up with me 6/20/2022    (2) Medical Oncology:  -Appointment with Dr. Zamudio 9/13/2021.     (3) Radiation Oncology:  -Appointment with Dr. Coats 9/15/2021.     Referring Provider: No ref. provider found    Chief Complaint: abnormal breast imaging    History of Present Illness: Ms. Francheska Sherman is a 66 y.o. year old lady, seen at the request of No ref. provider found for a new diagnosis of right breast cancer.      This was initially detected as an imaging abnormality. She has not annual mammograms each year; her last was in 2012. She denies any prior history of abnormal mammograms or breast biopsies. Her work-up is detailed in the oncologic history below.     She denies any breast lumps, pain, skin changes, or nipple discharge. She denies any family history of breast or ovarian cancer.     9/7/2021 she is overall doing well.  She did not take any pain medication.  She has no swelling or bruising.  She is back to her daily activities.    Workup of Current Diagnosis:    5/19/2021 Bilateral Screening Mammogram:  Impression: There is an area of focal asymmetry in the posterior one third of the right breast slightly lateral to the nipple.  Further evaluation is recommended.  BIRADS 0: Incomplete    6/18/2021 diagnostic Mammogram with Ultrasound:   Persistent 0.9 cm oval mass in upper outer posterior right breast.  On ultrasound, from 10-11 o'clock 6 cm from the nipple there is a 0.6 x 0.6 x 0.8 cm oval hypoechoic mass with indistinct margins which is suspicious.  Impression: Suspicious 0.8 cm mass at 10:00 in the right breast.  Recommend further evaluation with ultrasound-guided core needle biopsy.  BIRADS 4:  Suspicious    2021 ultrasound-guided breast biopsy biopsy:   The mass at 10:00 6 cm from the nipple in the right breast was targeted.  5 core specimens were obtained.  A bowtie clip was deployed.  Post procedure mammogram shows bowtie clip in expected position.  Impression: Ultrasound-guided core needle biopsy of 0.8 cm mass at 10:00 6 cm from the nipple in the right breast marked with a bowtie clip.  Pathology is malignant and concordant.  Surgical consult is recommended.    2021 pathology:   Right breast mass, 10:00 6 cm from the nipple:  Low-grade papillary and cribriform DCIS; ER positive, [place] positive    2021 right breast wire localized lumpectomy  Final Diagnosis   1.  Breast, Right, Lumpectomy (83 grams):                A.  Ductal carcinoma in situ (DCIS), intermediate nuclear grade, solid, cribriform and papillary         architecture measuring 9 mm maximally.   B.  Background breast parenchyma shows atypical ductal hyperplasia (focal), not at the inked        margin.   C.  Florid usual ductal hyperplasia, duct ectasia, apocrine metaplasia, sclerosing adenosis and 2 incidental         fibroadenomas measuring up to 1.4 mm maximally.  D.  All margins are free of in situ carcinoma with adequate margins (closest margin medial at        9 mm).    E.  See synoptic template for complete tumor details.     Gynecologic History:   . P: 1. AB: 0.  Age at first childbirth: 23  Lactation/How lon month  Age at menarche: 14  Age at menopause: 39  Total years of oral contraceptive use: 2 to 3 years  Total years of hormone replacement therapy: 6 to 7 years    Past Medical History:   • COPD on 3 L home oxygen and BiPAP at night  • GERD  • Hypertension    Past Surgical History:    • Appendectomy  •   • Cholecystectomy  • Hysterectomy and oophorectomy    Family History:    • No family history of breast or ovarian cancer    Social History:   • Denies tobacco use  • Denies alcohol use    Allergies:  "  Allergies   Allergen Reactions   • Adhesive Tape Other (See Comments)     Blisters   • Codeine Other (See Comments)     \"Made me feel like my head was really heavy and going to explode\"   • Morphine Delirium   • Sulfa Antibiotics Rash       Medications:     Current Outpatient Medications:   •  acetaminophen (TYLENOL) 325 MG tablet, Take 2 tablets by mouth Every 4 (Four) Hours As Needed for Mild Pain ., Disp: , Rfl:   •  amLODIPine (NORVASC) 5 MG tablet, Take 5 mg by mouth 2 (Two) Times a Day As Needed. Indications: High Blood Pressure Disorder, Disp: , Rfl: 0  •  bisoprolol (ZEBeta) 5 MG tablet, Take 1.5 tablets by mouth Daily. Indications: High Blood Pressure Disorder, Disp: 45 tablet, Rfl: 2  •  bumetanide (BUMEX) 0.5 MG tablet, Take 1 tablet by mouth Daily As Needed (Lower extremity fluid). Indications: Edema, Disp: 30 tablet, Rfl: 0  •  Chlorhexidine Gluconate (HIBICLENS EX), Apply  topically. AS DIRECTED PREOP, Disp: , Rfl:   •  Cholecalciferol (VITAMIN D3 PO), Take  by mouth., Disp: , Rfl:   •  COLESTIPOL HCL PO, Take  by mouth Daily., Disp: , Rfl:   •  oxyCODONE-acetaminophen (Percocet) 5-325 MG per tablet, Take 1 tablet by mouth Every 6 (Six) Hours As Needed for Moderate Pain ., Disp: 10 tablet, Rfl: 0  •  potassium chloride (MICRO-K) 10 MEQ CR capsule, Take 2 capsules by mouth Daily As Needed (take on same days as Bumex). Indications: Low Amount of Potassium in the Blood, Disp: 30 capsule, Rfl: 0  •  QUEtiapine (SEROquel) 100 MG tablet, Take 100 mg by mouth Every Night., Disp: , Rfl:   •  rosuvastatin (CRESTOR) 5 MG tablet, TAKE 1 TABLET BY MOUTH DAILY IN THE MORNING, Disp: , Rfl:     Laboratory Values:    Labs from 9/20/2020 reviewed    Review of Systems:   Influenza-like illness: no fever, no  cough, no  sore throat, no  body aches, no loss of sense of taste or smell, no known exposure to person with Covid-19.  Constitutional: Negative for fevers or chills  HENT: Negative for hearing loss or runny " nose  Eyes: Negative for vision changes or scleral icterus  Respiratory: Negative for cough or shortness of breath  Cardiovascular: Negative for chest pain or heart palpitations  Gastrointestinal: Negative for abdominal pain, nausea, vomiting, constipation, melena, or hematochezia  Genitourinary: Negative for hematuria or dysuria  Musculoskeletal: Negative for joint swelling or gait instability  Neurologic: Negative for tremors or seizures  Psychiatric: Negative for suicidal ideations or depression  All other systems reviewed and negative    Physical Exam:   • ECO - Symptomatic, <50% confined to bed  • Constitutional: Well-developed well-nourished, no acute distress  • Eyes: Conjunctiva normal, sclera nonicteric  • ENMT: Hearing grossly normal, oral mucosa moist  • Neck: Supple, no palpable mass, trachea midline  • Respiratory: Clear to auscultation, normal inspiratory effort  • Cardiovascular: Regular rate, no peripheral edema, no jugular venous distention  • Breast: symmetric  o Right: No visible abnormalities on inspection while seated, with arms raised or hands on hips. No masses, skin changes, or nipple abnormalities.  Right breast incision from 10-1 o'clock clean and dry with no signs of erythema or drainage, no signs of infection, no seroma palpated  o Left: No visible abnormalities on inspection while seated, with arms raised or hands on hips. No masses, skin changes, or nipple abnormalities.  o No clinical chest wall involvement.  • Gastrointestinal: Soft, nontender  • Lymphatics (palpable nodes): No cervical, supraclavicular or axillary lymphadenopathy  • Skin:  Warm, dry, no rash on visualized skin surfaces  • Musculoskeletal: Symmetric strength, normal gait  • Psychiatric: Alert and oriented ×3, normal affect     ROOSEVELT JHA M.D.  General and Endoscopic Surgery  [facility]    [address], [address]  [place], [place], [address]  P: [phone]  F: [phone]

## 2021-09-07 ENCOUNTER — OFFICE VISIT (OUTPATIENT)
Dept: SURGERY | Facility: CLINIC | Age: 66
End: 2021-09-07

## 2021-09-07 VITALS — HEIGHT: 67 IN | BODY MASS INDEX: 41.59 KG/M2 | WEIGHT: 265 LBS

## 2021-09-07 DIAGNOSIS — Z17.0 MALIGNANT NEOPLASM OF UPPER-OUTER QUADRANT OF RIGHT BREAST IN FEMALE, ESTROGEN RECEPTOR POSITIVE (HCC): Primary | ICD-10-CM

## 2021-09-07 DIAGNOSIS — C50.411 MALIGNANT NEOPLASM OF UPPER-OUTER QUADRANT OF RIGHT BREAST IN FEMALE, ESTROGEN RECEPTOR POSITIVE (HCC): Primary | ICD-10-CM

## 2021-09-07 PROCEDURE — 99024 POSTOP FOLLOW-UP VISIT: CPT | Performed by: STUDENT IN AN ORGANIZED HEALTH CARE EDUCATION/TRAINING PROGRAM

## 2021-09-13 ENCOUNTER — CONSULT (OUTPATIENT)
Dept: ONCOLOGY | Facility: CLINIC | Age: 66
End: 2021-09-13

## 2021-09-13 ENCOUNTER — LAB (OUTPATIENT)
Dept: LAB | Facility: HOSPITAL | Age: 66
End: 2021-09-13

## 2021-09-13 VITALS
BODY MASS INDEX: 41.37 KG/M2 | DIASTOLIC BLOOD PRESSURE: 67 MMHG | WEIGHT: 263.6 LBS | HEART RATE: 62 BPM | RESPIRATION RATE: 18 BRPM | TEMPERATURE: 96.4 F | HEIGHT: 67 IN | SYSTOLIC BLOOD PRESSURE: 129 MMHG | OXYGEN SATURATION: 96 %

## 2021-09-13 DIAGNOSIS — D05.11 DUCTAL CARCINOMA IN SITU (DCIS) OF RIGHT BREAST: Primary | ICD-10-CM

## 2021-09-13 DIAGNOSIS — D05.11 DUCTAL CARCINOMA IN SITU (DCIS) OF RIGHT BREAST: ICD-10-CM

## 2021-09-13 LAB
ALBUMIN SERPL-MCNC: 4.4 G/DL (ref 3.5–5.2)
ALBUMIN/GLOB SERPL: 1.3 G/DL (ref 1.1–2.4)
ALP SERPL-CCNC: 62 U/L (ref 38–116)
ALT SERPL W P-5'-P-CCNC: 17 U/L (ref 0–33)
ANION GAP SERPL CALCULATED.3IONS-SCNC: 11 MMOL/L (ref 5–15)
AST SERPL-CCNC: 21 U/L (ref 0–32)
BASOPHILS # BLD AUTO: 0.06 10*3/MM3 (ref 0–0.2)
BASOPHILS NFR BLD AUTO: 0.7 % (ref 0–1.5)
BILIRUB SERPL-MCNC: 0.3 MG/DL (ref 0.2–1.2)
BUN SERPL-MCNC: 20 MG/DL (ref 6–20)
BUN/CREAT SERPL: 32.8 (ref 7.3–30)
CALCIUM SPEC-SCNC: 9.8 MG/DL (ref 8.5–10.2)
CHLORIDE SERPL-SCNC: 104 MMOL/L (ref 98–107)
CO2 SERPL-SCNC: 27 MMOL/L (ref 22–29)
CREAT SERPL-MCNC: 0.61 MG/DL (ref 0.6–1.1)
DEPRECATED RDW RBC AUTO: 45.4 FL (ref 37–54)
EOSINOPHIL # BLD AUTO: 0.4 10*3/MM3 (ref 0–0.4)
EOSINOPHIL NFR BLD AUTO: 4.6 % (ref 0.3–6.2)
ERYTHROCYTE [DISTWIDTH] IN BLOOD BY AUTOMATED COUNT: 13.5 % (ref 12.3–15.4)
GFR SERPL CREATININE-BSD FRML MDRD: 99 ML/MIN/1.73
GLOBULIN UR ELPH-MCNC: 3.3 GM/DL (ref 1.8–3.5)
GLUCOSE SERPL-MCNC: 110 MG/DL (ref 74–124)
HCT VFR BLD AUTO: 47.1 % (ref 34–46.6)
HGB BLD-MCNC: 14.8 G/DL (ref 12–15.9)
IMM GRANULOCYTES # BLD AUTO: 0.04 10*3/MM3 (ref 0–0.05)
IMM GRANULOCYTES NFR BLD AUTO: 0.5 % (ref 0–0.5)
LYMPHOCYTES # BLD AUTO: 2.31 10*3/MM3 (ref 0.7–3.1)
LYMPHOCYTES NFR BLD AUTO: 26.7 % (ref 19.6–45.3)
MCH RBC QN AUTO: 29.3 PG (ref 26.6–33)
MCHC RBC AUTO-ENTMCNC: 31.4 G/DL (ref 31.5–35.7)
MCV RBC AUTO: 93.3 FL (ref 79–97)
MONOCYTES # BLD AUTO: 0.75 10*3/MM3 (ref 0.1–0.9)
MONOCYTES NFR BLD AUTO: 8.7 % (ref 5–12)
NEUTROPHILS NFR BLD AUTO: 5.09 10*3/MM3 (ref 1.7–7)
NEUTROPHILS NFR BLD AUTO: 58.8 % (ref 42.7–76)
NRBC BLD AUTO-RTO: 0 /100 WBC (ref 0–0.2)
PLATELET # BLD AUTO: 285 10*3/MM3 (ref 140–450)
PMV BLD AUTO: 10.3 FL (ref 6–12)
POTASSIUM SERPL-SCNC: 4.6 MMOL/L (ref 3.5–4.7)
PROT SERPL-MCNC: 7.7 G/DL (ref 6.3–8)
RBC # BLD AUTO: 5.05 10*6/MM3 (ref 3.77–5.28)
SODIUM SERPL-SCNC: 142 MMOL/L (ref 134–145)
WBC # BLD AUTO: 8.65 10*3/MM3 (ref 3.4–10.8)

## 2021-09-13 PROCEDURE — 85025 COMPLETE CBC W/AUTO DIFF WBC: CPT

## 2021-09-13 PROCEDURE — 80053 COMPREHEN METABOLIC PANEL: CPT

## 2021-09-13 PROCEDURE — 36415 COLL VENOUS BLD VENIPUNCTURE: CPT

## 2021-09-13 PROCEDURE — 99244 OFF/OP CNSLTJ NEW/EST MOD 40: CPT | Performed by: INTERNAL MEDICINE

## 2021-09-13 RX ORDER — ANASTROZOLE 1 MG/1
1 TABLET ORAL DAILY
Qty: 30 TABLET | Refills: 3 | Status: SHIPPED | OUTPATIENT
Start: 2021-09-13 | End: 2022-03-01

## 2021-09-13 NOTE — PROGRESS NOTES
Subjective   Francheska Sherman is a 66 y.o. female.  Referred by Dr. Pereyra for right breast ductal carcinoma in situ, ER/KS positive, grade 2    History of Present Illness   Ms. Aguilar is a 66-year-old  lady who has history of COPD, on supplemental oxygen, hypertension, hyperlipidemia presented with a screen detected abnormality of the right breast.  Patient's last mammogram was in 2012.  Most recently her screening mammogram performed in 2021 showed abnormality in the right breast which was further evaluated.  Family history is limited and no family history of breast or ovarian cancer as far she knows.  Denies any previous abnormal mammograms or breast biopsies.  She had a hysterectomy and a salpingo-oophorectomy at the age of 39 as she had an ovarian abscess and had sepsis secondary to the same.    5/19/2021-bilateral screening mammogram  1.area of focal asymmetry in the posterior one third of the right breast, lateral to the nipple.  Additional imaging recommended.  2.no suspicious findings in the left breast.    6/18/2021-right breast diagnostic mammogram and ultrasound  Suspicious 0.8 cm mass at 10 o'clock position of the right breast.  Ultrasound-guided core needle biopsy recommended.    7/1/2021-right breast 10 o'clock position, 6 cm from the nipple core biopsy  A.low-grade papillary and cribriform DCIS.  DCIS measures 5.5 mm  B.no invasive carcinoma identified.  ER +99% strong, KS positive greater than 95% strong, Ki-67 14%.    8/19/2021-right breast lumpectomy  Pathology consistent with ductal carcinoma in situ, measures 9 mm, cribriform and papillary, grade 2, margins negative, pTis NX M0  ER +99%, KS +95%    She has been referred by Dr. Pereyra to discuss adjuvant therapy.  She is recovering well from surgery.    The following portions of the patient's history were reviewed and updated as appropriate: allergies, current medications, past family history, past medical history, past social history, past  surgical history and problem list.    Past Medical History:   Diagnosis Date   • ADD (attention deficit disorder)    • Anxiety    • ARDS survivor    • Arthritis    • Breast cancer (CMS/HCC)    • Cancer (CMS/HCC)    • COPD (chronic obstructive pulmonary disease) (CMS/HCC)    • Encounter for annual health examination     Annual Health Assessment: 14, 10/25/13   • GERD (gastroesophageal reflux disease)    • Hepatitis-C    • History of mammogram 2011   • Hyperactivity of bladder    • Hypertension    • Irritable bowel syndrome    • On supplemental oxygen by nasal cannula     3L DURING DAY AND 4.5L AT NIGHT   • Pain of right side of body     c/o pain in right side and back   • Psychiatric illness    • Pulmonary fibrosis (CMS/HCC)    • Sepsis (CMS/HCC)         Past Surgical History:   Procedure Laterality Date   • APPENDECTOMY     • BREAST LUMPECTOMY Right 2021    Procedure: RIGHT BREAST WIRE-LOCALIZED BREAST LUMPECTOMY;  Surgeon: Ngozi Pereyra MD;  Location: [facility] OR [facility] [place];  Service: General;  Laterality: Right;   • CARDIAC CATHETERIZATION N/A 2020    Procedure: LEFT HEART CATH;  Surgeon: Karely Zee MD;  Location: [facility] CATH INVASIVE LOCATION;  Service: Cardiovascular;  Laterality: N/A;   • CARDIAC CATHETERIZATION N/A 2020    Procedure: CORONARY ANGIOGRAPHY;  Surgeon: Karely Zee MD;  Location: [facility] CATH INVASIVE LOCATION;  Service: Cardiovascular;  Laterality: N/A;   • CARDIAC CATHETERIZATION N/A 2020    Procedure: Left ventriculography;  Surgeon: Karely Zee MD;  Location: [facility] CATH INVASIVE LOCATION;  Service: Cardiovascular;  Laterality: N/A;   •  SECTION     • CHEST TUBE INSERTION     • CHOLECYSTECTOMY     • COLONOSCOPY N/A 2019    Procedure: COLONOSCOPY into cecum and TI with cold biopsy polypectomies;  Surgeon: Fernandez Hooks MD;  Location: [facility] ENDOSCOPY;  Service: Gastroenterology   • ENDOSCOPY N/A 2019     "Procedure: ESOPHAGOGASTRODUODENOSCOPY with biopsies;  Surgeon: Fernandez Hooks MD;  Location: [facility] ENDOSCOPY;  Service: Gastroenterology   • HYSTERECTOMY     • OOPHORECTOMY          Family History   Problem Relation Age of Onset   • Liver disease Mother    • Cervical cancer Mother    • Arthritis Mother    • Crohn's disease Brother    • Malig Hyperthermia Neg Hx         Social History     Socioeconomic History   • Marital status:      Spouse name: Tate Sherman   • Number of children: Not on file   • Years of education: Not on file   • Highest education level: Not on file   Tobacco Use   • Smoking status: Former Smoker     Types: Cigarettes     Quit date: 1994     Years since quittin.7   • Smokeless tobacco: Never Used   • Tobacco comment: QUIT    Vaping Use   • Vaping Use: Never used   Substance and Sexual Activity   • Alcohol use: No   • Drug use: No   • Sexual activity: Defer        OB History        1    Para   1    Term   1            AB        Living           SAB        TAB        Ectopic        Molar        Multiple        Live Births                     Allergies   Allergen Reactions   • Adhesive Tape Other (See Comments)     Blisters   • Codeine Other (See Comments)     \"Made me feel like my head was really heavy and going to explode\"   • Morphine Delirium   • Sulfa Antibiotics Rash            Review of Systems   Constitutional: Negative.    HENT: Negative.    Eyes: Negative.    Respiratory: Positive for shortness of breath.    Cardiovascular: Negative.    Gastrointestinal: Negative.    Endocrine: Negative.    Genitourinary: Positive for amenorrhea.   Musculoskeletal: Negative.    Neurological: Negative.    Hematological: Negative.    Psychiatric/Behavioral: Negative.          Objective   Blood pressure 129/67, pulse 62, temperature 96.4 °F (35.8 °C), temperature source Temporal, resp. rate 18, height 168.9 cm (66.5\"), weight 120 kg (263 lb 9.6 oz), SpO2 96 %.   Physical " Exam  Constitutional:       Appearance: Normal appearance. She is obese.   HENT:      Head: Normocephalic and atraumatic.      Nose: Nose normal.      Mouth/Throat:      Mouth: Mucous membranes are moist.      Pharynx: Oropharynx is clear.   Eyes:      Extraocular Movements: Extraocular movements intact.      Conjunctiva/sclera: Conjunctivae normal.      Pupils: Pupils are equal, round, and reactive to light.   Cardiovascular:      Rate and Rhythm: Normal rate and regular rhythm.      Pulses: Normal pulses.      Heart sounds: Normal heart sounds.   Pulmonary:      Effort: Pulmonary effort is normal.      Breath sounds: Normal breath sounds.   Abdominal:      General: Abdomen is flat. Bowel sounds are normal.      Palpations: Abdomen is soft.   Musculoskeletal:         General: Normal range of motion.      Cervical back: Normal range of motion and neck supple.   Skin:     General: Skin is warm.   Neurological:      General: No focal deficit present.      Mental Status: She is alert and oriented to person, place, and time. Mental status is at baseline.   Psychiatric:         Mood and Affect: Mood normal.         Behavior: Behavior normal.         Thought Content: Thought content normal.         Judgment: Judgment normal.       Breast Exam: Left breast appears normal inspection.  No palpable abnormalities of the left breast.  Right breast there is a scar in the upper outer quadrant which is healing well.  Palpation not performed due to recent surgery.    Lab on 09/13/2021   Component Date Value Ref Range Status   • WBC 09/13/2021 8.65  3.40 - 10.80 10*3/mm3 Final   • RBC 09/13/2021 5.05  3.77 - 5.28 10*6/mm3 Final   • Hemoglobin 09/13/2021 14.8  12.0 - 15.9 g/dL Final   • Hematocrit 09/13/2021 47.1* 34.0 - 46.6 % Final   • MCV 09/13/2021 93.3  79.0 - 97.0 fL Final   • MCH 09/13/2021 29.3  26.6 - 33.0 pg Final   • MCHC 09/13/2021 31.4* 31.5 - 35.7 g/dL Final   • RDW 09/13/2021 13.5  12.3 - 15.4 % Final   • RDW-SD  09/13/2021 45.4  37.0 - 54.0 fl Final   • MPV 09/13/2021 10.3  6.0 - 12.0 fL Final   • Platelets 09/13/2021 285  140 - 450 10*3/mm3 Final   • Neutrophil % 09/13/2021 58.8  42.7 - 76.0 % Final   • Lymphocyte % 09/13/2021 26.7  19.6 - 45.3 % Final   • Monocyte % 09/13/2021 8.7  5.0 - 12.0 % Final   • Eosinophil % 09/13/2021 4.6  0.3 - 6.2 % Final   • Basophil % 09/13/2021 0.7  0.0 - 1.5 % Final   • Immature Grans % 09/13/2021 0.5  0.0 - 0.5 % Final   • Neutrophils, Absolute 09/13/2021 5.09  1.70 - 7.00 10*3/mm3 Final   • Lymphocytes, Absolute 09/13/2021 2.31  0.70 - 3.10 10*3/mm3 Final   • Monocytes, Absolute 09/13/2021 0.75  0.10 - 0.90 10*3/mm3 Final   • Eosinophils, Absolute 09/13/2021 0.40  0.00 - 0.40 10*3/mm3 Final   • Basophils, Absolute 09/13/2021 0.06  0.00 - 0.20 10*3/mm3 Final   • Immature Grans, Absolute 09/13/2021 0.04  0.00 - 0.05 10*3/mm3 Final   • nRBC 09/13/2021 0.0  0.0 - 0.2 /100 WBC Final   Admission on 08/19/2021, Discharged on 08/19/2021   Component Date Value Ref Range Status   • Case Report 08/19/2021    Final                    Value:Surgical Pathology Report                         Case: [id number]                                  Authorizing Provider:  Ngozi Pereyra MD        Collected:           08/19/2021 09:39 AM          Ordering Location:     [facility]  Received:            08/19/2021 10:08 AM                                 [facility] OR                                                                       Pathologist:           Jono García MD                                                          Specimen:    Breast, Right, FRESH FOR PERMANENT RIGHT BREAST LUMPECTOMY, SHORT STITCH SUPERIOR,                  LONG STITCH LATERAL, DOUBLE STITCH ANTERIOR                                               • Final Diagnosis 08/19/2021    Final                    Value:This result contains rich text formatting which cannot be displayed here.   • Synoptic Checklist 08/19/2021     Final                    Value:DCIS OF THE BREAST: Resection  (DCIS OF THE BREAST: COMPLETE EXCISION - All Specimens)                                                        8th Edition - Protocol posted: 2/26/2020                                                        SPECIMEN                               Procedure:    Excision (less than total mastectomy)                                Specimen Laterality:    Right                                                         TUMOR                               Tumor Site:    Upper outer quadrant                                Tumor Site:    Clock position                                :    10 o'clock                              Tumor Site:    Distance from nipple (Centimeters): 6 cm                             Histologic Type:    Ductal carcinoma in situ                              Size (Extent) of DCIS:    Estimated size (extent) of DCIS is at least (Millimeters): 9 mm                             Architectural Patterns:    Cribriform                              Architectural Patterns:    Papillary                              Architectural Patterns:    Solid                              Nuclear Grade:    Grade II (intermediate)                              Necrosis:    Not identified                              Microcalcifications:    Not identified                                                         MARGINS                             Margins:    Uninvolved by DCIS                                Distance from Closest Margin (Millimeters):    9 mm                               Closest Margin(s):    Medial                                Distance from Other Margins:                                     Anterior Margin (Millimeters):    27 mm                                 Posterior Margin (Millimeters):    36 mm                                 Superior Margin (Millimeters):    52 mm                                 Inferior Margin (Millimeters):    12 mm                                  Lateral Margin (Millimeters):    20 mm                                                        LYMPH NODES                             Regional Lymph Nodes:    No lymph nodes submitted or found                                                         PATHOLOGIC STAGE CLASSIFICATION (pTNM, AJCC 8th Edition)                                                            Primary Tumor (pT):    pTis (DCIS)                              Regional Lymph Nodes (pN):    pNX                                                         SPECIAL STUDIES                             Breast Biomarker Testing Performed on Previous Biopsy:                                   Estrogen Receptor (ER) Status:    Positive                                  Percentage of Cells with Nuclear Positivity:    99 %                             Breast Biomarker Testing Performed on Previous Biopsy:                                   Progesterone Receptor (PgR) Status:    Positive                                  Percentage of Cells with Nuclear Positivity:    95 %                               Testing Performed on Case Number:    [id number]      • Comment 08/19/2021    Final                    Value:This result contains rich text formatting which cannot be displayed here.   • Gross Description 08/19/2021    Final                    Value:This result contains rich text formatting which cannot be displayed here.   Pre-Admission Testing on 08/17/2021   Component Date Value Ref Range Status   • WBC 08/17/2021 9.46  3.40 - 10.80 10*3/mm3 Final   • RBC 08/17/2021 4.89  3.77 - 5.28 10*6/mm3 Final   • Hemoglobin 08/17/2021 14.5  12.0 - 15.9 g/dL Final   • Hematocrit 08/17/2021 44.4  34.0 - 46.6 % Final   • MCV 08/17/2021 90.8  79.0 - 97.0 fL Final   • MCH 08/17/2021 29.7  26.6 - 33.0 pg Final   • MCHC 08/17/2021 32.7  31.5 - 35.7 g/dL Final   • RDW 08/17/2021 12.4  12.3 - 15.4 % Final   • RDW-SD 08/17/2021 41.1  37.0 - 54.0 fl Final   • MPV  08/17/2021 10.7  6.0 - 12.0 fL Final   • Platelets 08/17/2021 289  140 - 450 10*3/mm3 Final   • Glucose 08/17/2021 105* 65 - 99 mg/dL Final   • BUN 08/17/2021 17  8 - 23 mg/dL Final   • Creatinine 08/17/2021 0.70  0.57 - 1.00 mg/dL Final   • Sodium 08/17/2021 141  136 - 145 mmol/L Final   • Potassium 08/17/2021 4.4  3.5 - 5.2 mmol/L Final   • Chloride 08/17/2021 105  98 - 107 mmol/L Final   • CO2 08/17/2021 24.3  22.0 - 29.0 mmol/L Final   • Calcium 08/17/2021 9.4  8.6 - 10.5 mg/dL Final   • Total Protein 08/17/2021 7.6  6.0 - 8.5 g/dL Final   • Albumin 08/17/2021 4.50  3.50 - 5.20 g/dL Final   • ALT (SGPT) 08/17/2021 18  1 - 33 U/L Final   • AST (SGOT) 08/17/2021 18  1 - 32 U/L Final   • Alkaline Phosphatase 08/17/2021 59  39 - 117 U/L Final   • Total Bilirubin 08/17/2021 0.2  0.0 - 1.2 mg/dL Final   • eGFR Non  Amer 08/17/2021 84  >60 mL/min/1.73 Final   • Globulin 08/17/2021 3.1  gm/dL Final   • A/G Ratio 08/17/2021 1.5  g/dL Final   • BUN/Creatinine Ratio 08/17/2021 24.3  7.0 - 25.0 Final   • Anion Gap 08/17/2021 11.7  5.0 - 15.0 mmol/L Final   • COVID19 08/17/2021 Not Detected  Not Detected - Ref. Range Final        Mammo Breast Placement Device Initial Without Biopsy    Result Date: 8/19/2021  Technically successful mammographic guided right breast needle localization.  This report was finalized on 8/19/2021 10:28 AM by Dr. Yousif Magallon M.D.      XR Breast Specimen    Result Date: 8/19/2021  Technically successful mammographic guided right breast needle localization.  This report was finalized on 8/19/2021 10:28 AM by Dr. Yousif Magallon M.D.       Breast imaging independently reviewed interpreted by me in detail summarized in the HPI above.    Assessment/Plan       *DCIS of the right breast  · Grade 2, ER/WI positive  · Status post right breast lumpectomy  · Margins negative  · DCIS measures 9 mm  · Discussed the origin of carcinoma from the ducts.  Explained to her that in DCIS the malignant cells  are contained to within the duct.  Explained that there is risk of recurrence of DCIS as well as risk of ipsilateral as well as contralateral new invasive carcinoma however there is no risk of distant spread with DCIS.  · Explained the significance of ER/NM positivity as well as grade.  · She has been referred to radiation oncology to discuss adjuvant radiation.  · She is here to discuss adjuvant endocrine therapy.  · Explained to her about the benefit of adjuvant endocrine therapy and decreasing the recurrence of DCIS as well as providing new invasive carcinomas.  · Since she is postmenopausal discussed anastrozole 1 mg p.o. daily.  · Adverse effects of anastrozole including but not limited to hot flashes, mood changes, fatigue, alopecia, decrease in bone mineral density, arthralgias and myalgias, vaginal dryness, sexual dysfunction, cardiovascular risk.  · Recommend anastrozole for total of 5 years.  · Start anastrozole following completion of radiation.    *COPD-currently on 3 L of oxygen by nasal cannula.  Recent CT chest shows emphysematous changes.  Continue follow-up with pulmonary.    *Hypertension-blood pressure 129/87, continue amlodipine    *Hyperlipidemia-continue Crestor    *Follow-up-6 months    62 minutes spent on the encounter including reviewing the medical records, face-to-face time, documentation on the same day

## 2021-09-15 ENCOUNTER — APPOINTMENT (OUTPATIENT)
Dept: RADIATION ONCOLOGY | Facility: HOSPITAL | Age: 66
End: 2021-09-15

## 2021-09-15 ENCOUNTER — CONSULT (OUTPATIENT)
Dept: RADIATION ONCOLOGY | Facility: HOSPITAL | Age: 66
End: 2021-09-15

## 2021-09-15 VITALS — DIASTOLIC BLOOD PRESSURE: 62 MMHG | HEART RATE: 77 BPM | SYSTOLIC BLOOD PRESSURE: 135 MMHG | OXYGEN SATURATION: 96 %

## 2021-09-15 DIAGNOSIS — D05.11 DUCTAL CARCINOMA IN SITU (DCIS) OF RIGHT BREAST: Primary | ICD-10-CM

## 2021-09-15 PROCEDURE — 77333 RADIATION TREATMENT AID(S): CPT | Performed by: RADIOLOGY

## 2021-09-15 PROCEDURE — 99205 OFFICE O/P NEW HI 60 MIN: CPT | Performed by: RADIOLOGY

## 2021-09-15 PROCEDURE — 77290 THER RAD SIMULAJ FIELD CPLX: CPT | Performed by: RADIOLOGY

## 2021-09-15 NOTE — PROGRESS NOTES
Subjective     Ngozi Pereyra MD    Cancer Staging  No matching staging information was found for the patient.    Chief Complaint   Patient presents with   • Consult     Breast CA     CC: dcis right breast, grade II, 9mm, ER MS pos                              Dear Ngozi Pereyra MD    I had the pleasure of seeing Francheska Sherman  [facility].   The patient is a 66 y.o. female with recently diagnosed right breast cancer.  She had a screening mammogram on 21 which showed focal asymmetry posterior one third right breast.  She had a diagnostic right mammogram on 21 which showed 9mm oval mass uoq posterior right breast.  She had an ultrasound guided biopsy on 21 which showed low grade papillary and cribriform DCIS 5.5mm ER 99% MS 95% Lo67 14%    She underwent a right breast lumpectomy on 21 with pathology revealing intermediate grade DCIS, solid, cribriform and papillary architecture measuring 9mm with focal adh.  The closest medial margin was 9mm.      She met with Dr. Zamudio with medical oncology on 21 and she has recommended adjuvant anastrazole following radiation.     She is  with menarche age 14, first childbirth age 23 and menopause age 39.  She took birth control pills for 3 years and hormone replacement therapy for 7 years.  She breast fed for one month. She has no family hx of breast or ovarian cancer. She has a hx of COPD on 3L oxygen.            Review of Systems   Constitutional: Negative.    HENT: Negative.    Eyes: Negative.    Respiratory: Positive for shortness of breath.    Cardiovascular: Negative.    Gastrointestinal: Negative.    Genitourinary: Negative.    Musculoskeletal: Negative.    Skin: Negative.    Neurological: Negative.    Hematological: Negative.    Psychiatric/Behavioral: Negative.          Past Medical History:   Diagnosis Date   • ADD (attention deficit disorder)    • Anxiety    • ARDS survivor    • Arthritis    • Breast cancer  (CMS/HCC)    • Cancer (CMS/HCC)    • COPD (chronic obstructive pulmonary disease) (CMS/HCC)    • Encounter for annual health examination     Annual Health Assessment: 14, 10/25/13   • GERD (gastroesophageal reflux disease)    • Hepatitis-C    • History of mammogram 2011   • Hyperactivity of bladder    • Hypertension    • Irritable bowel syndrome    • On supplemental oxygen by nasal cannula     3L DURING DAY AND 4.5L AT NIGHT   • Pain of right side of body     c/o pain in right side and back   • Psychiatric illness    • Pulmonary fibrosis (CMS/HCC)    • Sepsis (CMS/HCC)          Past Surgical History:   Procedure Laterality Date   • APPENDECTOMY     • BREAST LUMPECTOMY Right 2021    Procedure: RIGHT BREAST WIRE-LOCALIZED BREAST LUMPECTOMY;  Surgeon: Ngozi Pereyra MD;  [facility] [place];  Service: General;  Laterality: Right;   • CARDIAC CATHETERIZATION N/A 2020    Procedure: LEFT HEART CATH;  Surgeon: Karely Zee MD;  Location: [facility] CATH INVASIVE LOCATION;  Service: Cardiovascular;  Laterality: N/A;   • CARDIAC CATHETERIZATION N/A 2020    Procedure: CORONARY ANGIOGRAPHY;  Surgeon: Karely Zee MD;  Location: [facility] CATH INVASIVE LOCATION;  Service: Cardiovascular;  Laterality: N/A;   • CARDIAC CATHETERIZATION N/A 2020    Procedure: Left ventriculography;  Surgeon: Karely Zee MD;  Location: [facility] CATH INVASIVE LOCATION;  Service: Cardiovascular;  Laterality: N/A;   •  SECTION  1978   • CHEST TUBE INSERTION     • CHOLECYSTECTOMY     • COLONOSCOPY N/A 2019    Procedure: COLONOSCOPY into cecum and TI with cold biopsy polypectomies;  Surgeon: Fernandez Hooks MD;  Location: [facility] ENDOSCOPY;  Service: Gastroenterology   • ENDOSCOPY N/A 2019    Procedure: ESOPHAGOGASTRODUODENOSCOPY with biopsies;  Surgeon: Fernandez Hooks MD;  Location: [facility] ENDOSCOPY;  Service: Gastroenterology   • HYSTERECTOMY     • OOPHORECTOMY            Social History     Socioeconomic History   • Marital status:      Spouse name: Tate Sherman   • Number of children: Not on file   • Years of education: Not on file   • Highest education level: Not on file   Tobacco Use   • Smoking status: Former Smoker     Types: Cigarettes     Quit date: 1994     Years since quittin.7   • Smokeless tobacco: Never Used   • Tobacco comment: QUIT    Vaping Use   • Vaping Use: Never used   Substance and Sexual Activity   • Alcohol use: No   • Drug use: No   • Sexual activity: Defer         Family History   Problem Relation Age of Onset   • Liver disease Mother    • Cervical cancer Mother    • Arthritis Mother    • Crohn's disease Brother    • Malig Hyperthermia Neg Hx           Objective    Physical Exam  Constitutional:       Appearance: She is obese.   HENT:      Head: Atraumatic.   Eyes:      Extraocular Movements: Extraocular movements intact.   Pulmonary:      Effort: Pulmonary effort is normal.   Chest:          Comments: Incision healing well, no palpable masses in either breast or axilla,   Musculoskeletal:         General: Normal range of motion.   Psychiatric:         Mood and Affect: Mood normal.         Thought Content: Thought content normal.           Current Outpatient Medications on File Prior to Visit   Medication Sig Dispense Refill   • acetaminophen (TYLENOL) 325 MG tablet Take 2 tablets by mouth Every 4 (Four) Hours As Needed for Mild Pain .     • amLODIPine (NORVASC) 5 MG tablet Take 5 mg by mouth 2 (Two) Times a Day As Needed. Indications: High Blood Pressure Disorder  0   • bisoprolol (ZEBeta) 5 MG tablet Take 1.5 tablets by mouth Daily. Indications: High Blood Pressure Disorder 45 tablet 2   • bumetanide (BUMEX) 0.5 MG tablet Take 1 tablet by mouth Daily As Needed (Lower extremity fluid). Indications: Edema 30 tablet 0   • Cholecalciferol (VITAMIN D3 PO) Take  by mouth.     • COLESTIPOL HCL PO Take  by mouth Daily.     • potassium chloride  "(MICRO-K) 10 MEQ CR capsule Take 2 capsules by mouth Daily As Needed (take on same days as Bumex). Indications: Low Amount of Potassium in the Blood 30 capsule 0   • QUEtiapine (SEROquel) 100 MG tablet Take 100 mg by mouth Every Night.     • rosuvastatin (CRESTOR) 5 MG tablet TAKE 1 TABLET BY MOUTH DAILY IN THE MORNING     • anastrozole (ARIMIDEX) 1 MG tablet Take 1 tablet by mouth Daily. 30 tablet 3   • Chlorhexidine Gluconate (HIBICLENS EX) Apply  topically. AS DIRECTED PREOP       No current facility-administered medications on file prior to visit.       ALLERGIES:    Allergies   Allergen Reactions   • Adhesive Tape Other (See Comments)     Blisters   • Codeine Other (See Comments)     \"Made me feel like my head was really heavy and going to explode\"   • Morphine Delirium   • Sulfa Antibiotics Rash       /62   Pulse 77   SpO2 96%      (1) Restricted in physically strenuous activity, ambulatory and able to do work of light nature  No flowsheet data found.      Assessment/Plan     66 year old female with intermediate grade dcis right breast, ER NH pos 9mm in size with negative margins.  I discussed with her my recommendation for post-operative radiation therapy to the right breast to decrease the risk of local recurrence.      I discussed with her in detail the risks, benefits and rationale of radiation therapy to the right breast to include but not limited to the following:    Acute: skin erythema, breakdown/moist desquamation, swelling or discomfort of the breast, fatigue, pneumonitis resulting in shortness of breath, cough or pain    Late: Permanent skin changes including hyperpigmentation, telangiectasias, fibrosis of the breast resulting in smaller size or poor cosmetic outcome, late edema or cellulitis, late rib fracture, late pulmonary fibrosis and the remote risk of second malignancies.      She voiced understanding and was given an opportunity to ask questions which I believe were answered to her " satisfaction.  We will proceed with CT simulation for treatment planning today in anticipation of starting her treatments next week. I plan to treat the right breast with tangential fields to a dose of approximately 3990cGy in 15 fractions with no boost.     I personally spent greater than 60 minutes today assessing, managing, discussing and documenting my visit with the patient. That time includes review of records, imaging and pathology reports, obtaining my own history, performing a medically appropriate evaluation, counseling and educating the patient, discussing goals, logistics, alternatives and risks of my recommendations, surveillance options and potential outcomes. It also includes the time documenting the clinical information in the EMR and communicating my recommendations to the other involved physicians.               Thank you very much for allowing me to participate in the care of this very pleasant patient.    Sincerely,      Simran Coats MD

## 2021-09-16 PROCEDURE — 77263 THER RADIOLOGY TX PLNG CPLX: CPT | Performed by: RADIOLOGY

## 2021-09-17 ENCOUNTER — HOSPITAL ENCOUNTER (OUTPATIENT)
Dept: BONE DENSITY | Facility: HOSPITAL | Age: 66
Discharge: HOME | End: 2021-09-17
Admitting: INTERNAL MEDICINE

## 2021-09-17 DIAGNOSIS — D05.11 DUCTAL CARCINOMA IN SITU (DCIS) OF RIGHT BREAST: ICD-10-CM

## 2021-09-17 PROCEDURE — 77080 DXA BONE DENSITY AXIAL: CPT

## 2021-09-20 PROCEDURE — 77295 3-D RADIOTHERAPY PLAN: CPT | Performed by: RADIOLOGY

## 2021-09-20 PROCEDURE — 77334 RADIATION TREATMENT AID(S): CPT | Performed by: RADIOLOGY

## 2021-09-20 PROCEDURE — 77300 RADIATION THERAPY DOSE PLAN: CPT | Performed by: RADIOLOGY

## 2021-09-27 PROCEDURE — 77280 THER RAD SIMULAJ FIELD SMPL: CPT | Performed by: RADIOLOGY

## 2021-09-27 PROCEDURE — 77427 RADIATION TX MANAGEMENT X5: CPT | Performed by: RADIOLOGY

## 2021-09-27 PROCEDURE — 77412 RADIATION TX DELIVERY LVL 3: CPT | Performed by: RADIOLOGY

## 2021-09-28 PROCEDURE — 77336 RADIATION PHYSICS CONSULT: CPT | Performed by: RADIOLOGY

## 2021-09-28 PROCEDURE — 77412 RADIATION TX DELIVERY LVL 3: CPT | Performed by: RADIOLOGY

## 2021-09-28 PROCEDURE — 77387 GUIDANCE FOR RADJ TX DLVR: CPT | Performed by: RADIOLOGY

## 2021-09-29 ENCOUNTER — RADIATION ONCOLOGY WEEKLY ASSESSMENT (OUTPATIENT)
Dept: RADIATION ONCOLOGY | Facility: HOSPITAL | Age: 66
End: 2021-09-29

## 2021-09-29 DIAGNOSIS — D05.11 DUCTAL CARCINOMA IN SITU (DCIS) OF RIGHT BREAST: Primary | ICD-10-CM

## 2021-09-29 PROCEDURE — 77387 GUIDANCE FOR RADJ TX DLVR: CPT | Performed by: RADIOLOGY

## 2021-09-29 PROCEDURE — 77412 RADIATION TX DELIVERY LVL 3: CPT | Performed by: RADIOLOGY

## 2021-09-29 NOTE — PROGRESS NOTES
Physician Weekly Management Note    Diagnosis:     Diagnosis Plan   1. Ductal carcinoma in situ (DCIS) of right breast         RT Details:  fx 3/15  Right breast    Notes on Treatment course, Films, Medical progress:  Doing well, no erythema    Weekly Management:  Medication reviewed?   Yes  New medications given?   No  Problemlist reviewed?   Yes  Problem added?   No  Issues raised requiring referral to support services - task assigned to:  malaika    Technical aspects reviewed:  Weekly OBI approved?   Yes  Weekly port films approved?   Yes  Change requests noted on port film?   No  Patient setup and plan reviewed?   Yes    Chart Reviewed:  Continue current treatment plan?   Yes  Treatment plan change requested?   No  CBC reviewed?   No  Concurrent Chemo?   No    Objective     Toxicities:   none     Review of Systems   Constitutional: Negative.    Skin: Negative.           There were no vitals filed for this visit.    No flowsheet data found.    Physical Exam  Constitutional:       Appearance: Normal appearance.   Chest:          Comments: No erythema            Problem Summary List    Diagnosis:     Diagnosis Plan   1. Ductal carcinoma in situ (DCIS) of right breast       Pathology:   breast    Past Medical History:   Diagnosis Date   • ADD (attention deficit disorder)    • Anxiety    • ARDS survivor 1994   • Arthritis    • Breast cancer (CMS/HCC)    • Cancer (CMS/HCC)    • COPD (chronic obstructive pulmonary disease) (CMS/HCC)    • Encounter for annual health examination     Annual Health Assessment: 07/30/14, 10/25/13   • GERD (gastroesophageal reflux disease)    • Hepatitis-C    • History of mammogram 04/14/2011   • Hyperactivity of bladder    • Hypertension    • Irritable bowel syndrome    • On supplemental oxygen by nasal cannula     3L DURING DAY AND 4.5L AT NIGHT   • Pain of right side of body     c/o pain in right side and back   • Psychiatric illness    • Pulmonary fibrosis (CMS/HCC)    • Sepsis (CMS/HCC)           Past Surgical History:   Procedure Laterality Date   • APPENDECTOMY     • BREAST LUMPECTOMY Right 2021    Procedure: RIGHT BREAST WIRE-LOCALIZED BREAST LUMPECTOMY;  Surgeon: Ngozi Pereyra MD;  Location:  [facility] [place];  Service: General;  Laterality: Right;   • CARDIAC CATHETERIZATION N/A 2020    Procedure: LEFT HEART CATH;  Surgeon: Karely Zee MD;  Location: [facility] C[facility]IVE LOC[facility];  Service: Cardiovascular;  Laterality: N/A;   • CARDIAC CATHETERIZATION N/A 2020    Procedure: CORONARY ANGIOGRAPHY;  Surgeon: Karely Zee MD;  Location: [facility] CATH [facility]IVE LOC[facility];  Service: Cardiovascular;  Laterality: N/A;   • CARDIAC CATHETERIZATION N/A 2020    Procedure: Left ventriculography;  Surgeon: Karely Zee MD;  Location: [facility] CATH [facility]ASIVE LOC[facility];  Service: Cardiovascular;  Laterality: N/A;   •  SECTION  1978   • CHEST TUBE INSERTION     • CHOLECYSTECTOMY     • COLONOSCOPY N/A 2019    Procedure: COLONOSCOPY into cecum and TI with cold biopsy polypectomies;  Surgeon: Fernandez Hooks MD;  Location: [facility] ENDOSCOPY;  Service: Gastroenterology   • ENDOSCOPY N/A 2019    Procedure: ESOPHAGOGASTRODUODENOSCOPY with biopsies;  Surgeon: Fernandez Hooks MD;  Location: [facility] ENDOSCOPY;  Service: Gastroenterology   • HYSTERECTOMY     • OOPHORECTOMY           Current Outpatient Medications on File Prior to Visit   Medication Sig Dispense Refill   • acetaminophen (TYLENOL) 325 MG tablet Take 2 tablets by mouth Every 4 (Four) Hours As Needed for Mild Pain .     • amLODIPine (NORVASC) 5 MG tablet Take 5 mg by mouth 2 (Two) Times a Day As Needed. Indications: High Blood Pressure Disorder  0   • anastrozole (ARIMIDEX) 1 MG tablet Take 1 tablet by mouth Daily. 30 tablet 3   • bisoprolol (ZEBeta) 5 MG tablet Take 1.5 tablets by mouth Daily. Indications: High Blood Pressure Disorder 45 tablet 2   • bumetanide (BUMEX) 0.5 MG tablet Take 1 tablet by  "mouth Daily As Needed (Lower extremity fluid). Indications: Edema 30 tablet 0   • Chlorhexidine Gluconate (HIBICLENS EX) Apply  topically. AS DIRECTED PREOP     • Cholecalciferol (VITAMIN D3 PO) Take  by mouth.     • COLESTIPOL HCL PO Take  by mouth Daily.     • potassium chloride (MICRO-K) 10 MEQ CR capsule Take 2 capsules by mouth Daily As Needed (take on same days as Bumex). Indications: Low Amount of Potassium in the Blood 30 capsule 0   • QUEtiapine (SEROquel) 100 MG tablet Take 100 mg by mouth Every Night.     • rosuvastatin (CRESTOR) 5 MG tablet TAKE 1 TABLET BY MOUTH DAILY IN THE MORNING       No current facility-administered medications on file prior to visit.       Allergies   Allergen Reactions   • Adhesive Tape Other (See Comments)     Blisters   • Codeine Other (See Comments)     \"Made me feel like my head was really heavy and going to explode\"   • Morphine Delirium   • Sulfa Antibiotics Rash         Referring Provider:    No referring provider defined for this encounter.    Oncologist:  No primary care provider on file.      Seen and approved by:  Simran Coats MD  09/29/2021  "

## 2021-09-30 PROCEDURE — 77387 GUIDANCE FOR RADJ TX DLVR: CPT | Performed by: RADIOLOGY

## 2021-09-30 PROCEDURE — 77412 RADIATION TX DELIVERY LVL 3: CPT | Performed by: RADIOLOGY

## 2021-10-01 ENCOUNTER — APPOINTMENT (OUTPATIENT)
Dept: RADIATION ONCOLOGY | Facility: HOSPITAL | Age: 66
End: 2021-10-01

## 2021-10-01 PROCEDURE — 77412 RADIATION TX DELIVERY LVL 3: CPT | Performed by: RADIOLOGY

## 2021-10-01 PROCEDURE — 77387 GUIDANCE FOR RADJ TX DLVR: CPT | Performed by: RADIOLOGY

## 2021-10-04 PROCEDURE — 77427 RADIATION TX MANAGEMENT X5: CPT | Performed by: RADIOLOGY

## 2021-10-04 PROCEDURE — 77387 GUIDANCE FOR RADJ TX DLVR: CPT | Performed by: RADIOLOGY

## 2021-10-04 PROCEDURE — 77412 RADIATION TX DELIVERY LVL 3: CPT | Performed by: RADIOLOGY

## 2021-10-05 ENCOUNTER — DOCUMENTATION (OUTPATIENT)
Dept: ONCOLOGY | Facility: CLINIC | Age: 66
End: 2021-10-05

## 2021-10-05 ENCOUNTER — TELEPHONE (OUTPATIENT)
Dept: ONCOLOGY | Facility: CLINIC | Age: 66
End: 2021-10-05

## 2021-10-05 PROCEDURE — 77336 RADIATION PHYSICS CONSULT: CPT | Performed by: RADIOLOGY

## 2021-10-05 PROCEDURE — 77412 RADIATION TX DELIVERY LVL 3: CPT | Performed by: RADIOLOGY

## 2021-10-05 PROCEDURE — 77387 GUIDANCE FOR RADJ TX DLVR: CPT | Performed by: RADIOLOGY

## 2021-10-05 NOTE — TELEPHONE ENCOUNTER
Notified pt of dexa report shows osteopenia. Advised her per Dr Zamudio to continue vitamin d and calcium.  Ms Sherman reports that her  picked up her anastrozole and they only gave her 14 tabs. I informed her I would send a message to Beatrice Martínez to check on this as it might be an insurance issue. She v/u.

## 2021-10-05 NOTE — PROGRESS NOTES
Staff message rec from Beckie DEL TORO, Clinical RN-she states pt reported that pharmacy only gave her 14 of her Anastrozole tablets.     I contacted [facility] and spoke to Geeta. I inquired on the qty that was dispensed. She states that was an insurance restriction for the first fill. She states they processed the claim again (2 weeks ago) for a 90 day supply and it paid but pt never picked it up. Geeta was going to get this filled again for pt.    Beckie DEL TORO Clinican RN notified    Beckie Cline RN sent to Beatrice Merino.  She said she went to the pharmacy to  her anastrozole and they only gave her 14 tabs.  Sounds like she might need a precert? Or some qty limit override?  She is currently undergoing radiation and won't start this until finished there.     Thank you!

## 2021-10-06 ENCOUNTER — IMMUNIZATION (OUTPATIENT)
Dept: VACCINE CLINIC | Facility: HOSPITAL | Age: 66
End: 2021-10-06

## 2021-10-06 PROCEDURE — 77387 GUIDANCE FOR RADJ TX DLVR: CPT | Performed by: RADIOLOGY

## 2021-10-06 PROCEDURE — 91300 HC SARSCOV02 VAC 30MCG/0.3ML IM: CPT | Performed by: INTERNAL MEDICINE

## 2021-10-06 PROCEDURE — 77412 RADIATION TX DELIVERY LVL 3: CPT | Performed by: RADIOLOGY

## 2021-10-06 PROCEDURE — 0004A ADM SARSCOV2 30MCG/0.3ML BOOSTER: CPT | Performed by: INTERNAL MEDICINE

## 2021-10-06 PROCEDURE — 0003A: CPT | Performed by: INTERNAL MEDICINE

## 2021-10-07 ENCOUNTER — RADIATION ONCOLOGY WEEKLY ASSESSMENT (OUTPATIENT)
Dept: RADIATION ONCOLOGY | Facility: HOSPITAL | Age: 66
End: 2021-10-07

## 2021-10-07 DIAGNOSIS — D05.11 DUCTAL CARCINOMA IN SITU (DCIS) OF RIGHT BREAST: Primary | ICD-10-CM

## 2021-10-07 PROCEDURE — 77387 GUIDANCE FOR RADJ TX DLVR: CPT | Performed by: RADIOLOGY

## 2021-10-07 PROCEDURE — 77412 RADIATION TX DELIVERY LVL 3: CPT | Performed by: RADIOLOGY

## 2021-10-07 NOTE — PROGRESS NOTES
Physician Weekly Management Note    Diagnosis:     Diagnosis Plan   1. Ductal carcinoma in situ (DCIS) of right breast         RT Details:  fx 9/15 right breast     Notes on Treatment course, Films, Medical progress:  Doing well, mild erythema over right breast     Weekly Management:  Medication reviewed?   Yes  New medications given?   No  Problemlist reviewed?   Yes  Problem added?   No  Issues raised requiring referral to support services - task assigned to:  malaika    Technical aspects reviewed:  Weekly OBI approved?   Yes  Weekly port films approved?   Yes  Change requests noted on port film?   No  Patient setup and plan reviewed?   Yes    Chart Reviewed:  Continue current treatment plan?   Yes  Treatment plan change requested?   No  CBC reviewed?   No  Concurrent Chemo?   No    Objective     Toxicities:   none     Review of Systems   Constitutional: Negative.    Skin: Positive for color change.          There were no vitals filed for this visit.    No flowsheet data found.    Physical Exam  Chest:          Comments: Mild erythema  Neurological:      Mental Status: She is alert.             Problem Summary List    Diagnosis:     Diagnosis Plan   1. Ductal carcinoma in situ (DCIS) of right breast       Pathology:   breast    Past Medical History:   Diagnosis Date   • ADD (attention deficit disorder)    • Anxiety    • ARDS survivor 1994   • Arthritis    • Breast cancer (CMS/HCC)    • Cancer (CMS/HCC)    • COPD (chronic obstructive pulmonary disease) (CMS/HCC)    • Encounter for annual health examination     Annual Health Assessment: 07/30/14, 10/25/13   • GERD (gastroesophageal reflux disease)    • Hepatitis-C    • History of mammogram 04/14/2011   • Hyperactivity of bladder    • Hypertension    • Irritable bowel syndrome    • On supplemental oxygen by nasal cannula     3L DURING DAY AND 4.5L AT NIGHT   • Pain of right side of body     c/o pain in right side and back   • Psychiatric illness    • Pulmonary fibrosis  (CMS/HCC)    • Sepsis (CMS/HCC)          Past Surgical History:   Procedure Laterality Date   • APPENDECTOMY     • BREAST LUMPECTOMY Right 2021    Procedure: RIGHT BREAST WIRE-LOCALIZED BREAST LUMPECTOMY;  Surgeon: Ngozi Pereyra MD;  [place]  [facility] [place] [facility] [place]  Service: General;  Laterality: Right;   • CARDIAC CATHETERIZATION N/A 2020    Procedure: LEFT HEART CATH;  Surgeon: Karely Zee MD;  Location: [facility] CATH INVASIVE LOCATION;  Service: Cardiovascular;  Laterality: N/A;   • CARDIAC CATHETERIZATION N/A 2020    Procedure: CORONARY ANGIOGRAPHY;  Surgeon: Karely Zee MD;  [facility];  Service: Cardiovascular;  Laterality: N/A;   • CARDIAC CATHETERIZATION N/A 2020    Procedure: Left ventriculography;  Surgeon: Karely Zee MD;  [facility];  Service: Cardiovascular;  Laterality: N/A;   •  SECTION     • CHEST TUBE INSERTION     • CHOLECYSTECTOMY     • COLONOSCOPY N/A 2019    Procedure: COLONOSCOPY into cecum and TI with cold biopsy polypectomies;  Surgeon: Fernandez Hooks MD;  Location: [facility] ENDOSCOPY;  Service: Gastroenterology   • ENDOSCOPY N/A 2019    Procedure: ESOPHAGOGASTRODUODENOSCOPY with biopsies;  Surgeon: Fernandez Hooks MD;  Location: [facility] ENDOSCOPY;  Service: Gastroenterology   • HYSTERECTOMY     • OOPHORECTOMY           Current Outpatient Medications on File Prior to Visit   Medication Sig Dispense Refill   • acetaminophen (TYLENOL) 325 MG tablet Take 2 tablets by mouth Every 4 (Four) Hours As Needed for Mild Pain .     • amLODIPine (NORVASC) 5 MG tablet Take 5 mg by mouth 2 (Two) Times a Day As Needed. Indications: High Blood Pressure Disorder  0   • anastrozole (ARIMIDEX) 1 MG tablet Take 1 tablet by mouth Daily. 30 tablet 3   • bisoprolol (ZEBeta) 5 MG tablet Take 1.5 tablets by mouth Daily. Indications: High Blood Pressure Disorder 45 tablet 2   • bumetanide  "(BUMEX) 0.5 MG tablet Take 1 tablet by mouth Daily As Needed (Lower extremity fluid). Indications: Edema 30 tablet 0   • Chlorhexidine Gluconate (HIBICLENS EX) Apply  topically. AS DIRECTED PREOP     • Cholecalciferol (VITAMIN D3 PO) Take  by mouth.     • COLESTIPOL HCL PO Take  by mouth Daily.     • potassium chloride (MICRO-K) 10 MEQ CR capsule Take 2 capsules by mouth Daily As Needed (take on same days as Bumex). Indications: Low Amount of Potassium in the Blood 30 capsule 0   • QUEtiapine (SEROquel) 100 MG tablet Take 100 mg by mouth Every Night.     • rosuvastatin (CRESTOR) 5 MG tablet TAKE 1 TABLET BY MOUTH DAILY IN THE MORNING       No current facility-administered medications on file prior to visit.       Allergies   Allergen Reactions   • Adhesive Tape Other (See Comments)     Blisters   • Codeine Other (See Comments)     \"Made me feel like my head was really heavy and going to explode\"   • Morphine Delirium   • Sulfa Antibiotics Rash         Referring Provider:    No referring provider defined for this encounter.    Oncologist:  No primary care provider on file.      Seen and approved by:  Simran Coats MD  10/07/2021  "

## 2021-10-08 PROCEDURE — 77412 RADIATION TX DELIVERY LVL 3: CPT | Performed by: RADIOLOGY

## 2021-10-08 PROCEDURE — 77387 GUIDANCE FOR RADJ TX DLVR: CPT | Performed by: RADIOLOGY

## 2021-10-11 PROCEDURE — 77387 GUIDANCE FOR RADJ TX DLVR: CPT | Performed by: RADIOLOGY

## 2021-10-11 PROCEDURE — 77412 RADIATION TX DELIVERY LVL 3: CPT | Performed by: RADIOLOGY

## 2021-10-11 PROCEDURE — 77427 RADIATION TX MANAGEMENT X5: CPT | Performed by: RADIOLOGY

## 2021-10-12 ENCOUNTER — RADIATION ONCOLOGY WEEKLY ASSESSMENT (OUTPATIENT)
Dept: RADIATION ONCOLOGY | Facility: HOSPITAL | Age: 66
End: 2021-10-12

## 2021-10-12 DIAGNOSIS — D05.11 DUCTAL CARCINOMA IN SITU (DCIS) OF RIGHT BREAST: Primary | ICD-10-CM

## 2021-10-12 PROCEDURE — 77387 GUIDANCE FOR RADJ TX DLVR: CPT | Performed by: RADIOLOGY

## 2021-10-12 PROCEDURE — 77412 RADIATION TX DELIVERY LVL 3: CPT | Performed by: RADIOLOGY

## 2021-10-12 PROCEDURE — 77336 RADIATION PHYSICS CONSULT: CPT | Performed by: RADIOLOGY

## 2021-10-13 PROCEDURE — 77387 GUIDANCE FOR RADJ TX DLVR: CPT | Performed by: RADIOLOGY

## 2021-10-13 PROCEDURE — 77412 RADIATION TX DELIVERY LVL 3: CPT | Performed by: RADIOLOGY

## 2021-10-14 PROCEDURE — 77412 RADIATION TX DELIVERY LVL 3: CPT | Performed by: RADIOLOGY

## 2021-10-14 PROCEDURE — 77387 GUIDANCE FOR RADJ TX DLVR: CPT | Performed by: RADIOLOGY

## 2021-10-14 NOTE — PROGRESS NOTES
Physician Weekly Management Note    Diagnosis:     1. Ductal carcinoma in situ (DCIS) of right breast       Reason for Visit: Weekly Status Check (12/15)    Concurrent Chemo:   No    Notes on Treatment course, Films, Medical progress and Plan:  Doing well. No problems or questions, cont on.    Performance Status:  (1) Restricted in physically strenuous activity, ambulatory and able to do work of light nature    Subjective   ROS - Other than as listed above, as follow:  Constitutional - Normal - Denies lack of appetite, fatigue, fever, night sweats and change in weight.  Neck - Normal - Denies neck masses, muscle weakness, neck pain, decreased range of motion and swelling of the neck.  Breasts - Normal - Denies breast masses, nipple discharge, nipple inversion and pain.  Respiratory - Normal - Denies cough, dyspnea, hemoptysis, hiccoughs, pleuritic chest pain and wheezing.  Gastrointestinal - Normal - Denies abdominal pain, constipation, diarrhea, heartburn / dyspepsia, hematemesis, hemorrhoids, melena / GI bleeding, nausea, pain / cramping, satiety and vomiting.  Musculoskeletal - Normal - Denies arthritis, bone pain, joint pain, muscle weakness and decreased range of motion.   Hematologic/Lymphatic - Normal - Denies easy bruising and tender or enlarged lymph nodes.  There were no vitals filed for this visit.  Objective   PHYSICAL EXAM - Other than listed above, as follows:  Constitutional - Normal - No evidence of impaired alertness, inadequate appearance, premature or advanced chronologic age, uncooperativeness, altered mood and affect and disorientation.  Neck - Normal - No evidence of tender or enlarged lymph nodes, neck abnormalities, restricted range of motion and enlarged thyroid gland.  Breasts - Normal - No change in nipple or incision site.  Chest - Normal - No evidence of chest abnormalities and tender or enlarged lymph nodes.  Hematologic/Lymphatic -  Normal - No evidence of tender or enlarged axillae  "lymph nodes and tender or enlarged neck lymph nodes.     Technical aspects reviewed:  Weekly OBI approved if applicable? Yes  Weekly port films approved?   Yes  Change requests noted if applicable? Yes  Patient setup and plan reviewed?  Yes    Chart Reviewed:  Continue current treatment plan?   Yes  Treatment plan change requested?  No    I have reviewed and marked as \"reviewed\" the current medications, allergies and problem list in the patients EMR.  I have reviewed the patient's medical, surgical  history in detail, reviewed any pertinent lab work  and updated the computerized patient record if needed.    Patient's Care Team:  Patient Care Team:  Toñito Loja MD as PCP - General (Internal Medicine)  Karely Zee MD as Consulting Physician (Cardiology)  Darryn Hobbs MD as Consulting Physician (Pulmonary Disease)  Ngozi Pereyra MD as Referring Physician (General Surgery)  Simran Coats MD as Consulting Physician (Radiation Oncology)      "

## 2021-10-15 ENCOUNTER — DOCUMENTATION (OUTPATIENT)
Dept: RADIATION ONCOLOGY | Facility: HOSPITAL | Age: 66
End: 2021-10-15

## 2021-10-15 PROCEDURE — 77412 RADIATION TX DELIVERY LVL 3: CPT | Performed by: RADIOLOGY

## 2021-10-15 PROCEDURE — 77387 GUIDANCE FOR RADJ TX DLVR: CPT | Performed by: RADIOLOGY

## 2021-10-18 NOTE — PROGRESS NOTES
RADIATION THERAPY TREATMENT SUMMARY  Patient: Francheska Sherman  YOB: 1955  Diagnosis:  Ductal carcinoma in situ (DCIS) of right breast   No matching staging information was found for the patient.      Francheska Sherman has recently completed the course of radiation therapy prescribed for the above-mentioned diagnosis.  Below, please find the specifics of the course of treatment delivered:    Radiation Details:    Tx Start Date  9/27/2021   Tx End date  10/15/2021   Intent   Post-op  Total Treatments 15    Prescription Name BREAST 3990cGy  Site   Breast  Primary/Boost  Primary  Dose Per Fraction 266 cGy/Frac  Number of Fractions 15  Prescribe To  IsodoseLine 100 % 3990 cGy  266 cGy/Frac  Mode    Photon  Technique  TANGENTS  Frequency  5 Times a week  Energy   6X/18X  Prescription Note PRESCRIBED 100% TO [facility] PT      Tolerance and Toxicities: she tolerated the treatments well, mild erythema over right breast , requiring no treatment breaks. she completed the treatments in 18 elapsed days.    Follow-up Plans:  I have asked the patient to return to see me in approximately 4 weeks.  I have also made a referral to our [facility].

## 2021-11-08 ENCOUNTER — TRANSCRIBE ORDERS (OUTPATIENT)
Dept: ADMINISTRATIVE | Facility: HOSPITAL | Age: 66
End: 2021-11-08

## 2021-11-08 DIAGNOSIS — J84.10 PULMONARY FIBROSIS (HCC): Primary | ICD-10-CM

## 2021-11-16 ENCOUNTER — OFFICE VISIT (OUTPATIENT)
Dept: RADIATION ONCOLOGY | Facility: HOSPITAL | Age: 66
End: 2021-11-16

## 2021-11-16 VITALS — OXYGEN SATURATION: 96 % | HEART RATE: 63 BPM | SYSTOLIC BLOOD PRESSURE: 153 MMHG | DIASTOLIC BLOOD PRESSURE: 107 MMHG

## 2021-11-16 DIAGNOSIS — D05.11 DUCTAL CARCINOMA IN SITU (DCIS) OF RIGHT BREAST: Primary | ICD-10-CM

## 2021-11-16 PROCEDURE — 99024 POSTOP FOLLOW-UP VISIT: CPT | Performed by: RADIOLOGY

## 2021-11-16 NOTE — PROGRESS NOTES
Subjective     No ref. provider found    Cancer Staging  No matching staging information was found for the patient.   Chief Complaint   Patient presents with   • Follow-up     S/P XRT to Breast    4 week follow up DCIS right breast ERPR pos                                  S:  I had the pleasure of seeing Francheska Sherman  today in the [facility].  She returns today for follow up now 4 weeks out from completion of her radiation therapy to the right  breast.  She completed a dose of 3990cGy to the right breast on 10/15/21.   She has been doing well since I last saw her.  She has not started the anastrazole yet.    Review of Systems   Constitutional: Positive for fatigue.   Skin: Positive for color change.         Past Medical History:   Diagnosis Date   • ADD (attention deficit disorder)    • Anxiety    • ARDS survivor 1994   • Arthritis    • Breast cancer (CMS/HCC)    • Cancer (CMS/HCC)    • COPD (chronic obstructive pulmonary disease) (CMS/HCC)    • Encounter for annual health examination     Annual Health Assessment: 07/30/14, 10/25/13   • GERD (gastroesophageal reflux disease)    • Hepatitis-C    • History of mammogram 04/14/2011   • Hyperactivity of bladder    • Hypertension    • Irritable bowel syndrome    • On supplemental oxygen by nasal cannula     3L DURING DAY AND 4.5L AT NIGHT   • Pain of right side of body     c/o pain in right side and back   • Psychiatric illness    • Pulmonary fibrosis (CMS/HCC)    • Sepsis (CMS/HCC)          Past Surgical History:   Procedure Laterality Date   • APPENDECTOMY     • BREAST LUMPECTOMY Right 8/19/2021    Procedure: RIGHT BREAST WIRE-LOCALIZED BREAST LUMPECTOMY;  Surgeon: Ngozi Pereyra MD;  Location: [facility] OR [facility];  Service: General;  Laterality: Right;   • CARDIAC CATHETERIZATION N/A 9/28/2020    Procedure: LEFT HEART CATH;  Surgeon: Karely Zee MD;  Location: [facility] CATH INVASIVE LOCATION;  Service: Cardiovascular;  Laterality: N/A;   • CARDIAC  CATHETERIZATION N/A 2020    Procedure: CORONARY ANGIOGRAPHY;  Surgeon: Karely Zee MD;  Location:  [facility] CATH INVASIVE LOCATION;  Service: Cardiovascular;  Laterality: N/A;   • CARDIAC CATHETERIZATION N/A 2020    Procedure: Left ventriculography;  Surgeon: Karely Zee MD;  Location:  [facility] CATH INVASIVE LOCATION;  Service: Cardiovascular;  Laterality: N/A;   •  SECTION     • CHEST TUBE INSERTION     • CHOLECYSTECTOMY     • COLONOSCOPY N/A 2019    Procedure: COLONOSCOPY into cecum and TI with cold biopsy polypectomies;  Surgeon: Fernandez Hooks MD;  Location:  [facility]OS[facility];  Service: Gastroenterology   • ENDOSCOPY N/A 2019    Procedure: ESOPHAGOGASTRODUODENOSCOPY with biopsies;  Surgeon: Fernandez Hooks MD;  Location: [facility] ENDOSCOPY;  Service: Gastroenterology   • HYSTERECTOMY     • OOPHORECTOMY           Social History     Socioeconomic History   • Marital status:      Spouse name: Tate Sherman   Tobacco Use   • Smoking status: Former Smoker     Types: Cigarettes     Quit date: 1994     Years since quittin.8   • Smokeless tobacco: Never Used   • Tobacco comment: QUIT    Vaping Use   • Vaping Use: Never used   Substance and Sexual Activity   • Alcohol use: No   • Drug use: No   • Sexual activity: Defer         Family History   Problem Relation Age of Onset   • Liver disease Mother    • Cervical cancer Mother    • Arthritis Mother    • Crohn's disease Brother    • Malig Hyperthermia Neg Hx           Objective    Physical Exam  Constitutional:       Appearance: Normal appearance.   Chest:          Comments: Mild hyperpigmentation over right breast  Neurological:      Mental Status: She is alert.           Current Outpatient Medications on File Prior to Visit   Medication Sig Dispense Refill   • acetaminophen (TYLENOL) 325 MG tablet Take 2 tablets by mouth Every 4 (Four) Hours As Needed for Mild Pain .     • amLODIPine (NORVASC) 5 MG tablet  "Take 5 mg by mouth 2 (Two) Times a Day As Needed. Indications: High Blood Pressure Disorder  0   • anastrozole (ARIMIDEX) 1 MG tablet Take 1 tablet by mouth Daily. 30 tablet 3   • bisoprolol (ZEBeta) 5 MG tablet Take 1.5 tablets by mouth Daily. Indications: High Blood Pressure Disorder 45 tablet 2   • bumetanide (BUMEX) 0.5 MG tablet Take 1 tablet by mouth Daily As Needed (Lower extremity fluid). Indications: Edema 30 tablet 0   • Chlorhexidine Gluconate (HIBICLENS EX) Apply  topically. AS DIRECTED PREOP     • Cholecalciferol (VITAMIN D3 PO) Take  by mouth.     • COLESTIPOL HCL PO Take  by mouth Daily.     • potassium chloride (MICRO-K) 10 MEQ CR capsule Take 2 capsules by mouth Daily As Needed (take on same days as Bumex). Indications: Low Amount of Potassium in the Blood 30 capsule 0   • QUEtiapine (SEROquel) 100 MG tablet Take 100 mg by mouth Every Night.     • rosuvastatin (CRESTOR) 5 MG tablet TAKE 1 TABLET BY MOUTH DAILY IN THE MORNING       No current facility-administered medications on file prior to visit.       ALLERGIES:    Allergies   Allergen Reactions   • Adhesive Tape Other (See Comments)     Blisters   • Codeine Other (See Comments)     \"Made me feel like my head was really heavy and going to explode\"   • Morphine Delirium   • Sulfa Antibiotics Rash       There were no vitals taken for this visit.     No flowsheet data found.      Assessment/Plan     66 year old female with intermediate grade dcis right breast, ER ND pos 9mm in size with negative margins now 4 weeks out from radiation.  She will due for her yearly mammogram in February 2022.  She will have regular follow up with her medical oncologist, Dr. Zamudio.  I will see her back in one year for follow up.  She knows to call for this appointment.             Thank you very much for allowing me to participate in the care of this very pleasant patient.    Sincerely,      Simran Coats MD     "

## 2021-11-29 ENCOUNTER — TELEPHONE (OUTPATIENT)
Dept: ONCOLOGY | Facility: CLINIC | Age: 66
End: 2021-11-29

## 2021-11-29 DIAGNOSIS — D05.11 DUCTAL CARCINOMA IN SITU (DCIS) OF RIGHT BREAST: Primary | ICD-10-CM

## 2021-11-29 NOTE — TELEPHONE ENCOUNTER
The pt started her Arimidex 2 weeks ago. Pt started to have a hard time breathing, nausea and hot flashes. She states she cant catch her breath and her chest feels tight .her O2 sat was 79 while on 4 liters of O2.  Pt is on 2-4 liters of O2.  Pt held yesterdays dose and today's dose. She feels better since stopping. Pt will hold Arimidex for 2 weeks and then see Soraya RENAE to review TX moving forward. Pt advised to go to ED if her SOB becomes worse or she developes chest pain. Pt V/U.

## 2021-12-11 NOTE — PROGRESS NOTES
Subjective   Francheska Sherman is a 66 y.o. female.  Referred by Dr. Pereyra for right breast ductal carcinoma in situ, ER/MT positive, grade 2    History of Present Illness   Ms. Aguilar is a 66-year-old  lady who has history of COPD, on supplemental oxygen, hypertension, hyperlipidemia presented with a screen detected abnormality of the right breast.  Patient's last mammogram was in 2012.  Most recently her screening mammogram performed in 2021 showed abnormality in the right breast which was further evaluated.  Family history is limited and no family history of breast or ovarian cancer as far she knows.  Denies any previous abnormal mammograms or breast biopsies.  She had a hysterectomy and a salpingo-oophorectomy at the age of 39 as she had an ovarian abscess and had sepsis secondary to the same.    5/19/2021-bilateral screening mammogram  1.area of focal asymmetry in the posterior one third of the right breast, lateral to the nipple.  Additional imaging recommended.  2.no suspicious findings in the left breast.    6/18/2021-right breast diagnostic mammogram and ultrasound  Suspicious 0.8 cm mass at 10 o'clock position of the right breast.  Ultrasound-guided core needle biopsy recommended.    7/1/2021-right breast 10 o'clock position, 6 cm from the nipple core biopsy  A.low-grade papillary and cribriform DCIS.  DCIS measures 5.5 mm  B.no invasive carcinoma identified.  ER +99% strong, MT positive greater than 95% strong, Ki-67 14%.    8/19/2021-right breast lumpectomy  Pathology consistent with ductal carcinoma in situ, measures 9 mm, cribriform and papillary, grade 2, margins negative, pTis NX M0  ER +99%, MT +95%    She has been referred by Dr. Pereyra to discuss adjuvant therapy.  She is recovering well from surgery.    Completed radiation October 2021.    Interval History:  Patient returns today for follow-up.  She notified our office on 11/29/2021 that after initiating anastrozole for 2 weeks she started  to notice difficulty breathing, nausea, and hot flashes.  Patient reported she was unable to catch her breath and that her chest felt tight.  She was advised to hold anastrozole, and follow-up today to assess how she is doing.      Today, she reports most of her symptoms resolved, however she is still experiencing dyspnea on exertion.  She experiences shortness of breath at baseline, however over the last few weeks has noticed that this is worsened.  She now becomes short of breath while ambulating a few feet to the bathroom.  She continues on 3 L continuous oxygen via nasal cannula at home.  She denies fever or chills.  She denies cough.  She denies nausea or vomiting.    She has not followed up with pulmonary since worsening dyspnea on exertion.    The following portions of the patient's history were reviewed and updated as appropriate: allergies, current medications, past family history, past medical history, past social history, past surgical history and problem list.    Past Medical History:   Diagnosis Date   • ADD (attention deficit disorder)    • Anxiety    • ARDS survivor 1994   • Arthritis    • Breast cancer (HCC)    • Cancer (HCC)    • COPD (chronic obstructive pulmonary disease) (HCC)    • Encounter for annual health examination     Annual Health Assessment: 07/30/14, 10/25/13   • GERD (gastroesophageal reflux disease)    • Hepatitis-C    • History of mammogram 04/14/2011   • Hyperactivity of bladder    • Hypertension    • Irritable bowel syndrome    • On supplemental oxygen by nasal cannula     3L DURING DAY AND 4.5L AT NIGHT   • Pain of right side of body     c/o pain in right side and back   • Psychiatric illness    • Pulmonary fibrosis (HCC)    • Sepsis (HCC)         Past Surgical History:   Procedure Laterality Date   • APPENDECTOMY     • BREAST LUMPECTOMY Right 8/19/2021    Procedure: RIGHT BREAST WIRE-LOCALIZED BREAST LUMPECTOMY;  Surgeon: Ngozi Pereyra MD;  Location: [facility] OR [facility] [place];  Service:  General;  Laterality: Right;   • CARDIAC CATHETERIZATION N/A 2020    Procedure: LEFT HEART CATH;  Surgeon: Karely Zee MD;  Location: [facility] CATH INVASIVE LOCATION;  Service: Cardiovascular;  Laterality: N/A;   • CARDIAC CATHETERIZATION N/A 2020    Procedure: CORONARY ANGIOGRAPHY;  Surgeon: Karely Zee MD;  [facility];  Service: Cardiovascular;  Laterality: N/A;   • CARDIAC CATHETERIZATION N/A 2020    Procedure: Left ventriculography;  Surgeon: Karely Zee MD;  Location:  [facility] CATH INVASIVE LOCATION;  Service: Cardiovascular;  Laterality: N/A;   •  SECTION     • CHEST TUBE INSERTION     • CHOLECYSTECTOMY     • COLONOSCOPY N/A 2019    Procedure: COLONOSCOPY into cecum and TI with cold biopsy polypectomies;  Surgeon: Fernandez Hooks MD;  Location: [facility] ENDOSCOPY;  Service: Gastroenterology   • ENDOSCOPY N/A 2019    Procedure: ESOPHAGOGASTRODUODENOSCOPY with biopsies;  Surgeon: Fernandez Hooks MD;  Location: [facility] ENDOSCOPY;  Service: Gastroenterology   • HYSTERECTOMY     • OOPHORECTOMY          Family History   Problem Relation Age of Onset   • Liver disease Mother    • Cervical cancer Mother    • Arthritis Mother    • Crohn's disease Brother    • Malig Hyperthermia Neg Hx         Social History     Socioeconomic History   • Marital status:      Spouse name: Tate Sherman   Tobacco Use   • Smoking status: Former Smoker     Types: Cigarettes     Quit date: 1994     Years since quittin.9   • Smokeless tobacco: Never Used   • Tobacco comment: QUIT    Vaping Use   • Vaping Use: Never used   Substance and Sexual Activity   • Alcohol use: No   • Drug use: No   • Sexual activity: Defer        OB History        1    Para   1    Term   1            AB        Living           SAB        IAB        Ectopic        Molar        Multiple        Live Births                     Allergies   Allergen  "Reactions   • Adhesive Tape Other (See Comments)     Blisters   • Codeine Other (See Comments)     \"Made me feel like my head was really heavy and going to explode\"   • Morphine Delirium   • Sulfa Antibiotics Rash      Review of Systems   Constitutional: Negative.    HENT: Negative.    Eyes: Negative.    Respiratory: Positive for shortness of breath.    Cardiovascular: Negative.    Gastrointestinal: Negative.    Endocrine: Negative.    Genitourinary: Positive for amenorrhea.   Musculoskeletal: Negative.    Neurological: Negative.    Hematological: Negative.    Psychiatric/Behavioral: Negative.      Objective   Blood pressure 180/93, pulse 64, temperature 97.1 °F (36.2 °C), temperature source Temporal, resp. rate 18, height 170.2 cm (67\"), weight 122 kg (269 lb 1.6 oz), SpO2 97 %.     Physical Exam  Constitutional:       Appearance: Normal appearance. She is obese.   HENT:      Head: Normocephalic and atraumatic.      Nose: Nose normal.      Mouth/Throat:      Mouth: Mucous membranes are moist.      Pharynx: Oropharynx is clear.   Eyes:      Extraocular Movements: Extraocular movements intact.      Conjunctiva/sclera: Conjunctivae normal.      Pupils: Pupils are equal, round, and reactive to light.   Cardiovascular:      Rate and Rhythm: Normal rate and regular rhythm.      Heart sounds: Normal heart sounds. No murmur heard.  No gallop.    Pulmonary:      Effort: Pulmonary effort is normal. No respiratory distress.      Breath sounds: Normal breath sounds. No wheezing.   Abdominal:      General: Bowel sounds are normal. There is no distension.      Palpations: Abdomen is soft.      Tenderness: There is no abdominal tenderness.   Musculoskeletal:         General: Normal range of motion.      Cervical back: Normal range of motion and neck supple.   Skin:     General: Skin is warm.      Findings: No rash.   Neurological:      General: No focal deficit present.      Mental Status: She is alert and oriented to person, " place, and time. Mental status is at baseline.   Psychiatric:         Mood and Affect: Mood normal.         Behavior: Behavior normal.         Thought Content: Thought content normal.         Judgment: Judgment normal.       Breast Exam: Left breast appears normal inspection.  No palpable abnormalities of the left breast.  Right breast there is a scar in the upper outer quadrant which is healing well.      Lab on 12/14/2021   Component Date Value Ref Range Status   • Glucose 12/14/2021 106  74 - 124 mg/dL Final   • BUN 12/14/2021 14  6 - 20 mg/dL Final   • Creatinine 12/14/2021 0.70  0.60 - 1.10 mg/dL Final   • Sodium 12/14/2021 142  134 - 145 mmol/L Final   • Potassium 12/14/2021 5.2* 3.5 - 4.7 mmol/L Final   • Chloride 12/14/2021 102  98 - 107 mmol/L Final   • CO2 12/14/2021 30.8* 22.0 - 29.0 mmol/L Final   • Calcium 12/14/2021 9.8  8.5 - 10.2 mg/dL Final   • Total Protein 12/14/2021 7.8  6.3 - 8.0 g/dL Final   • Albumin 12/14/2021 4.50  3.50 - 5.20 g/dL Final   • ALT (SGPT) 12/14/2021 19  0 - 33 U/L Final   • AST (SGOT) 12/14/2021 18  0 - 32 U/L Final   • Alkaline Phosphatase 12/14/2021 55  38 - 116 U/L Final   • Total Bilirubin 12/14/2021 0.2  0.2 - 1.2 mg/dL Final   • eGFR Non  Amer 12/14/2021 84  >60 mL/min/1.73 Final   • Globulin 12/14/2021 3.3  1.8 - 3.5 gm/dL Final   • A/G Ratio 12/14/2021 1.4  1.1 - 2.4 g/dL Final   • BUN/Creatinine Ratio 12/14/2021 20.0  7.3 - 30.0 Final   • Anion Gap 12/14/2021 9.2  5.0 - 15.0 mmol/L Final   • WBC 12/14/2021 8.21  3.40 - 10.80 10*3/mm3 Final   • RBC 12/14/2021 4.62  3.77 - 5.28 10*6/mm3 Final   • Hemoglobin 12/14/2021 13.2  12.0 - 15.9 g/dL Final   • Hematocrit 12/14/2021 44.0  34.0 - 46.6 % Final   • MCV 12/14/2021 95.2  79.0 - 97.0 fL Final   • MCH 12/14/2021 28.6  26.6 - 33.0 pg Final   • MCHC 12/14/2021 30.0* 31.5 - 35.7 g/dL Final   • RDW 12/14/2021 14.1  12.3 - 15.4 % Final   • RDW-SD 12/14/2021 49.6  37.0 - 54.0 fl Final   • MPV 12/14/2021 10.7  6.0 - 12.0  fL Final   • Platelets 12/14/2021 241  140 - 450 10*3/mm3 Final   • Neutrophil % 12/14/2021 65.2  42.7 - 76.0 % Final   • Lymphocyte % 12/14/2021 21.9  19.6 - 45.3 % Final   • Monocyte % 12/14/2021 8.0  5.0 - 12.0 % Final   • Eosinophil % 12/14/2021 3.7  0.3 - 6.2 % Final   • Basophil % 12/14/2021 0.6  0.0 - 1.5 % Final   • Immature Grans % 12/14/2021 0.6* 0.0 - 0.5 % Final   • Neutrophils, Absolute 12/14/2021 5.35  1.70 - 7.00 10*3/mm3 Final   • Lymphocytes, Absolute 12/14/2021 1.80  0.70 - 3.10 10*3/mm3 Final   • Monocytes, Absolute 12/14/2021 0.66  0.10 - 0.90 10*3/mm3 Final   • Eosinophils, Absolute 12/14/2021 0.30  0.00 - 0.40 10*3/mm3 Final   • Basophils, Absolute 12/14/2021 0.05  0.00 - 0.20 10*3/mm3 Final   • Immature Grans, Absolute 12/14/2021 0.05  0.00 - 0.05 10*3/mm3 Final   • nRBC 12/14/2021 0.0  0.0 - 0.2 /100 WBC Final        No radiology results for the last 30 days.   Breast imaging independently reviewed interpreted by me in detail summarized in the HPI above.    Assessment/Plan       *DCIS of the right breast  · Grade 2, ER/NE positive  · Status post right breast lumpectomy  · Margins negative  · DCIS measures 9 mm  · Discussed the origin of carcinoma from the ducts.  Explained to her that in DCIS the malignant cells are contained to within the duct.  Explained that there is risk of recurrence of DCIS as well as risk of ipsilateral as well as contralateral new invasive carcinoma however there is no risk of distant spread with DCIS.  · Explained the significance of ER/NE positivity as well as grade.  · She has been referred to radiation oncology to discuss adjuvant radiation.  · She is here to discuss adjuvant endocrine therapy.  · Explained to her about the benefit of adjuvant endocrine therapy and decreasing the recurrence of DCIS as well as providing new invasive carcinomas.  · Since she is postmenopausal discussed anastrozole 1 mg p.o. daily.  · Adverse effects of anastrozole including but not  limited to hot flashes, mood changes, fatigue, alopecia, decrease in bone mineral density, arthralgias and myalgias, vaginal dryness, sexual dysfunction, cardiovascular risk.  · Recommend anastrozole for total of 5 years.  · Start anastrozole following completion of radiation.  · Radiation completed October 2021.  · 11/29/2021 patient called our office after being on anastrozole x2 weeks.  Since initiating anastrozole she noticed difficult time breathing, nausea, and hot flashes.  Stated she was having difficult time catching her breath and her chest felt tight.  She was advised to hold anastrozole and follow-up in 2 weeks.  She was also advised to report to the ED if shortness of breath became worse or she developed chest pain.  · 12/14/2021, patient seen today for follow-up after holding anastrozole x2 weeks. She noticed improvement in the symptoms above, however is still noticing she is not back to baseline for shortness of breath.  She has noticed worsening dyspnea on exertion, noting she now gets short of breath ambulating a short distance to the bathroom.  She has been off anastrazole for 2 weeks and only took it for a few days.  We discussed this may not be related to her anastrazole, and could more likely be related to her COPD.  She will stay off anastrazole and follow up with us in 1 month for further plan.  In the meantime I have asked her to follow up with pulmonary.    *COPD-currently on 3 L of oxygen by nasal cannula.  Recent CT chest shows emphysematous changes.  Continue follow-up with pulmonary.    *Hypertension-blood pressure 180/93.  Patient takes her blood pressure once daily at home and is noticing more normal readings.  She will continue her current management for blood pressure and continue monitoring blood pressure at home.      *Hyperlipidemia-continue Crestor    *Bone Health  · DEXA scan performed 9/17/2021 showing osteopenia.  Patient was advised to start calcium and vitamin D.  · Continue  calcium and vitamin D.    PLAN:   · Continue to hold anastrozole.  · Continue calcium and vitamin D.  · Return in 4 weeks for MD follow-up to discuss further plan of care.    · Continue to follow-up with pulmonary, advised patient to call and schedule appointment for worsening dyspnea on exertion.  · Patient is due for screening mammogram February 2022.  · DEXA scan next due September 2023.    Soraya Yi, ROSEMARY  12/14/2021

## 2021-12-14 ENCOUNTER — OFFICE VISIT (OUTPATIENT)
Dept: ONCOLOGY | Facility: CLINIC | Age: 66
End: 2021-12-14

## 2021-12-14 ENCOUNTER — LAB (OUTPATIENT)
Dept: LAB | Facility: HOSPITAL | Age: 66
End: 2021-12-14

## 2021-12-14 VITALS
DIASTOLIC BLOOD PRESSURE: 93 MMHG | OXYGEN SATURATION: 97 % | HEART RATE: 64 BPM | TEMPERATURE: 97.1 F | BODY MASS INDEX: 42.24 KG/M2 | WEIGHT: 269.1 LBS | HEIGHT: 67 IN | RESPIRATION RATE: 18 BRPM | SYSTOLIC BLOOD PRESSURE: 180 MMHG

## 2021-12-14 DIAGNOSIS — D05.11 DUCTAL CARCINOMA IN SITU (DCIS) OF RIGHT BREAST: ICD-10-CM

## 2021-12-14 DIAGNOSIS — D05.11 DUCTAL CARCINOMA IN SITU (DCIS) OF RIGHT BREAST: Primary | ICD-10-CM

## 2021-12-14 DIAGNOSIS — R06.09 DYSPNEA ON EXERTION: ICD-10-CM

## 2021-12-14 LAB
ALBUMIN SERPL-MCNC: 4.5 G/DL (ref 3.5–5.2)
ALBUMIN/GLOB SERPL: 1.4 G/DL (ref 1.1–2.4)
ALP SERPL-CCNC: 55 U/L (ref 38–116)
ALT SERPL W P-5'-P-CCNC: 19 U/L (ref 0–33)
ANION GAP SERPL CALCULATED.3IONS-SCNC: 9.2 MMOL/L (ref 5–15)
AST SERPL-CCNC: 18 U/L (ref 0–32)
BASOPHILS # BLD AUTO: 0.05 10*3/MM3 (ref 0–0.2)
BASOPHILS NFR BLD AUTO: 0.6 % (ref 0–1.5)
BILIRUB SERPL-MCNC: 0.2 MG/DL (ref 0.2–1.2)
BUN SERPL-MCNC: 14 MG/DL (ref 6–20)
BUN/CREAT SERPL: 20 (ref 7.3–30)
CALCIUM SPEC-SCNC: 9.8 MG/DL (ref 8.5–10.2)
CHLORIDE SERPL-SCNC: 102 MMOL/L (ref 98–107)
CO2 SERPL-SCNC: 30.8 MMOL/L (ref 22–29)
CREAT SERPL-MCNC: 0.7 MG/DL (ref 0.6–1.1)
DEPRECATED RDW RBC AUTO: 49.6 FL (ref 37–54)
EOSINOPHIL # BLD AUTO: 0.3 10*3/MM3 (ref 0–0.4)
EOSINOPHIL NFR BLD AUTO: 3.7 % (ref 0.3–6.2)
ERYTHROCYTE [DISTWIDTH] IN BLOOD BY AUTOMATED COUNT: 14.1 % (ref 12.3–15.4)
GFR SERPL CREATININE-BSD FRML MDRD: 84 ML/MIN/1.73
GLOBULIN UR ELPH-MCNC: 3.3 GM/DL (ref 1.8–3.5)
GLUCOSE SERPL-MCNC: 106 MG/DL (ref 74–124)
HCT VFR BLD AUTO: 44 % (ref 34–46.6)
HGB BLD-MCNC: 13.2 G/DL (ref 12–15.9)
IMM GRANULOCYTES # BLD AUTO: 0.05 10*3/MM3 (ref 0–0.05)
IMM GRANULOCYTES NFR BLD AUTO: 0.6 % (ref 0–0.5)
LYMPHOCYTES # BLD AUTO: 1.8 10*3/MM3 (ref 0.7–3.1)
LYMPHOCYTES NFR BLD AUTO: 21.9 % (ref 19.6–45.3)
MCH RBC QN AUTO: 28.6 PG (ref 26.6–33)
MCHC RBC AUTO-ENTMCNC: 30 G/DL (ref 31.5–35.7)
MCV RBC AUTO: 95.2 FL (ref 79–97)
MONOCYTES # BLD AUTO: 0.66 10*3/MM3 (ref 0.1–0.9)
MONOCYTES NFR BLD AUTO: 8 % (ref 5–12)
NEUTROPHILS NFR BLD AUTO: 5.35 10*3/MM3 (ref 1.7–7)
NEUTROPHILS NFR BLD AUTO: 65.2 % (ref 42.7–76)
NRBC BLD AUTO-RTO: 0 /100 WBC (ref 0–0.2)
PLATELET # BLD AUTO: 241 10*3/MM3 (ref 140–450)
PMV BLD AUTO: 10.7 FL (ref 6–12)
POTASSIUM SERPL-SCNC: 5.2 MMOL/L (ref 3.5–4.7)
PROT SERPL-MCNC: 7.8 G/DL (ref 6.3–8)
RBC # BLD AUTO: 4.62 10*6/MM3 (ref 3.77–5.28)
SODIUM SERPL-SCNC: 142 MMOL/L (ref 134–145)
WBC NRBC COR # BLD: 8.21 10*3/MM3 (ref 3.4–10.8)

## 2021-12-14 PROCEDURE — 85025 COMPLETE CBC W/AUTO DIFF WBC: CPT

## 2021-12-14 PROCEDURE — 36415 COLL VENOUS BLD VENIPUNCTURE: CPT

## 2021-12-14 PROCEDURE — 99213 OFFICE O/P EST LOW 20 MIN: CPT | Performed by: NURSE PRACTITIONER

## 2021-12-14 PROCEDURE — 80053 COMPREHEN METABOLIC PANEL: CPT

## 2021-12-22 ENCOUNTER — OFFICE VISIT (OUTPATIENT)
Dept: CARDIOLOGY | Age: 66
End: 2021-12-22

## 2021-12-22 VITALS
SYSTOLIC BLOOD PRESSURE: 142 MMHG | WEIGHT: 271 LBS | HEIGHT: 67 IN | HEART RATE: 64 BPM | DIASTOLIC BLOOD PRESSURE: 86 MMHG | BODY MASS INDEX: 42.53 KG/M2

## 2021-12-22 DIAGNOSIS — R06.02 SHORTNESS OF BREATH: Primary | ICD-10-CM

## 2021-12-22 DIAGNOSIS — R94.31 ABNORMAL EKG: ICD-10-CM

## 2021-12-22 DIAGNOSIS — I10 ESSENTIAL HYPERTENSION: ICD-10-CM

## 2021-12-22 DIAGNOSIS — E78.5 HYPERLIPIDEMIA, UNSPECIFIED HYPERLIPIDEMIA TYPE: ICD-10-CM

## 2021-12-22 DIAGNOSIS — E66.9 OBESITY (BMI 30-39.9): ICD-10-CM

## 2021-12-22 PROCEDURE — 93000 ELECTROCARDIOGRAM COMPLETE: CPT | Performed by: INTERNAL MEDICINE

## 2021-12-22 PROCEDURE — 99214 OFFICE O/P EST MOD 30 MIN: CPT | Performed by: INTERNAL MEDICINE

## 2021-12-22 NOTE — PROGRESS NOTES
Follow up   Ref By Dr. Loja   Shortness of Breath    Subjective:        Francheska Sherman is a 66 y.o. female who here for follow up    CC  More sob  HPI  66-year-old female with abnormal EKG hyperlipidemia benign essential arterial hypertension has been complaining of the increasing shortness of breath with no chest pains or tightness in the chest     Problems Addressed this Visit        Cardiac and Vasculature    Essential hypertension    Abnormal EKG    Hyperlipidemia       Endocrine and Metabolic    Obesity (BMI 30-39.9)       Pulmonary and Pneumonias    Shortness of breath - Primary      Diagnoses       Codes Comments    Shortness of breath    -  Primary ICD-10-CM: R06.02  ICD-9-CM: 786.05     Hyperlipidemia, unspecified hyperlipidemia type     ICD-10-CM: E78.5  ICD-9-CM: 272.4     Essential hypertension     ICD-10-CM: I10  ICD-9-CM: 401.9     Abnormal EKG     ICD-10-CM: R94.31  ICD-9-CM: 794.31     Obesity (BMI 30-39.9)     ICD-10-CM: E66.9  ICD-9-CM: 278.00         .    The following portions of the patient's history were reviewed and updated as appropriate: allergies, current medications, past family history, past medical history, past social history, past surgical history and problem list.    Past Medical History:   Diagnosis Date   • ADD (attention deficit disorder)    • Anxiety    • ARDS survivor 1994   • Arthritis    • Breast cancer (HCC)    • Cancer (HCC)    • COPD (chronic obstructive pulmonary disease) (HCC)    • Encounter for annual health examination     Annual Health Assessment: 07/30/14, 10/25/13   • GERD (gastroesophageal reflux disease)    • Hepatitis-C    • History of mammogram 04/14/2011   • Hyperactivity of bladder    • Hypertension    • Irritable bowel syndrome    • On supplemental oxygen by nasal cannula     3L DURING DAY AND 4.5L AT NIGHT   • Pain of right side of body     c/o pain in right side and back   • Psychiatric illness    • Pulmonary fibrosis (HCC)    • Sepsis (HCC)      reports that  "she quit smoking about 27 years ago. Her smoking use included cigarettes. She has never used smokeless tobacco. She reports that she does not drink alcohol and does not use drugs.   Family History   Problem Relation Age of Onset   • Liver disease Mother    • Cervical cancer Mother    • Arthritis Mother    • Crohn's disease Brother    • Malig Hyperthermia Neg Hx        Review of Systems  Constitutional: No wt loss, fever, fatigue  Gastrointestinal: No nausea, abdominal pain  Behavioral/Psych: No insomnia or anxiety   Cardiovascular shortness of breath  Objective:       Physical Exam  /86   Pulse 64   Ht 170.2 cm (67\")   Wt 123 kg (271 lb)   BMI 42.44 kg/m²   General appearance: No acute changes   Neck: Trachea midline; NECK, supple, no thyromegaly or lymphadenopathy   Lungs: Normal size and shape, normal breath sounds, equal distribution of air, no rales and rhonchi   CV: S1-S2 regular, no murmurs, no rub, no gallop   Abdomen: Soft, nontender; no masses , no abnormal abdominal sounds   Extremities: No deformity , normal color , no peripheral edema   Skin: Normal temperature, turgor and texture; no rash, ulcers            ECG 12 Lead    Date/Time: 12/22/2021 2:30 PM  Performed by: Karely Zee MD  Authorized by: Karely Zee MD   Comparison: compared with previous ECG   Similar to previous ECG  Rhythm: sinus rhythm  ST Flattening: all    Clinical impression: abnormal EKG              Echocardiogram:        Current Outpatient Medications:   •  acetaminophen (TYLENOL) 325 MG tablet, Take 2 tablets by mouth Every 4 (Four) Hours As Needed for Mild Pain ., Disp: , Rfl:   •  amLODIPine (NORVASC) 5 MG tablet, Take 5 mg by mouth 2 (Two) Times a Day As Needed. Indications: High Blood Pressure Disorder, Disp: , Rfl: 0  •  bisoprolol (ZEBeta) 5 MG tablet, Take 1.5 tablets by mouth Daily. Indications: High Blood Pressure Disorder, Disp: 45 tablet, Rfl: 2  •  bumetanide (BUMEX) 0.5 MG tablet, Take 1 " tablet by mouth Daily As Needed (Lower extremity fluid). Indications: Edema, Disp: 30 tablet, Rfl: 0  •  Chlorhexidine Gluconate (HIBICLENS EX), Apply  topically. AS DIRECTED PREOP, Disp: , Rfl:   •  Cholecalciferol (VITAMIN D3 PO), Take  by mouth., Disp: , Rfl:   •  COLESTIPOL HCL PO, Take  by mouth Daily., Disp: , Rfl:   •  potassium chloride (MICRO-K) 10 MEQ CR capsule, Take 2 capsules by mouth Daily As Needed (take on same days as Bumex). Indications: Low Amount of Potassium in the Blood, Disp: 30 capsule, Rfl: 0  •  QUEtiapine (SEROquel) 100 MG tablet, Take 100 mg by mouth Every Night., Disp: , Rfl:   •  rosuvastatin (CRESTOR) 5 MG tablet, TAKE 1 TABLET BY MOUTH DAILY IN THE MORNING, Disp: , Rfl:   •  anastrozole (ARIMIDEX) 1 MG tablet, Take 1 tablet by mouth Daily., Disp: 30 tablet, Rfl: 3   Assessment:        Patient Active Problem List   Diagnosis   • Overactive bladder   • Essential hypertension   • Abnormal EKG   • SOB (shortness of breath)   • Altered mental status   • GERD (gastroesophageal reflux disease)   • ARDS survivor 1994 Ventilator/Tracheostomy   • ADD (attention deficit disorder)   • Vitamin D deficiency   • Acute kidney injury (HCC)   • Volume depletion    • Generalized weakness   • Hyperlipidemia   • History of hepatitis C followed by Dr. Ranjan Qiu [id number]   • Irritable bowel syndrome with both constipation and diarrhea   • Hepatic steatosis   • Obesity (BMI 30-39.9)   • Hematuria   • Elevated liver function tests   • Bipolar disorder, current episode mixed, moderate (HCC)   • Affective psychosis, bipolar (HCC)   • Acute on chronic respiratory failure with hypoxia and hypercapnia (HCC)   • Pulmonary interstitial fibrosis (CMS/HCC) with bleb/bullae formation   • Cyanosis of fingertip   • Lung nodule seen on imaging study 8mm RUL 9/18/20   • Pulmonary hypertension (CMS/HCC) with RVSP 55 by Echo 9/20/20   • Nonsustained ventricular tachycardia (HCC)   • Ductal carcinoma in situ (DCIS) of  right breast               Plan:            ICD-10-CM ICD-9-CM   1. Shortness of breath  R06.02 786.05   2. Hyperlipidemia, unspecified hyperlipidemia type  E78.5 272.4   3. Essential hypertension  I10 401.9   4. Abnormal EKG  R94.31 794.31   5. Obesity (BMI 30-39.9)  E66.9 278.00     1. Shortness of breath  Considering the patient's symptoms as well as clinical situation and  EKG findings, along with cardiac risk factors, ischemic workup is necessary to rule out ischemic cardiomyopathy, stress induced arrhythmias, and functional capacity for diagnosis as well as prognostic consideration    Considering patient's medical condition as well as the risk factors, patient will require echocardiogram for further evaluation for the LV function, four-chamber evaluation, including the pressures, valvular function and  pericardial disease and pericardial effusion      2. Hyperlipidemia, unspecified hyperlipidemia type  Continue current treatment    3. Essential hypertension  Blood pressure under control    4. Abnormal EKG  Considering the patient's symptoms as well as clinical situation and  EKG findings, along with cardiac risk factors, ischemic workup is necessary to rule out ischemic cardiomyopathy, stress induced arrhythmias, and functional capacity for diagnosis as well as prognostic consideration      5. Obesity (BMI 30-39.9)  Significant risk of obesity to CAD, HTN has been explained  Advantages of wt reduction has been explained         Chasity, echo  Follow up  COUNSELING:    Francheska Canales was given to patient for the following topics: diagnostic results, risk factor reductions, impressions, risks and benefits of treatment options and importance of treatment compliance .       SMOKING COUNSELING:    [unfilled]    Dictated using Dragon dictation

## 2022-01-01 ENCOUNTER — TELEPHONE (OUTPATIENT)
Dept: ONCOLOGY | Facility: CLINIC | Age: 67
End: 2022-01-01

## 2022-01-01 ENCOUNTER — TELEHEALTH PROVIDED IN PATIENT'S HOME (OUTPATIENT)
Dept: URBAN - METROPOLITAN AREA TELEHEALTH 5 | Facility: TELEHEALTH | Age: 67
End: 2022-01-01
Payer: COMMERCIAL

## 2022-01-01 VITALS — HEIGHT: 67 IN

## 2022-01-01 DIAGNOSIS — R19.7 DIARRHEA, UNSPECIFIED: ICD-10-CM

## 2022-01-01 PROCEDURE — 99214 OFFICE O/P EST MOD 30 MIN: CPT | Mod: 95 | Performed by: INTERNAL MEDICINE

## 2022-01-10 ENCOUNTER — TELEPHONE (OUTPATIENT)
Dept: ONCOLOGY | Facility: CLINIC | Age: 67
End: 2022-01-10

## 2022-01-10 NOTE — TELEPHONE ENCOUNTER
Caller: Tate Sherman    Relationship to patient: Emergency Contact    Best call back number:[phone]    Type of visit: LAB AND FOLLOW UP    Requested date: 02/16 AFTERNOON    If rescheduling, when is the original appointment: 01/11    Additional notes: PLEASE CALL ONCE R/S. HUB UNABLE TO R/S WITHIN TIMEFRAME.

## 2022-01-11 ENCOUNTER — APPOINTMENT (OUTPATIENT)
Dept: LAB | Facility: HOSPITAL | Age: 67
End: 2022-01-11

## 2022-02-08 ENCOUNTER — HOSPITAL ENCOUNTER (OUTPATIENT)
Dept: CT IMAGING | Facility: HOSPITAL | Age: 67
Discharge: HOME | End: 2022-02-08
Admitting: INTERNAL MEDICINE

## 2022-02-08 DIAGNOSIS — J84.10 PULMONARY FIBROSIS: ICD-10-CM

## 2022-02-08 PROCEDURE — 71250 CT THORAX DX C-: CPT

## 2022-03-01 ENCOUNTER — OFFICE VISIT (OUTPATIENT)
Dept: ONCOLOGY | Facility: CLINIC | Age: 67
End: 2022-03-01

## 2022-03-01 ENCOUNTER — LAB (OUTPATIENT)
Dept: LAB | Facility: HOSPITAL | Age: 67
End: 2022-03-01

## 2022-03-01 VITALS
SYSTOLIC BLOOD PRESSURE: 149 MMHG | OXYGEN SATURATION: 96 % | DIASTOLIC BLOOD PRESSURE: 86 MMHG | HEART RATE: 63 BPM | RESPIRATION RATE: 24 BRPM | WEIGHT: 265 LBS | BODY MASS INDEX: 41.59 KG/M2 | TEMPERATURE: 97.8 F | HEIGHT: 67 IN

## 2022-03-01 DIAGNOSIS — D05.11 DUCTAL CARCINOMA IN SITU (DCIS) OF RIGHT BREAST: Primary | ICD-10-CM

## 2022-03-01 DIAGNOSIS — D05.11 DUCTAL CARCINOMA IN SITU (DCIS) OF RIGHT BREAST: ICD-10-CM

## 2022-03-01 DIAGNOSIS — R06.09 DYSPNEA ON EXERTION: Primary | ICD-10-CM

## 2022-03-01 LAB
ALBUMIN SERPL-MCNC: 4.6 G/DL (ref 3.5–5.2)
ALBUMIN/GLOB SERPL: 1.3 G/DL (ref 1.1–2.4)
ALP SERPL-CCNC: 50 U/L (ref 38–116)
ALT SERPL W P-5'-P-CCNC: 34 U/L (ref 0–33)
ANION GAP SERPL CALCULATED.3IONS-SCNC: 9.6 MMOL/L (ref 5–15)
AST SERPL-CCNC: 33 U/L (ref 0–32)
BASOPHILS # BLD AUTO: 0.06 10*3/MM3 (ref 0–0.2)
BASOPHILS NFR BLD AUTO: 0.7 % (ref 0–1.5)
BILIRUB SERPL-MCNC: 0.3 MG/DL (ref 0.2–1.2)
BUN SERPL-MCNC: 15 MG/DL (ref 6–20)
BUN/CREAT SERPL: 24.2 (ref 7.3–30)
CALCIUM SPEC-SCNC: 10.1 MG/DL (ref 8.5–10.2)
CHLORIDE SERPL-SCNC: 103 MMOL/L (ref 98–107)
CO2 SERPL-SCNC: 29.4 MMOL/L (ref 22–29)
CREAT SERPL-MCNC: 0.62 MG/DL (ref 0.6–1.1)
DEPRECATED RDW RBC AUTO: 48.5 FL (ref 37–54)
EGFRCR SERPLBLD CKD-EPI 2021: 99 ML/MIN/1.73
EOSINOPHIL # BLD AUTO: 0.4 10*3/MM3 (ref 0–0.4)
EOSINOPHIL NFR BLD AUTO: 5 % (ref 0.3–6.2)
ERYTHROCYTE [DISTWIDTH] IN BLOOD BY AUTOMATED COUNT: 13.9 % (ref 12.3–15.4)
GLOBULIN UR ELPH-MCNC: 3.5 GM/DL (ref 1.8–3.5)
GLUCOSE SERPL-MCNC: 108 MG/DL (ref 74–124)
HCT VFR BLD AUTO: 45.9 % (ref 34–46.6)
HGB BLD-MCNC: 14.1 G/DL (ref 12–15.9)
IMM GRANULOCYTES # BLD AUTO: 0.03 10*3/MM3 (ref 0–0.05)
IMM GRANULOCYTES NFR BLD AUTO: 0.4 % (ref 0–0.5)
LYMPHOCYTES # BLD AUTO: 1.91 10*3/MM3 (ref 0.7–3.1)
LYMPHOCYTES NFR BLD AUTO: 23.7 % (ref 19.6–45.3)
MCH RBC QN AUTO: 29 PG (ref 26.6–33)
MCHC RBC AUTO-ENTMCNC: 30.7 G/DL (ref 31.5–35.7)
MCV RBC AUTO: 94.3 FL (ref 79–97)
MONOCYTES # BLD AUTO: 0.8 10*3/MM3 (ref 0.1–0.9)
MONOCYTES NFR BLD AUTO: 9.9 % (ref 5–12)
NEUTROPHILS NFR BLD AUTO: 4.85 10*3/MM3 (ref 1.7–7)
NEUTROPHILS NFR BLD AUTO: 60.3 % (ref 42.7–76)
NRBC BLD AUTO-RTO: 0 /100 WBC (ref 0–0.2)
PLATELET # BLD AUTO: 248 10*3/MM3 (ref 140–450)
PMV BLD AUTO: 10.4 FL (ref 6–12)
POTASSIUM SERPL-SCNC: 4.5 MMOL/L (ref 3.5–4.7)
PROT SERPL-MCNC: 8.1 G/DL (ref 6.3–8)
RBC # BLD AUTO: 4.87 10*6/MM3 (ref 3.77–5.28)
SODIUM SERPL-SCNC: 142 MMOL/L (ref 134–145)
WBC NRBC COR # BLD: 8.05 10*3/MM3 (ref 3.4–10.8)

## 2022-03-01 PROCEDURE — 36415 COLL VENOUS BLD VENIPUNCTURE: CPT

## 2022-03-01 PROCEDURE — 85025 COMPLETE CBC W/AUTO DIFF WBC: CPT

## 2022-03-01 PROCEDURE — 99214 OFFICE O/P EST MOD 30 MIN: CPT | Performed by: INTERNAL MEDICINE

## 2022-03-01 PROCEDURE — 80053 COMPREHEN METABOLIC PANEL: CPT

## 2022-03-01 RX ORDER — LETROZOLE 2.5 MG/1
2.5 TABLET, FILM COATED ORAL DAILY
Qty: 30 TABLET | Refills: 3 | Status: SHIPPED | OUTPATIENT
Start: 2022-03-01 | End: 2022-11-10

## 2022-03-01 NOTE — PROGRESS NOTES
Subjective   Francheska Sherman is a 67 y.o. female.  Referred by Dr. Pereyra for right breast ductal carcinoma in situ, ER/GA positive, grade 2    History of Present Illness   Ms. Aguilar is a 66-year-old  lady who has history of COPD, on supplemental oxygen, hypertension, hyperlipidemia presented with a screen detected abnormality of the right breast.  Patient's last mammogram was in 2012.  Most recently her screening mammogram performed in 2021 showed abnormality in the right breast which was further evaluated.  Family history is limited and no family history of breast or ovarian cancer as far she knows.  Denies any previous abnormal mammograms or breast biopsies.  She had a hysterectomy and a salpingo-oophorectomy at the age of 39 as she had an ovarian abscess and had sepsis secondary to the same.    5/19/2021-bilateral screening mammogram  1.area of focal asymmetry in the posterior one third of the right breast, lateral to the nipple.  Additional imaging recommended.  2.no suspicious findings in the left breast.    6/18/2021-right breast diagnostic mammogram and ultrasound  Suspicious 0.8 cm mass at 10 o'clock position of the right breast.  Ultrasound-guided core needle biopsy recommended.    7/1/2021-right breast 10 o'clock position, 6 cm from the nipple core biopsy  A.low-grade papillary and cribriform DCIS.  DCIS measures 5.5 mm  B.no invasive carcinoma identified.  ER +99% strong, GA positive greater than 95% strong, Ki-67 14%.    8/19/2021-right breast lumpectomy  Pathology consistent with ductal carcinoma in situ, measures 9 mm, cribriform and papillary, grade 2, margins negative, pTis NX M0  ER +99%, GA +95%    She has been referred by Dr. Pereyra to discuss adjuvant therapy.  She is recovering well from surgery.    Completed radiation October 2021.    She initiated anastrozole on 11/29/2021.  Soon after she experienced worsening shortness of breath and fluid retention and discontinued anastrozole.  She  was seen in our office subsequently and it was explained to her that the symptoms may not be related to anastrozole and to see her pulmonologist and cardiologist.  She is scheduled to undergo a stress test.   She also saw her pulmonologist to obtain the CT of the chest which was unchanged from before.    Interval History:  Ms. Sherman presents to the clinic today for follow-up.  She is accompanied by her .  She has not resumed anastrozole.  She is hesitant to do so.  She reports previous history of DVT and hence tamoxifen is not an option.  Denies any new breast masses.    The following portions of the patient's history were reviewed and updated as appropriate: allergies, current medications, past family history, past medical history, past social history, past surgical history and problem list.    Past Medical History:   Diagnosis Date   • ADD (attention deficit disorder)    • Anxiety    • ARDS survivor 1994   • Arthritis    • Breast cancer (HCC)    • Cancer (HCC)    • COPD (chronic obstructive pulmonary disease) (HCC)    • Encounter for annual health examination     Annual Health Assessment: 07/30/14, 10/25/13   • GERD (gastroesophageal reflux disease)    • Hepatitis-C    • History of mammogram 04/14/2011   • Hyperactivity of bladder    • Hypertension    • Irritable bowel syndrome    • On supplemental oxygen by nasal cannula     3L DURING DAY AND 4.5L AT NIGHT   • Pain of right side of body     c/o pain in right side and back   • Psychiatric illness    • Pulmonary fibrosis (HCC)    • Sepsis (HCC)         Past Surgical History:   Procedure Laterality Date   • APPENDECTOMY     • BREAST LUMPECTOMY Right 8/19/2021    Procedure: RIGHT BREAST WIRE-LOCALIZED BREAST LUMPECTOMY;  Surgeon: Ngozi Pereyra MD;  [place] [facility] [place] [facility] [place]  Service: General;  Laterality: Right;   • CARDIAC CATHETERIZATION N/A 9/28/2020    Procedure: LEFT HEART CATH;  Surgeon: Karely Zee MD;  Location: [facility] CATH INVASIVE  "LOCATION;  Service: Cardiovascular;  Laterality: N/A;   • CARDIAC CATHETERIZATION N/A 2020    Procedure: CORONARY ANGIOGRAPHY;  Surgeon: Karely Zee MD;  Location:  [facility] CATH INVASIVE LOCATION;  Service: Cardiovascular;  Laterality: N/A;   • CARDIAC CATHETERIZATION N/A 2020    Procedure: Left ventriculography;  Surgeon: Karely Zee MD;  Location:  [facility] CATH INVASIVE LOCATION;  Service: Cardiovascular;  Laterality: N/A;   •  SECTION     • CHEST TUBE INSERTION     • CHOLECYSTECTOMY     • COLONOSCOPY N/A 2019    Procedure: COLONOSCOPY into cecum and TI with cold biopsy polypectomies;  Surgeon: Fernandez Hooks MD;  Location:  [facility];  Service: Gastroenterology   • ENDOSCOPY N/A 2019    Procedure: ESOPHAGOGASTRODUODENOSCOPY with biopsies;  Surgeon: Fernandez Hooks MD;  Location:  [facility];  Service: Gastroenterology   • HYSTERECTOMY     • OOPHORECTOMY          Family History   Problem Relation Age of Onset   • Liver disease Mother    • Cervical cancer Mother    • Arthritis Mother    • Crohn's disease Brother    • Malig Hyperthermia Neg Hx         Social History     Socioeconomic History   • Marital status:      Spouse name: Tate Sherman   Tobacco Use   • Smoking status: Former Smoker     Types: Cigarettes     Quit date: 1994     Years since quittin.1   • Smokeless tobacco: Never Used   • Tobacco comment: QUIT    Vaping Use   • Vaping Use: Never used   Substance and Sexual Activity   • Alcohol use: No   • Drug use: No   • Sexual activity: Defer        OB History        1    Para   1    Term   1            AB        Living           SAB        IAB        Ectopic        Molar        Multiple        Live Births                     Allergies   Allergen Reactions   • Adhesive Tape Other (See Comments)     Blisters   • Codeine Other (See Comments)     \"Made me feel like my head was really heavy and going to explode\"   • " "Morphine Delirium   • Sulfa Antibiotics Rash      Review of Systems   Constitutional: Negative.    HENT: Negative.    Eyes: Negative.    Respiratory: Positive for shortness of breath.    Cardiovascular: Negative.    Gastrointestinal: Negative.    Endocrine: Negative.    Genitourinary: Positive for amenorrhea.   Musculoskeletal: Negative.    Neurological: Negative.    Hematological: Negative.    Psychiatric/Behavioral: Negative.      Review of systems unchanged    Objective   Blood pressure 149/86, pulse 63, temperature 97.8 °F (36.6 °C), temperature source Temporal, resp. rate 24, height 170.2 cm (67.01\"), weight 120 kg (265 lb), SpO2 96 %.     Physical Exam  Constitutional:       Appearance: Normal appearance. She is obese.   HENT:      Head: Normocephalic and atraumatic.      Nose: Nose normal.      Mouth/Throat:      Mouth: Mucous membranes are moist.      Pharynx: Oropharynx is clear.   Eyes:      Extraocular Movements: Extraocular movements intact.      Conjunctiva/sclera: Conjunctivae normal.      Pupils: Pupils are equal, round, and reactive to light.   Cardiovascular:      Rate and Rhythm: Normal rate and regular rhythm.      Heart sounds: Normal heart sounds. No murmur heard.  No gallop.    Pulmonary:      Effort: Pulmonary effort is normal. No respiratory distress.      Breath sounds: Normal breath sounds. No wheezing.   Abdominal:      General: Bowel sounds are normal. There is no distension.      Palpations: Abdomen is soft.      Tenderness: There is no abdominal tenderness.   Musculoskeletal:         General: Normal range of motion.      Cervical back: Normal range of motion and neck supple.   Skin:     General: Skin is warm.      Findings: No rash.   Neurological:      General: No focal deficit present.      Mental Status: She is alert and oriented to person, place, and time. Mental status is at baseline.   Psychiatric:         Mood and Affect: Mood normal.         Behavior: Behavior normal.         " Thought Content: Thought content normal.         Judgment: Judgment normal.       Breast Exam: Left breast appears normal inspection.  No palpable abnormalities of the left breast.  Right breast there is a scar in the upper outer quadrant which is healing well.    Breast exam not performed today.    I have reexamined the patient and the results are consistent with the previously documented exam. Macie Zamudio MD     Lab on 03/01/2022   Component Date Value Ref Range Status   • WBC 03/01/2022 8.05  3.40 - 10.80 10*3/mm3 Final   • RBC 03/01/2022 4.87  3.77 - 5.28 10*6/mm3 Final   • Hemoglobin 03/01/2022 14.1  12.0 - 15.9 g/dL Final   • Hematocrit 03/01/2022 45.9  34.0 - 46.6 % Final   • MCV 03/01/2022 94.3  79.0 - 97.0 fL Final   • MCH 03/01/2022 29.0  26.6 - 33.0 pg Final   • MCHC 03/01/2022 30.7* 31.5 - 35.7 g/dL Final   • RDW 03/01/2022 13.9  12.3 - 15.4 % Final   • RDW-SD 03/01/2022 48.5  37.0 - 54.0 fl Final   • MPV 03/01/2022 10.4  6.0 - 12.0 fL Final   • Platelets 03/01/2022 248  140 - 450 10*3/mm3 Final   • Neutrophil % 03/01/2022 60.3  42.7 - 76.0 % Final   • Lymphocyte % 03/01/2022 23.7  19.6 - 45.3 % Final   • Monocyte % 03/01/2022 9.9  5.0 - 12.0 % Final   • Eosinophil % 03/01/2022 5.0  0.3 - 6.2 % Final   • Basophil % 03/01/2022 0.7  0.0 - 1.5 % Final   • Immature Grans % 03/01/2022 0.4  0.0 - 0.5 % Final   • Neutrophils, Absolute 03/01/2022 4.85  1.70 - 7.00 10*3/mm3 Final   • Lymphocytes, Absolute 03/01/2022 1.91  0.70 - 3.10 10*3/mm3 Final   • Monocytes, Absolute 03/01/2022 0.80  0.10 - 0.90 10*3/mm3 Final   • Eosinophils, Absolute 03/01/2022 0.40  0.00 - 0.40 10*3/mm3 Final   • Basophils, Absolute 03/01/2022 0.06  0.00 - 0.20 10*3/mm3 Final   • Immature Grans, Absolute 03/01/2022 0.03  0.00 - 0.05 10*3/mm3 Final   • nRBC 03/01/2022 0.0  0.0 - 0.2 /100 WBC Final        CT Chest Hi Resolution Diagnostic    Result Date: 2/11/2022  1. Advanced emphysema. Coarsening of the peripheral interstitium within  the upper lobes and superior segment of the right lower lobe with suggestion of honeycombing, suggesting fibrosis. The appearance of the lungs is not significantly changed from 2020. No significant air trapping is demonstrated. 2. Diffuse hepatic steatosis.   Radiation dose reduction techniques were utilized, including automated exposure control and exposure modulation based on body size.  This report was finalized on 2/11/2022 1:37 PM by Dr. Palmer Fragoso M.D.       CBC 3/1/2020 reviewed and within normal limits  2/11/2022-high-resolution CT-images intimately reviewed interpreted by me-no evidence of malignancy.  Emphysematous changes stable compared to previous scans.    Assessment/Plan       *DCIS of the right breast  · Grade 2, ER/NV positive  · Status post right breast lumpectomy  · Margins negative  · DCIS measures 9 mm  · Radiation completed October 2021.  · 11/29/2021 patient called our office after being on anastrozole x2 weeks.  Since initiating anastrozole she noticed difficult time breathing, nausea, and hot flashes.  Stated she was having difficult time catching her breath and her chest felt tight.  She was advised to hold anastrozole and follow-up in 2 weeks.  She was also advised to report to the ED if shortness of breath became worse or she developed chest pain.  · 12/14/2021, patient seen today for follow-up after holding anastrozole x2 weeks. She noticed improvement in the symptoms above, however is still noticing she is not back to baseline for shortness of breath.  She has noticed worsening dyspnea on exertion, noting she now gets short of breath ambulating a short distance to the bathroom.  She has been off anastrazole for 2 weeks and only took it for a few days.  We discussed this may not be related to her anastrazole, and could more likely be related to her COPD.  She will stay off anastrazole and follow up with us in 1 month for further plan.  In the meantime I have asked her to follow up  with pulmonary.  · Patient does not willing to go back on anastrozole.  She tells me that she had a history of DVT and hence tamoxifen is not an option.  · We discussed that other aromatase inhibitors could be tried however there is no specific data with exemestane or letrozole for DCIS.  · However since we have limited options I think it is reasonable to try letrozole.  · Explained to her about the adverse effects of the same.  · If she notices worsening dyspnea recommend that she hold letrozole.  · I also explained to her that dyspnea is not typical side effect of aromatase inhibitors.  It is unclear to me as to why she had worsening dyspnea.  · We will plan to do a video visit in 3 weeks to assess her tolerance to the new treatment.    *COPD-currently on 3 L of oxygen by nasal cannula.  Recent CT chest shows emphysematous changes.  Recent high-resolution CT on 2/11/2022 without any significant changes.    *Hypertension-blood pressure 149/86.  Continue current medication.      *Hyperlipidemia-continue Crestor    *Bone Health  · DEXA scan performed 9/17/2021 showing osteopenia.  Patient was advised to start calcium and vitamin D.  · Continue calcium and vitamin D.    *Shortness of breath  · Scheduled for stress test next week  · Continue follow-up with cardiology and pulmonary    PLAN:   · Discontinue anastrozole  · Start letrozole  · Continue calcium and vitamin D.  · Video visit in 3 weeks to assess tolerance  · Continue to follow-up with pulmonary, and cardiology  · Patient is due for screening mammogram in May 2022 , schedule at next visit  · DEXA scan next due September 2023.    Macie Zamudio MD  12/14/2021

## 2022-03-08 ENCOUNTER — HOSPITAL ENCOUNTER (OUTPATIENT)
Dept: CARDIOLOGY | Facility: HOSPITAL | Age: 67
Discharge: HOME | End: 2022-03-08
Admitting: INTERNAL MEDICINE

## 2022-03-08 ENCOUNTER — HOSPITAL ENCOUNTER (OUTPATIENT)
Dept: CARDIOLOGY | Facility: HOSPITAL | Age: 67
Discharge: HOME | End: 2022-03-08

## 2022-03-08 VITALS
HEART RATE: 57 BPM | HEIGHT: 67 IN | RESPIRATION RATE: 18 BRPM | BODY MASS INDEX: 41.59 KG/M2 | OXYGEN SATURATION: 97 % | WEIGHT: 265 LBS | DIASTOLIC BLOOD PRESSURE: 80 MMHG | SYSTOLIC BLOOD PRESSURE: 138 MMHG

## 2022-03-08 PROCEDURE — 25010000002 PERFLUTREN (DEFINITY) 8.476 MG IN SODIUM CHLORIDE (PF) 0.9 % 10 ML INJECTION: Performed by: INTERNAL MEDICINE

## 2022-03-08 PROCEDURE — 93017 CV STRESS TEST TRACING ONLY: CPT

## 2022-03-08 PROCEDURE — 93018 CV STRESS TEST I&R ONLY: CPT | Performed by: INTERNAL MEDICINE

## 2022-03-08 PROCEDURE — A9500 TC99M SESTAMIBI: HCPCS | Performed by: INTERNAL MEDICINE

## 2022-03-08 PROCEDURE — 93306 TTE W/DOPPLER COMPLETE: CPT | Performed by: INTERNAL MEDICINE

## 2022-03-08 PROCEDURE — 78452 HT MUSCLE IMAGE SPECT MULT: CPT

## 2022-03-08 PROCEDURE — 25010000002 REGADENOSON 0.4 MG/5ML SOLUTION: Performed by: INTERNAL MEDICINE

## 2022-03-08 PROCEDURE — 93016 CV STRESS TEST SUPVJ ONLY: CPT

## 2022-03-08 PROCEDURE — 93306 TTE W/DOPPLER COMPLETE: CPT

## 2022-03-08 PROCEDURE — 78452 HT MUSCLE IMAGE SPECT MULT: CPT | Performed by: INTERNAL MEDICINE

## 2022-03-08 PROCEDURE — 0 TECHNETIUM SESTAMIBI: Performed by: INTERNAL MEDICINE

## 2022-03-08 RX ORDER — FLUCONAZOLE 150 MG/1
TABLET ORAL
COMMUNITY
Start: 2022-03-03 | End: 2022-11-10

## 2022-03-08 RX ADMIN — TECHNETIUM TC 99M SESTAMIBI 1 DOSE: 1 INJECTION INTRAVENOUS at 08:30

## 2022-03-08 RX ADMIN — SODIUM CHLORIDE 3 ML: 9 INJECTION INTRAMUSCULAR; INTRAVENOUS; SUBCUTANEOUS at 13:15

## 2022-03-08 RX ADMIN — REGADENOSON 0.4 MG: 0.08 INJECTION, SOLUTION INTRAVENOUS at 11:03

## 2022-03-08 RX ADMIN — TECHNETIUM TC 99M SESTAMIBI 1 DOSE: 1 INJECTION INTRAVENOUS at 11:03

## 2022-03-09 LAB
ASCENDING AORTA: 3.2 CM
BH CV ECHO MEAS - ACS: 2.09 CM
BH CV ECHO MEAS - AO MAX PG: 13.3 MMHG
BH CV ECHO MEAS - AO MEAN PG: 8.3 MMHG
BH CV ECHO MEAS - AO ROOT DIAM: 3.1 CM
BH CV ECHO MEAS - AO V2 MAX: 182.4 CM/SEC
BH CV ECHO MEAS - AO V2 VTI: 40.1 CM
BH CV ECHO MEAS - AVA(I,D): 1.25 CM2
BH CV ECHO MEAS - EDV(CUBED): 105.3 ML
BH CV ECHO MEAS - EDV(MOD-SP2): 64 ML
BH CV ECHO MEAS - EDV(MOD-SP4): 73 ML
BH CV ECHO MEAS - EF(MOD-BP): 59.7 %
BH CV ECHO MEAS - EF(MOD-SP2): 62.5 %
BH CV ECHO MEAS - EF(MOD-SP4): 58.9 %
BH CV ECHO MEAS - ESV(CUBED): 37.6 ML
BH CV ECHO MEAS - ESV(MOD-SP2): 24 ML
BH CV ECHO MEAS - ESV(MOD-SP4): 30 ML
BH CV ECHO MEAS - FS: 29 %
BH CV ECHO MEAS - IVS/LVPW: 0.99 CM
BH CV ECHO MEAS - IVSD: 1.58 CM
BH CV ECHO MEAS - LA DIMENSION: 4.2 CM
BH CV ECHO MEAS - LAT PEAK E' VEL: 9.2 CM/SEC
BH CV ECHO MEAS - LV DIASTOLIC VOL/BSA (35-75): 32 CM2
BH CV ECHO MEAS - LV MASS(C)D: 323.1 GRAMS
BH CV ECHO MEAS - LV MAX PG: 3.7 MMHG
BH CV ECHO MEAS - LV MEAN PG: 1.84 MMHG
BH CV ECHO MEAS - LV SYSTOLIC VOL/BSA (12-30): 13.2 CM2
BH CV ECHO MEAS - LV V1 MAX: 96.7 CM/SEC
BH CV ECHO MEAS - LV V1 VTI: 17.6 CM
BH CV ECHO MEAS - LVIDD: 4.7 CM
BH CV ECHO MEAS - LVIDS: 3.4 CM
BH CV ECHO MEAS - LVOT AREA: 2.8 CM2
BH CV ECHO MEAS - LVOT DIAM: 1.9 CM
BH CV ECHO MEAS - LVPWD: 1.6 CM
BH CV ECHO MEAS - MED PEAK E' VEL: 6.2 CM/SEC
BH CV ECHO MEAS - MV A MAX VEL: 109.6 CM/SEC
BH CV ECHO MEAS - MV DEC SLOPE: 306.9 CM/SEC2
BH CV ECHO MEAS - MV DEC TIME: 243 MSEC
BH CV ECHO MEAS - MV E MAX VEL: 128.1 CM/SEC
BH CV ECHO MEAS - MV E/A: 1.17
BH CV ECHO MEAS - MV MAX PG: 4.5 MMHG
BH CV ECHO MEAS - MV MEAN PG: 2.02 MMHG
BH CV ECHO MEAS - MV V2 VTI: 35.9 CM
BH CV ECHO MEAS - MVA(VTI): 1.39 CM2
BH CV ECHO MEAS - PA ACC TIME: 0.11 SEC
BH CV ECHO MEAS - PA PR(ACCEL): 29.1 MMHG
BH CV ECHO MEAS - PA V2 MAX: 154.7 CM/SEC
BH CV ECHO MEAS - PULM A REVS DUR: 0.16 SEC
BH CV ECHO MEAS - PULM A REVS VEL: 37.6 CM/SEC
BH CV ECHO MEAS - PULM DIAS VEL: 69 CM/SEC
BH CV ECHO MEAS - PULM SYS VEL: 90 CM/SEC
BH CV ECHO MEAS - RAP SYSTOLE: 3 MMHG
BH CV ECHO MEAS - RVDD: 3.2 CM
BH CV ECHO MEAS - RVSP: 17.4 MMHG
BH CV ECHO MEAS - SI(MOD-SP2): 17.6 ML/M2
BH CV ECHO MEAS - SI(MOD-SP4): 18.9 ML/M2
BH CV ECHO MEAS - SV(LVOT): 50 ML
BH CV ECHO MEAS - SV(MOD-SP2): 40 ML
BH CV ECHO MEAS - SV(MOD-SP4): 43 ML
BH CV ECHO MEAS - TAPSE (>1.6): 2.14 CM
BH CV ECHO MEAS - TR MAX PG: 14.4 MMHG
BH CV ECHO MEAS - TR MAX VEL: 190 CM/SEC
BH CV ECHO MEASUREMENTS AVERAGE E/E' RATIO: 16.64
BH CV XLRA - RV BASE: 2.9 CM
BH CV XLRA - RV LENGTH: 7.1 CM
BH CV XLRA - RV MID: 2.7 CM
BH CV XLRA - TDI S': 12.6 CM/SEC
LEFT ATRIUM VOLUME INDEX: 28.9 ML/M2
MAXIMAL PREDICTED HEART RATE: 155 BPM
SINUS: 2.8 CM
STJ: 3.1 CM
STRESS TARGET HR: 132 BPM

## 2022-03-10 LAB
BH CV REST NUCLEAR ISOTOPE DOSE: 10.8 MCI
BH CV STRESS BP STAGE 1: NORMAL
BH CV STRESS COMMENTS STAGE 1: NORMAL
BH CV STRESS DOSE REGADENOSON STAGE 1: 0.4
BH CV STRESS DURATION MIN STAGE 1: 1
BH CV STRESS DURATION SEC STAGE 1: 0
BH CV STRESS HR STAGE 1: 70
BH CV STRESS NUCLEAR ISOTOPE DOSE: 32.1 MCI
BH CV STRESS O2 STAGE 1: 99
BH CV STRESS PROTOCOL 1: NORMAL
BH CV STRESS RECOVERY BP: NORMAL MMHG
BH CV STRESS RECOVERY HR: 69 BPM
BH CV STRESS RECOVERY O2: 97 %
BH CV STRESS STAGE 1: 1
LV EF NUC BP: 60 %
MAXIMAL PREDICTED HEART RATE: 155 BPM
PERCENT MAX PREDICTED HR: 45.16 %
STRESS BASELINE BP: NORMAL MMHG
STRESS BASELINE HR: 61 BPM
STRESS O2 SAT REST: 97 %
STRESS PERCENT HR: 53 %
STRESS POST ESTIMATED WORKLOAD: 1 METS
STRESS POST EXERCISE DUR MIN: 1 MIN
STRESS POST EXERCISE DUR SEC: 0 SEC
STRESS POST O2 SAT PEAK: 99 %
STRESS POST PEAK BP: NORMAL MMHG
STRESS POST PEAK HR: 70 BPM
STRESS TARGET HR: 132 BPM

## 2022-03-16 ENCOUNTER — OFFICE VISIT (OUTPATIENT)
Dept: CARDIOLOGY | Age: 67
End: 2022-03-16

## 2022-03-16 VITALS
BODY MASS INDEX: 42.06 KG/M2 | DIASTOLIC BLOOD PRESSURE: 82 MMHG | SYSTOLIC BLOOD PRESSURE: 130 MMHG | HEART RATE: 51 BPM | HEIGHT: 67 IN | WEIGHT: 268 LBS

## 2022-03-16 DIAGNOSIS — E78.5 HYPERLIPIDEMIA, UNSPECIFIED HYPERLIPIDEMIA TYPE: ICD-10-CM

## 2022-03-16 DIAGNOSIS — R94.31 ABNORMAL EKG: Primary | ICD-10-CM

## 2022-03-16 DIAGNOSIS — R06.02 SHORTNESS OF BREATH: ICD-10-CM

## 2022-03-16 DIAGNOSIS — I10 ESSENTIAL HYPERTENSION: ICD-10-CM

## 2022-03-16 PROCEDURE — 99213 OFFICE O/P EST LOW 20 MIN: CPT | Performed by: INTERNAL MEDICINE

## 2022-03-16 RX ORDER — NITROGLYCERIN 0.4 MG/1
TABLET SUBLINGUAL
Qty: 100 TABLET | Refills: 11 | Status: SHIPPED | OUTPATIENT
Start: 2022-03-16

## 2022-03-16 NOTE — PROGRESS NOTES
TEST RESULTS   Subjective:        Francheska Sherman is a 67 y.o. female who here for follow up    CC  Follow-up hypertension abnormal EKG shortness of breath hyperlipidemia  HPI  67-year-old female with known history of the benign essential arterial hypertension abnormal EKG shortness of breath and hyperlipidemia here for the follow-up with no complaints of chest pains tightness heaviness or the pressure sensation     Problems Addressed this Visit        Other    Essential hypertension    Abnormal EKG - Primary    Shortness of breath    Hyperlipidemia      Diagnoses       Codes Comments    Abnormal EKG    -  Primary ICD-10-CM: R94.31  ICD-9-CM: 794.31     Shortness of breath     ICD-10-CM: R06.02  ICD-9-CM: 786.05     Hyperlipidemia, unspecified hyperlipidemia type     ICD-10-CM: E78.5  ICD-9-CM: 272.4     Essential hypertension     ICD-10-CM: I10  ICD-9-CM: 401.9         .    The following portions of the patient's history were reviewed and updated as appropriate: allergies, current medications, past family history, past medical history, past social history, past surgical history and problem list.    Past Medical History:   Diagnosis Date   • ADD (attention deficit disorder)    • Anxiety    • ARDS survivor 1994   • Arthritis    • Breast cancer (HCC)    • Cancer (HCC)    • COPD (chronic obstructive pulmonary disease) (HCC)    • Encounter for annual health examination     Annual Health Assessment: 07/30/14, 10/25/13   • GERD (gastroesophageal reflux disease)    • Hepatitis-C    • History of mammogram 04/14/2011   • Hyperactivity of bladder    • Hypertension    • Irritable bowel syndrome    • On supplemental oxygen by nasal cannula     3L DURING DAY AND 4.5L AT NIGHT   • Pain of right side of body     c/o pain in right side and back   • Psychiatric illness    • Pulmonary fibrosis (HCC)    • Sepsis (HCC)      reports that she quit smoking about 28 years ago. Her smoking use included cigarettes. She has never used smokeless  "tobacco. She reports that she does not drink alcohol and does not use drugs.   Family History   Problem Relation Age of Onset   • Liver disease Mother    • Cervical cancer Mother    • Arthritis Mother    • Crohn's disease Brother    • Malig Hyperthermia Neg Hx        Review of Systems  Constitutional: No wt loss, fever, fatigue  Gastrointestinal: No nausea, abdominal pain  Behavioral/Psych: No insomnia or anxiety   Cardiovascular no chest pains or tightness in the chest  Objective:       Physical Exam  /82   Pulse 51   Ht 170.2 cm (67\")   Wt 122 kg (268 lb)   BMI 41.97 kg/m²   General appearance: No acute changes   Neck: Trachea midline; NECK, supple, no thyromegaly or lymphadenopathy   Lungs: Normal size and shape, normal breath sounds, equal distribution of air, no rales and rhonchi   CV: S1-S2 regular, no murmurs, no rub, no gallop   Abdomen: Soft, nontender; no masses , no abnormal abdominal sounds   Extremities: No deformity , normal color , no peripheral edema   Skin: Normal temperature, turgor and texture; no rash, ulcers          Procedures      Echocardiogram:        Current Outpatient Medications:   •  acetaminophen (TYLENOL) 325 MG tablet, Take 2 tablets by mouth Every 4 (Four) Hours As Needed for Mild Pain ., Disp: , Rfl:   •  amLODIPine (NORVASC) 5 MG tablet, Take 5 mg by mouth 2 (Two) Times a Day As Needed. Indications: High Blood Pressure Disorder, Disp: , Rfl: 0  •  bisoprolol (ZEBeta) 5 MG tablet, Take 1.5 tablets by mouth Daily. Indications: High Blood Pressure Disorder, Disp: 45 tablet, Rfl: 2  •  bumetanide (BUMEX) 0.5 MG tablet, Take 1 tablet by mouth Daily As Needed (Lower extremity fluid). Indications: Edema, Disp: 30 tablet, Rfl: 0  •  Cholecalciferol (VITAMIN D3 PO), Take  by mouth., Disp: , Rfl:   •  COLESTIPOL HCL PO, Take  by mouth Daily., Disp: , Rfl:   •  fluconazole (DIFLUCAN) 150 MG tablet, , Disp: , Rfl:   •  letrozole (FEMARA) 2.5 MG tablet, Take 1 tablet by mouth Daily., " Disp: 30 tablet, Rfl: 3  •  potassium chloride (MICRO-K) 10 MEQ CR capsule, Take 2 capsules by mouth Daily As Needed (take on same days as Bumex). Indications: Low Amount of Potassium in the Blood, Disp: 30 capsule, Rfl: 0  •  QUEtiapine (SEROquel) 100 MG tablet, Take 100 mg by mouth Every Night., Disp: , Rfl:   •  rosuvastatin (CRESTOR) 5 MG tablet, TAKE 1 TABLET BY MOUTH DAILY IN THE MORNING, Disp: , Rfl:    Assessment:        Patient Active Problem List   Diagnosis   • Overactive bladder   • Essential hypertension   • Abnormal EKG   • Shortness of breath   • Altered mental status   • GERD (gastroesophageal reflux disease)   • ARDS survivor 1994 Ventilator/Tracheostomy   • ADD (attention deficit disorder)   • Vitamin D deficiency   • Acute kidney injury (HCC)   • Volume depletion    • Generalized weakness   • Hyperlipidemia   • History of hepatitis C followed by Dr. Ranjan Qiu [id number]   • Irritable bowel syndrome with both constipation and diarrhea   • Hepatic steatosis   • Obesity (BMI 30-39.9)   • Hematuria   • Elevated liver function tests   • Bipolar disorder, current episode mixed, moderate (HCC)   • Affective psychosis, bipolar (HCC)   • Acute on chronic respiratory failure with hypoxia and hypercapnia (HCC)   • Pulmonary interstitial fibrosis (CMS/HCC) with bleb/bullae formation   • Cyanosis of fingertip   • Lung nodule seen on imaging study 8mm RUL 9/18/20   • Pulmonary hypertension (CMS/HCC) with RVSP 55 by Echo 9/20/20   • Nonsustained ventricular tachycardia (HCC)   • Ductal carcinoma in situ (DCIS) of right breast       Interpretation Summary       · Findings consistent with an equivocal ECG stress test.  · Left ventricular ejection fraction is normal. (Calculated EF = 60%).  · Myocardial perfusion imaging indicates a small-sized, mildly severe area of ischemia located in the anterior wall.  · Impressions are consistent with an intermediate risk study.  · Area of ischemia is very small      Interpretation Summary    · Calculated left ventricular EF = 59.7% Estimated left ventricular EF was in agreement with the calculated left ventricular EF.  · Left ventricular diastolic function was normal.  · There is no evidence of pericardial effusion.              Plan:            ICD-10-CM ICD-9-CM   1. Abnormal EKG  R94.31 794.31   2. Shortness of breath  R06.02 786.05   3. Hyperlipidemia, unspecified hyperlipidemia type  E78.5 272.4   4. Essential hypertension  I10 401.9     1. Abnormal EKG  Under control    2. Shortness of breath  Under control    3. Hyperlipidemia, unspecified hyperlipidemia type  Continue current treatment    4. Essential hypertension  Blood pressure under control     ABNORMAL CARDIAC FUN TEST  DUE TO ARTIFACT  NTG  PRN  SEE IN 1 YR    COUNSELING:    Francheska Canales was given to patient for the following topics: diagnostic results, risk factor reductions, impressions, risks and benefits of treatment options and importance of treatment compliance .       SMOKING COUNSELING:    [unfilled]    Dictated using Dragon dictation

## 2022-03-23 ENCOUNTER — OFFICE VISIT (OUTPATIENT)
Dept: ONCOLOGY | Facility: CLINIC | Age: 67
End: 2022-03-23

## 2022-03-23 DIAGNOSIS — D05.11 DUCTAL CARCINOMA IN SITU (DCIS) OF RIGHT BREAST: ICD-10-CM

## 2022-03-23 DIAGNOSIS — R06.09 DYSPNEA ON EXERTION: Primary | ICD-10-CM

## 2022-03-23 PROCEDURE — 99213 OFFICE O/P EST LOW 20 MIN: CPT | Performed by: INTERNAL MEDICINE

## 2022-03-23 NOTE — PROGRESS NOTES
Subjective   Francheska Sherman is a 67 y.o. female.  Referred by Dr. Pereyra for right breast ductal carcinoma in situ, ER/RI positive, grade 2    History of Present Illness   Ms. Sherman is a 66-year-old  lady who has history of COPD, on supplemental oxygen, hypertension, hyperlipidemia presented with a screen detected abnormality of the right breast.  Patient's last mammogram was in 2012.  Most recently her screening mammogram performed in 2021 showed abnormality in the right breast which was further evaluated.  Family history is limited and no family history of breast or ovarian cancer as far she knows.  Denies any previous abnormal mammograms or breast biopsies.  She had a hysterectomy and a salpingo-oophorectomy at the age of 39 as she had an ovarian abscess and had sepsis secondary to the same.    5/19/2021-bilateral screening mammogram  1.area of focal asymmetry in the posterior one third of the right breast, lateral to the nipple.  Additional imaging recommended.  2.no suspicious findings in the left breast.    6/18/2021-right breast diagnostic mammogram and ultrasound  Suspicious 0.8 cm mass at 10 o'clock position of the right breast.  Ultrasound-guided core needle biopsy recommended.    7/1/2021-right breast 10 o'clock position, 6 cm from the nipple core biopsy  A.low-grade papillary and cribriform DCIS.  DCIS measures 5.5 mm  B.no invasive carcinoma identified.  ER +99% strong, RI positive greater than 95% strong, Ki-67 14%.    8/19/2021-right breast lumpectomy  Pathology consistent with ductal carcinoma in situ, measures 9 mm, cribriform and papillary, grade 2, margins negative, pTis NX M0  ER +99%, RI +95%    She has been referred by Dr. Pereyra to discuss adjuvant therapy.  She is recovering well from surgery.    Completed radiation October 2021.    She initiated anastrozole on 11/29/2021.  Soon after she experienced worsening shortness of breath and fluid retention and discontinued anastrozole.  She  was seen in our office subsequently and it was explained to her that the symptoms may not be related to anastrozole and to see her pulmonologist and cardiologist.  She is scheduled to undergo a stress test.   She also saw her pulmonologist to obtain the CT of the chest which was unchanged from before.    Ms. Bynum was seen in clinic early March 2022 at which point she discontinued taking anastrozole.  She explained that she developed dyspnea when she started the anastrozole and was not willing to go back on it.    Interval History:  She was recommended to take letrozole and prescribed the same.  She started letrozole about 5 days ago.  She reports that since starting letrozole she has felt extremely tired and also experienced worsening of dyspnea and her sats had dropped.  She is unwilling to take any other medications at this time.  She had a stress test done.  She is also complaining of nausea since starting letrozole.    The following portions of the patient's history were reviewed and updated as appropriate: allergies, current medications, past family history, past medical history, past social history, past surgical history and problem list.    Past Medical History:   Diagnosis Date   • ADD (attention deficit disorder)    • Anxiety    • ARDS survivor 1994   • Arthritis    • Breast cancer (HCC)    • Cancer (HCC)    • COPD (chronic obstructive pulmonary disease) (HCC)    • Encounter for annual health examination     Annual Health Assessment: 07/30/14, 10/25/13   • GERD (gastroesophageal reflux disease)    • Hepatitis-C    • History of mammogram 04/14/2011   • Hyperactivity of bladder    • Hypertension    • Irritable bowel syndrome    • On supplemental oxygen by nasal cannula     3L DURING DAY AND 4.5L AT NIGHT   • Pain of right side of body     c/o pain in right side and back   • Psychiatric illness    • Pulmonary fibrosis (HCC)    • Sepsis (HCC)         Past Surgical History:   Procedure Laterality Date   •  APPENDECTOMY     • BREAST LUMPECTOMY Right 2021    Procedure: RIGHT BREAST WIRE-LOCALIZED BREAST LUMPECTOMY;  Surgeon: Ngozi Pereyra MD;  [place] [facility] [place] [facility] [place]  Service: General;  Laterality: Right;   • CARDIAC CATHETERIZATION N/A 2020    Procedure: LEFT HEART CATH;  Surgeon: Karely Zee MD;  Location: [facility] CATH INVASIVE LOCATION;  Service: Cardiovascular;  Laterality: N/A;   • CARDIAC CATHETERIZATION N/A 2020    Procedure: CORONARY ANGIOGRAPHY;  Surgeon: Karely Zee MD;  Location: [facility] CATH INVASIVE LOCATION;  Service: Cardiovascular;  Laterality: N/A;   • CARDIAC CATHETERIZATION N/A 2020    Procedure: Left ventriculography;  Surgeon: Karely Zee MD;  Location: [facility] CATH INVASIVE LOCATION;  Service: Cardiovascular;  Laterality: N/A;   •  SECTION     • CHEST TUBE INSERTION     • CHOLECYSTECTOMY     • COLONOSCOPY N/A 2019    Procedure: COLONOSCOPY into cecum and TI with cold biopsy polypectomies;  Surgeon: Fernandez Hooks MD;  Location: [facility] ENDOSCOPY;  Service: Gastroenterology   • ENDOSCOPY N/A 2019    Procedure: ESOPHAGOGASTRODUODENOSCOPY with biopsies;  Surgeon: Fernandez Hooks MD;  Location: [facility] ENDOSCOPY;  Service: Gastroenterology   • HYSTERECTOMY     • OOPHORECTOMY          Family History   Problem Relation Age of Onset   • Liver disease Mother    • Cervical cancer Mother    • Arthritis Mother    • Crohn's disease Brother    • Malig Hyperthermia Neg Hx         Social History     Socioeconomic History   • Marital status:      Spouse name: Tate Sherman   Tobacco Use   • Smoking status: Former Smoker     Types: Cigarettes     Quit date: 1994     Years since quittin.2   • Smokeless tobacco: Never Used   • Tobacco comment: QUIT    Vaping Use   • Vaping Use: Never used   Substance and Sexual Activity   • Alcohol use: No   • Drug use: No   • Sexual activity: Defer     Birth control/protection: Surgical     "    OB History        1    Para   1    Term   1            AB        Living           SAB        IAB        Ectopic        Molar        Multiple        Live Births                     Allergies   Allergen Reactions   • Adhesive Tape Other (See Comments)     Blisters   • Codeine Other (See Comments)     \"Made me feel like my head was really heavy and going to explode\"   • Morphine Delirium   • Sulfa Antibiotics Rash      Review of Systems   Constitutional: Negative.    HENT: Negative.    Eyes: Negative.    Respiratory: Positive for shortness of breath.    Cardiovascular: Negative.    Gastrointestinal: Negative.    Endocrine: Negative.    Genitourinary: Positive for amenorrhea.   Musculoskeletal: Negative.    Neurological: Negative.    Hematological: Negative.    Psychiatric/Behavioral: Negative.      Review of systems as mentioned in the HPI    Objective   There were no vitals taken for this visit.     Physical Exam  Constitutional:       Appearance: Normal appearance. She is obese.   HENT:      Head: Normocephalic and atraumatic.      Nose: Nose normal.      Mouth/Throat:      Mouth: Mucous membranes are moist.      Pharynx: Oropharynx is clear.   Eyes:      Extraocular Movements: Extraocular movements intact.      Conjunctiva/sclera: Conjunctivae normal.      Pupils: Pupils are equal, round, and reactive to light.   Cardiovascular:      Rate and Rhythm: Normal rate and regular rhythm.      Heart sounds: Normal heart sounds. No murmur heard.    No gallop.   Pulmonary:      Effort: Pulmonary effort is normal. No respiratory distress.      Breath sounds: Normal breath sounds. No wheezing.   Abdominal:      General: Bowel sounds are normal. There is no distension.      Palpations: Abdomen is soft.      Tenderness: There is no abdominal tenderness.   Musculoskeletal:         General: Normal range of motion.      Cervical back: Normal range of motion and neck supple.   Skin:     General: Skin is warm.      " Findings: No rash.   Neurological:      General: No focal deficit present.      Mental Status: She is alert and oriented to person, place, and time. Mental status is at baseline.   Psychiatric:         Mood and Affect: Mood normal.         Behavior: Behavior normal.         Thought Content: Thought content normal.         Judgment: Judgment normal.       Breast Exam: Left breast appears normal inspection.  No palpable abnormalities of the left breast.  Right breast there is a scar in the upper outer quadrant which is healing well.    Breast exam not performed today.    Physical exam not performed as this was a telephone visit    Lab on 03/01/2022   Component Date Value Ref Range Status   • Glucose 03/01/2022 108  74 - 124 mg/dL Final   • BUN 03/01/2022 15  6 - 20 mg/dL Final   • Creatinine 03/01/2022 0.62  0.60 - 1.10 mg/dL Final   • Sodium 03/01/2022 142  134 - 145 mmol/L Final   • Potassium 03/01/2022 4.5  3.5 - 4.7 mmol/L Final   • Chloride 03/01/2022 103  98 - 107 mmol/L Final   • CO2 03/01/2022 29.4 (A) 22.0 - 29.0 mmol/L Final   • Calcium 03/01/2022 10.1  8.5 - 10.2 mg/dL Final   • Total Protein 03/01/2022 8.1 (A) 6.3 - 8.0 g/dL Final   • Albumin 03/01/2022 4.60  3.50 - 5.20 g/dL Final   • ALT (SGPT) 03/01/2022 34 (A) 0 - 33 U/L Final   • AST (SGOT) 03/01/2022 33 (A) 0 - 32 U/L Final   • Alkaline Phosphatase 03/01/2022 50  38 - 116 U/L Final   • Total Bilirubin 03/01/2022 0.3  0.2 - 1.2 mg/dL Final   • Globulin 03/01/2022 3.5  1.8 - 3.5 gm/dL Final   • A/G Ratio 03/01/2022 1.3  1.1 - 2.4 g/dL Final   • BUN/Creatinine Ratio 03/01/2022 24.2  7.3 - 30.0 Final   • Anion Gap 03/01/2022 9.6  5.0 - 15.0 mmol/L Final   • eGFR 03/01/2022 99.0  >60.0 mL/min/1.73 Final    National Kidney Foundation and American Society of Nephrology (ASN) Task Force recommended calculation based on the Chronic Kidney Disease Epidemiology Collaboration (CKD-EPI) equation refit without adjustment for race.   • WBC 03/01/2022 8.05  3.40 -  10.80 10*3/mm3 Final   • RBC 03/01/2022 4.87  3.77 - 5.28 10*6/mm3 Final   • Hemoglobin 03/01/2022 14.1  12.0 - 15.9 g/dL Final   • Hematocrit 03/01/2022 45.9  34.0 - 46.6 % Final   • MCV 03/01/2022 94.3  79.0 - 97.0 fL Final   • MCH 03/01/2022 29.0  26.6 - 33.0 pg Final   • MCHC 03/01/2022 30.7 (A) 31.5 - 35.7 g/dL Final   • RDW 03/01/2022 13.9  12.3 - 15.4 % Final   • RDW-SD 03/01/2022 48.5  37.0 - 54.0 fl Final   • MPV 03/01/2022 10.4  6.0 - 12.0 fL Final   • Platelets 03/01/2022 248  140 - 450 10*3/mm3 Final   • Neutrophil % 03/01/2022 60.3  42.7 - 76.0 % Final   • Lymphocyte % 03/01/2022 23.7  19.6 - 45.3 % Final   • Monocyte % 03/01/2022 9.9  5.0 - 12.0 % Final   • Eosinophil % 03/01/2022 5.0  0.3 - 6.2 % Final   • Basophil % 03/01/2022 0.7  0.0 - 1.5 % Final   • Immature Grans % 03/01/2022 0.4  0.0 - 0.5 % Final   • Neutrophils, Absolute 03/01/2022 4.85  1.70 - 7.00 10*3/mm3 Final   • Lymphocytes, Absolute 03/01/2022 1.91  0.70 - 3.10 10*3/mm3 Final   • Monocytes, Absolute 03/01/2022 0.80  0.10 - 0.90 10*3/mm3 Final   • Eosinophils, Absolute 03/01/2022 0.40  0.00 - 0.40 10*3/mm3 Final   • Basophils, Absolute 03/01/2022 0.06  0.00 - 0.20 10*3/mm3 Final   • Immature Grans, Absolute 03/01/2022 0.03  0.00 - 0.05 10*3/mm3 Final   • nRBC 03/01/2022 0.0  0.0 - 0.2 /100 WBC Final        No radiology results for the last 30 days.   CBC 3/1/2020 reviewed and within normal limits  2/11/2022-high-resolution CT-images intimately reviewed interpreted by me-no evidence of malignancy.  Emphysematous changes stable compared to previous scans.    Assessment/Plan       *DCIS of the right breast  · Grade 2, ER/OR positive  · Status post right breast lumpectomy  · Margins negative  · DCIS measures 9 mm  · Radiation completed October 2021.  · 11/29/2021 patient called our office after being on anastrozole x2 weeks.  Since initiating anastrozole she noticed difficult time breathing, nausea, and hot flashes.  Stated she was having  difficult time catching her breath and her chest felt tight.  She was advised to hold anastrozole and follow-up in 2 weeks.  She was also advised to report to the ED if shortness of breath became worse or she developed chest pain.  · 12/14/2021, patient seen today for follow-up after holding anastrozole x2 weeks. She noticed improvement in the symptoms above, however is still noticing she is not back to baseline for shortness of breath.  She has noticed worsening dyspnea on exertion, noting she now gets short of breath ambulating a short distance to the bathroom.  She has been off anastrazole for 2 weeks and only took it for a few days.  We discussed this may not be related to her anastrazole, and could more likely be related to her COPD.  She will stay off anastrazole and follow up with us in 1 month for further plan.  In the meantime I have asked her to follow up with pulmonary.  · Patient does not willing to go back on anastrozole.  She tells me that she had a history of DVT and hence tamoxifen is not an option.  · She was recommended to start letrozole  · She started letrozole 5 days ago  · Since starting letrozole she has experienced worsening dyspnea, decrease in oxygen saturations, fatigue and nausea.  · Today we discussed about trying Aromasin however she is not willing to do so.  · She understands the risk of increased recurrence of DCIS.  · It is very hard to explain the dyspnea on resuming beehives as this is typically not a side effect of aromatase inhibitors.  · Discussed the same with patient.  · Unfortunately she is not a candidate for tamoxifen because of history of DVT.  · At this point we have decided to continue surveillance alone without any AI's.    *COPD-currently on 3 L of oxygen by nasal cannula.  Recent CT chest shows emphysematous changes.  Recent high-resolution CT on 2/11/2022 without any significant changes.  She had recent stress test and has follow-up with  cardiology.    *Hypertension-blood pressure 149/86.  Continue current medication.      *Hyperlipidemia-continue Crestor    *Bone Health  · DEXA scan performed 9/17/2021 showing osteopenia.  Patient was advised to start calcium and vitamin D.  · Continue calcium and vitamin D.    *Shortness of breath  · Had a stress test last week  · Ejection fraction of 60%  · A small sized mildly severe area of ischemia in the anterior wall identified on myocardial perfusion images.  · Impression consistent with intermediate risk study  · Continue follow-up with cardiology and pulmonary    PLAN:   · Discontinue aromatase inhibitors  · Continue with surveillance alone  · She is scheduled for mammogram in April 2022  · I will plan to see her back in 5 months    Macie Zamudio MD     You have chosen to receive care through a telehealth visit.  Do you consent to use a video/audio connection for your medical care today? Yes    Unable to complete visit using a video connection to the patient. A phone visit was used to complete this visits. Total time of discussion was 20 minutes.

## 2022-04-19 ENCOUNTER — TELEPHONE (OUTPATIENT)
Dept: OTHER | Facility: HOSPITAL | Age: 67
End: 2022-04-19

## 2022-04-19 NOTE — TELEPHONE ENCOUNTER
[facility] MULTIDISCIPLINARY CLINIC  SURVIVORSHIP SERVICES CARE COORDINATION NOTE  Survivorship Treatment Summary Referral Scheduling  PHONE      Call placed to patient RE: open referral to survivorship treatment summary visit.    Left voice mail for patient    Introduced myself and reviewed purpose and goals of survivorship treatment summary visit as well as what to expect..    Patient mailed name and contact information for Simran Reeves DNP, APRN.    Patient encouraged to call the office at any point for additional information, resources or support.

## 2022-04-26 ENCOUNTER — HOSPITAL ENCOUNTER (OUTPATIENT)
Dept: ULTRASOUND IMAGING | Facility: HOSPITAL | Age: 67
Discharge: HOME | End: 2022-04-26

## 2022-04-26 ENCOUNTER — HOSPITAL ENCOUNTER (OUTPATIENT)
Dept: MAMMOGRAPHY | Facility: HOSPITAL | Age: 67
Discharge: HOME | End: 2022-04-26

## 2022-04-26 DIAGNOSIS — D05.11 DUCTAL CARCINOMA IN SITU (DCIS) OF RIGHT BREAST: ICD-10-CM

## 2022-04-26 PROCEDURE — 76642 ULTRASOUND BREAST LIMITED: CPT

## 2022-04-26 PROCEDURE — 77066 DX MAMMO INCL CAD BI: CPT

## 2022-04-26 PROCEDURE — G0279 TOMOSYNTHESIS, MAMMO: HCPCS

## 2022-05-24 ENCOUNTER — TRANSCRIBE ORDERS (OUTPATIENT)
Dept: ADMINISTRATIVE | Facility: HOSPITAL | Age: 67
End: 2022-05-24

## 2022-05-24 DIAGNOSIS — J84.10 PULMONARY FIBROSIS: Primary | ICD-10-CM

## 2022-06-17 NOTE — SERVICEHPINOTES
currently 3lpm  o2  using colestid for diarrhea helpful.   has advanced emphysema leary of colonoscopy.    had to be  intubated during sedation for breast cancer lumpectomy

## 2022-08-16 ENCOUNTER — APPOINTMENT (OUTPATIENT)
Dept: CT IMAGING | Facility: HOSPITAL | Age: 67
End: 2022-08-16

## 2022-08-22 NOTE — TELEPHONE ENCOUNTER
Caller: Tate Sherman    Relationship to patient: Emergency Contact    Best call back number: [phone]    Chief complaint: PATIENT TO RESCHEDULE 8/23/22 APPTS    Type of visit: VITALS AND FU    Requested date:ANY DAY BUT ThursdayS IN THE AFTERNOON BEGINNING THE SECOND WEEK OF SEPTEMBER

## 2022-08-23 ENCOUNTER — APPOINTMENT (OUTPATIENT)
Dept: LAB | Facility: HOSPITAL | Age: 67
End: 2022-08-23

## 2022-08-30 ENCOUNTER — HOSPITAL ENCOUNTER (OUTPATIENT)
Dept: CT IMAGING | Facility: HOSPITAL | Age: 67
Discharge: HOME | End: 2022-08-30
Admitting: INTERNAL MEDICINE

## 2022-08-30 DIAGNOSIS — J84.10 PULMONARY FIBROSIS: ICD-10-CM

## 2022-08-30 PROCEDURE — 71250 CT THORAX DX C-: CPT

## 2022-09-20 ENCOUNTER — OFFICE VISIT (OUTPATIENT)
Dept: ONCOLOGY | Facility: CLINIC | Age: 67
End: 2022-09-20

## 2022-09-20 ENCOUNTER — APPOINTMENT (OUTPATIENT)
Dept: LAB | Facility: HOSPITAL | Age: 67
End: 2022-09-20

## 2022-09-20 VITALS
BODY MASS INDEX: 41.96 KG/M2 | HEIGHT: 67 IN | TEMPERATURE: 97.4 F | OXYGEN SATURATION: 94 % | HEART RATE: 63 BPM | SYSTOLIC BLOOD PRESSURE: 139 MMHG | DIASTOLIC BLOOD PRESSURE: 85 MMHG | RESPIRATION RATE: 24 BRPM

## 2022-09-20 DIAGNOSIS — D05.11 DUCTAL CARCINOMA IN SITU (DCIS) OF RIGHT BREAST: ICD-10-CM

## 2022-09-20 DIAGNOSIS — R06.09 DYSPNEA ON EXERTION: Primary | ICD-10-CM

## 2022-09-20 PROCEDURE — 99214 OFFICE O/P EST MOD 30 MIN: CPT | Performed by: INTERNAL MEDICINE

## 2022-09-20 RX ORDER — OMEPRAZOLE 40 MG/1
40 CAPSULE, DELAYED RELEASE ORAL NIGHTLY
COMMUNITY
Start: 2022-09-19

## 2022-09-20 NOTE — PROGRESS NOTES
Subjective   Francheska Sherman is a 67 y.o. female.  Referred by Dr. Pereyra for right breast ductal carcinoma in situ, ER/ID positive, grade 2    History of Present Illness   Ms. Sherman is a 66-year-old  lady who has history of COPD, on supplemental oxygen, hypertension, hyperlipidemia presented with a screen detected abnormality of the right breast.  Patient's last mammogram was in 2012.  Most recently her screening mammogram performed in 2021 showed abnormality in the right breast which was further evaluated.  Family history is limited and no family history of breast or ovarian cancer as far she knows.  Denies any previous abnormal mammograms or breast biopsies.  She had a hysterectomy and a salpingo-oophorectomy at the age of 39 as she had an ovarian abscess and had sepsis secondary to the same.    5/19/2021-bilateral screening mammogram  1.area of focal asymmetry in the posterior one third of the right breast, lateral to the nipple.  Additional imaging recommended.  2.no suspicious findings in the left breast.    6/18/2021-right breast diagnostic mammogram and ultrasound  Suspicious 0.8 cm mass at 10 o'clock position of the right breast.  Ultrasound-guided core needle biopsy recommended.    7/1/2021-right breast 10 o'clock position, 6 cm from the nipple core biopsy  A.low-grade papillary and cribriform DCIS.  DCIS measures 5.5 mm  B.no invasive carcinoma identified.  ER +99% strong, ID positive greater than 95% strong, Ki-67 14%.    8/19/2021-right breast lumpectomy  Pathology consistent with ductal carcinoma in situ, measures 9 mm, cribriform and papillary, grade 2, margins negative, pTis NX M0  ER +99%, ID +95%    She has been referred by Dr. Pereyra to discuss adjuvant therapy.  She is recovering well from surgery.    Completed radiation October 2021.    She initiated anastrozole on 11/29/2021.  Soon after she experienced worsening shortness of breath and fluid retention and discontinued anastrozole.  She  was seen in our office subsequently and it was explained to her that the symptoms may not be related to anastrozole and to see her pulmonologist and cardiologist.  She is scheduled to undergo a stress test.   She also saw her pulmonologist to obtain the CT of the chest which was unchanged from before.    Ms. Bynum was seen in clinic early March 2022 at which point she discontinued taking anastrozole.  She explained that she developed dyspnea when she started the anastrozole and was not willing to go back on it.    Interval History:  Patient presents today for follow-up.  She has discontinued all adjuvant endocrine therapy as she was having side effects.  Denies any new breast masses.  Had a screening mammogram as well as ultrasound in April 2022 which was benign  She has experienced worsening dyspnea and now unable to ambulate even short distances due to the dyspnea.  She is currently on 3 L of oxygen by nasal cannula and saturations are lower than before at 93%.  She has been referred to see Dr. Castañeda with pulmonary for evaluation of pulmonary hypertension.  She had a CT of the chest on 8/30/2022 Which shows moderately advanced emphysematous changes in the upper lobes and also fibrotic changes.  Pleural-parenchymal thickening noted in the lower lobes with very mild bronchiectasis.  Mild air trapping of the right middle lobe.  Pulmonary arterial enlargement is stable with the main pulmonary artery measuring 3.8 cm.  Right breast lumpectomy changes noted.    The following portions of the patient's history were reviewed and updated as appropriate: allergies, current medications, past family history, past medical history, past social history, past surgical history and problem list.    Past Medical History:   Diagnosis Date   • ADD (attention deficit disorder)    • Anxiety    • ARDS survivor 1994   • Arthritis    • Breast cancer (HCC)    • Cancer (HCC)    • COPD (chronic obstructive pulmonary disease) (HCC)    •  Encounter for annual health examination     Annual Health Assessment: 14, 10/25/13   • GERD (gastroesophageal reflux disease)    • Hepatitis-C    • History of mammogram 2011   • Hyperactivity of bladder    • Hypertension    • Irritable bowel syndrome    • On supplemental oxygen by nasal cannula     3L DURING DAY AND 4.5L AT NIGHT   • Pain of right side of body     c/o pain in right side and back   • Psychiatric illness    • Pulmonary fibrosis (HCC)    • Sepsis (HCC)         Past Surgical History:   Procedure Laterality Date   • APPENDECTOMY     • BREAST LUMPECTOMY Right 2021    Procedure: RIGHT BREAST WIRE-LOCALIZED BREAST LUMPECTOMY;  Surgeon: Ngozi Pereyra MD;  Location:  [facility] [place];  Service: General;  Laterality: Right;   • CARDIAC CATHETERIZATION N/A 2020    Procedure: LEFT HEART CATH;  Surgeon: Karely Zee MD;  Location: [facility] CATH INVASIVE LOCATION;  Service: Cardiovascular;  Laterality: N/A;   • CARDIAC CATHETERIZATION N/A 2020    Procedure: CORONARY ANGIOGRAPHY;  Surgeon: Karely Zee MD;  Location: [facility] CATH INVASIVE LOCATION;  Service: Cardiovascular;  Laterality: N/A;   • CARDIAC CATHETERIZATION N/A 2020    Procedure: Left ventriculography;  Surgeon: Karely Zee MD;  Location: [facility] CATH INVASIVE LOCATION;  Service: Cardiovascular;  Laterality: N/A;   •  SECTION     • CHEST TUBE INSERTION     • CHOLECYSTECTOMY     • COLONOSCOPY N/A 2019    Procedure: COLONOSCOPY into cecum and TI with cold biopsy polypectomies;  Surgeon: Fernandez Hooks MD;  Location: [facility] ENDOSCOPY;  Service: Gastroenterology   • ENDOSCOPY N/A 2019    Procedure: ESOPHAGOGASTRODUODENOSCOPY with biopsies;  Surgeon: Fernandez Hooks MD;  Location: [facility] ENDOSCOPY;  Service: Gastroenterology   • HYSTERECTOMY     • OOPHORECTOMY          Family History   Problem Relation Age of Onset   • Liver disease Mother    • Cervical cancer Mother    •  "Arthritis Mother    • Crohn's disease Brother    • Malig Hyperthermia Neg Hx         Social History     Socioeconomic History   • Marital status:      Spouse name: Tate Sherman   Tobacco Use   • Smoking status: Former Smoker     Types: Cigarettes     Quit date: 1994     Years since quittin.7   • Smokeless tobacco: Never Used   • Tobacco comment: QUIT    Vaping Use   • Vaping Use: Never used   Substance and Sexual Activity   • Alcohol use: No   • Drug use: No   • Sexual activity: Defer     Birth control/protection: Surgical        OB History        1    Para   1    Term   1            AB        Living           SAB        IAB        Ectopic        Molar        Multiple        Live Births                     Allergies   Allergen Reactions   • Adhesive Tape Other (See Comments)     Blisters   • Codeine Other (See Comments)     \"Made me feel like my head was really heavy and going to explode\"   • Morphine Delirium   • Sulfa Antibiotics Rash      Review of Systems   Constitutional: Negative.    HENT: Negative.    Eyes: Negative.    Respiratory: Positive for shortness of breath.    Cardiovascular: Negative.    Gastrointestinal: Negative.    Endocrine: Negative.    Genitourinary: Positive for amenorrhea.   Musculoskeletal: Negative.    Neurological: Negative.    Hematological: Negative.    Psychiatric/Behavioral: Negative.      Review of systems as mentioned in the HPI    Objective   Height 170.2 cm (67.01\").     Physical Exam  Constitutional:       Appearance: Normal appearance. She is obese.   HENT:      Head: Normocephalic and atraumatic.      Nose: Nose normal.      Mouth/Throat:      Mouth: Mucous membranes are moist.      Pharynx: Oropharynx is clear.   Eyes:      Extraocular Movements: Extraocular movements intact.      Conjunctiva/sclera: Conjunctivae normal.      Pupils: Pupils are equal, round, and reactive to light.   Cardiovascular:      Rate and Rhythm: Normal rate and regular " rhythm.      Heart sounds: Normal heart sounds. No murmur heard.    No gallop.   Pulmonary:      Effort: Pulmonary effort is normal. No respiratory distress.      Breath sounds: Normal breath sounds. No wheezing.   Abdominal:      General: Bowel sounds are normal. There is no distension.      Palpations: Abdomen is soft.      Tenderness: There is no abdominal tenderness.   Musculoskeletal:         General: Normal range of motion.      Cervical back: Normal range of motion and neck supple.   Skin:     General: Skin is warm.      Findings: No rash.   Neurological:      General: No focal deficit present.      Mental Status: She is alert and oriented to person, place, and time. Mental status is at baseline.   Psychiatric:         Mood and Affect: Mood normal.         Behavior: Behavior normal.         Thought Content: Thought content normal.         Judgment: Judgment normal.       Breast Exam: Left breast appears normal inspection.  No palpable abnormalities of the left breast.  Right breast there is a scar in the upper outer quadrant which is healing well.    Breast exam not performed today.    I have reexamined the patient and the results are consistent with the previously documented exam. Macie Zamudio MD     No visits with results within 30 Day(s) from this visit.   Latest known visit with results is:   Lab on 03/01/2022   Component Date Value Ref Range Status   • Glucose 03/01/2022 108  74 - 124 mg/dL Final   • BUN 03/01/2022 15  6 - 20 mg/dL Final   • Creatinine 03/01/2022 0.62  0.60 - 1.10 mg/dL Final   • Sodium 03/01/2022 142  134 - 145 mmol/L Final   • Potassium 03/01/2022 4.5  3.5 - 4.7 mmol/L Final   • Chloride 03/01/2022 103  98 - 107 mmol/L Final   • CO2 03/01/2022 29.4 (A) 22.0 - 29.0 mmol/L Final   • Calcium 03/01/2022 10.1  8.5 - 10.2 mg/dL Final   • Total Protein 03/01/2022 8.1 (A) 6.3 - 8.0 g/dL Final   • Albumin 03/01/2022 4.60  3.50 - 5.20 g/dL Final   • ALT (SGPT) 03/01/2022 34 (A) 0 - 33 U/L  Final   • AST (SGOT) 03/01/2022 33 (A) 0 - 32 U/L Final   • Alkaline Phosphatase 03/01/2022 50  38 - 116 U/L Final   • Total Bilirubin 03/01/2022 0.3  0.2 - 1.2 mg/dL Final   • Globulin 03/01/2022 3.5  1.8 - 3.5 gm/dL Final   • A/G Ratio 03/01/2022 1.3  1.1 - 2.4 g/dL Final   • BUN/Creatinine Ratio 03/01/2022 24.2  7.3 - 30.0 Final   • Anion Gap 03/01/2022 9.6  5.0 - 15.0 mmol/L Final   • eGFR 03/01/2022 99.0  >60.0 mL/min/1.73 Final    National Kidney Foundation and American Society of Nephrology (ASN) Task Force recommended calculation based on the Chronic Kidney Disease Epidemiology Collaboration (CKD-EPI) equation refit without adjustment for race.   • WBC 03/01/2022 8.05  3.40 - 10.80 10*3/mm3 Final   • RBC 03/01/2022 4.87  3.77 - 5.28 10*6/mm3 Final   • Hemoglobin 03/01/2022 14.1  12.0 - 15.9 g/dL Final   • Hematocrit 03/01/2022 45.9  34.0 - 46.6 % Final   • MCV 03/01/2022 94.3  79.0 - 97.0 fL Final   • MCH 03/01/2022 29.0  26.6 - 33.0 pg Final   • MCHC 03/01/2022 30.7 (A) 31.5 - 35.7 g/dL Final   • RDW 03/01/2022 13.9  12.3 - 15.4 % Final   • RDW-SD 03/01/2022 48.5  37.0 - 54.0 fl Final   • MPV 03/01/2022 10.4  6.0 - 12.0 fL Final   • Platelets 03/01/2022 248  140 - 450 10*3/mm3 Final   • Neutrophil % 03/01/2022 60.3  42.7 - 76.0 % Final   • Lymphocyte % 03/01/2022 23.7  19.6 - 45.3 % Final   • Monocyte % 03/01/2022 9.9  5.0 - 12.0 % Final   • Eosinophil % 03/01/2022 5.0  0.3 - 6.2 % Final   • Basophil % 03/01/2022 0.7  0.0 - 1.5 % Final   • Immature Grans % 03/01/2022 0.4  0.0 - 0.5 % Final   • Neutrophils, Absolute 03/01/2022 4.85  1.70 - 7.00 10*3/mm3 Final   • Lymphocytes, Absolute 03/01/2022 1.91  0.70 - 3.10 10*3/mm3 Final   • Monocytes, Absolute 03/01/2022 0.80  0.10 - 0.90 10*3/mm3 Final   • Eosinophils, Absolute 03/01/2022 0.40  0.00 - 0.40 10*3/mm3 Final   • Basophils, Absolute 03/01/2022 0.06  0.00 - 0.20 10*3/mm3 Final   • Immature Grans, Absolute 03/01/2022 0.03  0.00 - 0.05 10*3/mm3 Final   •  nRBC 03/01/2022 0.0  0.0 - 0.2 /100 WBC Final        No radiology results for the last 30 days.     8/30/2022-CT of the chest images independently reviewed and interpreted by me.  Detail summarized above.    4/26/2022-bilateral diagnostic mammogram and right breast ultrasound-images independently reviewed and interpreted by me.  No evidence of malignancy.  Postoperative changes present.    Assessment & Plan       *DCIS of the right breast  · Grade 2, ER/ND positive  · Status post right breast lumpectomy  · Margins negative  · DCIS measures 9 mm  · Radiation completed October 2021.  · 11/29/2021 patient called our office after being on anastrozole x2 weeks.  Since initiating anastrozole she noticed difficult time breathing, nausea, and hot flashes.  Stated she was having difficult time catching her breath and her chest felt tight.  She was advised to hold anastrozole and follow-up in 2 weeks.  She was also advised to report to the ED if shortness of breath became worse or she developed chest pain.  · 12/14/2021, patient seen today for follow-up after holding anastrozole x2 weeks. She noticed improvement in the symptoms above, however is still noticing she is not back to baseline for shortness of breath.  She has noticed worsening dyspnea on exertion, noting she now gets short of breath ambulating a short distance to the bathroom.  She has been off anastrazole for 2 weeks and only took it for a few days.  We discussed this may not be related to her anastrazole, and could more likely be related to her COPD.  She will stay off anastrazole and follow up with us in 1 month for further plan.  In the meantime I have asked her to follow up with pulmonary.  · Patient does not willing to go back on anastrozole.  She tells me that she had a history of DVT and hence tamoxifen is not an option.  · She was recommended to start letrozole  · She started letrozole 5 days ago  · Since starting letrozole she has experienced worsening  dyspnea, decrease in oxygen saturations, fatigue and nausea.  · Today we discussed about trying Aromasin however she is not willing to do so.  · She understands the risk of increased recurrence of DCIS.  · It is very hard to explain the dyspnea on resuming beehives as this is typically not a side effect of aromatase inhibitors.  · Discussed the same with patient.  · Unfortunately she is not a candidate for tamoxifen because of history of DVT.  · She continues off endocrine therapy.  No evidence of recurrent disease  · Repeat mammogram in April 2023    *COPD-currently on 3 L of oxygen by nasal cannula.  High-resolution CT 8/30/2022 shows moderate to advanced emphysematous changes in the upper lobes.  Patient reports significant worsening of dyspnea.  She has been referred to Dr. Castañeda for evaluation for pulmonary hypertension.    *Hypertension-blood pressure 139/85.    *Hyperlipidemia-continue Crestor    *Bone Health  · DEXA scan performed 9/17/2021 showing osteopenia.  Patient was advised to start calcium and vitamin D.    *Shortness of breath  · Ejection fraction of 60%  · Stress test performed  · A small sized mildly severe area of ischemia in the anterior wall identified on myocardial perfusion images.  · Impression consistent with intermediate risk study  · Continue follow-up with cardiology and pulmonary  · Reports worsening dyspnea  · Recent COVID-19 infection in July 2022    PLAN:   · Discontinue aromatase inhibitors  · Continue surveillance alone  · Mammogram due April 2023  · Follow-up in 6 months    Macie Zamudio MD

## 2022-11-10 ENCOUNTER — PRE-ADMISSION TESTING (OUTPATIENT)
Dept: PREADMISSION TESTING | Facility: HOSPITAL | Age: 67
End: 2022-11-10

## 2022-11-10 VITALS
OXYGEN SATURATION: 95 % | DIASTOLIC BLOOD PRESSURE: 89 MMHG | WEIGHT: 252 LBS | BODY MASS INDEX: 39.55 KG/M2 | HEART RATE: 58 BPM | SYSTOLIC BLOOD PRESSURE: 167 MMHG | TEMPERATURE: 97.4 F | RESPIRATION RATE: 16 BRPM | HEIGHT: 67 IN

## 2022-11-10 LAB
ANION GAP SERPL CALCULATED.3IONS-SCNC: 11 MMOL/L (ref 5–15)
BUN SERPL-MCNC: 14 MG/DL (ref 8–23)
BUN/CREAT SERPL: 19.7 (ref 7–25)
CALCIUM SPEC-SCNC: 9.2 MG/DL (ref 8.6–10.5)
CHLORIDE SERPL-SCNC: 98 MMOL/L (ref 98–107)
CO2 SERPL-SCNC: 27 MMOL/L (ref 22–29)
CREAT SERPL-MCNC: 0.71 MG/DL (ref 0.57–1)
DEPRECATED RDW RBC AUTO: 44.2 FL (ref 37–54)
EGFRCR SERPLBLD CKD-EPI 2021: 94.5 ML/MIN/1.73
ERYTHROCYTE [DISTWIDTH] IN BLOOD BY AUTOMATED COUNT: 13.7 % (ref 12.3–15.4)
GLUCOSE SERPL-MCNC: 111 MG/DL (ref 65–99)
HCT VFR BLD AUTO: 45 % (ref 34–46.6)
HGB BLD-MCNC: 14.4 G/DL (ref 12–15.9)
MCH RBC QN AUTO: 28.3 PG (ref 26.6–33)
MCHC RBC AUTO-ENTMCNC: 32 G/DL (ref 31.5–35.7)
MCV RBC AUTO: 88.6 FL (ref 79–97)
PLATELET # BLD AUTO: 237 10*3/MM3 (ref 140–450)
PMV BLD AUTO: 10.9 FL (ref 6–12)
POTASSIUM SERPL-SCNC: 4.3 MMOL/L (ref 3.5–5.2)
QT INTERVAL: 397 MS
RBC # BLD AUTO: 5.08 10*6/MM3 (ref 3.77–5.28)
SODIUM SERPL-SCNC: 136 MMOL/L (ref 136–145)
WBC NRBC COR # BLD: 9.96 10*3/MM3 (ref 3.4–10.8)

## 2022-11-10 PROCEDURE — 36415 COLL VENOUS BLD VENIPUNCTURE: CPT

## 2022-11-10 PROCEDURE — 85027 COMPLETE CBC AUTOMATED: CPT

## 2022-11-10 PROCEDURE — 80048 BASIC METABOLIC PNL TOTAL CA: CPT

## 2022-11-10 PROCEDURE — 93005 ELECTROCARDIOGRAM TRACING: CPT

## 2022-11-10 PROCEDURE — 93010 ELECTROCARDIOGRAM REPORT: CPT | Performed by: INTERNAL MEDICINE

## 2022-11-10 RX ORDER — PREDNISONE 10 MG/1
10 TABLET ORAL 4 TIMES DAILY
COMMUNITY

## 2022-11-10 RX ORDER — CEPHALEXIN 250 MG/1
250 CAPSULE ORAL NIGHTLY
COMMUNITY
End: 2023-03-29

## 2022-11-10 NOTE — DISCHARGE INSTRUCTIONS
Take the following medications the morning of surgery with a small sip of water:    BISOPROLOL AND BRING YOUR AMLODIPINE WITH YOU.    TIME OF ARRIVAL 12:00    If you are on prescription narcotic pain medication to control your pain you may also take that medication the morning of surgery.    General Instructions:  Do not eat or drink anything 8 HOURS before ARRIVAL TIME OF surgery.  .   Patients who avoid smoking, chewing tobacco and alcohol for 4 weeks prior to surgery have a reduced risk of post-operative complications.  Quit smoking as many days before surgery as you can.  Do not smoke, use chewing tobacco or drink alcohol the day of surgery.   If applicable bring your C-PAP/ BI-PAP machine.  Bring any papers given to you in the doctor’s office.  Wear clean comfortable clothes.  Do not wear contact lenses, false eyelashes or make-up.  Bring a case for your glasses.   Bring crutches or walker if applicable.  Remove all piercings.  Leave jewelry and any other valuables at home.  Hair extensions with metal clips must be removed prior to surgery.  The Pre-Admission Testing nurse will instruct you to bring medications if unable to obtain an accurate list in Pre-Admission Testing.            Preventing a Surgical Site Infection:  For 2 to 3 days before surgery, avoid shaving with a razor because the razor can irritate skin and make it easier to develop an infection.    Any areas of open skin can increase the risk of a post-operative wound infection by allowing bacteria to enter and travel throughout the body.  Notify your surgeon if you have any skin wounds / rashes even if it is not near the expected surgical site.  The area will need assessed to determine if surgery should be delayed until it is healed.  The night prior to surgery shower using a fresh bar of anti-bacterial soap (such as Dial) and clean washcloth.  Sleep in a clean bed with clean clothing.  Do not allow pets to sleep with you.  Shower on the morning  of surgery using a fresh bar of anti-bacterial soap (such as Dial) and clean washcloth.  Dry with a clean towel and dress in clean clothing.  Ask your surgeon if you will be receiving antibiotics prior to surgery.  Make sure you, your family, and all healthcare providers clean their hands with soap and water or an alcohol based hand  before caring for you or your wound.    Day of surgery:  Your arrival time is approximately two hours before your scheduled surgery time.  Upon arrival, a Pre-op nurse and Anesthesiologist will review your health history, obtain vital signs, and answer questions you may have.  The only belongings needed at this time will be your home medications and if applicable your C-PAP/BI-PAP machine.  A Pre-op nurse will start an IV and you may receive medication in preparation for surgery, including something to help you relax.      Please be aware that surgery does come with discomfort.  We want to make every effort to control your discomfort so please discuss any uncontrolled symptoms with your nurse.   Your doctor will most likely have prescribed pain medications.      If you are going home after surgery you will receive individualized written care instructions before being discharged.  A responsible adult must drive you to and from the hospital on the day of your surgery and stay with you for 24 hours.  Discharge prescriptions can be filled by the hospital pharmacy during regular pharmacy hours.  If you are having surgery late in the day/evening your prescription may be e-prescribed to your pharmacy.  Please verify your pharmacy hours or chose a 24 hour pharmacy to avoid not having access to your prescription because your pharmacy has closed for the day.    If you are staying overnight following surgery, you will be transported to your hospital room following the recovery period.  [facility] has all private rooms.    If you have any questions please call Pre-Admission  Testing at [phone].  Deductibles and co-payments are collected on the day of service. Please be prepared to pay the required co-pay, deductible or deposit on the day of service as defined by your plan.    Call your surgeon immediately if you experience any of the following symptoms:  Sore Throat  Shortness of Breath or difficulty breathing  Cough  Chills  Body soreness or muscle pain  Headache  Fever  New loss of taste or smell  Do not arrive for your surgery ill.  Your procedure will need to be rescheduled to another time.  You will need to call your physician before the day of surgery to avoid any unnecessary exposure to hospital staff as well as other patients.

## 2022-11-11 ENCOUNTER — HOSPITAL ENCOUNTER (OUTPATIENT)
Facility: HOSPITAL | Age: 67
Setting detail: HOSPITAL OUTPATIENT SURGERY
Discharge: HOME | End: 2022-11-11
Attending: UROLOGY | Admitting: UROLOGY

## 2022-11-11 ENCOUNTER — ANESTHESIA (OUTPATIENT)
Dept: PERIOP | Facility: HOSPITAL | Age: 67
End: 2022-11-11

## 2022-11-11 ENCOUNTER — ANESTHESIA EVENT (OUTPATIENT)
Dept: PERIOP | Facility: HOSPITAL | Age: 67
End: 2022-11-11

## 2022-11-11 VITALS
HEART RATE: 56 BPM | DIASTOLIC BLOOD PRESSURE: 91 MMHG | SYSTOLIC BLOOD PRESSURE: 163 MMHG | TEMPERATURE: 98 F | OXYGEN SATURATION: 98 % | RESPIRATION RATE: 18 BRPM

## 2022-11-11 DIAGNOSIS — D49.4 BLADDER TUMOR: ICD-10-CM

## 2022-11-11 DIAGNOSIS — N32.89 BLADDER MASS: Primary | ICD-10-CM

## 2022-11-11 PROCEDURE — 25010000002 DEXAMETHASONE PER 1 MG: Performed by: ANESTHESIOLOGY

## 2022-11-11 PROCEDURE — 25010000002 ONDANSETRON PER 1 MG: Performed by: ANESTHESIOLOGY

## 2022-11-11 PROCEDURE — 25010000002 PROPOFOL 10 MG/ML EMULSION: Performed by: ANESTHESIOLOGY

## 2022-11-11 PROCEDURE — 25010000002 FENTANYL CITRATE (PF) 50 MCG/ML SOLUTION: Performed by: ANESTHESIOLOGY

## 2022-11-11 PROCEDURE — 88307 TISSUE EXAM BY PATHOLOGIST: CPT | Performed by: UROLOGY

## 2022-11-11 PROCEDURE — 25010000002 CEFAZOLIN IN DEXTROSE 2-4 GM/100ML-% SOLUTION: Performed by: UROLOGY

## 2022-11-11 RX ORDER — FENTANYL CITRATE 50 UG/ML
50 INJECTION, SOLUTION INTRAMUSCULAR; INTRAVENOUS
Status: DISCONTINUED | OUTPATIENT
Start: 2022-11-11 | End: 2022-11-11 | Stop reason: HOSPADM

## 2022-11-11 RX ORDER — DEXAMETHASONE SODIUM PHOSPHATE 4 MG/ML
INJECTION, SOLUTION INTRA-ARTICULAR; INTRALESIONAL; INTRAMUSCULAR; INTRAVENOUS; SOFT TISSUE AS NEEDED
Status: DISCONTINUED | OUTPATIENT
Start: 2022-11-11 | End: 2022-11-11 | Stop reason: SURG

## 2022-11-11 RX ORDER — MIDAZOLAM HYDROCHLORIDE 1 MG/ML
0.5 INJECTION INTRAMUSCULAR; INTRAVENOUS
Status: DISCONTINUED | OUTPATIENT
Start: 2022-11-11 | End: 2022-11-11 | Stop reason: HOSPADM

## 2022-11-11 RX ORDER — EPHEDRINE SULFATE 50 MG/ML
INJECTION INTRAVENOUS AS NEEDED
Status: DISCONTINUED | OUTPATIENT
Start: 2022-11-11 | End: 2022-11-11 | Stop reason: SURG

## 2022-11-11 RX ORDER — EPHEDRINE SULFATE 50 MG/ML
5 INJECTION, SOLUTION INTRAVENOUS ONCE AS NEEDED
Status: DISCONTINUED | OUTPATIENT
Start: 2022-11-11 | End: 2022-11-11 | Stop reason: HOSPADM

## 2022-11-11 RX ORDER — ONDANSETRON 2 MG/ML
4 INJECTION INTRAMUSCULAR; INTRAVENOUS ONCE AS NEEDED
Status: DISCONTINUED | OUTPATIENT
Start: 2022-11-11 | End: 2022-11-11 | Stop reason: HOSPADM

## 2022-11-11 RX ORDER — SODIUM CHLORIDE 0.9 % (FLUSH) 0.9 %
3-10 SYRINGE (ML) INJECTION AS NEEDED
Status: DISCONTINUED | OUTPATIENT
Start: 2022-11-11 | End: 2022-11-11 | Stop reason: HOSPADM

## 2022-11-11 RX ORDER — FAMOTIDINE 10 MG/ML
20 INJECTION, SOLUTION INTRAVENOUS ONCE
Status: COMPLETED | OUTPATIENT
Start: 2022-11-11 | End: 2022-11-11

## 2022-11-11 RX ORDER — PHENAZOPYRIDINE HYDROCHLORIDE 200 MG/1
200 TABLET, FILM COATED ORAL
Status: DISCONTINUED | OUTPATIENT
Start: 2022-11-11 | End: 2022-11-11 | Stop reason: HOSPADM

## 2022-11-11 RX ORDER — HYDRALAZINE HYDROCHLORIDE 20 MG/ML
5 INJECTION INTRAMUSCULAR; INTRAVENOUS
Status: DISCONTINUED | OUTPATIENT
Start: 2022-11-11 | End: 2022-11-11 | Stop reason: HOSPADM

## 2022-11-11 RX ORDER — SODIUM CHLORIDE, SODIUM LACTATE, POTASSIUM CHLORIDE, CALCIUM CHLORIDE 600; 310; 30; 20 MG/100ML; MG/100ML; MG/100ML; MG/100ML
9 INJECTION, SOLUTION INTRAVENOUS CONTINUOUS
Status: DISCONTINUED | OUTPATIENT
Start: 2022-11-11 | End: 2022-11-11 | Stop reason: HOSPADM

## 2022-11-11 RX ORDER — LABETALOL HYDROCHLORIDE 5 MG/ML
5 INJECTION, SOLUTION INTRAVENOUS
Status: DISCONTINUED | OUTPATIENT
Start: 2022-11-11 | End: 2022-11-11 | Stop reason: HOSPADM

## 2022-11-11 RX ORDER — PROMETHAZINE HYDROCHLORIDE 25 MG/1
25 SUPPOSITORY RECTAL ONCE AS NEEDED
Status: DISCONTINUED | OUTPATIENT
Start: 2022-11-11 | End: 2022-11-11 | Stop reason: HOSPADM

## 2022-11-11 RX ORDER — CEFDINIR 300 MG/1
300 CAPSULE ORAL 2 TIMES DAILY
Qty: 14 CAPSULE | Refills: 0 | Status: SHIPPED | OUTPATIENT
Start: 2022-11-11 | End: 2023-03-29

## 2022-11-11 RX ORDER — NALOXONE HCL 0.4 MG/ML
0.2 VIAL (ML) INJECTION AS NEEDED
Status: DISCONTINUED | OUTPATIENT
Start: 2022-11-11 | End: 2022-11-11 | Stop reason: HOSPADM

## 2022-11-11 RX ORDER — MIDAZOLAM HYDROCHLORIDE 1 MG/ML
1 INJECTION INTRAMUSCULAR; INTRAVENOUS
Status: DISCONTINUED | OUTPATIENT
Start: 2022-11-11 | End: 2022-11-11 | Stop reason: HOSPADM

## 2022-11-11 RX ORDER — DIPHENHYDRAMINE HYDROCHLORIDE 50 MG/ML
12.5 INJECTION INTRAMUSCULAR; INTRAVENOUS
Status: DISCONTINUED | OUTPATIENT
Start: 2022-11-11 | End: 2022-11-11 | Stop reason: HOSPADM

## 2022-11-11 RX ORDER — FLUMAZENIL 0.1 MG/ML
0.2 INJECTION INTRAVENOUS AS NEEDED
Status: DISCONTINUED | OUTPATIENT
Start: 2022-11-11 | End: 2022-11-11 | Stop reason: HOSPADM

## 2022-11-11 RX ORDER — PROPOFOL 10 MG/ML
VIAL (ML) INTRAVENOUS AS NEEDED
Status: DISCONTINUED | OUTPATIENT
Start: 2022-11-11 | End: 2022-11-11 | Stop reason: SURG

## 2022-11-11 RX ORDER — PROMETHAZINE HYDROCHLORIDE 25 MG/1
25 TABLET ORAL ONCE AS NEEDED
Status: DISCONTINUED | OUTPATIENT
Start: 2022-11-11 | End: 2022-11-11 | Stop reason: HOSPADM

## 2022-11-11 RX ORDER — LIDOCAINE HYDROCHLORIDE 10 MG/ML
0.5 INJECTION, SOLUTION EPIDURAL; INFILTRATION; INTRACAUDAL; PERINEURAL ONCE AS NEEDED
Status: DISCONTINUED | OUTPATIENT
Start: 2022-11-11 | End: 2022-11-11 | Stop reason: HOSPADM

## 2022-11-11 RX ORDER — PHENAZOPYRIDINE HYDROCHLORIDE 200 MG/1
200 TABLET, FILM COATED ORAL 3 TIMES DAILY PRN
Qty: 30 TABLET | Refills: 0 | Status: SHIPPED | OUTPATIENT
Start: 2022-11-11 | End: 2023-03-29

## 2022-11-11 RX ORDER — MAGNESIUM HYDROXIDE 1200 MG/15ML
LIQUID ORAL AS NEEDED
Status: DISCONTINUED | OUTPATIENT
Start: 2022-11-11 | End: 2022-11-11 | Stop reason: HOSPADM

## 2022-11-11 RX ORDER — CEFAZOLIN SODIUM 2 G/100ML
2 INJECTION, SOLUTION INTRAVENOUS ONCE
Status: COMPLETED | OUTPATIENT
Start: 2022-11-11 | End: 2022-11-11

## 2022-11-11 RX ORDER — FENTANYL CITRATE 0.05 MG/ML
INJECTION, SOLUTION INTRAMUSCULAR; INTRAVENOUS AS NEEDED
Status: DISCONTINUED | OUTPATIENT
Start: 2022-11-11 | End: 2022-11-11 | Stop reason: SURG

## 2022-11-11 RX ORDER — SODIUM CHLORIDE 0.9 % (FLUSH) 0.9 %
3 SYRINGE (ML) INJECTION EVERY 12 HOURS SCHEDULED
Status: DISCONTINUED | OUTPATIENT
Start: 2022-11-11 | End: 2022-11-11 | Stop reason: HOSPADM

## 2022-11-11 RX ORDER — HYDROMORPHONE HYDROCHLORIDE 2 MG/1
2 TABLET ORAL EVERY 12 HOURS PRN
Qty: 20 TABLET | Refills: 0 | Status: SHIPPED | OUTPATIENT
Start: 2022-11-11 | End: 2023-03-29

## 2022-11-11 RX ORDER — LIDOCAINE HYDROCHLORIDE 20 MG/ML
INJECTION, SOLUTION INFILTRATION; PERINEURAL AS NEEDED
Status: DISCONTINUED | OUTPATIENT
Start: 2022-11-11 | End: 2022-11-11 | Stop reason: SURG

## 2022-11-11 RX ORDER — ONDANSETRON 2 MG/ML
INJECTION INTRAMUSCULAR; INTRAVENOUS AS NEEDED
Status: DISCONTINUED | OUTPATIENT
Start: 2022-11-11 | End: 2022-11-11 | Stop reason: SURG

## 2022-11-11 RX ORDER — DIPHENHYDRAMINE HCL 25 MG
25 CAPSULE ORAL
Status: DISCONTINUED | OUTPATIENT
Start: 2022-11-11 | End: 2022-11-11 | Stop reason: HOSPADM

## 2022-11-11 RX ADMIN — PHENAZOPYRIDINE 200 MG: 200 TABLET, FILM COATED ORAL at 16:57

## 2022-11-11 RX ADMIN — DEXAMETHASONE SODIUM PHOSPHATE 8 MG: 4 INJECTION, SOLUTION INTRA-ARTICULAR; INTRALESIONAL; INTRAMUSCULAR; INTRAVENOUS; SOFT TISSUE at 16:00

## 2022-11-11 RX ADMIN — EPHEDRINE SULFATE 10 MG: 50 INJECTION INTRAVENOUS at 15:55

## 2022-11-11 RX ADMIN — ONDANSETRON 4 MG: 2 INJECTION INTRAMUSCULAR; INTRAVENOUS at 16:00

## 2022-11-11 RX ADMIN — SODIUM CHLORIDE, POTASSIUM CHLORIDE, SODIUM LACTATE AND CALCIUM CHLORIDE 9 ML/HR: 600; 310; 30; 20 INJECTION, SOLUTION INTRAVENOUS at 13:49

## 2022-11-11 RX ADMIN — LIDOCAINE HYDROCHLORIDE 100 MG: 20 INJECTION, SOLUTION INFILTRATION; PERINEURAL at 15:44

## 2022-11-11 RX ADMIN — FAMOTIDINE 20 MG: 10 INJECTION INTRAVENOUS at 13:49

## 2022-11-11 RX ADMIN — PROPOFOL 200 MG: 10 INJECTION, EMULSION INTRAVENOUS at 15:44

## 2022-11-11 RX ADMIN — FENTANYL CITRATE 25 MCG: 50 INJECTION INTRAMUSCULAR; INTRAVENOUS at 16:03

## 2022-11-11 RX ADMIN — FENTANYL CITRATE 25 MCG: 50 INJECTION INTRAMUSCULAR; INTRAVENOUS at 16:09

## 2022-11-11 RX ADMIN — CEFAZOLIN SODIUM 2 G: 2 INJECTION, SOLUTION INTRAVENOUS at 15:29

## 2022-11-11 NOTE — ANESTHESIA PROCEDURE NOTES
Airway  Urgency: elective    Date/Time: 11/11/2022 3:47 PM  Airway not difficult    General Information and Staff    Patient location during procedure: OR  Anesthesiologist: Marco A Epps MD    Indications and Patient Condition  Indications for airway management: airway protection    Preoxygenated: yes  MILS maintained throughout  Mask difficulty assessment: 1 - vent by mask    Final Airway Details  Final airway type: supraglottic airway      Successful airway: classic and LMA  Size 4     Number of attempts at approach: 1  Assessment: lips, teeth, and gum same as pre-op and atraumatic intubation

## 2022-11-11 NOTE — H&P
[facility] Surgical History and Physical    Patient Care Team:  Toñito Loja MD as PCP - General (Internal Medicine)  Karely Zee MD as Consulting Physician (Cardiology)  Ngozi Pereyra MD as Referring Physician (General Surgery)  Simran Coats MD as Consulting Physician (Radiation Oncology)  Macie Zamudio MD as Consulting Physician (Hematology and Oncology)  Eugenio Castañeda Jr., MD as Consulting Physician (Pulmonary Disease)    Chief complaint bladder mas    Subjective     Patient is a 67 y.o. female presents with 2cm blader tumor for resection.     Review of Systems   Pertinent items are noted in HPI    Past Medical History:   Diagnosis Date   • ADD (attention deficit disorder)    • Anxiety    • ARDS survivor 1994   • Arthritis    • Bipolar 1 disorder (HCC)    • Breast cancer (HCC)     RIGHT   • Cancer (HCC)    • COPD (chronic obstructive pulmonary disease) (HCC)    • Deviated septum    • DONALD (dyspnea on exertion)    • Encounter for annual health examination     Annual Health Assessment: 07/30/14, 10/25/13   • GERD (gastroesophageal reflux disease)    • Hepatitis-C    • History of mammogram 04/14/2011   • Hyperactivity of bladder    • Hypertension    • Irritable bowel syndrome    • On supplemental oxygen by nasal cannula     3L DURING DAY AND 7 AT NIGHT   • Pain of right side of body     c/o pain in right side and back   • PONV (postoperative nausea and vomiting)    • Psychiatric illness    • Pulmonary fibrosis (HCC)    • Sepsis (HCC)    • Sleep apnea     BIPAP     Past Surgical History:   Procedure Laterality Date   • APPENDECTOMY     • BREAST LUMPECTOMY Right 08/19/2021    Procedure: RIGHT BREAST WIRE-LOCALIZED BREAST LUMPECTOMY;  Surgeon: Ngozi Pereyra MD;  Location: [facility] OR [facility];  Service: General;  Laterality: Right;   • CARDIAC CATHETERIZATION N/A 09/28/2020    Procedure: LEFT HEART CATH;  Surgeon: Karely Zee MD;  Location: [facility] CATH INVASIVE  LOCATION;  Service: Cardiovascular;  Laterality: N/A;   • CARDIAC CATHETERIZATION N/A 2020    Procedure: CORONARY ANGIOGRAPHY;  Surgeon: Karely Zee MD;  Location:  [facility] CATH INVASIVE LOCATION;  Service: Cardiovascular;  Laterality: N/A;   • CARDIAC CATHETERIZATION N/A 2020    Procedure: Left ventriculography;  Surgeon: Karely Zee MD;  Location:  [facility] CATH INVASIVE LOCATION;  Service: Cardiovascular;  Laterality: N/A;   •  SECTION     • CHEST TUBE INSERTION     • CHOLECYSTECTOMY     • COLONOSCOPY N/A 2019    Procedure: COLONOSCOPY into cecum and TI with cold biopsy polypectomies;  Surgeon: Fernandez Hooks MD;  Location:  [facility];  Service: Gastroenterology   • ENDOSCOPY N/A 2019    Procedure: ESOPHAGOGASTRODUODENOSCOPY with biopsies;  Surgeon: Fernandez Hooks MD;  Location:  [facility];  Service: Gastroenterology   • EYE SURGERY Left    • HYSTERECTOMY     • OOPHORECTOMY       Family History   Problem Relation Age of Onset   • Liver disease Mother    • Cervical cancer Mother    • Arthritis Mother    • Crohn's disease Brother    • Malig Hyperthermia Neg Hx      Social History     Tobacco Use   • Smoking status: Former     Types: Cigarettes     Quit date: 1994     Years since quittin.8   • Smokeless tobacco: Never   • Tobacco comments:     QUIT    Vaping Use   • Vaping Use: Never used   Substance Use Topics   • Alcohol use: No   • Drug use: No       Meds:  Medications Prior to Admission   Medication Sig Dispense Refill Last Dose   • acetaminophen (TYLENOL) 325 MG tablet Take 2 tablets by mouth Every 4 (Four) Hours As Needed for Mild Pain .   11/10/2022 at 1700   • bisoprolol (ZEBeta) 5 MG tablet Take 1.5 tablets by mouth Daily. Indications: High Blood Pressure Disorder 45 tablet 2 2022 at 0800   • cephalexin (KEFLEX) 250 MG capsule Take 1 capsule by mouth Every Night. FOR UTI'S   11/10/2022 at 2300   • omeprazole (priLOSEC)  40 MG capsule Take 1 capsule by mouth Every Night.   11/10/2022 at 2300   • predniSONE (DELTASONE) 10 MG tablet Take 1 tablet by mouth 4 (Four) Times a Day. TAKING FOR LUNG DISEASE   11/10/2022 at 2300   • QUEtiapine (SEROquel) 100 MG tablet Take 100 mg by mouth Every Night.   11/10/2022 at 2300   • rosuvastatin (CRESTOR) 5 MG tablet Take 1 tablet by mouth Daily.   11/10/2022 at 0800   • amLODIPine (NORVASC) 5 MG tablet Take 1 tablet by mouth Daily As Needed. ONLY TAKES IF SYSTOLIC BP OVER 140  Indications: High Blood Pressure Disorder  0 More than a month   • bumetanide (BUMEX) 0.5 MG tablet Take 1 tablet by mouth Daily As Needed (Lower extremity fluid). Indications: Edema (Patient taking differently: Take 1 tablet by mouth Daily As Needed (Lower extremity fluid). ONLY TAKES FOR LEG EDEMA  Indications: Edema) 30 tablet 0 More than a month   • Cholecalciferol (VITAMIN D3 PO) Take 1 capsule by mouth Daily. PT HOLDING FOR SURGERY   11/1/2022   • COLESTIPOL HCL PO Take 1 tablet by mouth Daily.   10/28/2022   • nitroglycerin (NITROSTAT) 0.4 MG SL tablet 1 under the tongue as needed for angina, may repeat q5mins for up three doses 100 tablet 11 NEVER USED   • potassium chloride (MICRO-K) 10 MEQ CR capsule Take 2 capsules by mouth Daily As Needed (take on same days as Bumex). Indications: Low Amount of Potassium in the Blood (Patient taking differently: Take 2 capsules by mouth Daily As Needed (take on same days as Bumex). ONLY TAKES WHEN TAKING BUMEX  Indications: Low Amount of Potassium in the Blood) 30 capsule 0 More than a month       Allergies:  Adhesive tape, Codeine, Morphine, and Sulfa antibiotics    Debilities:  none    Objective     Vital Signs  Temp:  [97.7 °F (36.5 °C)] 97.7 °F (36.5 °C)  Heart Rate:  [54-58] 54  Resp:  [17-20] 20  BP: (134-154)/(67-99) 134/67  No intake or output data in the 24 hours ending 11/11/22 1520       Physical Exam:     General Appearance:    Alert, cooperative, NAD   HEENT:    No  trauma, pupils reactive, hearing intact   Back:     No CVA tenderness   Lungs:     Respirations unlabored, no wheezing    Heart:    RRR, intact peripheral pulses   Abdomen:     Soft, NDNT, no masses, no guarding   :    def   Extremities:   No edema, no deformity   Lymphatic:   No neck or groin LAD   Skin:   No bleeding, bruising or rashes   Neuro/Psych:   Orientation intact, mood/affect pleasant, no focal findings     Results Review:    I reviewed the patient's new clinical results.  Results for orders placed or performed in visit on 11/10/22   Basic Metabolic Panel    Specimen: Blood   Result Value Ref Range    Glucose 111 (H) 65 - 99 mg/dL    BUN 14 8 - 23 mg/dL    Creatinine 0.71 0.57 - 1.00 mg/dL    Sodium 136 136 - 145 mmol/L    Potassium 4.3 3.5 - 5.2 mmol/L    Chloride 98 98 - 107 mmol/L    CO2 27.0 22.0 - 29.0 mmol/L    Calcium 9.2 8.6 - 10.5 mg/dL    BUN/Creatinine Ratio 19.7 7.0 - 25.0    Anion Gap 11.0 5.0 - 15.0 mmol/L    eGFR 94.5 >60.0 mL/min/1.73   CBC (No Diff)    Specimen: Blood   Result Value Ref Range    WBC 9.96 3.40 - 10.80 10*3/mm3    RBC 5.08 3.77 - 5.28 10*6/mm3    Hemoglobin 14.4 12.0 - 15.9 g/dL    Hematocrit 45.0 34.0 - 46.6 %    MCV 88.6 79.0 - 97.0 fL    MCH 28.3 26.6 - 33.0 pg    MCHC 32.0 31.5 - 35.7 g/dL    RDW 13.7 12.3 - 15.4 %    RDW-SD 44.2 37.0 - 54.0 fl    MPV 10.9 6.0 - 12.0 fL    Platelets 237 140 - 450 10*3/mm3   ECG 12 Lead   Result Value Ref Range    QT Interval 397 ms        Assessment:  Bladder Tumor    Plan:    Transurethral resection of bladder tumor    I discussed the patient's findings and my recommendations with patient.   Risks, complications, outcomes and alternatives discussed with the patient at the bedside and office.    Erik Stevens MD  11/11/22  15:20 EST

## 2022-11-11 NOTE — ANESTHESIA PREPROCEDURE EVALUATION
Anesthesia Evaluation     no history of anesthetic complications:  NPO Solid Status: > 8 hours  NPO Liquid Status: > 2 hours           Airway   Mallampati: II  Neck ROM: full  no difficulty expected  Dental - normal exam     Pulmonary - normal exam   (+) a smoker Former, shortness of breath, sleep apnea on CPAP,   (-) COPD, asthma    ROS comment: H/o ARDS  PE comment: nonlabored  Cardiovascular - normal exam    Rhythm: regular  Rate: normal    (+) hypertension 2 medications or greater, dysrhythmias (T-wave abnormlity on EKG-->ischemic workup negative),   (-) valvular problems/murmurs, past MI, CAD, angina      Neuro/Psych  (+) psychiatric history ADD,    (-) seizures, TIA, CVA  GI/Hepatic/Renal/Endo    (+) obesity,   hepatitis (treated) C, liver disease (h/o Hep C-->resolved w/ tx),   (-) GERD, no renal disease, diabetes    Musculoskeletal (-) negative ROS    Abdominal    Substance History      OB/GYN          Other        ROS/Med Hx Other: 24h home O2 (3L) and BiPAP at night      2021 Stress:    ? Findings consistent with an equivocal ECG stress test.  ? Left ventricular ejection fraction is normal. (Calculated EF = 60%).  ? Myocardial perfusion imaging indicates a small-sized, mildly severe area of ischemia located in the anterior wall.  ? Impressions are consistent with an intermediate risk study.  ? Area of ischemia is very small     Interpretation Summary     ? Calculated left ventricular EF = 59.7% Estimated left ventricular EF was in agreement with the calculated left ventricular EF.  ? Left ventricular diastolic function was normal.  ? There is no evidence of pericardial effusion.       Cardiology says small area of ischemia due to artifact, and to follow up in 1 year    Patient reports no chest pain                    Anesthesia Plan    ASA 4     spinal     (Patient wants a spinal and said that was her understanding before coming in today. If needed - easy LMA 4 previosuly)    Anesthetic plan, risks,  benefits, and alternatives have been provided, discussed and informed consent has been obtained with: patient.    Plan discussed with CRNA.

## 2022-11-11 NOTE — OP NOTE
CYSTOSCOPY TRANSURETHRAL RESECTION OF BLADDER TUMOR  Procedure Note    Francheska Sherman  11/11/2022    Pre-op Diagnosis:   Bladder Tumor    Post-op Diagnosis:     Post-Op Diagnosis Codes:     * Bladder tumor [D49.4]    Procedure(s):  TRANSURETHRAL RESECTION OF BLADDER TUMOR    Surgeon(s):  Erik Stevens MD    Anesthesia: General    Staff:   Circulator: Tracey Marx RN  Scrub Person: Radha Mantilla    Estimated Blood Loss: minimal    Specimens:                Order Name Source Comment Collection Info Order Time   TISSUE PATHOLOGY EXAM Urinary Bladder  Collected By: Erik Stevens MD 11/11/2022  3:59 PM     Release to patient   Routine Release              Drains: * No LDAs found *    Findings: Superficial bladder tumor left lateral wall excised in its entirety.  No suggestion of invasion.    Complications: None apparent    Indications: 7-year-old female has a 3 cm left lateral wall bladder tumor just lateral to the left ureteral orifice.  She now presents for resection.    Procedure: Patient was taken to the operative suite given general anesthesia.  Placed in comfortable lithotomy position.  Prepped and draped in a sterile fashion.  Surgical timeout was performed.  This resectoscope was placed.  The bladder was thoroughly visualized.  A 3 cm tumor seen in the left lateral wall lateral to the orifice.  Using the bipolar loop resectoscope I was able to resect the mass in its entirety and did some superficial resection of the deeper layers as well getting into some of the detrusor muscle.  I did not violate the ureter that was good to be very close to this.  All the specimen was sent off to pathology.  The area was hemostatically controlled.  She was awoken and taken to recovery in stable condition.      Erik Stevens MD     Date: 11/11/2022  Time: 16:26 EST

## 2022-11-11 NOTE — ANESTHESIA POSTPROCEDURE EVALUATION
Patient: Francheska Sherman    Procedure Summary     Date: 11/11/22 Room / Location: [facility] OR  / [facility] MAIN OR    Anesthesia Start: 1536 Anesthesia Stop: 1622    Procedure: TRANSURETHRAL RESECTION OF BLADDER TUMOR Diagnosis:       Bladder tumor      (Bladder Tumor)    Surgeons: Erik Stevens MD Provider: Marco A Epps MD    Anesthesia Type: spinal ASA Status: 4          Anesthesia Type: spinal    Vitals  Vitals Value Taken Time   /86 11/11/22 1716   Temp 36.7 °C (98 °F) 11/11/22 1620   Pulse 61 11/11/22 1723   Resp 22 11/11/22 1710   SpO2 95 % 11/11/22 1723   Vitals shown include unvalidated device data.        Post Anesthesia Care and Evaluation    Patient location during evaluation: bedside  Patient participation: complete - patient participated  Level of consciousness: awake and alert  Pain management: adequate    Airway patency: patent  Anesthetic complications: No anesthetic complications    Cardiovascular status: acceptable  Respiratory status: acceptable  Hydration status: acceptable    Comments: /86   Pulse 60   Temp 36.7 °C (98 °F) (Oral)   Resp 22   SpO2 90%

## 2022-11-15 LAB
LAB AP CASE REPORT: NORMAL
LAB AP SYNOPTIC CHECKLIST: NORMAL
PATH REPORT.FINAL DX SPEC: NORMAL
PATH REPORT.GROSS SPEC: NORMAL

## 2023-01-01 ENCOUNTER — OFFICE (OUTPATIENT)
Dept: URBAN - METROPOLITAN AREA CLINIC 66 | Facility: CLINIC | Age: 68
End: 2023-01-01

## 2023-01-01 VITALS
HEART RATE: 67 BPM | SYSTOLIC BLOOD PRESSURE: 120 MMHG | HEIGHT: 67 IN | WEIGHT: 260 LBS | DIASTOLIC BLOOD PRESSURE: 76 MMHG

## 2023-01-01 DIAGNOSIS — Z87.891 PERSONAL HISTORY OF NICOTINE DEPENDENCE: ICD-10-CM

## 2023-01-01 DIAGNOSIS — J44.9 CHRONIC OBSTRUCTIVE PULMONARY DISEASE, UNSPECIFIED: ICD-10-CM

## 2023-01-01 DIAGNOSIS — B19.20 UNSPECIFIED VIRAL HEPATITIS C WITHOUT HEPATIC COMA: ICD-10-CM

## 2023-01-01 DIAGNOSIS — R19.7 DIARRHEA, UNSPECIFIED: ICD-10-CM

## 2023-01-01 DIAGNOSIS — K21.9 GASTRO-ESOPHAGEAL REFLUX DISEASE WITHOUT ESOPHAGITIS: ICD-10-CM

## 2023-01-01 DIAGNOSIS — K76.0 FATTY (CHANGE OF) LIVER, NOT ELSEWHERE CLASSIFIED: ICD-10-CM

## 2023-01-01 DIAGNOSIS — R79.89 OTHER SPECIFIED ABNORMAL FINDINGS OF BLOOD CHEMISTRY: ICD-10-CM

## 2023-01-01 DIAGNOSIS — Z99.81 DEPENDENCE ON SUPPLEMENTAL OXYGEN: ICD-10-CM

## 2023-01-01 PROCEDURE — 99214 OFFICE O/P EST MOD 30 MIN: CPT | Performed by: NURSE PRACTITIONER

## 2023-01-01 RX ORDER — COLESTIPOL HYDROCHLORIDE 1 G/1
TABLET, FILM COATED ORAL
Qty: 30 | Refills: 11 | Status: ACTIVE

## 2023-02-10 ENCOUNTER — TRANSCRIBE ORDERS (OUTPATIENT)
Dept: ADMINISTRATIVE | Facility: HOSPITAL | Age: 68
End: 2023-02-10
Payer: MEDICARE

## 2023-02-10 DIAGNOSIS — J84.9 ILD (INTERSTITIAL LUNG DISEASE): Primary | ICD-10-CM

## 2023-03-01 ENCOUNTER — TELEPHONE (OUTPATIENT)
Dept: CARDIOLOGY | Facility: CLINIC | Age: 68
End: 2023-03-01
Payer: MEDICARE

## 2023-03-01 NOTE — TELEPHONE ENCOUNTER
Caller: Francheska Sherman    Relationship: Self    Best call back number: [phone]     What is the best time to reach you: ANY     Who are you requesting to speak with (clinical staff, provider,  specific staff member): CLINICAL     Do you know the name of the person who called:    What was the call regarding: PTS HEARTRATE HAS BEEN ELEVATED AT TIMES. JUMPS UP TO AROUND 160 AND THEN LOWERS BACK DOWN. PLEASE CALL BACK TO DISCUSS. PT TAKING CEFDINIR 300MG 2 TIMES A DAY AND PROMETHAZINE 5ML EVERY 6 HOURS.    Do you require a callback: ANY

## 2023-03-28 ENCOUNTER — HOSPITAL ENCOUNTER (OUTPATIENT)
Dept: CT IMAGING | Facility: HOSPITAL | Age: 68
Discharge: HOME | End: 2023-03-28
Admitting: INTERNAL MEDICINE
Payer: MEDICARE

## 2023-03-28 DIAGNOSIS — J84.9 ILD (INTERSTITIAL LUNG DISEASE): ICD-10-CM

## 2023-03-28 PROCEDURE — 71250 CT THORAX DX C-: CPT

## 2023-03-29 ENCOUNTER — OFFICE VISIT (OUTPATIENT)
Dept: CARDIOLOGY | Age: 68
End: 2023-03-29
Payer: MEDICARE

## 2023-03-29 VITALS
SYSTOLIC BLOOD PRESSURE: 124 MMHG | DIASTOLIC BLOOD PRESSURE: 85 MMHG | HEIGHT: 67 IN | BODY MASS INDEX: 41.12 KG/M2 | WEIGHT: 262 LBS

## 2023-03-29 DIAGNOSIS — R94.31 ABNORMAL EKG: Primary | ICD-10-CM

## 2023-03-29 DIAGNOSIS — R06.02 SOB (SHORTNESS OF BREATH): ICD-10-CM

## 2023-03-29 DIAGNOSIS — E78.5 HYPERLIPIDEMIA, UNSPECIFIED HYPERLIPIDEMIA TYPE: ICD-10-CM

## 2023-03-29 DIAGNOSIS — R06.02 SHORTNESS OF BREATH: ICD-10-CM

## 2023-03-29 DIAGNOSIS — I10 ESSENTIAL HYPERTENSION: ICD-10-CM

## 2023-03-29 PROCEDURE — 3079F DIAST BP 80-89 MM HG: CPT | Performed by: INTERNAL MEDICINE

## 2023-03-29 PROCEDURE — 93000 ELECTROCARDIOGRAM COMPLETE: CPT | Performed by: INTERNAL MEDICINE

## 2023-03-29 PROCEDURE — 3074F SYST BP LT 130 MM HG: CPT | Performed by: INTERNAL MEDICINE

## 2023-03-29 PROCEDURE — 99214 OFFICE O/P EST MOD 30 MIN: CPT | Performed by: INTERNAL MEDICINE

## 2023-03-29 NOTE — H&P (VIEW-ONLY)
1 yr follow up    Subjective:        Francheska Sherman is a 68 y.o. female who here for follow up    CC  Hr elevated  168  HPI  68 years old female with hypertension abnormal EKG hyperlipidemia and palpitation here for the follow-up with no complaints of chest pains tightness heaviness or the pressure sensation     Problems Addressed this Visit        Cardiac and Vasculature    Essential hypertension    Abnormal EKG - Primary    Hyperlipidemia       Pulmonary and Pneumonias    SOB (shortness of breath)   Diagnoses       Codes Comments    Abnormal EKG    -  Primary ICD-10-CM: R94.31  ICD-9-CM: 794.31     Shortness of breath     ICD-10-CM: R06.02  ICD-9-CM: 786.05     Hyperlipidemia, unspecified hyperlipidemia type     ICD-10-CM: E78.5  ICD-9-CM: 272.4     Essential hypertension     ICD-10-CM: I10  ICD-9-CM: 401.9     SOB (shortness of breath)     ICD-10-CM: R06.02  ICD-9-CM: 786.05         .    The following portions of the patient's history were reviewed and updated as appropriate: allergies, current medications, past family history, past medical history, past social history, past surgical history and problem list.    Past Medical History:   Diagnosis Date   • ADD (attention deficit disorder)    • Anxiety    • ARDS survivor 1994   • Arthritis    • Bipolar 1 disorder (HCC)    • Breast cancer (HCC)     RIGHT   • Cancer (HCC)    • COPD (chronic obstructive pulmonary disease) (HCC)    • Deviated septum    • DONALD (dyspnea on exertion)    • Encounter for annual health examination     Annual Health Assessment: 07/30/14, 10/25/13   • GERD (gastroesophageal reflux disease)    • Hepatitis-C    • History of mammogram 04/14/2011   • Hyperactivity of bladder    • Hypertension    • Irritable bowel syndrome    • On supplemental oxygen by nasal cannula     3L DURING DAY AND 7 AT NIGHT   • Pain of right side of body     c/o pain in right side and back   • PONV (postoperative nausea and vomiting)    • Psychiatric illness    • Pulmonary  "fibrosis (HCC)    • Sepsis (HCC)    • Sleep apnea     BIPAP     reports that she quit smoking about 29 years ago. Her smoking use included cigarettes. She has never used smokeless tobacco. She reports that she does not drink alcohol and does not use drugs.   Family History   Problem Relation Age of Onset   • Liver disease Mother    • Cervical cancer Mother    • Arthritis Mother    • Crohn's disease Brother    • Malig Hyperthermia Neg Hx        Review of Systems  Constitutional: No wt loss, fever, fatigue  Gastrointestinal: No nausea, abdominal pain  Behavioral/Psych: No insomnia or anxiety   Cardiovascular palpitation  Objective:       Physical Exam           Physical Exam  /85   Ht 170.2 cm (67\")   Wt 119 kg (262 lb)   BMI 41.04 kg/m²     General appearance: No acute changes   Eyes: Sclerae conjunctivae normal, pupils reactive   HENT: Atraumatic; oropharynx clear with moist mucous membranes and no mucosal ulcerations;  Neck: Trachea midline; NECK, supple, no thyromegaly or lymphadenopathy   Lungs: Normal size and shape, normal breath sounds, equal distribution of air, no rales and rhonchi   CV: S1-S2 regular, no murmurs, no rub, no gallop   Abdomen: Soft, nontender; no masses , no abnormal abdominal sounds   Extremities: No deformity , normal color , no peripheral edema   Skin: Normal temperature, turgor and texture; no rash, ulcers  Psych: Appropriate affect, alert and oriented to person, place and time             ECG 12 Lead    Date/Time: 3/29/2023 2:14 PM  Performed by: Karely Zee MD  Authorized by: Karely Zee MD   Comparison: compared with previous ECG   Similar to previous ECG  Rhythm: sinus rhythm  Ectopy: atrial premature contractions    Clinical impression: non-specific ECG              Echocardiogram:        Current Outpatient Medications:   •  acetaminophen (TYLENOL) 325 MG tablet, Take 2 tablets by mouth Every 4 (Four) Hours As Needed for Mild Pain ., Disp: , Rfl:   •  " amLODIPine (NORVASC) 5 MG tablet, Take 1 tablet by mouth Daily As Needed. ONLY TAKES IF SYSTOLIC BP OVER 140  Indications: High Blood Pressure Disorder, Disp: , Rfl: 0  •  bisoprolol (ZEBeta) 5 MG tablet, Take 1.5 tablets by mouth Daily. Indications: High Blood Pressure Disorder, Disp: 45 tablet, Rfl: 2  •  bumetanide (BUMEX) 0.5 MG tablet, Take 1 tablet by mouth Daily As Needed (Lower extremity fluid). Indications: Edema, Disp: 30 tablet, Rfl: 0  •  Cholecalciferol (VITAMIN D3 PO), Take 1 capsule by mouth Daily. PT HOLDING FOR SURGERY, Disp: , Rfl:   •  COLESTIPOL HCL PO, Take 1 tablet by mouth Daily., Disp: , Rfl:   •  nitroglycerin (NITROSTAT) 0.4 MG SL tablet, 1 under the tongue as needed for angina, may repeat q5mins for up three doses, Disp: 100 tablet, Rfl: 11  •  omeprazole (priLOSEC) 40 MG capsule, Take 1 capsule by mouth Every Night., Disp: , Rfl:   •  potassium chloride (MICRO-K) 10 MEQ CR capsule, Take 2 capsules by mouth Daily As Needed (take on same days as Bumex). Indications: Low Amount of Potassium in the Blood, Disp: 30 capsule, Rfl: 0  •  predniSONE (DELTASONE) 10 MG tablet, Take 1 tablet by mouth 4 (Four) Times a Day. TAKING FOR LUNG DISEASE Patient takes 2 tablets daily, Disp: , Rfl:   •  rosuvastatin (CRESTOR) 5 MG tablet, Take 1 tablet by mouth Daily., Disp: , Rfl:    Assessment:        Patient Active Problem List   Diagnosis   • Overactive bladder   • Essential hypertension   • Abnormal EKG   • SOB (shortness of breath)   • Altered mental status   • GERD (gastroesophageal reflux disease)   • ARDS survivor 1994 Ventilator/Tracheostomy   • ADD (attention deficit disorder)   • Vitamin D deficiency   • Acute kidney injury (HCC)   • Volume depletion    • Generalized weakness   • Hyperlipidemia   • History of hepatitis C followed by Dr. Ranjan Qiu [phone]   • Irritable bowel syndrome with both constipation and diarrhea   • Hepatic steatosis   • Obesity (BMI 30-39.9)   • Hematuria   • Elevated  liver function tests   • Bipolar disorder, current episode mixed, moderate (HCC)   • Affective psychosis, bipolar (HCC)   • Acute on chronic respiratory failure with hypoxia and hypercapnia (HCC)   • Pulmonary interstitial fibrosis (CMS/HCC) with bleb/bullae formation   • Cyanosis of fingertip   • Lung nodule seen on imaging study 8mm RUL 9/18/20   • Pulmonary hypertension (CMS/HCC) with RVSP 55 by Echo 9/20/20   • Nonsustained ventricular tachycardia   • Ductal carcinoma in situ (DCIS) of right breast   • Bladder mass               Plan:            ICD-10-CM ICD-9-CM   1. Abnormal EKG  R94.31 794.31   2. Shortness of breath  R06.02 786.05   3. Hyperlipidemia, unspecified hyperlipidemia type  E78.5 272.4   4. Essential hypertension  I10 401.9   5. SOB (shortness of breath)  R06.02 786.05     1. Abnormal EKG  No angina pectoris    2. Shortness of breath  Multifactorial    3. Hyperlipidemia, unspecified hyperlipidemia type  Continue current treatment    4. Essential hypertension  Blood pressure under control    5. SOB (shortness of breath)  Multifactorial    Because of the significant palpitations patient needs a loop recorder as well as extra beta-blockers on the day of palpitation       exdtra half bisoprolol for elevated hr    Needs loop     See in 3 months  COUNSELING:    Francheska Canales was given to patient for the following topics: diagnostic results, risk factor reductions, impressions, risks and benefits of treatment options and importance of treatment compliance .       SMOKING COUNSELING:        Dictated using Dragon dictation

## 2023-03-29 NOTE — PROGRESS NOTES
1 yr follow up    Subjective:        Francheska Sherman is a 68 y.o. female who here for follow up    CC  Hr elevated  168  HPI  68 years old female with hypertension abnormal EKG hyperlipidemia and palpitation here for the follow-up with no complaints of chest pains tightness heaviness or the pressure sensation     Problems Addressed this Visit        Cardiac and Vasculature    Essential hypertension    Abnormal EKG - Primary    Hyperlipidemia       Pulmonary and Pneumonias    SOB (shortness of breath)   Diagnoses       Codes Comments    Abnormal EKG    -  Primary ICD-10-CM: R94.31  ICD-9-CM: 794.31     Shortness of breath     ICD-10-CM: R06.02  ICD-9-CM: 786.05     Hyperlipidemia, unspecified hyperlipidemia type     ICD-10-CM: E78.5  ICD-9-CM: 272.4     Essential hypertension     ICD-10-CM: I10  ICD-9-CM: 401.9     SOB (shortness of breath)     ICD-10-CM: R06.02  ICD-9-CM: 786.05         .    The following portions of the patient's history were reviewed and updated as appropriate: allergies, current medications, past family history, past medical history, past social history, past surgical history and problem list.    Past Medical History:   Diagnosis Date   • ADD (attention deficit disorder)    • Anxiety    • ARDS survivor 1994   • Arthritis    • Bipolar 1 disorder (HCC)    • Breast cancer (HCC)     RIGHT   • Cancer (HCC)    • COPD (chronic obstructive pulmonary disease) (HCC)    • Deviated septum    • DONALD (dyspnea on exertion)    • Encounter for annual health examination     Annual Health Assessment: 07/30/14, 10/25/13   • GERD (gastroesophageal reflux disease)    • Hepatitis-C    • History of mammogram 04/14/2011   • Hyperactivity of bladder    • Hypertension    • Irritable bowel syndrome    • On supplemental oxygen by nasal cannula     3L DURING DAY AND 7 AT NIGHT   • Pain of right side of body     c/o pain in right side and back   • PONV (postoperative nausea and vomiting)    • Psychiatric illness    • Pulmonary  "fibrosis (HCC)    • Sepsis (HCC)    • Sleep apnea     BIPAP     reports that she quit smoking about 29 years ago. Her smoking use included cigarettes. She has never used smokeless tobacco. She reports that she does not drink alcohol and does not use drugs.   Family History   Problem Relation Age of Onset   • Liver disease Mother    • Cervical cancer Mother    • Arthritis Mother    • Crohn's disease Brother    • Malig Hyperthermia Neg Hx        Review of Systems  Constitutional: No wt loss, fever, fatigue  Gastrointestinal: No nausea, abdominal pain  Behavioral/Psych: No insomnia or anxiety   Cardiovascular palpitation  Objective:       Physical Exam           Physical Exam  /85   Ht 170.2 cm (67\")   Wt 119 kg (262 lb)   BMI 41.04 kg/m²     General appearance: No acute changes   Eyes: Sclerae conjunctivae normal, pupils reactive   HENT: Atraumatic; oropharynx clear with moist mucous membranes and no mucosal ulcerations;  Neck: Trachea midline; NECK, supple, no thyromegaly or lymphadenopathy   Lungs: Normal size and shape, normal breath sounds, equal distribution of air, no rales and rhonchi   CV: S1-S2 regular, no murmurs, no rub, no gallop   Abdomen: Soft, nontender; no masses , no abnormal abdominal sounds   Extremities: No deformity , normal color , no peripheral edema   Skin: Normal temperature, turgor and texture; no rash, ulcers  Psych: Appropriate affect, alert and oriented to person, place and time             ECG 12 Lead    Date/Time: 3/29/2023 2:14 PM  Performed by: Karely Zee MD  Authorized by: Karely Zee MD   Comparison: compared with previous ECG   Similar to previous ECG  Rhythm: sinus rhythm  Ectopy: atrial premature contractions    Clinical impression: non-specific ECG              Echocardiogram:        Current Outpatient Medications:   •  acetaminophen (TYLENOL) 325 MG tablet, Take 2 tablets by mouth Every 4 (Four) Hours As Needed for Mild Pain ., Disp: , Rfl:   •  " amLODIPine (NORVASC) 5 MG tablet, Take 1 tablet by mouth Daily As Needed. ONLY TAKES IF SYSTOLIC BP OVER 140  Indications: High Blood Pressure Disorder, Disp: , Rfl: 0  •  bisoprolol (ZEBeta) 5 MG tablet, Take 1.5 tablets by mouth Daily. Indications: High Blood Pressure Disorder, Disp: 45 tablet, Rfl: 2  •  bumetanide (BUMEX) 0.5 MG tablet, Take 1 tablet by mouth Daily As Needed (Lower extremity fluid). Indications: Edema, Disp: 30 tablet, Rfl: 0  •  Cholecalciferol (VITAMIN D3 PO), Take 1 capsule by mouth Daily. PT HOLDING FOR SURGERY, Disp: , Rfl:   •  COLESTIPOL HCL PO, Take 1 tablet by mouth Daily., Disp: , Rfl:   •  nitroglycerin (NITROSTAT) 0.4 MG SL tablet, 1 under the tongue as needed for angina, may repeat q5mins for up three doses, Disp: 100 tablet, Rfl: 11  •  omeprazole (priLOSEC) 40 MG capsule, Take 1 capsule by mouth Every Night., Disp: , Rfl:   •  potassium chloride (MICRO-K) 10 MEQ CR capsule, Take 2 capsules by mouth Daily As Needed (take on same days as Bumex). Indications: Low Amount of Potassium in the Blood, Disp: 30 capsule, Rfl: 0  •  predniSONE (DELTASONE) 10 MG tablet, Take 1 tablet by mouth 4 (Four) Times a Day. TAKING FOR LUNG DISEASE Patient takes 2 tablets daily, Disp: , Rfl:   •  rosuvastatin (CRESTOR) 5 MG tablet, Take 1 tablet by mouth Daily., Disp: , Rfl:    Assessment:        Patient Active Problem List   Diagnosis   • Overactive bladder   • Essential hypertension   • Abnormal EKG   • SOB (shortness of breath)   • Altered mental status   • GERD (gastroesophageal reflux disease)   • ARDS survivor 1994 Ventilator/Tracheostomy   • ADD (attention deficit disorder)   • Vitamin D deficiency   • Acute kidney injury (HCC)   • Volume depletion    • Generalized weakness   • Hyperlipidemia   • History of hepatitis C followed by Dr. Ranjan Qiu [id number]   • Irritable bowel syndrome with both constipation and diarrhea   • Hepatic steatosis   • Obesity (BMI 30-39.9)   • Hematuria   • Elevated  liver function tests   • Bipolar disorder, current episode mixed, moderate (HCC)   • Affective psychosis, bipolar (HCC)   • Acute on chronic respiratory failure with hypoxia and hypercapnia (HCC)   • Pulmonary interstitial fibrosis (CMS/HCC) with bleb/bullae formation   • Cyanosis of fingertip   • Lung nodule seen on imaging study 8mm RUL 9/18/20   • Pulmonary hypertension (CMS/HCC) with RVSP 55 by Echo 9/20/20   • Nonsustained ventricular tachycardia   • Ductal carcinoma in situ (DCIS) of right breast   • Bladder mass               Plan:            ICD-10-CM ICD-9-CM   1. Abnormal EKG  R94.31 794.31   2. Shortness of breath  R06.02 786.05   3. Hyperlipidemia, unspecified hyperlipidemia type  E78.5 272.4   4. Essential hypertension  I10 401.9   5. SOB (shortness of breath)  R06.02 786.05     1. Abnormal EKG  No angina pectoris    2. Shortness of breath  Multifactorial    3. Hyperlipidemia, unspecified hyperlipidemia type  Continue current treatment    4. Essential hypertension  Blood pressure under control    5. SOB (shortness of breath)  Multifactorial    Because of the significant palpitations patient needs a loop recorder as well as extra beta-blockers on the day of palpitation       exdtra half bisoprolol for elevated hr    Needs loop     See in 3 months  COUNSELING:    Francheska Canales was given to patient for the following topics: diagnostic results, risk factor reductions, impressions, risks and benefits of treatment options and importance of treatment compliance .       SMOKING COUNSELING:        Dictated using Dragon dictation

## 2023-04-21 ENCOUNTER — HOSPITAL ENCOUNTER (OUTPATIENT)
Facility: HOSPITAL | Age: 68
Setting detail: HOSPITAL OUTPATIENT SURGERY
Discharge: HOME | End: 2023-04-21
Attending: INTERNAL MEDICINE | Admitting: INTERNAL MEDICINE
Payer: MEDICARE

## 2023-04-21 VITALS
DIASTOLIC BLOOD PRESSURE: 94 MMHG | TEMPERATURE: 97.7 F | WEIGHT: 260 LBS | HEIGHT: 67 IN | RESPIRATION RATE: 16 BRPM | SYSTOLIC BLOOD PRESSURE: 131 MMHG | OXYGEN SATURATION: 94 % | BODY MASS INDEX: 40.81 KG/M2 | HEART RATE: 71 BPM

## 2023-04-21 DIAGNOSIS — R06.02 SHORTNESS OF BREATH: ICD-10-CM

## 2023-04-21 DIAGNOSIS — R94.31 ABNORMAL EKG: ICD-10-CM

## 2023-04-21 PROCEDURE — 33285 INSJ SUBQ CAR RHYTHM MNTR: CPT | Performed by: INTERNAL MEDICINE

## 2023-04-21 PROCEDURE — 25010000002 CEFAZOLIN IN DEXTROSE 2-4 GM/100ML-% SOLUTION: Performed by: INTERNAL MEDICINE

## 2023-04-21 PROCEDURE — C1764 EVENT RECORDER, CARDIAC: HCPCS | Performed by: INTERNAL MEDICINE

## 2023-04-21 DEVICE — ICM LP/RECRD BIOMONITOR3M: Type: IMPLANTABLE DEVICE | Status: FUNCTIONAL

## 2023-04-21 RX ORDER — CALCIUM CARBONATE 200(500)MG
2 TABLET,CHEWABLE ORAL DAILY
COMMUNITY

## 2023-04-21 RX ORDER — LIDOCAINE HYDROCHLORIDE AND EPINEPHRINE 5; 5 MG/ML; UG/ML
INJECTION, SOLUTION INFILTRATION; PERINEURAL
Status: DISCONTINUED | OUTPATIENT
Start: 2023-04-21 | End: 2023-04-21 | Stop reason: HOSPADM

## 2023-04-21 RX ORDER — SODIUM CHLORIDE 0.9 % (FLUSH) 0.9 %
10 SYRINGE (ML) INJECTION EVERY 12 HOURS SCHEDULED
Status: DISCONTINUED | OUTPATIENT
Start: 2023-04-21 | End: 2023-04-21 | Stop reason: HOSPADM

## 2023-04-21 RX ORDER — SODIUM CHLORIDE 0.9 % (FLUSH) 0.9 %
10 SYRINGE (ML) INJECTION AS NEEDED
Status: DISCONTINUED | OUTPATIENT
Start: 2023-04-21 | End: 2023-04-21 | Stop reason: HOSPADM

## 2023-04-21 RX ORDER — SODIUM CHLORIDE 9 MG/ML
75 INJECTION, SOLUTION INTRAVENOUS CONTINUOUS
Status: DISCONTINUED | OUTPATIENT
Start: 2023-04-21 | End: 2023-04-21 | Stop reason: HOSPADM

## 2023-04-21 RX ORDER — CEFAZOLIN SODIUM 2 G/100ML
INJECTION, SOLUTION INTRAVENOUS
Status: COMPLETED | OUTPATIENT
Start: 2023-04-21 | End: 2023-04-21

## 2023-04-21 RX ADMIN — CEFAZOLIN SODIUM 2 G: 2 INJECTION, SOLUTION INTRAVENOUS at 12:39

## 2023-05-01 ENCOUNTER — TELEPHONE (OUTPATIENT)
Dept: CARDIOLOGY | Facility: CLINIC | Age: 68
End: 2023-05-01
Payer: MEDICARE

## 2023-05-01 NOTE — TELEPHONE ENCOUNTER
Remote transmission reviewed.  Loop alerting for a-fib.  Does not appear to be true a-fib, appears to be PACs.   Patient has an appt on 5/10/23.

## 2023-05-10 ENCOUNTER — OFFICE VISIT (OUTPATIENT)
Dept: CARDIOLOGY | Age: 68
End: 2023-05-10
Payer: MEDICARE

## 2023-05-10 VITALS
HEIGHT: 67 IN | WEIGHT: 265 LBS | HEART RATE: 51 BPM | BODY MASS INDEX: 41.59 KG/M2 | DIASTOLIC BLOOD PRESSURE: 70 MMHG | OXYGEN SATURATION: 96 % | SYSTOLIC BLOOD PRESSURE: 138 MMHG

## 2023-05-10 DIAGNOSIS — E78.5 HYPERLIPIDEMIA, UNSPECIFIED HYPERLIPIDEMIA TYPE: ICD-10-CM

## 2023-05-10 DIAGNOSIS — R00.2 PALPITATIONS: ICD-10-CM

## 2023-05-10 DIAGNOSIS — I10 ESSENTIAL HYPERTENSION: ICD-10-CM

## 2023-05-10 DIAGNOSIS — Z95.818 STATUS POST PLACEMENT OF IMPLANTABLE LOOP RECORDER: ICD-10-CM

## 2023-05-10 DIAGNOSIS — R94.31 ABNORMAL EKG: Primary | ICD-10-CM

## 2023-05-10 DIAGNOSIS — R06.02 SHORTNESS OF BREATH: ICD-10-CM

## 2023-05-10 NOTE — PROGRESS NOTES
Subjective:        Francheska Sherman is a 68 y.o. female who here for follow up    CC  Loop   palpitation  HPI  68 years old female with a hypertension abnormal EKG hyperlipidemia Loop recorder and palpitation here for the follow-up with no complaints of chest pains tightness heaviness or the pressure sensation     Problems Addressed this Visit        Cardiac and Vasculature    Essential hypertension    Abnormal EKG - Primary    Hyperlipidemia    Status post placement of implantable loop recorder    Palpitations   Other Visit Diagnoses     Shortness of breath          Diagnoses       Codes Comments    Abnormal EKG    -  Primary ICD-10-CM: R94.31  ICD-9-CM: 794.31     Shortness of breath     ICD-10-CM: R06.02  ICD-9-CM: 786.05     Hyperlipidemia, unspecified hyperlipidemia type     ICD-10-CM: E78.5  ICD-9-CM: 272.4     Essential hypertension     ICD-10-CM: I10  ICD-9-CM: 401.9     Status post placement of implantable loop recorder     ICD-10-CM: Z95.818  ICD-9-CM: V45.09     Palpitations     ICD-10-CM: R00.2  ICD-9-CM: 785.1         .    The following portions of the patient's history were reviewed and updated as appropriate: allergies, current medications, past family history, past medical history, past social history, past surgical history and problem list.    Past Medical History:   Diagnosis Date   • ADD (attention deficit disorder)    • Anxiety    • ARDS survivor 1994   • Arthritis    • Bipolar 1 disorder    • Breast cancer     RIGHT   • Cancer    • COPD (chronic obstructive pulmonary disease)    • Deviated septum    • DONALD (dyspnea on exertion)    • Encounter for annual health examination     Annual Health Assessment: 07/30/14, 10/25/13   • GERD (gastroesophageal reflux disease)    • Hepatitis-C    • History of mammogram 04/14/2011   • Hyperactivity of bladder    • Hypertension    • Irritable bowel syndrome    • On supplemental oxygen by nasal cannula     3L DURING DAY AND 7 AT NIGHT   • Pain of right side of body   "   c/o pain in right side and back   • PONV (postoperative nausea and vomiting)    • Psychiatric illness    • Pulmonary fibrosis    • Sepsis    • Sleep apnea     BIPAP     reports that she quit smoking about 29 years ago. Her smoking use included cigarettes. She has never used smokeless tobacco. She reports that she does not drink alcohol and does not use drugs.   Family History   Problem Relation Age of Onset   • Liver disease Mother    • Cervical cancer Mother    • Arthritis Mother    • Crohn's disease Brother    • Malig Hyperthermia Neg Hx        Review of Systems  Constitutional: No wt loss, fever, fatigue  Gastrointestinal: No nausea, abdominal pain  Behavioral/Psych: No insomnia or anxiety   Cardiovascular palpitation  Objective:       Physical Exam           Physical Exam  /70   Pulse 51   Ht 170.2 cm (67\")   Wt 120 kg (265 lb) Comment: Verbal, unable to stand on scale  SpO2 96% Comment: 4Liters  BMI 41.50 kg/m²     General appearance: No acute changes   Eyes: Sclerae conjunctivae normal, pupils reactive   HENT: Atraumatic; oropharynx clear with moist mucous membranes and no mucosal ulcerations;  Neck: Trachea midline; NECK, supple, no thyromegaly or lymphadenopathy   Lungs: Normal size and shape, normal breath sounds, equal distribution of air, no rales and rhonchi   CV: S1-S2 regular, no murmurs, no rub, no gallop   Abdomen: Soft, nontender; no masses , no abnormal abdominal sounds   Extremities: No deformity , normal color , no peripheral edema   Skin: Normal temperature, turgor and texture; no rash, ulcers  Psych: Appropriate affect, alert and oriented to person, place and time           Procedures      Echocardiogram:        Current Outpatient Medications:   •  acetaminophen (TYLENOL) 325 MG tablet, Take 2 tablets by mouth Every 4 (Four) Hours As Needed for Mild Pain ., Disp: , Rfl:   •  amLODIPine (NORVASC) 5 MG tablet, Take 1 tablet by mouth Daily As Needed. ONLY TAKES IF SYSTOLIC BP OVER " 140  Indications: High Blood Pressure Disorder, Disp: , Rfl: 0  •  bisoprolol (ZEBeta) 5 MG tablet, Take 1.5 tablets by mouth Daily. Indications: High Blood Pressure Disorder, Disp: 45 tablet, Rfl: 2  •  bumetanide (BUMEX) 0.5 MG tablet, Take 1 tablet by mouth Daily As Needed (Lower extremity fluid). Indications: Edema, Disp: 30 tablet, Rfl: 0  •  calcium carbonate (TUMS) 500 MG chewable tablet, Chew 2 tablets Daily., Disp: , Rfl:   •  COLESTIPOL HCL PO, Take 1 tablet by mouth Daily., Disp: , Rfl:   •  nitroglycerin (NITROSTAT) 0.4 MG SL tablet, 1 under the tongue as needed for angina, may repeat q5mins for up three doses, Disp: 100 tablet, Rfl: 11  •  omeprazole (priLOSEC) 40 MG capsule, Take 1 capsule by mouth Every Night., Disp: , Rfl:   •  potassium chloride (MICRO-K) 10 MEQ CR capsule, Take 2 capsules by mouth Daily As Needed (take on same days as Bumex). Indications: Low Amount of Potassium in the Blood, Disp: 30 capsule, Rfl: 0  •  predniSONE (DELTASONE) 10 MG tablet, Take 1 tablet by mouth 3 (Three) Times a Day. TAKING FOR LUNG DISEASE, Disp: , Rfl:   •  rosuvastatin (CRESTOR) 5 MG tablet, Take 1 tablet by mouth Daily., Disp: , Rfl:    Assessment:        Patient Active Problem List   Diagnosis   • Overactive bladder   • Essential hypertension   • Abnormal EKG   • SOB (shortness of breath)   • Altered mental status   • GERD (gastroesophageal reflux disease)   • ARDS survivor 1994 Ventilator/Tracheostomy   • ADD (attention deficit disorder)   • Vitamin D deficiency   • Acute kidney injury   • Volume depletion    • Generalized weakness   • Hyperlipidemia   • History of hepatitis C followed by Dr. Ranjan Qiu [id number]   • Irritable bowel syndrome with both constipation and diarrhea   • Hepatic steatosis   • Obesity (BMI 30-39.9)   • Hematuria   • Elevated liver function tests   • Bipolar disorder, current episode mixed, moderate   • Affective psychosis, bipolar   • Acute on chronic respiratory failure with  hypoxia and hypercapnia   • Pulmonary interstitial fibrosis (CMS/HCC) with bleb/bullae formation   • Cyanosis of fingertip   • Lung nodule seen on imaging study 8mm RUL 9/18/20   • Pulmonary hypertension (CMS/HCC) with RVSP 55 by Echo 9/20/20   • Nonsustained ventricular tachycardia   • Ductal carcinoma in situ (DCIS) of right breast   • Bladder mass   • Status post placement of implantable loop recorder   • Palpitations               Plan:            ICD-10-CM ICD-9-CM   1. Abnormal EKG  R94.31 794.31   2. Shortness of breath  R06.02 786.05   3. Hyperlipidemia, unspecified hyperlipidemia type  E78.5 272.4   4. Essential hypertension  I10 401.9   5. Status post placement of implantable loop recorder  Z95.818 V45.09   6. Palpitations  R00.2 785.1     1. Abnormal EKG  No angina pectoris    2. Shortness of breath  Multifactorial    3. Hyperlipidemia, unspecified hyperlipidemia type  Continue current treatment    4. Essential hypertension  Blood pressure under control    5. Status post placement of implantable loop recorder  No arrhythmia detected    6. Palpitations  Occasional       1 yr  COUNSELING:    Francheska Canales was given to patient for the following topics: diagnostic results, risk factor reductions, impressions, risks and benefits of treatment options and importance of treatment compliance .       SMOKING COUNSELING:        Dictated using Dragon dictation

## 2023-06-27 ENCOUNTER — TELEPHONE (OUTPATIENT)
Dept: CARDIOLOGY | Facility: CLINIC | Age: 68
End: 2023-06-27

## 2023-06-27 NOTE — TELEPHONE ENCOUNTER
Caller: Tate Sherman    Relationship: Emergency Contact    Best call back number: [phone]    What is the best time to reach you: ANY    Who are you requesting to speak with (clinical staff, provider,  specific staff member): CLINICAL OR HAL       What was the call regarding: PT WANTS TO DISCUSS LOOP RECORDER.

## (undated) DEVICE — IRRIGATOR BULB ASEPTO 60CC STRL

## (undated) DEVICE — KT MANIFLD CARDIAC

## (undated) DEVICE — PK PROC MINOR TOWER 40

## (undated) DEVICE — CONTAINER,SPECIMEN,OR STERILE,4OZ: Brand: MEDLINE

## (undated) DEVICE — APPL CHLORAPREP HI/LITE 26ML ORNG

## (undated) DEVICE — STCKNT IMPERV 9X36IN STRL

## (undated) DEVICE — CATH DIAG IMPULSE FR4 5F 100CM

## (undated) DEVICE — ANTIBACTERIAL UNDYED BRAIDED (POLYGLACTIN 910), SYNTHETIC ABSORBABLE SUTURE: Brand: COATED VICRYL

## (undated) DEVICE — PATIENT RETURN ELECTRODE, SINGLE-USE, CONTACT QUALITY MONITORING, ADULT, WITH 9FT CORD, FOR PATIENTS WEIGING OVER 33LBS. (15KG): Brand: MEGADYNE

## (undated) DEVICE — GLV SURG BIOGEL LTX PF 7

## (undated) DEVICE — SUT VIC 2/0 CT2 27IN J269H

## (undated) DEVICE — PENCL E/S ULTRAVAC TELESCP NOSE HOLSTR 10FT

## (undated) DEVICE — DRAPE,CHEST,FENES,15X10,STERIL: Brand: MEDLINE

## (undated) DEVICE — CATH DIAG IMPULSE FL3.5 5F 100CM

## (undated) DEVICE — Device: Brand: DEFENDO AIR/WATER/SUCTION AND BIOPSY VALVE

## (undated) DEVICE — ADHS SKIN SURG TISS VISC PREMIERPRO EXOFIN HI/VISC FAST/DRY

## (undated) DEVICE — SINGLE-USE BIOPSY FORCEPS: Brand: RADIAL JAW 4

## (undated) DEVICE — TUBING, SUCTION, 1/4" X 20', STRAIGHT: Brand: MEDLINE INDUSTRIES, INC.

## (undated) DEVICE — SUT MONOCRYL 4/0 PS2 27IN Y426H ETY426H

## (undated) DEVICE — NDL HYPO PRECISIONGLIDE REG 25G 1 1/2

## (undated) DEVICE — TRAP FLD MINIVAC MEGADYNE 100ML

## (undated) DEVICE — ELECTRD BLD EZ CLN MOD XLNG 2.75IN

## (undated) DEVICE — SUT SILK 2/0 FS BLK 18IN 685G

## (undated) DEVICE — THE TORRENT IRRIGATION SCOPE CONNECTOR IS USED WITH THE TORRENT IRRIGATION TUBING TO PROVIDE IRRIGATION FLUIDS SUCH AS STERILE WATER DURING GASTROINTESTINAL ENDOSCOPIC PROCEDURES WHEN USED IN CONJUNCTION WITH AN IRRIGATION PUMP (OR ELECTROSURGICAL UNIT).: Brand: TORRENT

## (undated) DEVICE — LOU LOOP RECORDER PACK: Brand: MEDLINE INDUSTRIES, INC.

## (undated) DEVICE — HF-RESECTION ELECTRODE PLASMALOOP LOOP, MEDIUM, 24 FR., 12°-30°, ESG TURIS: Brand: OLYMPUS

## (undated) DEVICE — TUBING, SUCTION, 1/4" X 10', STRAIGHT: Brand: MEDLINE

## (undated) DEVICE — CANN NASL CO2 TRULINK W/O2 A/

## (undated) DEVICE — TIDISHIELD UROLOGY DRAIN BAGS FROSTY VINYL STERILE FITS SIEMENS UROSKOP ACCESS 20 PER CASE: Brand: TIDISHIELD

## (undated) DEVICE — LOU TUR: Brand: MEDLINE INDUSTRIES, INC.

## (undated) DEVICE — BITEBLOCK OMNI BLOC

## (undated) DEVICE — PK CATH CARD 40

## (undated) DEVICE — CATH VENT MIV RADL PIG ST TIP 5F 110CM

## (undated) DEVICE — GW EMR FIX EXCHG J STD .035 3MM 260CM

## (undated) DEVICE — LEGGINGS, PAIR, CLEAR, STERILE: Brand: MEDLINE

## (undated) DEVICE — GLV SURG BIOGEL LTX PF 6 1/2

## (undated) DEVICE — SYR LL TP 10ML STRL

## (undated) DEVICE — GLIDESHEATH SLENDER STAINLESS STEEL KIT: Brand: GLIDESHEATH SLENDER

## (undated) DEVICE — SPNG LAP 18X18IN LF STRL PK/5

## (undated) DEVICE — TR BAND RADIAL ARTERY COMPRESSION DEVICE: Brand: TR BAND